# Patient Record
Sex: FEMALE | Race: BLACK OR AFRICAN AMERICAN | NOT HISPANIC OR LATINO | Employment: OTHER | RURAL
[De-identification: names, ages, dates, MRNs, and addresses within clinical notes are randomized per-mention and may not be internally consistent; named-entity substitution may affect disease eponyms.]

---

## 2017-12-07 ENCOUNTER — HISTORICAL (OUTPATIENT)
Dept: ADMINISTRATIVE | Facility: HOSPITAL | Age: 67
End: 2017-12-07

## 2017-12-12 LAB
LAB AP CLINICAL INFORMATION: NORMAL
LAB AP COMMENTS: NORMAL
LAB AP GENERAL CAT - HISTORICAL: NORMAL
LAB AP INTERPRETATION/RESULT - HISTORICAL: NEGATIVE
LAB AP SPECIMEN ADEQUACY - HISTORICAL: NORMAL
LAB AP SPECIMEN SUBMITTED - HISTORICAL: NORMAL

## 2018-03-14 ENCOUNTER — HISTORICAL (OUTPATIENT)
Dept: ADMINISTRATIVE | Facility: HOSPITAL | Age: 68
End: 2018-03-14

## 2018-03-15 LAB
LAB AP CLINICAL INFORMATION: NORMAL
LAB AP COMMENTS: NORMAL
LAB AP DIAGNOSIS - HISTORICAL: NORMAL
LAB AP GROSS PATHOLOGY - HISTORICAL: NORMAL
LAB AP SPECIMEN SUBMITTED - HISTORICAL: NORMAL

## 2020-04-18 ENCOUNTER — HISTORICAL (OUTPATIENT)
Dept: ADMINISTRATIVE | Facility: HOSPITAL | Age: 70
End: 2020-04-18

## 2020-04-18 LAB
ALBUMIN SERPL BCP-MCNC: 3.8 G/DL (ref 3.4–5)
ALBUMIN/GLOB SERPL: 1 {RATIO}
ALP SERPL-CCNC: 63 U/L (ref 50–136)
ALT SERPL W P-5'-P-CCNC: 18 U/L (ref 12–78)
AST SERPL W P-5'-P-CCNC: 14 U/L (ref 15–37)
BACTERIA #/AREA URNS HPF: ABNORMAL /HPF
BASOPHILS # BLD AUTO: 0.04 X10E3/UL (ref 0–0.2)
BASOPHILS NFR BLD AUTO: 0.3 % (ref 0–1)
BILIRUB SERPL-MCNC: 0.4 MG/DL (ref 0.2–1)
BILIRUB UR QL STRIP: NEGATIVE MG/DL
BUN SERPL-MCNC: 50 MG/DL (ref 7–18)
CALCIUM SERPL-MCNC: 9.3 MG/DL (ref 8.5–10.1)
CHLORIDE SERPL-SCNC: 102 MMOL/L (ref 101–111)
CLARITY UR: ABNORMAL
CO2 SERPL-SCNC: 27 MMOL/L (ref 21–32)
COLOR UR: YELLOW
CREAT SERPL-MCNC: 1.9 MG/DL (ref 0.6–1.3)
EOSINOPHIL # BLD AUTO: 0.13 X10E3/UL (ref 0–0.5)
EOSINOPHIL NFR BLD AUTO: 1.1 % (ref 1–4)
ERYTHROCYTE [DISTWIDTH] IN BLOOD BY AUTOMATED COUNT: 15.3 % (ref 11.5–14.5)
GLOBULIN SER-MCNC: 3.4 G/DL
GLUCOSE SERPL-MCNC: 154 MG/DL (ref 74–106)
GLUCOSE UR STRIP-MCNC: NORMAL MG/DL
HCT VFR BLD AUTO: 35.3 % (ref 38–47)
HGB BLD-MCNC: 11.6 G/DL (ref 12–16)
KETONES UR STRIP-SCNC: NEGATIVE MG/DL
LEUKOCYTE ESTERASE UR QL STRIP: ABNORMAL LEU/UL
LYMPHOCYTES # BLD AUTO: 1.31 X10E3/UL (ref 1–4.8)
LYMPHOCYTES NFR BLD AUTO: 11.4 % (ref 27–41)
MAGNESIUM SERPL-MCNC: 1.8 MG/DL (ref 1.8–2.4)
MCH RBC QN AUTO: 25.6 PG (ref 27–31)
MCHC RBC AUTO-ENTMCNC: 32.9 G/DL (ref 32–36)
MCV RBC AUTO: 78 FL (ref 80–96)
MONOCYTES # BLD AUTO: 1.21 X10E3/UL (ref 0–0.8)
MONOCYTES NFR BLD AUTO: 10.6 % (ref 2–6)
MPC BLD CALC-MCNC: 10.7 FL (ref 9.4–12.4)
MUCOUS THREADS #/AREA URNS HPF: ABNORMAL /HPF
NEUTROPHILS # BLD AUTO: 8.77 X10E3/UL (ref 1.8–7.7)
NEUTROPHILS NFR BLD AUTO: 76.6 % (ref 53–65)
NITRITE UR QL STRIP: NEGATIVE
PH UR STRIP: 6 PH UNITS (ref 5–8)
PHOSPHATE SERPL-MCNC: 3 MG/DL (ref 2.5–4.9)
PLATELET # BLD AUTO: 218 X10E3/UL (ref 150–400)
POTASSIUM SERPL-SCNC: 4.3 MMOL/L (ref 3.6–5)
PROT SERPL-MCNC: 7.2 G/DL (ref 6.4–8.2)
PROT UR QL STRIP: NEGATIVE MG/DL
RBC # BLD AUTO: 4.53 X10E6/UL (ref 4.2–5.4)
RBC # UR STRIP: ABNORMAL ERY/UL
RBC #/AREA URNS HPF: ABNORMAL /HPF (ref 0–3)
RENAL EPI CELLS #/AREA URNS LPF: ABNORMAL /LPF
SODIUM SERPL-SCNC: 140 MMOL/L (ref 135–145)
SP GR UR STRIP: 1.01 (ref 1–1.03)
SQUAMOUS #/AREA URNS LPF: ABNORMAL /LPF
UROBILINOGEN UR STRIP-ACNC: 0.2 MG/DL
WBC # BLD AUTO: 11.46 X10E3/UL (ref 4.5–11)
WBC #/AREA URNS HPF: ABNORMAL /HPF (ref 0–5)

## 2020-04-20 LAB
REPORT: 38
REPORT: NORMAL

## 2020-04-21 LAB — TACROLIMUS TROUGH BLD-MCNC: 2 NG/ML

## 2020-07-15 ENCOUNTER — HISTORICAL (OUTPATIENT)
Dept: ADMINISTRATIVE | Facility: HOSPITAL | Age: 70
End: 2020-07-15

## 2021-01-14 ENCOUNTER — HISTORICAL (OUTPATIENT)
Dept: ADMINISTRATIVE | Facility: HOSPITAL | Age: 71
End: 2021-01-14

## 2021-01-14 LAB
ALBUMIN SERPL BCP-MCNC: 4.3 G/DL (ref 3.5–5)
ALBUMIN/GLOB SERPL: 1.3 {RATIO}
ALP SERPL-CCNC: 70 U/L (ref 55–142)
ALT SERPL W P-5'-P-CCNC: 19 U/L (ref 13–56)
ANION GAP SERPL CALCULATED.3IONS-SCNC: 11 MMOL/L (ref 7–16)
AST SERPL W P-5'-P-CCNC: 16 U/L (ref 15–37)
BASOPHILS # BLD AUTO: 0.03 X10E3/UL (ref 0–0.2)
BASOPHILS NFR BLD AUTO: 0.3 % (ref 0–1)
BILIRUB SERPL-MCNC: 0.3 MG/DL (ref 0–1.2)
BUN SERPL-MCNC: 52 MG/DL (ref 7–18)
BUN/CREAT SERPL: 26
CALCIUM SERPL-MCNC: 9.9 MG/DL (ref 8.5–10.1)
CHLORIDE SERPL-SCNC: 102 MMOL/L (ref 98–107)
CO2 SERPL-SCNC: 30 MMOL/L (ref 21–32)
CREAT SERPL-MCNC: 2.03 MG/DL (ref 0.5–1.02)
EOSINOPHIL # BLD AUTO: 0.11 X10E3/UL (ref 0–0.5)
EOSINOPHIL NFR BLD AUTO: 1.2 % (ref 1–4)
ERYTHROCYTE [DISTWIDTH] IN BLOOD BY AUTOMATED COUNT: 17.4 % (ref 11.5–14.5)
GLOBULIN SER-MCNC: 3.4 G/DL (ref 2–4)
GLUCOSE SERPL-MCNC: 64 MG/DL (ref 74–106)
HCT VFR BLD AUTO: 37.9 % (ref 38–47)
HGB BLD-MCNC: 11.5 G/DL (ref 12–16)
IMM GRANULOCYTES # BLD AUTO: 0.02 X10E3/UL (ref 0–0.04)
IMM GRANULOCYTES NFR BLD: 0.2 % (ref 0–0.4)
LYMPHOCYTES # BLD AUTO: 1.15 X10E3/UL (ref 1–4.8)
LYMPHOCYTES NFR BLD AUTO: 12.7 % (ref 27–41)
MAGNESIUM SERPL-MCNC: 2.5 MG/DL (ref 1.7–2.3)
MCH RBC QN AUTO: 25.4 PG (ref 27–31)
MCHC RBC AUTO-ENTMCNC: 30.3 G/DL (ref 32–36)
MCV RBC AUTO: 83.8 FL (ref 80–96)
MONOCYTES # BLD AUTO: 0.87 X10E3/UL (ref 0–0.8)
MONOCYTES NFR BLD AUTO: 9.6 % (ref 2–6)
MPC BLD CALC-MCNC: 11 FL (ref 9.4–12.4)
NEUTROPHILS # BLD AUTO: 6.87 X10E3/UL (ref 1.8–7.7)
NEUTROPHILS NFR BLD AUTO: 76 % (ref 53–65)
NRBC # BLD AUTO: 0 X10E3/UL (ref 0–0)
NRBC, AUTO (.00): 0 /100 (ref 0–0)
PHOSPHATE SERPL-MCNC: 3.8 MG/DL (ref 2.5–4.5)
PLATELET # BLD AUTO: 209 X10E3/UL (ref 150–400)
POTASSIUM SERPL-SCNC: 4 MMOL/L (ref 3.5–5.1)
PROT SERPL-MCNC: 7.7 G/DL (ref 6.4–8.2)
RBC # BLD AUTO: 4.52 X10E6/UL (ref 4.2–5.4)
SODIUM SERPL-SCNC: 139 MMOL/L (ref 136–145)
WBC # BLD AUTO: 9.05 X10E3/UL (ref 4.5–11)

## 2021-01-16 LAB — TACROLIMUS TROUGH BLD-MCNC: 3.6 NG/ML

## 2021-02-22 ENCOUNTER — HISTORICAL (OUTPATIENT)
Dept: ADMINISTRATIVE | Facility: HOSPITAL | Age: 71
End: 2021-02-22

## 2021-06-22 RX ORDER — FUROSEMIDE 80 MG/1
80 TABLET ORAL 2 TIMES DAILY
COMMUNITY
End: 2021-06-22 | Stop reason: SDUPTHER

## 2021-06-22 RX ORDER — NIFEDIPINE 30 MG/1
30 TABLET, EXTENDED RELEASE ORAL DAILY
Qty: 90 TABLET | Refills: 1 | Status: SHIPPED | OUTPATIENT
Start: 2021-06-22 | End: 2021-08-18

## 2021-06-22 RX ORDER — ISOSORBIDE MONONITRATE 60 MG/1
60 TABLET, EXTENDED RELEASE ORAL NIGHTLY
COMMUNITY
End: 2021-06-22 | Stop reason: SDUPTHER

## 2021-06-22 RX ORDER — NIFEDIPINE 30 MG/1
30 TABLET, EXTENDED RELEASE ORAL DAILY
COMMUNITY
End: 2021-06-22 | Stop reason: SDUPTHER

## 2021-06-22 RX ORDER — ISOSORBIDE MONONITRATE 60 MG/1
60 TABLET, EXTENDED RELEASE ORAL NIGHTLY
Qty: 90 TABLET | Refills: 1 | Status: SHIPPED | OUTPATIENT
Start: 2021-06-22 | End: 2021-08-18

## 2021-06-22 RX ORDER — FUROSEMIDE 80 MG/1
80 TABLET ORAL 2 TIMES DAILY
Qty: 30 TABLET | Refills: 3 | Status: SHIPPED | OUTPATIENT
Start: 2021-06-22 | End: 2021-08-18

## 2021-07-22 ENCOUNTER — OFFICE VISIT (OUTPATIENT)
Dept: FAMILY MEDICINE | Facility: CLINIC | Age: 71
End: 2021-07-22
Payer: COMMERCIAL

## 2021-07-22 VITALS
HEIGHT: 64 IN | RESPIRATION RATE: 20 BRPM | BODY MASS INDEX: 30.08 KG/M2 | TEMPERATURE: 98 F | OXYGEN SATURATION: 99 % | DIASTOLIC BLOOD PRESSURE: 70 MMHG | SYSTOLIC BLOOD PRESSURE: 140 MMHG | WEIGHT: 176.19 LBS | HEART RATE: 69 BPM

## 2021-07-22 DIAGNOSIS — Z12.31 ENCOUNTER FOR SCREENING MAMMOGRAM FOR MALIGNANT NEOPLASM OF BREAST: ICD-10-CM

## 2021-07-22 DIAGNOSIS — Z78.0 ASYMPTOMATIC MENOPAUSAL STATE: ICD-10-CM

## 2021-07-22 DIAGNOSIS — Z00.00 ENCOUNTER FOR SUBSEQUENT ANNUAL WELLNESS VISIT (AWV) IN MEDICARE PATIENT: Primary | ICD-10-CM

## 2021-07-22 PROCEDURE — 1036F TOBACCO NON-USER: CPT | Mod: ,,, | Performed by: FAMILY MEDICINE

## 2021-07-22 PROCEDURE — G0439 PR MEDICARE ANNUAL WELLNESS SUBSEQUENT VISIT: ICD-10-PCS | Mod: ,,, | Performed by: FAMILY MEDICINE

## 2021-07-22 PROCEDURE — G0439 PPPS, SUBSEQ VISIT: HCPCS | Mod: ,,, | Performed by: FAMILY MEDICINE

## 2021-07-22 PROCEDURE — 3048F PR MOST RECENT LDL-C < 100 MG/DL: ICD-10-PCS | Mod: ,,, | Performed by: FAMILY MEDICINE

## 2021-07-22 PROCEDURE — 1036F PR CURRENT TOBACCO NON-USER: ICD-10-PCS | Mod: ,,, | Performed by: FAMILY MEDICINE

## 2021-07-22 PROCEDURE — 3048F LDL-C <100 MG/DL: CPT | Mod: ,,, | Performed by: FAMILY MEDICINE

## 2021-07-22 RX ORDER — MYCOPHENOLIC ACID 180 MG/1
180 TABLET, DELAYED RELEASE ORAL 2 TIMES DAILY
COMMUNITY
End: 2024-02-14

## 2021-07-22 RX ORDER — NAPROXEN SODIUM 220 MG/1
81 TABLET, FILM COATED ORAL
COMMUNITY
End: 2021-08-18 | Stop reason: SDUPTHER

## 2021-07-22 RX ORDER — TAMSULOSIN HYDROCHLORIDE 0.4 MG/1
0.4 CAPSULE ORAL DAILY
COMMUNITY
End: 2021-08-18 | Stop reason: SDUPTHER

## 2021-07-22 RX ORDER — TACROLIMUS 1 MG/1
1 CAPSULE ORAL DAILY
COMMUNITY
Start: 2021-07-16 | End: 2023-11-27

## 2021-07-22 RX ORDER — AMLODIPINE BESYLATE 10 MG/1
10 TABLET ORAL
COMMUNITY
End: 2021-08-18 | Stop reason: SDUPTHER

## 2021-07-22 RX ORDER — CARVEDILOL 25 MG/1
25 TABLET ORAL 2 TIMES DAILY
COMMUNITY
Start: 2021-04-08 | End: 2021-08-18 | Stop reason: SDUPTHER

## 2021-07-22 RX ORDER — INSULIN ASPART 100 [IU]/ML
9 INJECTION, SOLUTION INTRAVENOUS; SUBCUTANEOUS 3 TIMES DAILY
COMMUNITY
Start: 2021-01-22 | End: 2021-08-18 | Stop reason: SDUPTHER

## 2021-07-22 RX ORDER — PREDNISONE 5 MG/1
5 TABLET ORAL DAILY
COMMUNITY
End: 2023-11-27

## 2021-07-22 RX ORDER — INSULIN DEGLUDEC 100 U/ML
20 INJECTION, SOLUTION SUBCUTANEOUS DAILY
COMMUNITY
End: 2021-08-18 | Stop reason: SDUPTHER

## 2021-08-18 ENCOUNTER — OFFICE VISIT (OUTPATIENT)
Dept: FAMILY MEDICINE | Facility: CLINIC | Age: 71
End: 2021-08-18
Payer: COMMERCIAL

## 2021-08-18 VITALS
DIASTOLIC BLOOD PRESSURE: 90 MMHG | BODY MASS INDEX: 30.02 KG/M2 | HEIGHT: 64 IN | TEMPERATURE: 99 F | RESPIRATION RATE: 20 BRPM | SYSTOLIC BLOOD PRESSURE: 200 MMHG | WEIGHT: 175.81 LBS | OXYGEN SATURATION: 100 % | HEART RATE: 86 BPM

## 2021-08-18 DIAGNOSIS — E11.21 TYPE 2 DIABETES MELLITUS WITH DIABETIC NEPHROPATHY, WITH LONG-TERM CURRENT USE OF INSULIN: ICD-10-CM

## 2021-08-18 DIAGNOSIS — I10 ESSENTIAL HYPERTENSION: Primary | ICD-10-CM

## 2021-08-18 DIAGNOSIS — E78.2 MIXED HYPERLIPIDEMIA: ICD-10-CM

## 2021-08-18 DIAGNOSIS — H66.93 BILATERAL OTITIS MEDIA, UNSPECIFIED OTITIS MEDIA TYPE: ICD-10-CM

## 2021-08-18 DIAGNOSIS — Z20.822 EXPOSURE TO COVID-19 VIRUS: ICD-10-CM

## 2021-08-18 DIAGNOSIS — Z79.4 TYPE 2 DIABETES MELLITUS WITH DIABETIC NEPHROPATHY, WITH LONG-TERM CURRENT USE OF INSULIN: ICD-10-CM

## 2021-08-18 PROBLEM — M65.311 TRIGGER THUMB OF RIGHT HAND: Status: ACTIVE | Noted: 2018-11-15

## 2021-08-18 PROBLEM — G03.9 MENINGITIS: Status: ACTIVE | Noted: 2018-09-26

## 2021-08-18 PROBLEM — G56.01 CARPAL TUNNEL SYNDROME OF RIGHT WRIST: Status: ACTIVE | Noted: 2018-11-15

## 2021-08-18 PROBLEM — G93.2 ELEVATED INTRACRANIAL PRESSURE: Status: ACTIVE | Noted: 2020-12-11

## 2021-08-18 PROBLEM — R19.7 ACUTE DIARRHEA: Status: ACTIVE | Noted: 2018-09-12

## 2021-08-18 PROBLEM — S98.132A: Status: ACTIVE | Noted: 2021-02-26

## 2021-08-18 PROBLEM — H53.8 BLURRY VISION: Status: ACTIVE | Noted: 2018-09-24

## 2021-08-18 PROBLEM — I20.89 ANGINA OF EFFORT: Status: ACTIVE | Noted: 2020-09-30

## 2021-08-18 PROBLEM — G56.21 CUBITAL TUNNEL SYNDROME, RIGHT: Status: ACTIVE | Noted: 2018-11-15

## 2021-08-18 PROBLEM — I25.10 CORONARY ARTERY DISEASE INVOLVING NATIVE CORONARY ARTERY OF NATIVE HEART WITHOUT ANGINA PECTORIS: Status: ACTIVE | Noted: 2017-04-17

## 2021-08-18 PROBLEM — B45.9 CRYPTOCOCCOSIS: Status: ACTIVE | Noted: 2017-11-21

## 2021-08-18 PROBLEM — M54.16 LUMBAR RADICULOPATHY: Status: ACTIVE | Noted: 2018-11-29

## 2021-08-18 PROBLEM — E11.3553 STABLE PROLIFERATIVE DIABETIC RETINOPATHY OF BOTH EYES ASSOCIATED WITH TYPE 2 DIABETES MELLITUS: Status: ACTIVE | Noted: 2019-12-09

## 2021-08-18 PROBLEM — G62.9 NEUROPATHY: Status: ACTIVE | Noted: 2021-02-26

## 2021-08-18 PROBLEM — R04.2 MASSIVE HEMOPTYSIS: Status: ACTIVE | Noted: 2017-10-18

## 2021-08-18 PROBLEM — J80 ARDS (ADULT RESPIRATORY DISTRESS SYNDROME): Status: ACTIVE | Noted: 2017-10-18

## 2021-08-18 PROBLEM — R74.01 TRANSAMINITIS: Status: ACTIVE | Noted: 2017-10-03

## 2021-08-18 PROBLEM — E11.42 POLYNEUROPATHY DUE TO TYPE 2 DIABETES MELLITUS: Status: ACTIVE | Noted: 2018-11-29

## 2021-08-18 PROBLEM — Z96.1 PSEUDOPHAKIA, BOTH EYES: Status: ACTIVE | Noted: 2019-12-09

## 2021-08-18 PROBLEM — R91.8 PULMONARY NODULES: Status: ACTIVE | Noted: 2017-06-27

## 2021-08-18 LAB
CTP QC/QA: YES
SARS-COV-2 AG RESP QL IA.RAPID: NEGATIVE

## 2021-08-18 PROCEDURE — 99214 PR OFFICE/OUTPT VISIT, EST, LEVL IV, 30-39 MIN: ICD-10-PCS | Mod: ,,, | Performed by: FAMILY MEDICINE

## 2021-08-18 PROCEDURE — 87426 SARSCOV CORONAVIRUS AG IA: CPT | Mod: QW,,, | Performed by: FAMILY MEDICINE

## 2021-08-18 PROCEDURE — 99214 OFFICE O/P EST MOD 30 MIN: CPT | Mod: ,,, | Performed by: FAMILY MEDICINE

## 2021-08-18 PROCEDURE — 87426 SARS CORONAVIRUS 2 ANTIGEN POCT: ICD-10-PCS | Mod: QW,,, | Performed by: FAMILY MEDICINE

## 2021-08-18 RX ORDER — PRAVASTATIN SODIUM 20 MG/1
20 TABLET ORAL DAILY
Qty: 90 TABLET | Refills: 3 | Status: SHIPPED | OUTPATIENT
Start: 2021-08-18 | End: 2021-08-18

## 2021-08-18 RX ORDER — CARVEDILOL 25 MG/1
25 TABLET ORAL 2 TIMES DAILY
Qty: 180 TABLET | Refills: 1 | Status: SHIPPED | OUTPATIENT
Start: 2021-08-18 | End: 2021-08-18 | Stop reason: SDUPTHER

## 2021-08-18 RX ORDER — INSULIN ASPART 100 [IU]/ML
9 INJECTION, SOLUTION INTRAVENOUS; SUBCUTANEOUS
Qty: 24.3 ML | Refills: 1 | Status: SHIPPED | OUTPATIENT
Start: 2021-08-18 | End: 2021-09-21

## 2021-08-18 RX ORDER — NAPROXEN SODIUM 220 MG/1
81 TABLET, FILM COATED ORAL DAILY
Qty: 90 TABLET | Refills: 3 | Status: SHIPPED | OUTPATIENT
Start: 2021-08-18 | End: 2022-08-25

## 2021-08-18 RX ORDER — INSULIN DEGLUDEC 100 U/ML
20 INJECTION, SOLUTION SUBCUTANEOUS DAILY
Qty: 6 PEN | Refills: 1 | Status: SHIPPED | OUTPATIENT
Start: 2021-08-18 | End: 2021-08-18 | Stop reason: SDUPTHER

## 2021-08-18 RX ORDER — CIPROFLOXACIN AND DEXAMETHASONE 3; 1 MG/ML; MG/ML
4 SUSPENSION/ DROPS AURICULAR (OTIC) 2 TIMES DAILY
Qty: 7.5 ML | Refills: 0 | Status: SHIPPED | OUTPATIENT
Start: 2021-08-18 | End: 2021-09-10

## 2021-08-18 RX ORDER — NITROGLYCERIN 0.4 MG/1
0.4 TABLET SUBLINGUAL EVERY 5 MIN PRN
Qty: 30 TABLET | Refills: 3 | Status: SHIPPED | OUTPATIENT
Start: 2021-08-18 | End: 2021-08-18

## 2021-08-18 RX ORDER — INSULIN DEGLUDEC 100 U/ML
20 INJECTION, SOLUTION SUBCUTANEOUS DAILY
Qty: 6 PEN | Refills: 1 | Status: SHIPPED | OUTPATIENT
Start: 2021-08-18 | End: 2021-08-18

## 2021-08-18 RX ORDER — CARVEDILOL 25 MG/1
25 TABLET ORAL 2 TIMES DAILY
Qty: 180 TABLET | Refills: 1 | Status: SHIPPED | OUTPATIENT
Start: 2021-08-18 | End: 2021-08-18

## 2021-08-18 RX ORDER — CARVEDILOL 25 MG/1
25 TABLET ORAL 2 TIMES DAILY
Qty: 180 TABLET | Refills: 1 | Status: SHIPPED | OUTPATIENT
Start: 2021-08-18 | End: 2022-03-23

## 2021-08-18 RX ORDER — AMLODIPINE BESYLATE 10 MG/1
10 TABLET ORAL DAILY
Qty: 90 TABLET | Refills: 1 | Status: SHIPPED | OUTPATIENT
Start: 2021-08-18 | End: 2022-04-19

## 2021-08-18 RX ORDER — TAMSULOSIN HYDROCHLORIDE 0.4 MG/1
0.4 CAPSULE ORAL DAILY
Qty: 90 CAPSULE | Refills: 3 | Status: SHIPPED | OUTPATIENT
Start: 2021-08-18 | End: 2022-01-19

## 2021-08-18 RX ORDER — FUROSEMIDE 40 MG/1
40 TABLET ORAL 2 TIMES DAILY
Qty: 180 TABLET | Refills: 3 | Status: SHIPPED | OUTPATIENT
Start: 2021-08-18 | End: 2022-08-22

## 2021-08-18 RX ORDER — TAMSULOSIN HYDROCHLORIDE 0.4 MG/1
0.4 CAPSULE ORAL DAILY
Qty: 90 CAPSULE | Refills: 3 | Status: SHIPPED | OUTPATIENT
Start: 2021-08-18 | End: 2021-08-18

## 2021-08-18 RX ORDER — INSULIN ASPART 100 [IU]/ML
9 INJECTION, SOLUTION INTRAVENOUS; SUBCUTANEOUS
Qty: 24.3 ML | Refills: 1 | Status: SHIPPED | OUTPATIENT
Start: 2021-08-18 | End: 2021-08-18 | Stop reason: SDUPTHER

## 2021-08-18 RX ORDER — NAPROXEN SODIUM 220 MG/1
81 TABLET, FILM COATED ORAL DAILY
Qty: 90 TABLET | Refills: 3 | Status: SHIPPED | OUTPATIENT
Start: 2021-08-18 | End: 2021-08-18

## 2021-08-18 RX ORDER — AMLODIPINE BESYLATE 10 MG/1
10 TABLET ORAL DAILY
Qty: 90 TABLET | Refills: 1 | Status: SHIPPED | OUTPATIENT
Start: 2021-08-18 | End: 2021-08-18

## 2021-08-18 RX ORDER — NITROGLYCERIN 0.4 MG/1
0.4 TABLET SUBLINGUAL EVERY 5 MIN PRN
Qty: 30 TABLET | Refills: 3 | Status: SHIPPED | OUTPATIENT
Start: 2021-08-18 | End: 2023-09-13

## 2021-08-18 RX ORDER — FUROSEMIDE 40 MG/1
40 TABLET ORAL 2 TIMES DAILY
Qty: 180 TABLET | Refills: 3 | Status: SHIPPED | OUTPATIENT
Start: 2021-08-18 | End: 2021-08-18

## 2021-08-18 RX ORDER — INSULIN DEGLUDEC 100 U/ML
20 INJECTION, SOLUTION SUBCUTANEOUS DAILY
Qty: 6 PEN | Refills: 1 | Status: SHIPPED | OUTPATIENT
Start: 2021-08-18 | End: 2021-09-21

## 2021-08-18 RX ORDER — AMLODIPINE BESYLATE 10 MG/1
10 TABLET ORAL DAILY
Qty: 90 TABLET | Refills: 1 | Status: SHIPPED | OUTPATIENT
Start: 2021-08-18 | End: 2021-08-18 | Stop reason: SDUPTHER

## 2021-08-18 RX ORDER — PRAVASTATIN SODIUM 20 MG/1
20 TABLET ORAL DAILY
COMMUNITY
End: 2021-08-18 | Stop reason: SDUPTHER

## 2021-08-18 RX ORDER — IBUPROFEN 800 MG/1
800 TABLET ORAL EVERY 8 HOURS PRN
COMMUNITY
End: 2021-08-18

## 2021-08-18 RX ORDER — INSULIN ASPART 100 [IU]/ML
9 INJECTION, SOLUTION INTRAVENOUS; SUBCUTANEOUS
Qty: 24.3 ML | Refills: 1 | Status: SHIPPED | OUTPATIENT
Start: 2021-08-18 | End: 2021-08-18

## 2021-08-18 RX ORDER — NITROGLYCERIN 0.4 MG/1
0.4 TABLET SUBLINGUAL EVERY 5 MIN PRN
COMMUNITY
End: 2021-08-18 | Stop reason: SDUPTHER

## 2021-08-18 RX ORDER — FUROSEMIDE 40 MG/1
40 TABLET ORAL 2 TIMES DAILY
COMMUNITY
End: 2021-08-18 | Stop reason: SDUPTHER

## 2021-08-18 RX ORDER — PRAVASTATIN SODIUM 20 MG/1
20 TABLET ORAL DAILY
Qty: 90 TABLET | Refills: 3 | Status: SHIPPED | OUTPATIENT
Start: 2021-08-18 | End: 2022-03-28 | Stop reason: SDUPTHER

## 2021-08-18 RX ORDER — AMOXICILLIN AND CLAVULANATE POTASSIUM 500; 125 MG/1; MG/1
1 TABLET, FILM COATED ORAL 2 TIMES DAILY
Qty: 14 TABLET | Refills: 0 | Status: SHIPPED | OUTPATIENT
Start: 2021-08-18 | End: 2021-08-25

## 2021-08-18 RX ORDER — CLONIDINE HYDROCHLORIDE 0.2 MG/1
0.2 TABLET ORAL
Status: COMPLETED | OUTPATIENT
Start: 2021-08-18 | End: 2021-08-18

## 2021-08-18 RX ADMIN — CLONIDINE HYDROCHLORIDE 0.2 MG: 0.2 TABLET ORAL at 02:08

## 2021-09-09 ENCOUNTER — HOSPITAL ENCOUNTER (OUTPATIENT)
Dept: RADIOLOGY | Facility: HOSPITAL | Age: 71
Discharge: HOME | End: 2021-09-09
Attending: FAMILY MEDICINE
Payer: COMMERCIAL

## 2021-09-09 VITALS — WEIGHT: 175 LBS | HEIGHT: 64 IN | BODY MASS INDEX: 29.88 KG/M2

## 2021-09-09 DIAGNOSIS — Z12.31 ENCOUNTER FOR SCREENING MAMMOGRAM FOR MALIGNANT NEOPLASM OF BREAST: ICD-10-CM

## 2021-09-09 DIAGNOSIS — Z78.0 ASYMPTOMATIC MENOPAUSAL STATE: ICD-10-CM

## 2021-09-09 DIAGNOSIS — M81.0 AGE RELATED OSTEOPOROSIS, UNSPECIFIED PATHOLOGICAL FRACTURE PRESENCE: Primary | ICD-10-CM

## 2021-09-09 PROCEDURE — 77067 MAMMO DIGITAL SCREENING BILAT: ICD-10-PCS | Mod: 26,,, | Performed by: RADIOLOGY

## 2021-09-09 PROCEDURE — 77067 SCR MAMMO BI INCL CAD: CPT | Mod: TC

## 2021-09-09 PROCEDURE — 77080 DXA BONE DENSITY AXIAL: CPT | Mod: TC

## 2021-09-09 PROCEDURE — 77067 SCR MAMMO BI INCL CAD: CPT | Mod: 26,,, | Performed by: RADIOLOGY

## 2021-09-09 RX ORDER — ALENDRONATE SODIUM 70 MG/1
70 TABLET ORAL
Qty: 4 TABLET | Refills: 11 | Status: SHIPPED | OUTPATIENT
Start: 2021-09-09 | End: 2022-07-18

## 2021-09-10 ENCOUNTER — TELEPHONE (OUTPATIENT)
Dept: FAMILY MEDICINE | Facility: CLINIC | Age: 71
End: 2021-09-10

## 2021-09-10 ENCOUNTER — OFFICE VISIT (OUTPATIENT)
Dept: FAMILY MEDICINE | Facility: CLINIC | Age: 71
End: 2021-09-10
Payer: COMMERCIAL

## 2021-09-10 VITALS
HEART RATE: 62 BPM | WEIGHT: 177 LBS | OXYGEN SATURATION: 97 % | SYSTOLIC BLOOD PRESSURE: 136 MMHG | HEIGHT: 64 IN | RESPIRATION RATE: 18 BRPM | TEMPERATURE: 97 F | DIASTOLIC BLOOD PRESSURE: 62 MMHG | BODY MASS INDEX: 30.22 KG/M2

## 2021-09-10 DIAGNOSIS — H60.91 OTITIS EXTERNA OF RIGHT EAR, UNSPECIFIED CHRONICITY, UNSPECIFIED TYPE: Primary | ICD-10-CM

## 2021-09-10 DIAGNOSIS — H92.01 RIGHT EAR PAIN: ICD-10-CM

## 2021-09-10 PROCEDURE — 99214 PR OFFICE/OUTPT VISIT, EST, LEVL IV, 30-39 MIN: ICD-10-PCS | Mod: ,,, | Performed by: NURSE PRACTITIONER

## 2021-09-10 PROCEDURE — 99214 OFFICE O/P EST MOD 30 MIN: CPT | Mod: ,,, | Performed by: NURSE PRACTITIONER

## 2021-09-10 RX ORDER — ONDANSETRON 4 MG/1
4 TABLET, ORALLY DISINTEGRATING ORAL ONCE
COMMUNITY
End: 2022-06-13 | Stop reason: SDUPTHER

## 2021-09-10 RX ORDER — CIPROFLOXACIN AND DEXAMETHASONE 3; 1 MG/ML; MG/ML
4 SUSPENSION/ DROPS AURICULAR (OTIC) 2 TIMES DAILY
Qty: 7.5 ML | Refills: 1 | Status: SHIPPED | OUTPATIENT
Start: 2021-09-10 | End: 2022-01-19

## 2021-09-10 RX ORDER — AMOXICILLIN 500 MG/1
500 TABLET, FILM COATED ORAL EVERY 12 HOURS
Qty: 20 TABLET | Refills: 0 | Status: SHIPPED | OUTPATIENT
Start: 2021-09-10 | End: 2021-09-20

## 2021-09-17 ENCOUNTER — OFFICE VISIT (OUTPATIENT)
Dept: FAMILY MEDICINE | Facility: CLINIC | Age: 71
End: 2021-09-17
Payer: COMMERCIAL

## 2021-09-17 VITALS
WEIGHT: 176 LBS | SYSTOLIC BLOOD PRESSURE: 140 MMHG | OXYGEN SATURATION: 96 % | TEMPERATURE: 98 F | RESPIRATION RATE: 18 BRPM | DIASTOLIC BLOOD PRESSURE: 76 MMHG | HEART RATE: 80 BPM | BODY MASS INDEX: 30.05 KG/M2 | HEIGHT: 64 IN

## 2021-09-17 DIAGNOSIS — H60.91 OTITIS EXTERNA OF RIGHT EAR, UNSPECIFIED CHRONICITY, UNSPECIFIED TYPE: Primary | ICD-10-CM

## 2021-09-17 DIAGNOSIS — H92.09 OTALGIA, UNSPECIFIED LATERALITY: ICD-10-CM

## 2021-09-17 PROCEDURE — 99213 OFFICE O/P EST LOW 20 MIN: CPT | Mod: ,,, | Performed by: NURSE PRACTITIONER

## 2021-09-17 PROCEDURE — 99213 PR OFFICE/OUTPT VISIT, EST, LEVL III, 20-29 MIN: ICD-10-PCS | Mod: ,,, | Performed by: NURSE PRACTITIONER

## 2021-09-21 ENCOUNTER — OFFICE VISIT (OUTPATIENT)
Dept: FAMILY MEDICINE | Facility: CLINIC | Age: 71
End: 2021-09-21
Payer: COMMERCIAL

## 2021-09-21 VITALS
SYSTOLIC BLOOD PRESSURE: 176 MMHG | WEIGHT: 180 LBS | RESPIRATION RATE: 20 BRPM | OXYGEN SATURATION: 100 % | BODY MASS INDEX: 30.73 KG/M2 | HEART RATE: 74 BPM | TEMPERATURE: 98 F | DIASTOLIC BLOOD PRESSURE: 72 MMHG | HEIGHT: 64 IN

## 2021-09-21 DIAGNOSIS — F32.A DEPRESSION, UNSPECIFIED DEPRESSION TYPE: ICD-10-CM

## 2021-09-21 DIAGNOSIS — G89.29 CHRONIC BILATERAL LOW BACK PAIN WITH BILATERAL SCIATICA: ICD-10-CM

## 2021-09-21 DIAGNOSIS — E11.21 TYPE 2 DIABETES MELLITUS WITH DIABETIC NEPHROPATHY, WITH LONG-TERM CURRENT USE OF INSULIN: ICD-10-CM

## 2021-09-21 DIAGNOSIS — M54.41 CHRONIC BILATERAL LOW BACK PAIN WITH BILATERAL SCIATICA: ICD-10-CM

## 2021-09-21 DIAGNOSIS — Z79.4 TYPE 2 DIABETES MELLITUS WITH DIABETIC NEPHROPATHY, WITH LONG-TERM CURRENT USE OF INSULIN: ICD-10-CM

## 2021-09-21 DIAGNOSIS — I10 ESSENTIAL HYPERTENSION: Primary | ICD-10-CM

## 2021-09-21 DIAGNOSIS — M54.42 CHRONIC BILATERAL LOW BACK PAIN WITH BILATERAL SCIATICA: ICD-10-CM

## 2021-09-21 LAB
EST. AVERAGE GLUCOSE BLD GHB EST-MCNC: 127 MG/DL
GLUCOSE SERPL-MCNC: 97 MG/DL (ref 70–110)
HBA1C MFR BLD HPLC: 6.4 % (ref 4.5–6.6)

## 2021-09-21 PROCEDURE — 82962 POCT GLUCOSE, HAND-HELD DEVICE: ICD-10-PCS | Mod: QW,,, | Performed by: FAMILY MEDICINE

## 2021-09-21 PROCEDURE — 99213 OFFICE O/P EST LOW 20 MIN: CPT | Mod: ,,, | Performed by: FAMILY MEDICINE

## 2021-09-21 PROCEDURE — 99213 PR OFFICE/OUTPT VISIT, EST, LEVL III, 20-29 MIN: ICD-10-PCS | Mod: ,,, | Performed by: FAMILY MEDICINE

## 2021-09-21 PROCEDURE — 83036 HEMOGLOBIN A1C: ICD-10-PCS | Mod: QW,,, | Performed by: CLINICAL MEDICAL LABORATORY

## 2021-09-21 PROCEDURE — 83036 HEMOGLOBIN GLYCOSYLATED A1C: CPT | Mod: QW,,, | Performed by: CLINICAL MEDICAL LABORATORY

## 2021-09-21 PROCEDURE — 82962 GLUCOSE BLOOD TEST: CPT | Mod: QW,,, | Performed by: FAMILY MEDICINE

## 2021-09-21 RX ORDER — INSULIN DEGLUDEC 100 U/ML
22 INJECTION, SOLUTION SUBCUTANEOUS DAILY
Qty: 7 PEN | Refills: 1 | Status: SHIPPED | OUTPATIENT
Start: 2021-09-21 | End: 2022-03-20

## 2021-09-21 RX ORDER — DULOXETIN HYDROCHLORIDE 20 MG/1
20 CAPSULE, DELAYED RELEASE ORAL DAILY
Qty: 90 CAPSULE | Refills: 3 | Status: SHIPPED | OUTPATIENT
Start: 2021-09-21 | End: 2022-01-19

## 2021-09-21 RX ORDER — INSULIN ASPART 100 [IU]/ML
5 INJECTION, SOLUTION INTRAVENOUS; SUBCUTANEOUS
Qty: 13.5 ML | Refills: 1 | Status: SHIPPED | OUTPATIENT
Start: 2021-09-21 | End: 2022-01-25

## 2021-10-08 ENCOUNTER — OFFICE VISIT (OUTPATIENT)
Dept: FAMILY MEDICINE | Facility: CLINIC | Age: 71
End: 2021-10-08
Payer: COMMERCIAL

## 2021-10-08 VITALS
BODY MASS INDEX: 30.73 KG/M2 | HEART RATE: 63 BPM | OXYGEN SATURATION: 99 % | HEIGHT: 64 IN | WEIGHT: 180 LBS | RESPIRATION RATE: 20 BRPM | TEMPERATURE: 98 F | SYSTOLIC BLOOD PRESSURE: 128 MMHG | DIASTOLIC BLOOD PRESSURE: 72 MMHG

## 2021-10-08 DIAGNOSIS — H66.91 RIGHT OTITIS MEDIA, UNSPECIFIED OTITIS MEDIA TYPE: Primary | ICD-10-CM

## 2021-10-08 DIAGNOSIS — H93.8X1 EAR CONGESTION, RIGHT: ICD-10-CM

## 2021-10-08 PROCEDURE — 99214 PR OFFICE/OUTPT VISIT, EST, LEVL IV, 30-39 MIN: ICD-10-PCS | Mod: ,,, | Performed by: NURSE PRACTITIONER

## 2021-10-08 PROCEDURE — 99214 OFFICE O/P EST MOD 30 MIN: CPT | Mod: ,,, | Performed by: NURSE PRACTITIONER

## 2021-10-08 RX ORDER — METHYLPREDNISOLONE 4 MG/1
TABLET ORAL
Qty: 1 PACKAGE | Refills: 0 | Status: SHIPPED | OUTPATIENT
Start: 2021-10-08 | End: 2021-10-29

## 2021-10-08 RX ORDER — CEFDINIR 300 MG/1
300 CAPSULE ORAL 2 TIMES DAILY
Qty: 20 CAPSULE | Refills: 0 | Status: SHIPPED | OUTPATIENT
Start: 2021-10-08 | End: 2021-10-18

## 2021-10-08 RX ORDER — DEXAMETHASONE SODIUM PHOSPHATE 1 MG/ML
1 SOLUTION/ DROPS OPHTHALMIC EVERY 4 HOURS
Qty: 5 ML | Refills: 1 | Status: SHIPPED | OUTPATIENT
Start: 2021-10-08 | End: 2021-10-18

## 2021-10-11 PROBLEM — H93.8X1 EAR CONGESTION, RIGHT: Status: ACTIVE | Noted: 2021-10-11

## 2021-10-11 PROBLEM — H66.91 RIGHT OTITIS MEDIA: Status: ACTIVE | Noted: 2021-10-11

## 2022-01-19 ENCOUNTER — OFFICE VISIT (OUTPATIENT)
Dept: FAMILY MEDICINE | Facility: CLINIC | Age: 72
End: 2022-01-19
Payer: COMMERCIAL

## 2022-01-19 VITALS
OXYGEN SATURATION: 99 % | TEMPERATURE: 98 F | HEIGHT: 64 IN | SYSTOLIC BLOOD PRESSURE: 161 MMHG | DIASTOLIC BLOOD PRESSURE: 38 MMHG | RESPIRATION RATE: 18 BRPM | HEART RATE: 68 BPM | WEIGHT: 184.38 LBS | BODY MASS INDEX: 31.48 KG/M2

## 2022-01-19 DIAGNOSIS — H65.116 RECURRENT SUBACUTE ALLERGIC OTITIS MEDIA OF BOTH EARS: ICD-10-CM

## 2022-01-19 DIAGNOSIS — I10 PRIMARY HYPERTENSION: ICD-10-CM

## 2022-01-19 DIAGNOSIS — M50.90 CERVICAL DISC DISEASE: Primary | ICD-10-CM

## 2022-01-19 DIAGNOSIS — Z94.0 RENAL TRANSPLANT RECIPIENT: ICD-10-CM

## 2022-01-19 PROBLEM — G89.29 CHRONIC INTRACTABLE HEADACHE: Status: ACTIVE | Noted: 2018-06-27

## 2022-01-19 PROBLEM — R51.9 CHRONIC INTRACTABLE HEADACHE: Status: ACTIVE | Noted: 2018-06-27

## 2022-01-19 PROCEDURE — 1101F PR PT FALLS ASSESS DOC 0-1 FALLS W/OUT INJ PAST YR: ICD-10-PCS | Mod: ,,, | Performed by: FAMILY MEDICINE

## 2022-01-19 PROCEDURE — 3008F BODY MASS INDEX DOCD: CPT | Mod: ,,, | Performed by: FAMILY MEDICINE

## 2022-01-19 PROCEDURE — 99213 OFFICE O/P EST LOW 20 MIN: CPT | Mod: ,,, | Performed by: FAMILY MEDICINE

## 2022-01-19 PROCEDURE — 4010F ACE/ARB THERAPY RXD/TAKEN: CPT | Mod: ,,, | Performed by: FAMILY MEDICINE

## 2022-01-19 PROCEDURE — 1159F MED LIST DOCD IN RCRD: CPT | Mod: ,,, | Performed by: FAMILY MEDICINE

## 2022-01-19 PROCEDURE — 1159F PR MEDICATION LIST DOCUMENTED IN MEDICAL RECORD: ICD-10-PCS | Mod: ,,, | Performed by: FAMILY MEDICINE

## 2022-01-19 PROCEDURE — 3077F SYST BP >= 140 MM HG: CPT | Mod: ,,, | Performed by: FAMILY MEDICINE

## 2022-01-19 PROCEDURE — 3078F DIAST BP <80 MM HG: CPT | Mod: ,,, | Performed by: FAMILY MEDICINE

## 2022-01-19 PROCEDURE — 1160F RVW MEDS BY RX/DR IN RCRD: CPT | Mod: ,,, | Performed by: FAMILY MEDICINE

## 2022-01-19 PROCEDURE — 3078F PR MOST RECENT DIASTOLIC BLOOD PRESSURE < 80 MM HG: ICD-10-PCS | Mod: ,,, | Performed by: FAMILY MEDICINE

## 2022-01-19 PROCEDURE — 3288F PR FALLS RISK ASSESSMENT DOCUMENTED: ICD-10-PCS | Mod: ,,, | Performed by: FAMILY MEDICINE

## 2022-01-19 PROCEDURE — 3288F FALL RISK ASSESSMENT DOCD: CPT | Mod: ,,, | Performed by: FAMILY MEDICINE

## 2022-01-19 PROCEDURE — 1101F PT FALLS ASSESS-DOCD LE1/YR: CPT | Mod: ,,, | Performed by: FAMILY MEDICINE

## 2022-01-19 PROCEDURE — 3008F PR BODY MASS INDEX (BMI) DOCUMENTED: ICD-10-PCS | Mod: ,,, | Performed by: FAMILY MEDICINE

## 2022-01-19 PROCEDURE — 99213 PR OFFICE/OUTPT VISIT, EST, LEVL III, 20-29 MIN: ICD-10-PCS | Mod: ,,, | Performed by: FAMILY MEDICINE

## 2022-01-19 PROCEDURE — 1160F PR REVIEW ALL MEDS BY PRESCRIBER/CLIN PHARMACIST DOCUMENTED: ICD-10-PCS | Mod: ,,, | Performed by: FAMILY MEDICINE

## 2022-01-19 PROCEDURE — 4010F PR ACE/ARB THEARPY RXD/TAKEN: ICD-10-PCS | Mod: ,,, | Performed by: FAMILY MEDICINE

## 2022-01-19 PROCEDURE — 3077F PR MOST RECENT SYSTOLIC BLOOD PRESSURE >= 140 MM HG: ICD-10-PCS | Mod: ,,, | Performed by: FAMILY MEDICINE

## 2022-01-19 RX ORDER — LOSARTAN POTASSIUM 25 MG/1
1 TABLET ORAL DAILY
COMMUNITY
Start: 2022-01-11 | End: 2022-01-19 | Stop reason: SDUPTHER

## 2022-01-19 RX ORDER — LOSARTAN POTASSIUM 50 MG/1
50 TABLET ORAL DAILY
Qty: 90 TABLET | Refills: 3 | Status: SHIPPED | OUTPATIENT
Start: 2022-01-19 | End: 2022-03-28

## 2022-01-19 RX ORDER — CINACALCET 30 MG/1
30 TABLET, FILM COATED ORAL DAILY
COMMUNITY
Start: 2022-01-11 | End: 2022-06-28 | Stop reason: SDUPTHER

## 2022-01-19 RX ORDER — METHYLPREDNISOLONE 4 MG/1
TABLET ORAL
Qty: 21 EACH | Refills: 0 | Status: SHIPPED | OUTPATIENT
Start: 2022-01-19 | End: 2022-02-09

## 2022-01-19 NOTE — PROGRESS NOTES
Patricia Kendall MD        PATIENT NAME: Nasrin Grant  : 1950  DATE: 22  MRN: [id number]      Billing Provider: Patricia Kendall MD  Level of Service:   Patient PCP Information     Provider PCP Type    Patricia Kendall MD General          Reason for Visit / Chief Complaint: Referral and Arm Pain (Left arm tingling down to finger)       History of Present Illness      Nasrin Grant presents to the clinic with Referral and Arm Pain (Left arm tingling down to finger)     She has left arm pain where she has her fistula, radiating down the arm, pain is moderate to severe, electric shocks      Review of Systems     Review of Systems   Constitutional: Negative for activity change, appetite change, fatigue and fever.   Respiratory: Negative for shortness of breath.    Musculoskeletal: Positive for arthralgias, joint swelling and myalgias.   Allergic/Immunologic: Positive for environmental allergies.   Psychiatric/Behavioral: Negative for agitation, behavioral problems and suicidal ideas.       Medical / Social / Family History     Past Medical History:   Diagnosis Date    Atherosclerotic heart disease of native coronary artery with angina pectoris 2020    Depression     Diabetes mellitus, type 2     Disorder of kidney and ureter     Hyperlipidemia     Hypertension     Kidney transplant status 2020    Osteoporosis 2020    Stroke        Past Surgical History:   Procedure Laterality Date    EXTRACTION OF TOOTH Left 2021    HYSTERECTOMY      kidney transplant 2016         Social History  Ms.  reports that she has never smoked. She has never used smokeless tobacco. She reports that she does not drink alcohol and does not use drugs.    Family History  Ms.'s family history includes Diabetes in her father and mother; Hearing loss in her father and mother; Heart disease in her father and mother; Hyperlipidemia in her father and mother.    Medications and Allergies     Medications  Outpatient  "Medications Marked as Taking for the 1/19/22 encounter (Office Visit) with Patricia Kendall MD   Medication Sig Dispense Refill    alendronate (FOSAMAX) 70 MG tablet Take 1 tablet (70 mg total) by mouth every 7 days. 4 tablet 11    amLODIPine (NORVASC) 10 MG tablet Take 1 tablet (10 mg total) by mouth once daily. 90 tablet 1    aspirin 81 MG Chew Take 1 tablet (81 mg total) by mouth once daily. for 365 doses 90 tablet 3    carvediloL (COREG) 25 MG tablet Take 1 tablet (25 mg total) by mouth 2 (two) times daily. 180 tablet 1    cinacalcet (SENSIPAR) 30 MG Tab Take 30 mg by mouth once daily.      furosemide (LASIX) 40 MG tablet Take 1 tablet (40 mg total) by mouth 2 (two) times daily. 180 tablet 3    insulin aspart U-100 (NOVOLOG FLEXPEN U-100 INSULIN) 100 unit/mL (3 mL) InPn pen Inject 5 Units into the skin 3 (three) times daily with meals. 13.5 mL 1    insulin degludec (TRESIBA FLEXTOUCH U-100) 100 unit/mL (3 mL) insulin pen Inject 22 Units into the skin once daily. 7 pen 1    mycophenolate (MYFORTIC) 180 MG TbEC Take 180 mg by mouth 2 (two) times daily.      nitroGLYCERIN (NITROSTAT) 0.4 MG SL tablet Place 1 tablet (0.4 mg total) under the tongue every 5 (five) minutes as needed for Chest pain. 30 tablet 3    pravastatin (PRAVACHOL) 20 MG tablet Take 1 tablet (20 mg total) by mouth once daily. 90 tablet 3    predniSONE (DELTASONE) 5 MG tablet Take 5 mg by mouth once daily.      tacrolimus (PROGRAF) 1 MG Cap 1 capsule once daily.      [DISCONTINUED] losartan (COZAAR) 25 MG tablet Take 1 tablet by mouth once daily.         Allergies  Review of patient's allergies indicates:   Allergen Reactions    Gabapentin Other (See Comments)     Made her disoriented  "out of my head"      Morphine Itching    Opioids - morphine analogues        Physical Examination   BP (!) 161/38   Pulse 68   Temp 97.9 °F (36.6 °C) (Temporal)   Resp 18   Ht 5' 4" (1.626 m)   Wt 83.6 kg (184 lb 6.4 oz)   SpO2 99%   BMI 31.65 " kg/m²     Physical Exam  Constitutional:       Appearance: Normal appearance. She is normal weight.   Cardiovascular:      Rate and Rhythm: Normal rate and regular rhythm.      Pulses: Normal pulses.      Heart sounds: Normal heart sounds.      Comments: I did check the radial pulse and it was palpable BL   Frandy test was normal  fistulla sounds ok  Neurological:      General: No focal deficit present.      Mental Status: She is alert.   Psychiatric:         Mood and Affect: Mood normal.         Behavior: Behavior normal.         Judgment: Judgment normal.         Assessment and Plan (including Health Maintenance)     Plan:         Problem List Items Addressed This Visit        Cardiac/Vascular    Hypertension    Relevant Medications    losartan (COZAAR) 50 MG tablet      Other Visit Diagnoses     Cervical disc disease    -  Primary    Relevant Medications    methylPREDNISolone (MEDROL DOSEPACK) 4 mg tablet    Other Relevant Orders    X-Ray Cervical Spine 2 or 3 Views    Recurrent subacute allergic otitis media of both ears        Relevant Orders    Ambulatory referral/consult to ENT    Renal transplant recipient        Relevant Medications    methylPREDNISolone (MEDROL DOSEPACK) 4 mg tablet    Other Relevant Orders    Ambulatory referral/consult to Urology          I believe her pain is coming form the C spine, she is unable to tolerate gabapentin, will send a medrol dose pack     Follow up if symptoms worsen or fail to improve.        Signature:  Patricia Kendall MD      Date of encounter: 1/19/22

## 2022-01-26 RX ORDER — OLMESARTAN MEDOXOMIL 20 MG/1
20 TABLET ORAL DAILY
Qty: 30 TABLET | Refills: 0 | Status: SHIPPED | OUTPATIENT
Start: 2022-01-26 | End: 2022-02-21

## 2022-03-28 DIAGNOSIS — E78.2 MIXED HYPERLIPIDEMIA: ICD-10-CM

## 2022-03-28 RX ORDER — OLMESARTAN MEDOXOMIL 20 MG/1
TABLET ORAL
Qty: 90 TABLET | Refills: 0 | Status: SHIPPED | OUTPATIENT
Start: 2022-03-28 | End: 2022-05-31

## 2022-03-28 RX ORDER — PRAVASTATIN SODIUM 20 MG/1
20 TABLET ORAL DAILY
Qty: 90 TABLET | Refills: 3 | Status: SHIPPED | OUTPATIENT
Start: 2022-03-28 | End: 2022-11-29

## 2022-06-13 ENCOUNTER — OFFICE VISIT (OUTPATIENT)
Dept: FAMILY MEDICINE | Facility: CLINIC | Age: 72
End: 2022-06-13
Payer: MEDICARE

## 2022-06-13 VITALS
DIASTOLIC BLOOD PRESSURE: 65 MMHG | HEIGHT: 64 IN | OXYGEN SATURATION: 98 % | TEMPERATURE: 99 F | HEART RATE: 67 BPM | WEIGHT: 180 LBS | SYSTOLIC BLOOD PRESSURE: 138 MMHG | RESPIRATION RATE: 18 BRPM | BODY MASS INDEX: 30.73 KG/M2

## 2022-06-13 DIAGNOSIS — E78.2 MIXED HYPERLIPIDEMIA: ICD-10-CM

## 2022-06-13 DIAGNOSIS — Z79.4 TYPE 2 DIABETES MELLITUS WITH DIABETIC NEPHROPATHY, WITH LONG-TERM CURRENT USE OF INSULIN: Primary | ICD-10-CM

## 2022-06-13 DIAGNOSIS — E11.21 TYPE 2 DIABETES MELLITUS WITH DIABETIC NEPHROPATHY, WITH LONG-TERM CURRENT USE OF INSULIN: Primary | ICD-10-CM

## 2022-06-13 DIAGNOSIS — R19.7 DIARRHEA, UNSPECIFIED TYPE: ICD-10-CM

## 2022-06-13 LAB
ALBUMIN SERPL BCP-MCNC: 3.4 G/DL (ref 3.5–5)
ALBUMIN/GLOB SERPL: 1 {RATIO}
ALP SERPL-CCNC: 68 U/L (ref 55–142)
ALT SERPL W P-5'-P-CCNC: 11 U/L (ref 13–56)
ANION GAP SERPL CALCULATED.3IONS-SCNC: 13 MMOL/L (ref 7–16)
AST SERPL W P-5'-P-CCNC: 8 U/L (ref 15–37)
BASOPHILS # BLD AUTO: 0.06 K/UL (ref 0–0.2)
BASOPHILS NFR BLD AUTO: 0.6 % (ref 0–1)
BILIRUB SERPL-MCNC: 0.4 MG/DL (ref 0–1.2)
BUN SERPL-MCNC: 48 MG/DL (ref 7–18)
BUN/CREAT SERPL: 20 (ref 6–20)
CALCIUM SERPL-MCNC: 8.2 MG/DL (ref 8.5–10.1)
CHLORIDE SERPL-SCNC: 99 MMOL/L (ref 98–107)
CHOLEST SERPL-MCNC: 113 MG/DL (ref 0–200)
CHOLEST/HDLC SERPL: 2.9 {RATIO}
CO2 SERPL-SCNC: 26 MMOL/L (ref 21–32)
CREAT SERPL-MCNC: 2.44 MG/DL (ref 0.55–1.02)
DIFFERENTIAL METHOD BLD: ABNORMAL
EOSINOPHIL # BLD AUTO: 0.17 K/UL (ref 0–0.5)
EOSINOPHIL NFR BLD AUTO: 1.6 % (ref 1–4)
ERYTHROCYTE [DISTWIDTH] IN BLOOD BY AUTOMATED COUNT: 13.6 % (ref 11.5–14.5)
EST. AVERAGE GLUCOSE BLD GHB EST-MCNC: 184 MG/DL
GLOBULIN SER-MCNC: 3.4 G/DL (ref 2–4)
GLUCOSE SERPL-MCNC: 134 MG/DL (ref 74–106)
GLUCOSE SERPL-MCNC: 162 MG/DL (ref 70–110)
HBA1C MFR BLD HPLC: 8.1 % (ref 4.5–6.6)
HCT VFR BLD AUTO: 32.4 % (ref 38–47)
HDLC SERPL-MCNC: 39 MG/DL (ref 40–60)
HGB BLD-MCNC: 10.5 G/DL (ref 12–16)
IMM GRANULOCYTES # BLD AUTO: 0.13 K/UL (ref 0–0.04)
IMM GRANULOCYTES NFR BLD: 1.2 % (ref 0–0.4)
LDLC SERPL CALC-MCNC: 54 MG/DL
LDLC/HDLC SERPL: 1.4 {RATIO}
LYMPHOCYTES # BLD AUTO: 0.72 K/UL (ref 1–4.8)
LYMPHOCYTES NFR BLD AUTO: 6.6 % (ref 27–41)
MCH RBC QN AUTO: 25.9 PG (ref 27–31)
MCHC RBC AUTO-ENTMCNC: 32.4 G/DL (ref 32–36)
MCV RBC AUTO: 79.8 FL (ref 80–96)
MONOCYTES # BLD AUTO: 1.35 K/UL (ref 0–0.8)
MONOCYTES NFR BLD AUTO: 12.5 % (ref 2–6)
MPC BLD CALC-MCNC: 12 FL (ref 9.4–12.4)
NEUTROPHILS # BLD AUTO: 8.4 K/UL (ref 1.8–7.7)
NEUTROPHILS NFR BLD AUTO: 77.5 % (ref 53–65)
NONHDLC SERPL-MCNC: 74 MG/DL
NRBC # BLD AUTO: 0 X10E3/UL
NRBC, AUTO (.00): 0 %
PLATELET # BLD AUTO: 200 K/UL (ref 150–400)
POTASSIUM SERPL-SCNC: 4.3 MMOL/L (ref 3.5–5.1)
PROT SERPL-MCNC: 6.8 G/DL (ref 6.4–8.2)
RBC # BLD AUTO: 4.06 M/UL (ref 4.2–5.4)
SODIUM SERPL-SCNC: 134 MMOL/L (ref 136–145)
TRIGL SERPL-MCNC: 99 MG/DL (ref 35–150)
TSH SERPL DL<=0.005 MIU/L-ACNC: 1.36 UIU/ML (ref 0.36–3.74)
VLDLC SERPL-MCNC: 20 MG/DL
WBC # BLD AUTO: 10.83 K/UL (ref 4.5–11)

## 2022-06-13 PROCEDURE — 99215 OFFICE O/P EST HI 40 MIN: CPT | Mod: ,,, | Performed by: FAMILY MEDICINE

## 2022-06-13 PROCEDURE — 1160F RVW MEDS BY RX/DR IN RCRD: CPT | Mod: ,,, | Performed by: FAMILY MEDICINE

## 2022-06-13 PROCEDURE — 80053 COMPREHENSIVE METABOLIC PANEL: ICD-10-PCS | Mod: ,,, | Performed by: CLINICAL MEDICAL LABORATORY

## 2022-06-13 PROCEDURE — 84443 TSH: ICD-10-PCS | Mod: ,,, | Performed by: CLINICAL MEDICAL LABORATORY

## 2022-06-13 PROCEDURE — 80061 LIPID PANEL: ICD-10-PCS | Mod: ,,, | Performed by: CLINICAL MEDICAL LABORATORY

## 2022-06-13 PROCEDURE — 1159F PR MEDICATION LIST DOCUMENTED IN MEDICAL RECORD: ICD-10-PCS | Mod: ,,, | Performed by: FAMILY MEDICINE

## 2022-06-13 PROCEDURE — 1101F PT FALLS ASSESS-DOCD LE1/YR: CPT | Mod: ,,, | Performed by: FAMILY MEDICINE

## 2022-06-13 PROCEDURE — 1101F PR PT FALLS ASSESS DOC 0-1 FALLS W/OUT INJ PAST YR: ICD-10-PCS | Mod: ,,, | Performed by: FAMILY MEDICINE

## 2022-06-13 PROCEDURE — 1160F PR REVIEW ALL MEDS BY PRESCRIBER/CLIN PHARMACIST DOCUMENTED: ICD-10-PCS | Mod: ,,, | Performed by: FAMILY MEDICINE

## 2022-06-13 PROCEDURE — 3008F BODY MASS INDEX DOCD: CPT | Mod: ,,, | Performed by: FAMILY MEDICINE

## 2022-06-13 PROCEDURE — 4010F PR ACE/ARB THEARPY RXD/TAKEN: ICD-10-PCS | Mod: ,,, | Performed by: FAMILY MEDICINE

## 2022-06-13 PROCEDURE — 3078F DIAST BP <80 MM HG: CPT | Mod: ,,, | Performed by: FAMILY MEDICINE

## 2022-06-13 PROCEDURE — 85025 COMPLETE CBC W/AUTO DIFF WBC: CPT | Mod: ,,, | Performed by: CLINICAL MEDICAL LABORATORY

## 2022-06-13 PROCEDURE — 3288F FALL RISK ASSESSMENT DOCD: CPT | Mod: ,,, | Performed by: FAMILY MEDICINE

## 2022-06-13 PROCEDURE — 1159F MED LIST DOCD IN RCRD: CPT | Mod: ,,, | Performed by: FAMILY MEDICINE

## 2022-06-13 PROCEDURE — 83036 HEMOGLOBIN A1C: ICD-10-PCS | Mod: ,,, | Performed by: CLINICAL MEDICAL LABORATORY

## 2022-06-13 PROCEDURE — 84443 ASSAY THYROID STIM HORMONE: CPT | Mod: ,,, | Performed by: CLINICAL MEDICAL LABORATORY

## 2022-06-13 PROCEDURE — 83036 HEMOGLOBIN GLYCOSYLATED A1C: CPT | Mod: ,,, | Performed by: CLINICAL MEDICAL LABORATORY

## 2022-06-13 PROCEDURE — 3008F PR BODY MASS INDEX (BMI) DOCUMENTED: ICD-10-PCS | Mod: ,,, | Performed by: FAMILY MEDICINE

## 2022-06-13 PROCEDURE — 85025 CBC WITH DIFFERENTIAL: ICD-10-PCS | Mod: ,,, | Performed by: CLINICAL MEDICAL LABORATORY

## 2022-06-13 PROCEDURE — 3288F PR FALLS RISK ASSESSMENT DOCUMENTED: ICD-10-PCS | Mod: ,,, | Performed by: FAMILY MEDICINE

## 2022-06-13 PROCEDURE — 3078F PR MOST RECENT DIASTOLIC BLOOD PRESSURE < 80 MM HG: ICD-10-PCS | Mod: ,,, | Performed by: FAMILY MEDICINE

## 2022-06-13 PROCEDURE — 3075F SYST BP GE 130 - 139MM HG: CPT | Mod: ,,, | Performed by: FAMILY MEDICINE

## 2022-06-13 PROCEDURE — 4010F ACE/ARB THERAPY RXD/TAKEN: CPT | Mod: ,,, | Performed by: FAMILY MEDICINE

## 2022-06-13 PROCEDURE — 3075F PR MOST RECENT SYSTOLIC BLOOD PRESS GE 130-139MM HG: ICD-10-PCS | Mod: ,,, | Performed by: FAMILY MEDICINE

## 2022-06-13 PROCEDURE — 99215 PR OFFICE/OUTPT VISIT, EST, LEVL V, 40-54 MIN: ICD-10-PCS | Mod: ,,, | Performed by: FAMILY MEDICINE

## 2022-06-13 PROCEDURE — 80061 LIPID PANEL: CPT | Mod: ,,, | Performed by: CLINICAL MEDICAL LABORATORY

## 2022-06-13 PROCEDURE — 80053 COMPREHEN METABOLIC PANEL: CPT | Mod: ,,, | Performed by: CLINICAL MEDICAL LABORATORY

## 2022-06-13 RX ORDER — TAMSULOSIN HYDROCHLORIDE 0.4 MG/1
1 CAPSULE ORAL DAILY
COMMUNITY
Start: 2022-03-23 | End: 2022-06-13

## 2022-06-13 RX ORDER — ONDANSETRON 4 MG/1
4 TABLET, ORALLY DISINTEGRATING ORAL EVERY 6 HOURS PRN
Qty: 30 TABLET | Refills: 0 | Status: SHIPPED | OUTPATIENT
Start: 2022-06-13 | End: 2022-06-28 | Stop reason: SDUPTHER

## 2022-06-13 RX ORDER — DULOXETIN HYDROCHLORIDE 20 MG/1
20 CAPSULE, DELAYED RELEASE ORAL DAILY
COMMUNITY
Start: 2022-03-23 | End: 2022-09-12

## 2022-06-13 RX ORDER — MIRABEGRON 25 MG/1
25 TABLET, FILM COATED, EXTENDED RELEASE ORAL DAILY
COMMUNITY
End: 2024-02-14

## 2022-06-13 RX ORDER — SODIUM CHLORIDE 0.9 % (FLUSH) 0.9 %
10 SYRINGE (ML) INJECTION
OUTPATIENT
Start: 2022-06-13

## 2022-06-13 RX ORDER — METRONIDAZOLE 500 MG/1
500 TABLET ORAL EVERY 12 HOURS
Qty: 20 TABLET | Refills: 0 | Status: SHIPPED | OUTPATIENT
Start: 2022-06-13 | End: 2022-06-23

## 2022-06-13 RX ORDER — CIPROFLOXACIN 500 MG/1
500 TABLET ORAL 2 TIMES DAILY
Qty: 20 TABLET | Refills: 0 | Status: SHIPPED | OUTPATIENT
Start: 2022-06-13 | End: 2022-06-23

## 2022-06-13 NOTE — PROGRESS NOTES
Patricia Kendall MD        PATIENT NAME: Nasrin Grant  : 1950  DATE: 22  MRN: [id number]      Billing Provider: Patricia Kendall MD  Level of Service:   Patient PCP Information     Provider PCP Type    Patricia Kendall MD General          Reason for Visit / Chief Complaint: Headache, Diarrhea, and Fatigue       History of Present Illness      Nasrin Grant presents to the clinic with Headache, Diarrhea, and Fatigue     Diarrhea for the last week, bloated, some nausea with no vomiting, has been unable to eat much of anything for 2 days, dehydrated    Headache   This is a recurrent problem. Pertinent negatives include no fever.   Diarrhea   Associated symptoms include headaches. Pertinent negatives include no fever.   Fatigue  Associated symptoms include fatigue, headaches and joint swelling. Pertinent negatives include no fever.       Review of Systems     Review of Systems   Constitutional: Positive for fatigue. Negative for activity change, appetite change and fever.   HENT: Negative for postnasal drip.    Respiratory: Negative for shortness of breath.    Gastrointestinal: Positive for diarrhea.   Musculoskeletal: Positive for gait problem and joint swelling.   Allergic/Immunologic: Positive for environmental allergies.   Neurological: Positive for headaches.   Psychiatric/Behavioral: Negative for agitation, behavioral problems and suicidal ideas.       Medical / Social / Family History     Past Medical History:   Diagnosis Date    Atherosclerotic heart disease of native coronary artery with angina pectoris 2020    Depression     Diabetes mellitus, type 2     Disorder of kidney and ureter     Hyperlipidemia     Hypertension     Kidney transplant status 2020    Osteoporosis 2020    Stroke        Past Surgical History:   Procedure Laterality Date    EXTRACTION OF TOOTH Left 2021    HYSTERECTOMY      kidney transplant 2016         Social History  Ms.  reports that she has never  smoked. She has never used smokeless tobacco. She reports that she does not drink alcohol and does not use drugs.    Family History  Ms.'s family history includes Diabetes in her father and mother; Hearing loss in her father and mother; Heart disease in her father and mother; Hyperlipidemia in her father and mother.    Medications and Allergies     Medications  Outpatient Medications Marked as Taking for the 6/13/22 encounter (Office Visit) with Patricia Kendall MD   Medication Sig Dispense Refill    alendronate (FOSAMAX) 70 MG tablet Take 1 tablet (70 mg total) by mouth every 7 days. 4 tablet 11    amLODIPine (NORVASC) 10 MG tablet TAKE ONE TABLET BY MOUTH ONCE DAILY 90 tablet 1    aspirin 81 MG Chew Take 1 tablet (81 mg total) by mouth once daily. for 365 doses 90 tablet 3    carvediloL (COREG) 25 MG tablet TAKE ONE TABLET BY MOUTH TWICE DAILY 180 tablet 1    cinacalcet (SENSIPAR) 30 MG Tab Take 30 mg by mouth once daily.      DULoxetine (CYMBALTA) 20 MG capsule Take 20 mg by mouth once daily.      furosemide (LASIX) 40 MG tablet Take 1 tablet (40 mg total) by mouth 2 (two) times daily. 180 tablet 3    Lactobacillus acidophilus (PROBIOTIC ACIDOPHILUS ORAL) Take 4 Billion Cells by mouth.      mirabegron (MYRBETRIQ) 25 mg Tb24 ER tablet Take 25 mg by mouth once daily.      mycophenolate (MYFORTIC) 180 MG TbEC Take 180 mg by mouth 2 (two) times daily.      nitroGLYCERIN (NITROSTAT) 0.4 MG SL tablet Place 1 tablet (0.4 mg total) under the tongue every 5 (five) minutes as needed for Chest pain. 30 tablet 3    NOVOLOG FLEXPEN U-100 INSULIN 100 unit/mL (3 mL) InPn pen INJECT FIVE UNITS into THE SKIN THREE TIMES DAILY WITH MEALS 15 mL 1    olmesartan (BENICAR) 20 MG tablet TAKE ONE TABLET BY MOUTH EVERY DAY 90 tablet 0    pravastatin (PRAVACHOL) 20 MG tablet Take 1 tablet (20 mg total) by mouth once daily. 90 tablet 3    predniSONE (DELTASONE) 5 MG tablet Take 5 mg by mouth once daily.      tacrolimus  "(PROGRAF) 1 MG Cap 1 capsule once daily.      [DISCONTINUED] tamsulosin (FLOMAX) 0.4 mg Cap Take 1 capsule by mouth once daily.         Allergies  Review of patient's allergies indicates:   Allergen Reactions    Gabapentin Other (See Comments)     Made her disoriented  "out of my head"      Morphine Itching    Opioids - morphine analogues        Physical Examination   /65 (BP Location: Left arm, Patient Position: Sitting, BP Method: Medium (Automatic))   Pulse 67   Temp 98.8 °F (37.1 °C) (Oral)   Resp 18   Ht 5' 4" (1.626 m)   Wt 81.6 kg (180 lb)   SpO2 98%   BMI 30.90 kg/m²     Physical Exam  Constitutional:       Appearance: Normal appearance. She is normal weight.   Cardiovascular:      Rate and Rhythm: Normal rate and regular rhythm.      Pulses: Normal pulses.      Heart sounds: Normal heart sounds.   Pulmonary:      Effort: Pulmonary effort is normal.      Breath sounds: Normal breath sounds.   Abdominal:      General: There is distension.      Tenderness: There is no abdominal tenderness. There is no guarding or rebound.      Hernia: No hernia is present.   Musculoskeletal:         General: Normal range of motion.   Skin:     General: Skin is warm.   Neurological:      General: No focal deficit present.      Mental Status: She is alert.   Psychiatric:         Mood and Affect: Mood normal.         Behavior: Behavior normal.         Judgment: Judgment normal.         Recent Results (from the past 24 hour(s))   POCT glucose    Collection Time: 06/13/22  9:39 AM   Result Value Ref Range    POC Glucose 162 (A) 70 - 110 MG/DL        Assessment and Plan (including Health Maintenance)     Plan:         Problem List Items Addressed This Visit        Endocrine    Type 2 diabetes mellitus with diabetic nephropathy, with long-term current use of insulin - Primary    Relevant Orders    POCT glucose (Completed)    Comprehensive Metabolic Panel    Lipid Panel    CBC Auto Differential    Hemoglobin A1C       " GI    Diarrhea    Relevant Medications    ciprofloxacin HCl (CIPRO) 500 MG tablet    metroNIDAZOLE (FLAGYL) 500 MG tablet    ondansetron (ZOFRAN-ODT) 4 MG TbDL    Other Relevant Orders    TSH    POCT URINALYSIS W/O SCOPE      Other Visit Diagnoses     Mixed hyperlipidemia            - IVF give to the pt today, sending antibiotics for diverticulitis, follow up in one week    Follow up in about 1 month (around 7/13/2022).        Signature:  Patricia Kendall MD      Date of encounter: 6/13/22

## 2022-06-14 ENCOUNTER — TELEPHONE (OUTPATIENT)
Dept: FAMILY MEDICINE | Facility: CLINIC | Age: 72
End: 2022-06-14
Payer: MEDICARE

## 2022-06-14 DIAGNOSIS — R79.89 ELEVATED LFTS: Primary | ICD-10-CM

## 2022-06-14 NOTE — TELEPHONE ENCOUNTER
Pt stated she feels much better. She pushed fluids and she has been urinating. She will be in tomorrow for her labs to be done

## 2022-06-14 NOTE — TELEPHONE ENCOUNTER
----- Message from Patricia Kendall MD sent at 6/14/2022  8:57 AM CDT -----  How is she feeling today, her A1C is up to 8.1, her kidney function is down a little and she has some inflamation of her liver, can she come in to get a hepatitis panel drawn if she is not feeling any better

## 2022-06-28 ENCOUNTER — OFFICE VISIT (OUTPATIENT)
Dept: FAMILY MEDICINE | Facility: CLINIC | Age: 72
End: 2022-06-28
Payer: MEDICARE

## 2022-06-28 VITALS
DIASTOLIC BLOOD PRESSURE: 76 MMHG | HEART RATE: 78 BPM | SYSTOLIC BLOOD PRESSURE: 145 MMHG | TEMPERATURE: 98 F | HEIGHT: 64 IN | WEIGHT: 193.63 LBS | RESPIRATION RATE: 18 BRPM | BODY MASS INDEX: 33.06 KG/M2

## 2022-06-28 DIAGNOSIS — R19.7 DIARRHEA, UNSPECIFIED TYPE: ICD-10-CM

## 2022-06-28 DIAGNOSIS — R60.0 LOWER EXTREMITY EDEMA: Primary | ICD-10-CM

## 2022-06-28 PROCEDURE — 3052F PR MOST RECENT HEMOGLOBIN A1C LEVEL 8.0 - < 9.0%: ICD-10-PCS | Mod: ,,, | Performed by: FAMILY MEDICINE

## 2022-06-28 PROCEDURE — 3008F BODY MASS INDEX DOCD: CPT | Mod: ,,, | Performed by: FAMILY MEDICINE

## 2022-06-28 PROCEDURE — 3288F FALL RISK ASSESSMENT DOCD: CPT | Mod: ,,, | Performed by: FAMILY MEDICINE

## 2022-06-28 PROCEDURE — 3288F PR FALLS RISK ASSESSMENT DOCUMENTED: ICD-10-PCS | Mod: ,,, | Performed by: FAMILY MEDICINE

## 2022-06-28 PROCEDURE — 1159F PR MEDICATION LIST DOCUMENTED IN MEDICAL RECORD: ICD-10-PCS | Mod: ,,, | Performed by: FAMILY MEDICINE

## 2022-06-28 PROCEDURE — 1160F PR REVIEW ALL MEDS BY PRESCRIBER/CLIN PHARMACIST DOCUMENTED: ICD-10-PCS | Mod: ,,, | Performed by: FAMILY MEDICINE

## 2022-06-28 PROCEDURE — 3052F HG A1C>EQUAL 8.0%<EQUAL 9.0%: CPT | Mod: ,,, | Performed by: FAMILY MEDICINE

## 2022-06-28 PROCEDURE — 3078F DIAST BP <80 MM HG: CPT | Mod: ,,, | Performed by: FAMILY MEDICINE

## 2022-06-28 PROCEDURE — 4010F PR ACE/ARB THEARPY RXD/TAKEN: ICD-10-PCS | Mod: ,,, | Performed by: FAMILY MEDICINE

## 2022-06-28 PROCEDURE — 3077F SYST BP >= 140 MM HG: CPT | Mod: ,,, | Performed by: FAMILY MEDICINE

## 2022-06-28 PROCEDURE — 99213 OFFICE O/P EST LOW 20 MIN: CPT | Mod: ,,, | Performed by: FAMILY MEDICINE

## 2022-06-28 PROCEDURE — 1101F PT FALLS ASSESS-DOCD LE1/YR: CPT | Mod: ,,, | Performed by: FAMILY MEDICINE

## 2022-06-28 PROCEDURE — 3078F PR MOST RECENT DIASTOLIC BLOOD PRESSURE < 80 MM HG: ICD-10-PCS | Mod: ,,, | Performed by: FAMILY MEDICINE

## 2022-06-28 PROCEDURE — 3077F PR MOST RECENT SYSTOLIC BLOOD PRESSURE >= 140 MM HG: ICD-10-PCS | Mod: ,,, | Performed by: FAMILY MEDICINE

## 2022-06-28 PROCEDURE — 1159F MED LIST DOCD IN RCRD: CPT | Mod: ,,, | Performed by: FAMILY MEDICINE

## 2022-06-28 PROCEDURE — 1101F PR PT FALLS ASSESS DOC 0-1 FALLS W/OUT INJ PAST YR: ICD-10-PCS | Mod: ,,, | Performed by: FAMILY MEDICINE

## 2022-06-28 PROCEDURE — 99213 PR OFFICE/OUTPT VISIT, EST, LEVL III, 20-29 MIN: ICD-10-PCS | Mod: ,,, | Performed by: FAMILY MEDICINE

## 2022-06-28 PROCEDURE — 3008F PR BODY MASS INDEX (BMI) DOCUMENTED: ICD-10-PCS | Mod: ,,, | Performed by: FAMILY MEDICINE

## 2022-06-28 PROCEDURE — 4010F ACE/ARB THERAPY RXD/TAKEN: CPT | Mod: ,,, | Performed by: FAMILY MEDICINE

## 2022-06-28 PROCEDURE — 1160F RVW MEDS BY RX/DR IN RCRD: CPT | Mod: ,,, | Performed by: FAMILY MEDICINE

## 2022-06-28 RX ORDER — LOSARTAN POTASSIUM 25 MG/1
25 TABLET ORAL DAILY
COMMUNITY
Start: 2022-03-01 | End: 2024-02-14

## 2022-06-28 RX ORDER — CINACALCET 30 MG/1
30 TABLET, FILM COATED ORAL DAILY
Qty: 30 TABLET | Refills: 11 | Status: SHIPPED | OUTPATIENT
Start: 2022-06-28 | End: 2023-07-03

## 2022-06-28 RX ORDER — ONDANSETRON 4 MG/1
4 TABLET, ORALLY DISINTEGRATING ORAL EVERY 6 HOURS PRN
Qty: 30 TABLET | Refills: 0 | Status: SHIPPED | OUTPATIENT
Start: 2022-06-28 | End: 2022-11-29

## 2022-06-28 NOTE — PROGRESS NOTES
Patricia Kendall MD        PATIENT NAME: Nasrin Grant  : 1950  DATE: 22  MRN: [id number]      Billing Provider: Patricia Kendall MD  Level of Service:   Patient PCP Information     Provider PCP Type    Patricia Kendall MD General          Reason for Visit / Chief Complaint: Follow-up and Abdominal Pain (Pt stated her stomach pain is much better)       History of Present Illness      Nasrin Grant presents to the clinic with Follow-up and Abdominal Pain (Pt stated her stomach pain is much better)     Follow-up  Pertinent negatives include no abdominal pain, anorexia, arthralgias, change in bowel habit, congestion, headaches, joint swelling, myalgias, nausea, neck pain or numbness.   Abdominal Pain  Pertinent negatives include no anorexia, arthralgias, headaches, myalgias or nausea.       Review of Systems     Review of Systems   HENT: Negative for congestion.    Gastrointestinal: Negative for abdominal pain, anorexia, change in bowel habit and nausea.   Musculoskeletal: Negative for arthralgias, joint swelling, myalgias and neck pain.   Neurological: Negative for numbness and headaches.       Medical / Social / Family History     Past Medical History:   Diagnosis Date    Atherosclerotic heart disease of native coronary artery with angina pectoris 2020    Depression     Diabetes mellitus, type 2     Disorder of kidney and ureter     Hyperlipidemia     Hypertension     Kidney transplant status 2020    Osteoporosis 2020    Stroke        Past Surgical History:   Procedure Laterality Date    EXTRACTION OF TOOTH Left 2021    HYSTERECTOMY      kidney transplant          Social History  Ms.  reports that she has never smoked. She has never used smokeless tobacco. She reports that she does not drink alcohol and does not use drugs.    Family History  Ms.'s family history includes Diabetes in her father and mother; Hearing loss in her father and mother; Heart disease in her father  "and mother; Hyperlipidemia in her father and mother.    Medications and Allergies     Medications  No outpatient medications have been marked as taking for the 6/28/22 encounter (Office Visit) with Patricia Kendall MD.       Allergies  Review of patient's allergies indicates:   Allergen Reactions    Gabapentin Other (See Comments)     Made her disoriented  "out of my head"      Morphine Itching    Opioids - morphine analogues        Physical Examination   BP (!) 145/76   Pulse 78   Temp 97.7 °F (36.5 °C) (Oral)   Resp 18   Ht 5' 4" (1.626 m)   Wt 87.8 kg (193 lb 9.6 oz)   BMI 33.23 kg/m²     Physical Exam  Constitutional:       Appearance: Normal appearance. She is obese.   Cardiovascular:      Rate and Rhythm: Normal rate and regular rhythm.      Pulses: Normal pulses.      Heart sounds: Normal heart sounds.   Pulmonary:      Effort: Pulmonary effort is normal.      Breath sounds: Normal breath sounds.   Musculoskeletal:         General: Normal range of motion.   Skin:     General: Skin is warm.   Neurological:      General: No focal deficit present.      Mental Status: She is alert.   Psychiatric:         Mood and Affect: Mood normal.         Behavior: Behavior normal.         Judgment: Judgment normal.         Assessment and Plan (including Health Maintenance)     Plan:         Problem List Items Addressed This Visit        GI    Diarrhea    Relevant Medications    ondansetron (ZOFRAN-ODT) 4 MG TbDL      Other Visit Diagnoses     Lower extremity edema    -  Primary        - wear compression socks if this does not work come back and we can due a diuretic    Follow up if symptoms worsen or fail to improve.        Signature:  Patricia Kendall MD      Date of encounter: 6/28/22      "

## 2022-07-25 ENCOUNTER — OFFICE VISIT (OUTPATIENT)
Dept: FAMILY MEDICINE | Facility: CLINIC | Age: 72
End: 2022-07-25
Payer: MEDICARE

## 2022-07-25 VITALS
RESPIRATION RATE: 18 BRPM | WEIGHT: 187 LBS | TEMPERATURE: 98 F | SYSTOLIC BLOOD PRESSURE: 154 MMHG | OXYGEN SATURATION: 100 % | HEART RATE: 70 BPM | DIASTOLIC BLOOD PRESSURE: 60 MMHG | BODY MASS INDEX: 31.92 KG/M2 | HEIGHT: 64 IN

## 2022-07-25 DIAGNOSIS — M79.672 LEFT FOOT PAIN: ICD-10-CM

## 2022-07-25 DIAGNOSIS — S91.302A WOUND OF LEFT FOOT: Primary | ICD-10-CM

## 2022-07-25 PROCEDURE — 99213 PR OFFICE/OUTPT VISIT, EST, LEVL III, 20-29 MIN: ICD-10-PCS | Mod: ,,, | Performed by: FAMILY MEDICINE

## 2022-07-25 PROCEDURE — 3008F PR BODY MASS INDEX (BMI) DOCUMENTED: ICD-10-PCS | Mod: ,,, | Performed by: FAMILY MEDICINE

## 2022-07-25 PROCEDURE — 3077F PR MOST RECENT SYSTOLIC BLOOD PRESSURE >= 140 MM HG: ICD-10-PCS | Mod: ,,, | Performed by: FAMILY MEDICINE

## 2022-07-25 PROCEDURE — 87070 CULTURE, WOUND: ICD-10-PCS | Mod: ,,, | Performed by: CLINICAL MEDICAL LABORATORY

## 2022-07-25 PROCEDURE — 4010F ACE/ARB THERAPY RXD/TAKEN: CPT | Mod: ,,, | Performed by: FAMILY MEDICINE

## 2022-07-25 PROCEDURE — 1101F PR PT FALLS ASSESS DOC 0-1 FALLS W/OUT INJ PAST YR: ICD-10-PCS | Mod: ,,, | Performed by: FAMILY MEDICINE

## 2022-07-25 PROCEDURE — 3077F SYST BP >= 140 MM HG: CPT | Mod: ,,, | Performed by: FAMILY MEDICINE

## 2022-07-25 PROCEDURE — 3078F DIAST BP <80 MM HG: CPT | Mod: ,,, | Performed by: FAMILY MEDICINE

## 2022-07-25 PROCEDURE — 1159F MED LIST DOCD IN RCRD: CPT | Mod: ,,, | Performed by: FAMILY MEDICINE

## 2022-07-25 PROCEDURE — 3052F HG A1C>EQUAL 8.0%<EQUAL 9.0%: CPT | Mod: ,,, | Performed by: FAMILY MEDICINE

## 2022-07-25 PROCEDURE — 1101F PT FALLS ASSESS-DOCD LE1/YR: CPT | Mod: ,,, | Performed by: FAMILY MEDICINE

## 2022-07-25 PROCEDURE — 3078F PR MOST RECENT DIASTOLIC BLOOD PRESSURE < 80 MM HG: ICD-10-PCS | Mod: ,,, | Performed by: FAMILY MEDICINE

## 2022-07-25 PROCEDURE — 3288F PR FALLS RISK ASSESSMENT DOCUMENTED: ICD-10-PCS | Mod: ,,, | Performed by: FAMILY MEDICINE

## 2022-07-25 PROCEDURE — 87070 CULTURE OTHR SPECIMN AEROBIC: CPT | Mod: ,,, | Performed by: CLINICAL MEDICAL LABORATORY

## 2022-07-25 PROCEDURE — 1159F PR MEDICATION LIST DOCUMENTED IN MEDICAL RECORD: ICD-10-PCS | Mod: ,,, | Performed by: FAMILY MEDICINE

## 2022-07-25 PROCEDURE — 3052F PR MOST RECENT HEMOGLOBIN A1C LEVEL 8.0 - < 9.0%: ICD-10-PCS | Mod: ,,, | Performed by: FAMILY MEDICINE

## 2022-07-25 PROCEDURE — 3008F BODY MASS INDEX DOCD: CPT | Mod: ,,, | Performed by: FAMILY MEDICINE

## 2022-07-25 PROCEDURE — 1160F RVW MEDS BY RX/DR IN RCRD: CPT | Mod: ,,, | Performed by: FAMILY MEDICINE

## 2022-07-25 PROCEDURE — 4010F PR ACE/ARB THEARPY RXD/TAKEN: ICD-10-PCS | Mod: ,,, | Performed by: FAMILY MEDICINE

## 2022-07-25 PROCEDURE — 1160F PR REVIEW ALL MEDS BY PRESCRIBER/CLIN PHARMACIST DOCUMENTED: ICD-10-PCS | Mod: ,,, | Performed by: FAMILY MEDICINE

## 2022-07-25 PROCEDURE — 99213 OFFICE O/P EST LOW 20 MIN: CPT | Mod: ,,, | Performed by: FAMILY MEDICINE

## 2022-07-25 PROCEDURE — 3288F FALL RISK ASSESSMENT DOCD: CPT | Mod: ,,, | Performed by: FAMILY MEDICINE

## 2022-07-25 RX ORDER — SULFAMETHOXAZOLE AND TRIMETHOPRIM 800; 160 MG/1; MG/1
1 TABLET ORAL 2 TIMES DAILY
Qty: 10 TABLET | Refills: 0 | Status: SHIPPED | OUTPATIENT
Start: 2022-07-25 | End: 2022-07-30
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
## 2022-07-27 ENCOUNTER — HOSPITAL ENCOUNTER (OUTPATIENT)
Dept: RADIOLOGY | Facility: HOSPITAL | Age: 72
Discharge: HOME | End: 2022-07-27
Attending: FAMILY MEDICINE
Payer: MEDICARE

## 2022-07-27 DIAGNOSIS — M79.672 LEFT FOOT PAIN: ICD-10-CM

## 2022-07-27 LAB — MICROORGANISM SPEC CULT: NORMAL

## 2022-07-27 PROCEDURE — 93925 LOWER EXTREMITY STUDY: CPT | Mod: TC

## 2022-08-22 LAB — CRC RECOMMENDATION EXT: NORMAL

## 2022-08-25 ENCOUNTER — OFFICE VISIT (OUTPATIENT)
Dept: FAMILY MEDICINE | Facility: CLINIC | Age: 72
End: 2022-08-25
Payer: MEDICARE

## 2022-08-25 VITALS
WEIGHT: 194.63 LBS | RESPIRATION RATE: 18 BRPM | DIASTOLIC BLOOD PRESSURE: 80 MMHG | SYSTOLIC BLOOD PRESSURE: 120 MMHG | TEMPERATURE: 98 F | OXYGEN SATURATION: 97 % | HEART RATE: 68 BPM | HEIGHT: 64 IN | BODY MASS INDEX: 33.23 KG/M2

## 2022-08-25 DIAGNOSIS — M81.0 AGE RELATED OSTEOPOROSIS, UNSPECIFIED PATHOLOGICAL FRACTURE PRESENCE: ICD-10-CM

## 2022-08-25 DIAGNOSIS — K25.9 GASTRIC ULCER, UNSPECIFIED CHRONICITY, UNSPECIFIED WHETHER GASTRIC ULCER HEMORRHAGE OR PERFORATION PRESENT: ICD-10-CM

## 2022-08-25 DIAGNOSIS — I73.9 PVD (PERIPHERAL VASCULAR DISEASE): ICD-10-CM

## 2022-08-25 DIAGNOSIS — K57.30 DIVERTICULA OF COLON: ICD-10-CM

## 2022-08-25 DIAGNOSIS — S98.132A: Primary | ICD-10-CM

## 2022-08-25 PROBLEM — R11.2 INTRACTABLE NAUSEA AND VOMITING: Status: ACTIVE | Noted: 2022-08-20

## 2022-08-25 PROBLEM — I50.9 CONGESTIVE HEART FAILURE: Status: ACTIVE | Noted: 2017-10-19

## 2022-08-25 PROBLEM — L08.9 LEFT FOOT INFECTION: Status: ACTIVE | Noted: 2022-08-01

## 2022-08-25 PROCEDURE — 1125F AMNT PAIN NOTED PAIN PRSNT: CPT | Mod: ,,, | Performed by: FAMILY MEDICINE

## 2022-08-25 PROCEDURE — 4010F PR ACE/ARB THEARPY RXD/TAKEN: ICD-10-PCS | Mod: ,,, | Performed by: FAMILY MEDICINE

## 2022-08-25 PROCEDURE — 1101F PT FALLS ASSESS-DOCD LE1/YR: CPT | Mod: ,,, | Performed by: FAMILY MEDICINE

## 2022-08-25 PROCEDURE — 99214 OFFICE O/P EST MOD 30 MIN: CPT | Mod: ,,, | Performed by: FAMILY MEDICINE

## 2022-08-25 PROCEDURE — 3008F PR BODY MASS INDEX (BMI) DOCUMENTED: ICD-10-PCS | Mod: ,,, | Performed by: FAMILY MEDICINE

## 2022-08-25 PROCEDURE — 3074F PR MOST RECENT SYSTOLIC BLOOD PRESSURE < 130 MM HG: ICD-10-PCS | Mod: ,,, | Performed by: FAMILY MEDICINE

## 2022-08-25 PROCEDURE — 1159F PR MEDICATION LIST DOCUMENTED IN MEDICAL RECORD: ICD-10-PCS | Mod: ,,, | Performed by: FAMILY MEDICINE

## 2022-08-25 PROCEDURE — 99214 PR OFFICE/OUTPT VISIT, EST, LEVL IV, 30-39 MIN: ICD-10-PCS | Mod: ,,, | Performed by: FAMILY MEDICINE

## 2022-08-25 PROCEDURE — 1160F PR REVIEW ALL MEDS BY PRESCRIBER/CLIN PHARMACIST DOCUMENTED: ICD-10-PCS | Mod: ,,, | Performed by: FAMILY MEDICINE

## 2022-08-25 PROCEDURE — 1160F RVW MEDS BY RX/DR IN RCRD: CPT | Mod: ,,, | Performed by: FAMILY MEDICINE

## 2022-08-25 PROCEDURE — 3074F SYST BP LT 130 MM HG: CPT | Mod: ,,, | Performed by: FAMILY MEDICINE

## 2022-08-25 PROCEDURE — 3079F DIAST BP 80-89 MM HG: CPT | Mod: ,,, | Performed by: FAMILY MEDICINE

## 2022-08-25 PROCEDURE — 3079F PR MOST RECENT DIASTOLIC BLOOD PRESSURE 80-89 MM HG: ICD-10-PCS | Mod: ,,, | Performed by: FAMILY MEDICINE

## 2022-08-25 PROCEDURE — 3052F HG A1C>EQUAL 8.0%<EQUAL 9.0%: CPT | Mod: ,,, | Performed by: FAMILY MEDICINE

## 2022-08-25 PROCEDURE — 1159F MED LIST DOCD IN RCRD: CPT | Mod: ,,, | Performed by: FAMILY MEDICINE

## 2022-08-25 PROCEDURE — 3052F PR MOST RECENT HEMOGLOBIN A1C LEVEL 8.0 - < 9.0%: ICD-10-PCS | Mod: ,,, | Performed by: FAMILY MEDICINE

## 2022-08-25 PROCEDURE — 1101F PR PT FALLS ASSESS DOC 0-1 FALLS W/OUT INJ PAST YR: ICD-10-PCS | Mod: ,,, | Performed by: FAMILY MEDICINE

## 2022-08-25 PROCEDURE — 4010F ACE/ARB THERAPY RXD/TAKEN: CPT | Mod: ,,, | Performed by: FAMILY MEDICINE

## 2022-08-25 PROCEDURE — 3288F FALL RISK ASSESSMENT DOCD: CPT | Mod: ,,, | Performed by: FAMILY MEDICINE

## 2022-08-25 PROCEDURE — 3008F BODY MASS INDEX DOCD: CPT | Mod: ,,, | Performed by: FAMILY MEDICINE

## 2022-08-25 PROCEDURE — 3288F PR FALLS RISK ASSESSMENT DOCUMENTED: ICD-10-PCS | Mod: ,,, | Performed by: FAMILY MEDICINE

## 2022-08-25 PROCEDURE — 1125F PR PAIN SEVERITY QUANTIFIED, PAIN PRESENT: ICD-10-PCS | Mod: ,,, | Performed by: FAMILY MEDICINE

## 2022-08-25 RX ORDER — INSULIN DEGLUDEC 100 U/ML
INJECTION, SOLUTION SUBCUTANEOUS
COMMUNITY
Start: 2022-06-13 | End: 2023-04-17

## 2022-08-25 RX ORDER — LOPERAMIDE HYDROCHLORIDE 2 MG/1
2 CAPSULE ORAL 2 TIMES DAILY
COMMUNITY
Start: 2022-08-05 | End: 2023-04-13

## 2022-08-25 RX ORDER — TAMSULOSIN HYDROCHLORIDE 0.4 MG/1
CAPSULE ORAL
COMMUNITY
Start: 2022-08-18 | End: 2024-02-14

## 2022-08-25 RX ORDER — PANTOPRAZOLE SODIUM 40 MG/1
40 TABLET, DELAYED RELEASE ORAL 2 TIMES DAILY
Qty: 180 TABLET | Refills: 3 | Status: SHIPPED | OUTPATIENT
Start: 2022-08-25 | End: 2023-10-06 | Stop reason: SDUPTHER

## 2022-08-25 RX ORDER — PANTOPRAZOLE SODIUM 40 MG/1
40 TABLET, DELAYED RELEASE ORAL
COMMUNITY
Start: 2022-08-23 | End: 2022-08-25 | Stop reason: SDUPTHER

## 2022-08-25 NOTE — PROGRESS NOTES
Patricia Kendall MD        PATIENT NAME: Nasrin Grant  : 1950  DATE: 22  MRN: [id number]      Billing Provider: Patricia Kendall MD  Level of Service:   Patient PCP Information     Provider PCP Type    Patricia Kendall MD General          Reason for Visit / Chief Complaint: wound left foot (F/u on wound left foot. She has been in hospital at [facility] for a gi illness and diagnosed with small stomach ulcer. States she had colonoscopy and upper scope also. )       History of Present Illness      Nasrin Grant presents to the clinic with wound left foot (F/u on wound left foot. She has been in hospital at [facility] for a gi illness and diagnosed with small stomach ulcer. States she had colonoscopy and upper scope also. )     She has gone to the hospital a few times due to KYLER, which she was there according to the pt she had an endoscopy and a colonoscopy done, I reviewed the records and found that and  It appears both of these had negative biopsies, however there was a report of diverticula and gastric ulcers. She was also told she had Bl lower extremity blockage, I recenlty did an MIRANDA, with moderate disease reported, but there was no obstruction. Her wound on her left foot has healed well. She continues to have problems with her son who has an addiction problem.     She is concern over her health      Review of Systems     Review of Systems   Constitutional: Negative for activity change, appetite change, fatigue and fever.   Respiratory: Negative for shortness of breath.    Musculoskeletal: Positive for arthralgias, back pain, gait problem and joint swelling.   Allergic/Immunologic: Positive for environmental allergies.   Psychiatric/Behavioral: Negative for agitation, behavioral problems and suicidal ideas.       Medical / Social / Family History     Past Medical History:   Diagnosis Date    Atherosclerotic heart disease of native coronary artery with angina pectoris 2020    Depression     Diabetes mellitus, type  2     Disorder of kidney and ureter     Hyperlipidemia     Hypertension     Kidney transplant status 07/13/2020    Osteoporosis 07/22/2020    Stroke        Past Surgical History:   Procedure Laterality Date    EXTRACTION OF TOOTH Left 08/11/2021    HYSTERECTOMY      kidney transplant 2016         Social History  Ms.  reports that she has never smoked. She has never used smokeless tobacco. She reports that she does not drink alcohol and does not use drugs.    Family History  Ms.'s family history includes Diabetes in her father and mother; Hearing loss in her father and mother; Heart disease in her father and mother; Hyperlipidemia in her father and mother.    Medications and Allergies     Medications  Outpatient Medications Marked as Taking for the 8/25/22 encounter (Office Visit) with Patricia Kendall MD   Medication Sig Dispense Refill    amLODIPine (NORVASC) 10 MG tablet TAKE ONE TABLET BY MOUTH ONCE DAILY 90 tablet 1    carvediloL (COREG) 25 MG tablet TAKE ONE TABLET BY MOUTH TWICE DAILY 180 tablet 1    cinacalcet (SENSIPAR) 30 MG Tab Take 1 tablet (30 mg total) by mouth once daily. 30 tablet 11    DULoxetine (CYMBALTA) 20 MG capsule Take 20 mg by mouth once daily.      furosemide (LASIX) 40 MG tablet TAKE ONE TABLET BY MOUTH TWICE DAILY 180 tablet 3    Lactobacillus acidophilus (PROBIOTIC ACIDOPHILUS ORAL) Take 4 Billion Cells by mouth.      loperamide (IMODIUM) 2 mg capsule Take 2 mg by mouth 2 (two) times daily.      losartan (COZAAR) 25 MG tablet Take 25 mg by mouth once daily.      mirabegron (MYRBETRIQ) 25 mg Tb24 ER tablet Take 25 mg by mouth once daily.      mycophenolate (MYFORTIC) 180 MG TbEC Take 180 mg by mouth 2 (two) times daily.      nitroGLYCERIN (NITROSTAT) 0.4 MG SL tablet Place 1 tablet (0.4 mg total) under the tongue every 5 (five) minutes as needed for Chest pain. 30 tablet 3    NOVOLOG FLEXPEN U-100 INSULIN 100 unit/mL (3 mL) InPn pen INJECT FIVE UNITS into THE SKIN THREE  "TIMES DAILY WITH MEALS 15 mL 1    olmesartan (BENICAR) 20 MG tablet TAKE ONE TABLET BY MOUTH EVERY DAY 90 tablet 0    ondansetron (ZOFRAN-ODT) 4 MG TbDL Take 1 tablet (4 mg total) by mouth every 6 (six) hours as needed (nausea). 30 tablet 0    pravastatin (PRAVACHOL) 20 MG tablet Take 1 tablet (20 mg total) by mouth once daily. 90 tablet 3    predniSONE (DELTASONE) 5 MG tablet Take 5 mg by mouth once daily.      tacrolimus (PROGRAF) 1 MG Cap 1 capsule once daily.      tamsulosin (FLOMAX) 0.4 mg Cap       TRESIBA FLEXTOUCH U-100 100 unit/mL (3 mL) insulin pen INJECT 20 UNITS into THE SKIN ONCE DAILY      triprolidine-pseudoephedrine (APRODINE) 2.5-60 mg Tab Take 1 tablet by mouth every 6 (six) hours as needed.      [DISCONTINUED] alendronate (FOSAMAX) 70 MG tablet TAKE ONE TABLET BY MOUTH once weekly 4 tablet 10    [DISCONTINUED] pantoprazole (PROTONIX) 40 MG tablet Take 40 mg by mouth.         Allergies  Review of patient's allergies indicates:   Allergen Reactions    Gabapentin Other (See Comments)     Made her disoriented  "out of my head"      Morphine Itching    Opioids - morphine analogues        Physical Examination   /80 (BP Location: Left arm, Patient Position: Sitting)   Pulse 68   Temp 97.9 °F (36.6 °C) (Oral)   Resp 18   Ht 5' 4" (1.626 m)   Wt 88.3 kg (194 lb 9.6 oz)   SpO2 97%   BMI 33.40 kg/m²     Physical Exam  Constitutional:       Appearance: Normal appearance. She is normal weight.   Cardiovascular:      Rate and Rhythm: Normal rate and regular rhythm.      Pulses: Normal pulses.      Heart sounds: Normal heart sounds.   Pulmonary:      Effort: Pulmonary effort is normal.      Breath sounds: Normal breath sounds.   Musculoskeletal:         General: Normal range of motion.   Skin:     General: Skin is warm.   Neurological:      Mental Status: She is alert.      Motor: Weakness present.      Gait: Gait abnormal.   Psychiatric:         Mood and Affect: Mood normal.         " Behavior: Behavior normal.         Judgment: Judgment normal.         Assessment and Plan (including Health Maintenance)     Plan:         Problem List Items Addressed This Visit        Orthopedic    Amputation of third toe, left, traumatic - Primary    Age related osteoporosis      Other Visit Diagnoses     PVD (peripheral vascular disease)        Relevant Orders    US Lower Extrem Arteries Bilat with MIRANDA (xpd)    Ambulatory referral/consult to General Surgery    Diverticula of colon        Gastric ulcer, unspecified chronicity, unspecified whether gastric ulcer hemorrhage or perforation present        Relevant Medications    pantoprazole (PROTONIX) 40 MG tablet        - increase protonix to BID  - stop alendronate will try to order prolia for her  - repeat US and send for surgical intervention of PVD  - wound is doing well     Follow up in about 6 months (around 2/25/2023).        Signature:  Patricai Kendall MD      Date of encounter: 8/25/22

## 2022-09-12 ENCOUNTER — OFFICE VISIT (OUTPATIENT)
Dept: SURGERY | Facility: CLINIC | Age: 72
End: 2022-09-12
Payer: MEDICARE

## 2022-09-12 VITALS — WEIGHT: 182 LBS | HEIGHT: 64 IN | BODY MASS INDEX: 31.07 KG/M2

## 2022-09-12 DIAGNOSIS — Z94.0 RENAL TRANSPLANT RECIPIENT: Primary | ICD-10-CM

## 2022-09-12 DIAGNOSIS — I73.9 PVD (PERIPHERAL VASCULAR DISEASE): ICD-10-CM

## 2022-09-12 PROCEDURE — 99214 OFFICE O/P EST MOD 30 MIN: CPT | Mod: PBBFAC | Performed by: SURGERY

## 2022-09-12 PROCEDURE — 4010F PR ACE/ARB THEARPY RXD/TAKEN: ICD-10-PCS | Mod: CPTII,,, | Performed by: SURGERY

## 2022-09-12 PROCEDURE — 3052F PR MOST RECENT HEMOGLOBIN A1C LEVEL 8.0 - < 9.0%: ICD-10-PCS | Mod: CPTII,,, | Performed by: SURGERY

## 2022-09-12 PROCEDURE — 3052F HG A1C>EQUAL 8.0%<EQUAL 9.0%: CPT | Mod: CPTII,,, | Performed by: SURGERY

## 2022-09-12 PROCEDURE — 4010F ACE/ARB THERAPY RXD/TAKEN: CPT | Mod: CPTII,,, | Performed by: SURGERY

## 2022-09-12 PROCEDURE — 1159F PR MEDICATION LIST DOCUMENTED IN MEDICAL RECORD: ICD-10-PCS | Mod: CPTII,,, | Performed by: SURGERY

## 2022-09-12 PROCEDURE — 1159F MED LIST DOCD IN RCRD: CPT | Mod: CPTII,,, | Performed by: SURGERY

## 2022-09-12 PROCEDURE — 3008F PR BODY MASS INDEX (BMI) DOCUMENTED: ICD-10-PCS | Mod: CPTII,,, | Performed by: SURGERY

## 2022-09-12 PROCEDURE — 99202 PR OFFICE/OUTPT VISIT, NEW, LEVL II, 15-29 MIN: ICD-10-PCS | Mod: S$PBB,,, | Performed by: SURGERY

## 2022-09-12 PROCEDURE — 3008F BODY MASS INDEX DOCD: CPT | Mod: CPTII,,, | Performed by: SURGERY

## 2022-09-12 PROCEDURE — 99202 OFFICE O/P NEW SF 15 MIN: CPT | Mod: S$PBB,,, | Performed by: SURGERY

## 2022-09-12 NOTE — PROGRESS NOTES
"Subjective:       Patient ID: Nasrin Grant is a 72 y.o. female.    Chief Complaint: Consult (PVD)  Peripheral vascular disease.  Right MIRANDA is 0.71 left MIRANDA is 0.51     No wounds tissue loss currently is healed multiple amputations on both feet    As a right pelvic transplant kidney.  Creatinine is are elevated close to 2 will plan is sit tight this time there is no wounds or tissue loss or rest pain.  family history includes Diabetes in her father and mother; Hearing loss in her father and mother; Heart disease in her father and mother; Hyperlipidemia in her father and mother.  Past Medical History:   Diagnosis Date    Atherosclerotic heart disease of native coronary artery with angina pectoris 01/20/2020    Depression     Diabetes mellitus, type 2     Disorder of kidney and ureter     Hyperlipidemia     Hypertension     Kidney transplant status 07/13/2020    Osteoporosis 07/22/2020    Stroke       Past Surgical History:   Procedure Laterality Date    EXTRACTION OF TOOTH Left 08/11/2021    HYSTERECTOMY      kidney transplant 2016      TOE AMPUTATION         reports that she has never smoked. She has never used smokeless tobacco. She reports that she does not drink alcohol and does not use drugs.   HPI  Review of Systems      Objective:      Ht 5' 4" (1.626 m)   Wt 82.6 kg (182 lb)   BMI 31.24 kg/m²    Physical Exam  Constitutional:       Appearance: Normal appearance.   Cardiovascular:      Comments: Decreased pedal pulses bilaterally   Pulmonary:      Effort: Pulmonary effort is normal.      Breath sounds: Normal breath sounds.   Abdominal:      General: Abdomen is flat.   Musculoskeletal:      Right lower leg: Edema present.      Left lower leg: Edema present.      Comments: Toe amputations bilateral feet well-healed   Skin:     General: Skin is warm and dry.   Neurological:      Mental Status: She is alert.   Psychiatric:         Mood and Affect: Mood normal.         Behavior: Behavior normal.         Thought " Content: Thought content normal.         Judgment: Judgment normal.         Assessment:       1. Renal transplant recipient    2. PVD (peripheral vascular disease)          Plan:       Follow-up 6 months educated patient

## 2022-10-26 ENCOUNTER — OFFICE VISIT (OUTPATIENT)
Dept: FAMILY MEDICINE | Facility: CLINIC | Age: 72
End: 2022-10-26
Payer: MEDICARE

## 2022-10-26 VITALS
HEIGHT: 64 IN | BODY MASS INDEX: 30.22 KG/M2 | HEART RATE: 60 BPM | RESPIRATION RATE: 18 BRPM | TEMPERATURE: 98 F | WEIGHT: 177 LBS | DIASTOLIC BLOOD PRESSURE: 63 MMHG | OXYGEN SATURATION: 100 % | SYSTOLIC BLOOD PRESSURE: 131 MMHG

## 2022-10-26 DIAGNOSIS — S46.811A STRAIN OF RIGHT TRAPEZIUS MUSCLE, INITIAL ENCOUNTER: ICD-10-CM

## 2022-10-26 DIAGNOSIS — S91.302A WOUND OF LEFT FOOT: Primary | ICD-10-CM

## 2022-10-26 DIAGNOSIS — R30.0 DYSURIA: ICD-10-CM

## 2022-10-26 DIAGNOSIS — N39.0 UTI (URINARY TRACT INFECTION), UNCOMPLICATED: ICD-10-CM

## 2022-10-26 LAB
BILIRUB SERPL-MCNC: NEGATIVE MG/DL
BLOOD URINE, POC: ABNORMAL
COLOR, POC UA: YELLOW
GLUCOSE UR QL STRIP: NEGATIVE
KETONES UR QL STRIP: NEGATIVE
LEUKOCYTE ESTERASE URINE, POC: ABNORMAL
NITRITE, POC UA: NEGATIVE
PH, POC UA: 5.5
PROTEIN, POC: 30
SPECIFIC GRAVITY, POC UA: 1.01
UROBILINOGEN, POC UA: 0.2

## 2022-10-26 PROCEDURE — 3078F PR MOST RECENT DIASTOLIC BLOOD PRESSURE < 80 MM HG: ICD-10-PCS | Mod: ,,, | Performed by: FAMILY MEDICINE

## 2022-10-26 PROCEDURE — 4010F ACE/ARB THERAPY RXD/TAKEN: CPT | Mod: ,,, | Performed by: FAMILY MEDICINE

## 2022-10-26 PROCEDURE — 3288F PR FALLS RISK ASSESSMENT DOCUMENTED: ICD-10-PCS | Mod: ,,, | Performed by: FAMILY MEDICINE

## 2022-10-26 PROCEDURE — 87077 CULTURE AEROBIC IDENTIFY: CPT | Mod: ,,, | Performed by: CLINICAL MEDICAL LABORATORY

## 2022-10-26 PROCEDURE — 3075F SYST BP GE 130 - 139MM HG: CPT | Mod: ,,, | Performed by: FAMILY MEDICINE

## 2022-10-26 PROCEDURE — 3075F PR MOST RECENT SYSTOLIC BLOOD PRESS GE 130-139MM HG: ICD-10-PCS | Mod: ,,, | Performed by: FAMILY MEDICINE

## 2022-10-26 PROCEDURE — 81003 URINALYSIS AUTO W/O SCOPE: CPT | Mod: QW,,, | Performed by: FAMILY MEDICINE

## 2022-10-26 PROCEDURE — 3288F FALL RISK ASSESSMENT DOCD: CPT | Mod: ,,, | Performed by: FAMILY MEDICINE

## 2022-10-26 PROCEDURE — 1159F PR MEDICATION LIST DOCUMENTED IN MEDICAL RECORD: ICD-10-PCS | Mod: ,,, | Performed by: FAMILY MEDICINE

## 2022-10-26 PROCEDURE — 87186 SC STD MICRODIL/AGAR DIL: CPT | Mod: XU,91,, | Performed by: CLINICAL MEDICAL LABORATORY

## 2022-10-26 PROCEDURE — 99214 OFFICE O/P EST MOD 30 MIN: CPT | Mod: ,,, | Performed by: FAMILY MEDICINE

## 2022-10-26 PROCEDURE — 87070 CULTURE, WOUND: ICD-10-PCS | Mod: 59,,, | Performed by: CLINICAL MEDICAL LABORATORY

## 2022-10-26 PROCEDURE — 1160F RVW MEDS BY RX/DR IN RCRD: CPT | Mod: ,,, | Performed by: FAMILY MEDICINE

## 2022-10-26 PROCEDURE — 87077 CULTURE, URINE: ICD-10-PCS | Mod: ,,, | Performed by: CLINICAL MEDICAL LABORATORY

## 2022-10-26 PROCEDURE — 87186 SC STD MICRODIL/AGAR DIL: CPT | Mod: ,,, | Performed by: CLINICAL MEDICAL LABORATORY

## 2022-10-26 PROCEDURE — 1101F PR PT FALLS ASSESS DOC 0-1 FALLS W/OUT INJ PAST YR: ICD-10-PCS | Mod: ,,, | Performed by: FAMILY MEDICINE

## 2022-10-26 PROCEDURE — 1160F PR REVIEW ALL MEDS BY PRESCRIBER/CLIN PHARMACIST DOCUMENTED: ICD-10-PCS | Mod: ,,, | Performed by: FAMILY MEDICINE

## 2022-10-26 PROCEDURE — 1159F MED LIST DOCD IN RCRD: CPT | Mod: ,,, | Performed by: FAMILY MEDICINE

## 2022-10-26 PROCEDURE — 1125F PR PAIN SEVERITY QUANTIFIED, PAIN PRESENT: ICD-10-PCS | Mod: ,,, | Performed by: FAMILY MEDICINE

## 2022-10-26 PROCEDURE — 1125F AMNT PAIN NOTED PAIN PRSNT: CPT | Mod: ,,, | Performed by: FAMILY MEDICINE

## 2022-10-26 PROCEDURE — 1101F PT FALLS ASSESS-DOCD LE1/YR: CPT | Mod: ,,, | Performed by: FAMILY MEDICINE

## 2022-10-26 PROCEDURE — 99214 PR OFFICE/OUTPT VISIT, EST, LEVL IV, 30-39 MIN: ICD-10-PCS | Mod: ,,, | Performed by: FAMILY MEDICINE

## 2022-10-26 PROCEDURE — 3052F HG A1C>EQUAL 8.0%<EQUAL 9.0%: CPT | Mod: ,,, | Performed by: FAMILY MEDICINE

## 2022-10-26 PROCEDURE — 87086 URINE CULTURE/COLONY COUNT: CPT | Mod: ,,, | Performed by: CLINICAL MEDICAL LABORATORY

## 2022-10-26 PROCEDURE — 81003 POCT URINALYSIS W/O SCOPE: ICD-10-PCS | Mod: QW,,, | Performed by: FAMILY MEDICINE

## 2022-10-26 PROCEDURE — 3052F PR MOST RECENT HEMOGLOBIN A1C LEVEL 8.0 - < 9.0%: ICD-10-PCS | Mod: ,,, | Performed by: FAMILY MEDICINE

## 2022-10-26 PROCEDURE — 3078F DIAST BP <80 MM HG: CPT | Mod: ,,, | Performed by: FAMILY MEDICINE

## 2022-10-26 PROCEDURE — 87070 CULTURE OTHR SPECIMN AEROBIC: CPT | Mod: 59,,, | Performed by: CLINICAL MEDICAL LABORATORY

## 2022-10-26 PROCEDURE — 87186 CULTURE, URINE: ICD-10-PCS | Mod: ,,, | Performed by: CLINICAL MEDICAL LABORATORY

## 2022-10-26 PROCEDURE — 87086 CULTURE, URINE: ICD-10-PCS | Mod: ,,, | Performed by: CLINICAL MEDICAL LABORATORY

## 2022-10-26 PROCEDURE — 4010F PR ACE/ARB THEARPY RXD/TAKEN: ICD-10-PCS | Mod: ,,, | Performed by: FAMILY MEDICINE

## 2022-10-26 RX ORDER — SILVER SULFADIAZINE 10 G/1000G
CREAM TOPICAL DAILY
Qty: 400 G | Refills: 1 | Status: SHIPPED | OUTPATIENT
Start: 2022-10-26 | End: 2023-04-13

## 2022-10-26 RX ORDER — SULFAMETHOXAZOLE AND TRIMETHOPRIM 800; 160 MG/1; MG/1
1 TABLET ORAL 2 TIMES DAILY
Qty: 14 TABLET | Refills: 0 | Status: SHIPPED | OUTPATIENT
Start: 2022-10-26 | End: 2022-11-02

## 2022-10-26 RX ORDER — CYCLOBENZAPRINE HCL 10 MG
10 TABLET ORAL 3 TIMES DAILY PRN
Qty: 60 TABLET | Refills: 0 | Status: SHIPPED | OUTPATIENT
Start: 2022-10-26 | End: 2022-11-25

## 2022-10-26 NOTE — PROGRESS NOTES
Patricia Kendall MD        PATIENT NAME: Nasrin Grant  : 1950  DATE: 10/26/22  MRN: [id number]      Billing Provider: Patricia Kendall MD  Level of Service:   Patient PCP Information       Provider PCP Type    Patricia Kendall MD General            Reason for Visit / Chief Complaint: Callouses (Bottom of left foot), Flank Pain (When she woke up yesterday had a pain in right side that makes it difficult to move or stand; pain goes down back), and Shoulder Pain (Area towards neck pain when trying to stand and head feel heavy)       History of Present Illness      Nasrin Grant presents to the clinic with Callouses (Bottom of left foot), Flank Pain (When she woke up yesterday had a pain in right side that makes it difficult to move or stand; pain goes down back), and Shoulder Pain (Area towards neck pain when trying to stand and head feel heavy)     She has a wound on the bottom of her left foot, she has been trying to clean it on her own, she had home health, but they stopped coming    She has right side neck pain radiating to the shoulder, feels tense    She has right flank pain, radiating to the back     Flank Pain  This is a new problem. The current episode started yesterday. The problem occurs constantly. The problem is unchanged. The pain is at a severity of 6/10. Associated symptoms include headaches and weakness. Pertinent negatives include no chest pain, dysuria, leg pain or numbness.   Shoulder Pain   The pain is present in the neck and back. This is a new problem. The current episode started yesterday. The problem has been unchanged. The quality of the pain is described as aching. The pain is moderate. Associated symptoms include headaches. Pertinent negatives include no numbness.     Review of Systems     Review of Systems   Constitutional:  Positive for unexpected weight change. Negative for activity change.   HENT:  Negative for hearing loss, rhinorrhea and trouble swallowing.    Eyes:  Negative for  discharge and visual disturbance.   Respiratory:  Negative for chest tightness and wheezing.    Cardiovascular:  Negative for chest pain and palpitations.   Gastrointestinal:  Positive for vomiting. Negative for constipation and diarrhea.   Endocrine: Negative for polydipsia and polyuria.   Genitourinary:  Positive for flank pain. Negative for difficulty urinating, dysuria, hematuria and menstrual problem.   Musculoskeletal:  Positive for arthralgias and neck pain. Negative for joint swelling.   Neurological:  Positive for weakness and headaches. Negative for numbness.   Psychiatric/Behavioral:  Negative for confusion and dysphoric mood.      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  reports that she has never smoked. She has never used smokeless tobacco. She reports that she does not drink alcohol and does not use drugs.    Family History  Ms.'s family history includes Diabetes in her father and mother; Hearing loss in her father and mother; Heart disease in her father and mother; Hyperlipidemia in her father and mother.    Medications and Allergies     Medications  Outpatient Medications Marked as Taking for the 10/26/22 encounter (Office Visit) with Patricia Kendall MD   Medication Sig Dispense Refill    amLODIPine (NORVASC) 10 MG tablet TAKE ONE TABLET BY MOUTH ONCE DAILY 90 tablet 1    blood sugar diagnostic (TRUE METRIX GLUCOSE TEST STRIP) Strp Inject 1 strip into the skin 3 (three) times daily before meals. 400  strip 2    blood-glucose meter (TRUE METRIX GLUCOSE METER) kit 1 each by Other route 3 (three) times daily before meals. 1 each 0    carvediloL (COREG) 25 MG tablet TAKE ONE TABLET BY MOUTH TWICE DAILY 180 tablet 1    cinacalcet (SENSIPAR) 30 MG Tab Take 1 tablet (30 mg total) by mouth once daily. 30 tablet 11    DULoxetine (CYMBALTA) 20 MG capsule TAKE ONE CAPSULE BY MOUTH ONCE DAILY 90 capsule 3    furosemide (LASIX) 40 MG tablet TAKE ONE TABLET BY MOUTH TWICE DAILY 180 tablet 3    Lactobacillus acidophilus (PROBIOTIC ACIDOPHILUS ORAL) Take 4 Billion Cells by mouth.      lancets (TRUEPLUS LANCETS) 33 gauge Misc Inject 1 lancet into the skin 3 (three) times daily before meals. 400 each 2    loperamide (IMODIUM) 2 mg capsule Take 2 mg by mouth 2 (two) times daily.      losartan (COZAAR) 25 MG tablet Take 25 mg by mouth once daily.      mirabegron (MYRBETRIQ) 25 mg Tb24 ER tablet Take 25 mg by mouth once daily.      mycophenolate (MYFORTIC) 180 MG TbEC Take 180 mg by mouth 2 (two) times daily.      NOVOLOG FLEXPEN U-100 INSULIN 100 unit/mL (3 mL) InPn pen INJECT FIVE UNITS into THE SKIN THREE TIMES DAILY WITH MEALS 15 mL 1    olmesartan (BENICAR) 20 MG tablet TAKE ONE TABLET BY MOUTH EVERY DAY 90 tablet 0    ondansetron (ZOFRAN-ODT) 4 MG TbDL Take 1 tablet (4 mg total) by mouth every 6 (six) hours as needed (nausea). 30 tablet 0    pantoprazole (PROTONIX) 40 MG tablet Take 1 tablet (40 mg total) by mouth 2 (two) times daily. 180 tablet 3    pravastatin (PRAVACHOL) 20 MG tablet Take 1 tablet (20 mg total) by mouth once daily. 90 tablet 3    predniSONE (DELTASONE) 5 MG tablet Take 5 mg by mouth once daily.      tacrolimus (PROGRAF) 1 MG Cap 1 capsule once daily.      tamsulosin (FLOMAX) 0.4 mg Cap       TRESIBA FLEXTOUCH U-100 100 unit/mL (3 mL) insulin pen INJECT 20 UNITS into THE SKIN ONCE DAILY      triprolidine-pseudoephedrine (APRODINE) 2.5-60 mg Tab Take 1 tablet by mouth every 6 (six) hours as needed.    "      Allergies  Review of patient's allergies indicates:   Allergen Reactions    Gabapentin Other (See Comments)     Made her disoriented  "out of my head"      Morphine Itching    Opioids - morphine analogues        Physical Examination   /63 (BP Location: Right arm, Patient Position: Sitting, BP Method: Medium (Automatic))   Pulse 60   Temp 98.1 °F (36.7 °C) (Oral)   Resp 18   Ht 5' 4" (1.626 m)   Wt 80.3 kg (177 lb)   SpO2 100%   BMI 30.38 kg/m²     Physical Exam  Constitutional:       Appearance: Normal appearance. She is normal weight.   Cardiovascular:      Rate and Rhythm: Normal rate and regular rhythm.      Pulses: Normal pulses.      Heart sounds: Normal heart sounds.   Abdominal:      Tenderness: There is abdominal tenderness. There is right CVA tenderness.   Musculoskeletal:      Right shoulder: Tenderness present. Decreased range of motion.        Arms:         Feet:    Feet:      Left foot:      Skin integrity: Skin breakdown and erythema present.   Skin:     General: Skin is warm.   Neurological:      Mental Status: She is alert.      Motor: Weakness present.      Coordination: Coordination abnormal.      Gait: Gait abnormal.   Psychiatric:         Mood and Affect: Mood normal.         Behavior: Behavior normal.         Judgment: Judgment normal.       Recent Results (from the past 24 hour(s))   POCT URINALYSIS W/O SCOPE    Collection Time: 10/26/22  3:13 PM   Result Value Ref Range    Color, UA Yellow     Spec Grav UA 1.015     pH, UA 5.5     WBC, UA large     Nitrite, UA negative     Protein, POC 30     Glucose, UA negative     Ketones, UA negative     Bilirubin, POC negative     Urobilinogen, UA 0.2     Blood, UA small         Assessment and Plan (including Health Maintenance)     Plan:         Problem List Items Addressed This Visit    None  Visit Diagnoses       Wound of left foot    -  Primary    Relevant Orders    Culture, Wound    Ambulatory referral/consult to Wound Clinic    " Dysuria        Relevant Orders    POCT URINALYSIS W/O SCOPE (Completed)    Strain of right trapezius muscle, initial encounter        Relevant Medications    cyclobenzaprine (FLEXERIL) 10 MG tablet    UTI (urinary tract infection), uncomplicated        Relevant Medications    sulfamethoxazole-trimethoprim 800-160mg (BACTRIM DS) 800-160 mg Tab    Other Relevant Orders    Urine culture              Will send silvadene and wound care referral   Bactrium for UTI  Urine culture send  Trapezius strain noted on exam, will send flexeril to see if this helps  Wound culture taken today       Signature:  Patricia Kendall MD      Date of encounter: 10/26/22

## 2022-10-28 LAB
MICROORGANISM SPEC CULT: ABNORMAL
UA COMPLETE W REFLEX CULTURE PNL UR: ABNORMAL

## 2022-10-31 ENCOUNTER — OFFICE VISIT (OUTPATIENT)
Dept: FAMILY MEDICINE | Facility: CLINIC | Age: 72
End: 2022-10-31
Payer: MEDICARE

## 2022-10-31 VITALS
HEART RATE: 68 BPM | OXYGEN SATURATION: 97 % | SYSTOLIC BLOOD PRESSURE: 110 MMHG | TEMPERATURE: 98 F | WEIGHT: 177 LBS | HEIGHT: 64 IN | RESPIRATION RATE: 20 BRPM | DIASTOLIC BLOOD PRESSURE: 72 MMHG | BODY MASS INDEX: 30.22 KG/M2

## 2022-10-31 DIAGNOSIS — R44.3 HALLUCINATION: ICD-10-CM

## 2022-10-31 DIAGNOSIS — E87.1 HYPONATREMIA: Primary | ICD-10-CM

## 2022-10-31 DIAGNOSIS — R53.1 WEAKNESS: ICD-10-CM

## 2022-10-31 DIAGNOSIS — R51.9 ACUTE NONINTRACTABLE HEADACHE, UNSPECIFIED HEADACHE TYPE: ICD-10-CM

## 2022-10-31 LAB
ALBUMIN SERPL BCP-MCNC: 3.2 G/DL (ref 3.5–5)
ALBUMIN/GLOB SERPL: 0.8 {RATIO}
ALP SERPL-CCNC: 60 U/L (ref 55–142)
ALT SERPL W P-5'-P-CCNC: 8 U/L (ref 13–56)
ANION GAP SERPL CALCULATED.3IONS-SCNC: 16 MMOL/L (ref 7–16)
AST SERPL W P-5'-P-CCNC: 15 U/L (ref 15–37)
BASOPHILS # BLD AUTO: 0.02 K/UL (ref 0–0.2)
BASOPHILS NFR BLD AUTO: 0.2 % (ref 0–1)
BILIRUB SERPL-MCNC: 0.2 MG/DL (ref ?–1.2)
BUN SERPL-MCNC: 91 MG/DL (ref 7–18)
BUN/CREAT SERPL: 17 (ref 6–20)
CALCIUM SERPL-MCNC: 8.1 MG/DL (ref 8.5–10.1)
CHLORIDE SERPL-SCNC: 87 MMOL/L (ref 98–107)
CO2 SERPL-SCNC: 19 MMOL/L (ref 21–32)
CREAT SERPL-MCNC: 5.4 MG/DL (ref 0.55–1.02)
CTP QC/QA: YES
DIFFERENTIAL METHOD BLD: ABNORMAL
EGFR (NO RACE VARIABLE) (RUSH/TITUS): 8 ML/MIN/1.73M²
EOSINOPHIL # BLD AUTO: 0.13 K/UL (ref 0–0.5)
EOSINOPHIL NFR BLD AUTO: 1.2 % (ref 1–4)
ERYTHROCYTE [DISTWIDTH] IN BLOOD BY AUTOMATED COUNT: 14 % (ref 11.5–14.5)
FLUAV AG NPH QL: NEGATIVE
FLUBV AG NPH QL: NEGATIVE
GLOBULIN SER-MCNC: 4 G/DL (ref 2–4)
GLUCOSE SERPL-MCNC: 185 MG/DL (ref 74–106)
GLUCOSE SERPL-MCNC: 186 MG/DL (ref 70–110)
HCT VFR BLD AUTO: 27.8 % (ref 38–47)
HGB BLD-MCNC: 9.6 G/DL (ref 12–16)
HYPOCHROMIA BLD QL SMEAR: ABNORMAL
LYMPHOCYTES # BLD AUTO: 0.61 K/UL (ref 1–4.8)
LYMPHOCYTES NFR BLD AUTO: 5.6 % (ref 27–41)
LYMPHOCYTES NFR BLD MANUAL: 6 % (ref 27–41)
MCH RBC QN AUTO: 24.6 PG (ref 27–31)
MCHC RBC AUTO-ENTMCNC: 34.5 G/DL (ref 32–36)
MCV RBC AUTO: 71.3 FL (ref 80–96)
MICROCYTES BLD QL SMEAR: ABNORMAL
MONOCYTES # BLD AUTO: 0.58 K/UL (ref 0–0.8)
MONOCYTES NFR BLD AUTO: 5.3 % (ref 2–6)
MONOCYTES NFR BLD MANUAL: 5 % (ref 2–6)
MPC BLD CALC-MCNC: 9.8 FL (ref 9.4–12.4)
NEUTROPHILS # BLD AUTO: 9.58 K/UL (ref 1.8–7.7)
NEUTROPHILS NFR BLD AUTO: 87.7 % (ref 53–65)
NEUTS BAND NFR BLD MANUAL: 1 % (ref 1–5)
NEUTS SEG NFR BLD MANUAL: 88 % (ref 50–62)
NRBC BLD MANUAL-RTO: ABNORMAL %
PLATELET # BLD AUTO: 374 K/UL (ref 150–400)
POTASSIUM SERPL-SCNC: 4.8 MMOL/L (ref 3.5–5.1)
PROT SERPL-MCNC: 7.2 G/DL (ref 6.4–8.2)
RBC # BLD AUTO: 3.9 M/UL (ref 4.2–5.4)
SARS-COV-2 AG RESP QL IA.RAPID: NEGATIVE
SODIUM SERPL-SCNC: 117 MMOL/L (ref 136–145)
WBC # BLD AUTO: 10.92 K/UL (ref 4.5–11)

## 2022-10-31 PROCEDURE — 3074F PR MOST RECENT SYSTOLIC BLOOD PRESSURE < 130 MM HG: ICD-10-PCS | Mod: ,,, | Performed by: NURSE PRACTITIONER

## 2022-10-31 PROCEDURE — 85025 COMPLETE CBC W/AUTO DIFF WBC: CPT | Performed by: NURSE PRACTITIONER

## 2022-10-31 PROCEDURE — 87428 POCT SARS-COV2 (COVID) WITH FLU ANTIGEN: ICD-10-PCS | Mod: QW,,, | Performed by: NURSE PRACTITIONER

## 2022-10-31 PROCEDURE — 1101F PR PT FALLS ASSESS DOC 0-1 FALLS W/OUT INJ PAST YR: ICD-10-PCS | Mod: ,,, | Performed by: NURSE PRACTITIONER

## 2022-10-31 PROCEDURE — 3074F SYST BP LT 130 MM HG: CPT | Mod: ,,, | Performed by: NURSE PRACTITIONER

## 2022-10-31 PROCEDURE — 4010F PR ACE/ARB THEARPY RXD/TAKEN: ICD-10-PCS | Mod: ,,, | Performed by: NURSE PRACTITIONER

## 2022-10-31 PROCEDURE — 3078F DIAST BP <80 MM HG: CPT | Mod: ,,, | Performed by: NURSE PRACTITIONER

## 2022-10-31 PROCEDURE — 4010F ACE/ARB THERAPY RXD/TAKEN: CPT | Mod: ,,, | Performed by: NURSE PRACTITIONER

## 2022-10-31 PROCEDURE — 3288F PR FALLS RISK ASSESSMENT DOCUMENTED: ICD-10-PCS | Mod: ,,, | Performed by: NURSE PRACTITIONER

## 2022-10-31 PROCEDURE — 3288F FALL RISK ASSESSMENT DOCD: CPT | Mod: ,,, | Performed by: NURSE PRACTITIONER

## 2022-10-31 PROCEDURE — 3052F HG A1C>EQUAL 8.0%<EQUAL 9.0%: CPT | Mod: ,,, | Performed by: NURSE PRACTITIONER

## 2022-10-31 PROCEDURE — 3078F PR MOST RECENT DIASTOLIC BLOOD PRESSURE < 80 MM HG: ICD-10-PCS | Mod: ,,, | Performed by: NURSE PRACTITIONER

## 2022-10-31 PROCEDURE — 3052F PR MOST RECENT HEMOGLOBIN A1C LEVEL 8.0 - < 9.0%: ICD-10-PCS | Mod: ,,, | Performed by: NURSE PRACTITIONER

## 2022-10-31 PROCEDURE — 1159F PR MEDICATION LIST DOCUMENTED IN MEDICAL RECORD: ICD-10-PCS | Mod: ,,, | Performed by: NURSE PRACTITIONER

## 2022-10-31 PROCEDURE — 87428 SARSCOV & INF VIR A&B AG IA: CPT | Mod: QW,,, | Performed by: NURSE PRACTITIONER

## 2022-10-31 PROCEDURE — 80053 COMPREHEN METABOLIC PANEL: CPT | Performed by: NURSE PRACTITIONER

## 2022-10-31 PROCEDURE — 99214 PR OFFICE/OUTPT VISIT, EST, LEVL IV, 30-39 MIN: ICD-10-PCS | Mod: ,,, | Performed by: NURSE PRACTITIONER

## 2022-10-31 PROCEDURE — 1101F PT FALLS ASSESS-DOCD LE1/YR: CPT | Mod: ,,, | Performed by: NURSE PRACTITIONER

## 2022-10-31 PROCEDURE — 99214 OFFICE O/P EST MOD 30 MIN: CPT | Mod: ,,, | Performed by: NURSE PRACTITIONER

## 2022-10-31 PROCEDURE — 1159F MED LIST DOCD IN RCRD: CPT | Mod: ,,, | Performed by: NURSE PRACTITIONER

## 2022-10-31 NOTE — PROGRESS NOTES
Nery Brush NP   [facility]  [id number] Replaced by [facility]  [place]     PATIENT NAME: Nasrni Grant  : 1950  DATE: 10/31/22  MRN: [id number]      Billing Provider: Nery Brush NP  Level of Service:   Patient PCP Information       Provider PCP Type    Patricia Kendall MD General            Reason for Visit / Chief Complaint: Fatigue (C/o weakness, unable to walk without assistance.  States she fell on Friday while trying to get in her bed.  /Pt's family member states patient has been hallucinating and this behavior is not normal for her.), Foot Pain (F/u callous of left foot, pt states she was seen by Dr. Kendall last week and put on antibiotics. ), and Headache       Update PCP  Update Chief Complaint         History of Present Illness / Problem Focused Workflow     Nasrin Grant presents to the clinic pt is here today due to c/o weakness, unable to walk without assistance, fell Friday while trying to get in her bed, family member stated that she has been having auditory and visual hallucinations and this behavior is ot normal for her, c/o foot pain due to callow of left foot, saw dr kendall last week and put on antibiotics, also c/o headache. Had UTI Klebsiella Pneumoniae grew out and was sentive to bactrim which Dr Kendall prescribed    PT HAD KIDNEY TRANSPLANT IN 2016.       Review of Systems     Review of Systems   Constitutional:  Positive for fatigue.   Integumentary:         Large older bruise noted on right upper arm   Neurological:  Positive for headaches.   Psychiatric/Behavioral:          Auditory and visual hallucinations      Medical / Social / Family History     Past Medical History:   Diagnosis Date    Atherosclerotic heart disease of native coronary artery with angina pectoris 2020    Depression     Diabetes mellitus, type 2     Disorder of kidney and ureter     Hyperlipidemia     Hypertension     Kidney transplant status 2020    Osteoporosis 2020    Stroke        Past Surgical  History:   Procedure Laterality Date    EXTRACTION OF TOOTH Left 08/11/2021    HYSTERECTOMY      kidney transplant 2016      TOE AMPUTATION         Social History  Ms.  reports that she has never smoked. She has never used smokeless tobacco. She reports that she does not drink alcohol and does not use drugs.    Family History  Ms.'s family history includes Diabetes in her father and mother; Hearing loss in her father and mother; Heart disease in her father and mother; Hyperlipidemia in her father and mother.    Medications and Allergies     Medications  Outpatient Medications Marked as Taking for the 10/31/22 encounter (Office Visit) with Nery Brush NP   Medication Sig Dispense Refill    amLODIPine (NORVASC) 10 MG tablet TAKE ONE TABLET BY MOUTH ONCE DAILY 90 tablet 1    blood sugar diagnostic (TRUE METRIX GLUCOSE TEST STRIP) Strp Inject 1 strip into the skin 3 (three) times daily before meals. 400 strip 2    blood-glucose meter (TRUE METRIX GLUCOSE METER) kit 1 each by Other route 3 (three) times daily before meals. 1 each 0    carvediloL (COREG) 25 MG tablet TAKE ONE TABLET BY MOUTH TWICE DAILY 180 tablet 1    cinacalcet (SENSIPAR) 30 MG Tab Take 1 tablet (30 mg total) by mouth once daily. 30 tablet 11    cyclobenzaprine (FLEXERIL) 10 MG tablet Take 1 tablet (10 mg total) by mouth 3 (three) times daily as needed for Muscle spasms. 60 tablet 0    DULoxetine (CYMBALTA) 20 MG capsule TAKE ONE CAPSULE BY MOUTH ONCE DAILY 90 capsule 3    furosemide (LASIX) 40 MG tablet TAKE ONE TABLET BY MOUTH TWICE DAILY 180 tablet 3    Lactobacillus acidophilus (PROBIOTIC ACIDOPHILUS ORAL) Take 4 Billion Cells by mouth.      lancets (TRUEPLUS LANCETS) 33 gauge Misc Inject 1 lancet into the skin 3 (three) times daily before meals. 400 each 2    loperamide (IMODIUM) 2 mg capsule Take 2 mg by mouth 2 (two) times daily.      losartan (COZAAR) 25 MG tablet Take 25 mg by mouth once daily.      mirabegron (MYRBETRIQ) 25 mg Tb24 ER  "tablet Take 25 mg by mouth once daily.      mycophenolate (MYFORTIC) 180 MG TbEC Take 180 mg by mouth 2 (two) times daily.      NOVOLOG FLEXPEN U-100 INSULIN 100 unit/mL (3 mL) InPn pen INJECT FIVE UNITS into THE SKIN THREE TIMES DAILY WITH MEALS 15 mL 1    olmesartan (BENICAR) 20 MG tablet TAKE ONE TABLET BY MOUTH EVERY DAY 90 tablet 0    ondansetron (ZOFRAN-ODT) 4 MG TbDL Take 1 tablet (4 mg total) by mouth every 6 (six) hours as needed (nausea). 30 tablet 0    pantoprazole (PROTONIX) 40 MG tablet Take 1 tablet (40 mg total) by mouth 2 (two) times daily. 180 tablet 3    pravastatin (PRAVACHOL) 20 MG tablet Take 1 tablet (20 mg total) by mouth once daily. 90 tablet 3    predniSONE (DELTASONE) 5 MG tablet Take 5 mg by mouth once daily.      silver sulfADIAZINE 1% (SILVADENE) 1 % cream Apply topically once daily. 400 g 1    sulfamethoxazole-trimethoprim 800-160mg (BACTRIM DS) 800-160 mg Tab Take 1 tablet by mouth 2 (two) times daily. for 7 days 14 tablet 0    tacrolimus (PROGRAF) 1 MG Cap 1 capsule once daily.      tamsulosin (FLOMAX) 0.4 mg Cap       TRESIBA FLEXTOUCH U-100 100 unit/mL (3 mL) insulin pen INJECT 20 UNITS into THE SKIN ONCE DAILY      triprolidine-pseudoephedrine (APRODINE) 2.5-60 mg Tab Take 1 tablet by mouth every 6 (six) hours as needed.         Allergies  Review of patient's allergies indicates:   Allergen Reactions    Gabapentin Other (See Comments)     Made her disoriented  "out of my head"      Morphine Itching    Opioids - morphine analogues        Physical Examination     Vitals:    10/31/22 1333   BP: 110/72   BP Location: Left arm   Patient Position: Sitting   BP Method: Medium (Manual)   Pulse: 68   Resp: 20   Temp: 98.3 °F (36.8 °C)   TempSrc: Oral   SpO2: 97%   Weight: 80.3 kg (177 lb)   Height: 5' 4.02" (1.626 m)      Physical Exam  Constitutional:       Appearance: Normal appearance.   HENT:      Head: Normocephalic.      Right Ear: Tympanic membrane, ear canal and external ear normal. "      Left Ear: Tympanic membrane, ear canal and external ear normal.      Nose: Nose normal.      Mouth/Throat:      Mouth: Mucous membranes are moist.      Pharynx: Oropharynx is clear.   Eyes:      Extraocular Movements: Extraocular movements intact.      Conjunctiva/sclera: Conjunctivae normal.      Pupils: Pupils are equal, round, and reactive to light.   Cardiovascular:      Rate and Rhythm: Normal rate and regular rhythm.      Pulses: Normal pulses.      Heart sounds: Normal heart sounds.   Pulmonary:      Effort: Pulmonary effort is normal.      Breath sounds: Normal breath sounds.   Abdominal:      General: Bowel sounds are normal.      Palpations: Abdomen is soft.   Musculoskeletal:         General: Normal range of motion.      Cervical back: Normal range of motion and neck supple.   Skin:     General: Skin is warm and dry.      Capillary Refill: Capillary refill takes less than 2 seconds.      Coloration: Skin is pale.   Neurological:      General: No focal deficit present.      Mental Status: She is alert and oriented to person, place, and time.      Gait: Gait abnormal (using wheelchair for assistance).   Psychiatric:         Mood and Affect: Mood normal.         Behavior: Behavior normal.        Assessment and Plan (including Health Maintenance)      Problem List  Smart Sets  Document Outside HM   :    Plan: refer to [facility] er for prob admission, spoke with dr merchant who instructed me to send pt to go to er for admission    Weakness  -     CBC Auto Differential; Future; Expected date: 10/31/2022  -     Comprehensive Metabolic Panel; Future; Expected date: 10/31/2022  -     POCT glucose  -     POCT URINALYSIS W/O SCOPE  -     POCT SARS-COV2 (COVID) with Flu Antigen    Acute nonintractable headache, unspecified headache type  -     CBC Auto Differential; Future; Expected date: 10/31/2022  -     Comprehensive Metabolic Panel; Future; Expected date: 10/31/2022  -     POCT SARS-COV2 (COVID) with Flu  Antigen    Hallucination  -     CBC Auto Differential; Future; Expected date: 10/31/2022  -     Comprehensive Metabolic Panel; Future; Expected date: 10/31/2022  -     POCT glucose  -     POCT URINALYSIS W/O SCOPE  -     POCT SARS-COV2 (COVID) with Flu Antigen          Health Maintenance Due   Topic Date Due    Diabetes Urine Screening  Never done    Mammogram  09/09/2022    Foot Exam  10/08/2022    Hemoglobin A1c  11/02/2022       Problem List Items Addressed This Visit    None  Visit Diagnoses       Weakness    -  Primary    Relevant Orders    POCT URINALYSIS W/O SCOPE    Acute nonintractable headache, unspecified headache type        Hallucination        Relevant Orders    POCT URINALYSIS W/O SCOPE              Health Maintenance Topics with due status: Not Due       Topic Last Completion Date    DEXA Scan 09/09/2021    Lipid Panel 06/13/2022    Eye Exam 06/15/2022    Colorectal Cancer Screening 08/22/2022       Procedures     Future Appointments   Date Time Provider Department Center   11/10/2022 10:00 AM CHAPARRITA Paz [facility]   11/29/2022  1:45 PM Patricia Kendall MD [facility]   12/21/2022 10:45 AM Patricia Kendall MD [facility]   3/15/2023  1:00 PM Miryam Alaniz MD [facility]        Follow up for as scheduled with dr kendall.       Signature:  Nery Brush NP    Date of encounter: 10/31/22

## 2022-10-31 NOTE — PATIENT INSTRUCTIONS
I spoke with Dr Castellanos who instructed for pt to go to [facility] ER and be evaluated, as she does need hospital admission, will give copies of lab to pt and family member.

## 2022-11-01 PROBLEM — R44.3 HALLUCINATION: Status: ACTIVE | Noted: 2022-11-01

## 2022-11-01 PROBLEM — R53.1 WEAKNESS: Status: ACTIVE | Noted: 2022-11-01

## 2022-11-01 PROBLEM — E87.1 HYPONATREMIA: Status: ACTIVE | Noted: 2022-11-01

## 2022-11-01 PROBLEM — R51.9 ACUTE NONINTRACTABLE HEADACHE: Status: ACTIVE | Noted: 2022-11-01

## 2022-11-09 DIAGNOSIS — Z71.89 COMPLEX CARE COORDINATION: ICD-10-CM

## 2022-11-10 ENCOUNTER — HOSPITAL ENCOUNTER (EMERGENCY)
Facility: HOSPITAL | Age: 72
Discharge: ANOTHER HOSPITAL | End: 2022-11-10
Payer: MEDICARE

## 2022-11-10 VITALS
WEIGHT: 170 LBS | RESPIRATION RATE: 29 BRPM | DIASTOLIC BLOOD PRESSURE: 65 MMHG | HEIGHT: 64 IN | TEMPERATURE: 99 F | OXYGEN SATURATION: 100 % | HEART RATE: 92 BPM | SYSTOLIC BLOOD PRESSURE: 174 MMHG | BODY MASS INDEX: 29.02 KG/M2

## 2022-11-10 DIAGNOSIS — D72.829 LEUKOCYTOSIS, UNSPECIFIED TYPE: ICD-10-CM

## 2022-11-10 DIAGNOSIS — R09.02 HYPOXIA: ICD-10-CM

## 2022-11-10 DIAGNOSIS — R79.89 ELEVATED TROPONIN: ICD-10-CM

## 2022-11-10 DIAGNOSIS — I50.9 CONGESTIVE HEART FAILURE, UNSPECIFIED HF CHRONICITY, UNSPECIFIED HEART FAILURE TYPE: Primary | ICD-10-CM

## 2022-11-10 DIAGNOSIS — R06.02 SOB (SHORTNESS OF BREATH): ICD-10-CM

## 2022-11-10 LAB
ALBUMIN SERPL BCP-MCNC: 3.1 G/DL (ref 3.5–5)
ALBUMIN/GLOB SERPL: 0.8 {RATIO}
ALP SERPL-CCNC: 49 U/L (ref 55–142)
ALT SERPL W P-5'-P-CCNC: 15 U/L (ref 13–56)
ANION GAP SERPL CALCULATED.3IONS-SCNC: 16 MMOL/L (ref 7–16)
AST SERPL W P-5'-P-CCNC: 14 U/L (ref 15–37)
BACTERIA #/AREA URNS HPF: ABNORMAL /HPF
BASOPHILS # BLD AUTO: 0.07 K/UL (ref 0–0.2)
BASOPHILS NFR BLD AUTO: 0.4 % (ref 0–1)
BILIRUB SERPL-MCNC: 0.5 MG/DL (ref ?–1.2)
BILIRUB UR QL STRIP: NEGATIVE
BUN SERPL-MCNC: 68 MG/DL (ref 7–18)
BUN/CREAT SERPL: 28 (ref 6–20)
CALCIUM SERPL-MCNC: 9.1 MG/DL (ref 8.5–10.1)
CHLORIDE SERPL-SCNC: 102 MMOL/L (ref 98–107)
CLARITY UR: CLEAR
CO2 SERPL-SCNC: 25 MMOL/L (ref 21–32)
COLOR UR: YELLOW
CREAT SERPL-MCNC: 2.41 MG/DL (ref 0.55–1.02)
DIFFERENTIAL METHOD BLD: ABNORMAL
EGFR (NO RACE VARIABLE) (RUSH/TITUS): 21 ML/MIN/1.73M²
EOSINOPHIL # BLD AUTO: 0.11 K/UL (ref 0–0.5)
EOSINOPHIL NFR BLD AUTO: 0.6 % (ref 1–4)
ERYTHROCYTE [DISTWIDTH] IN BLOOD BY AUTOMATED COUNT: 15.8 % (ref 11.5–14.5)
FLUAV AG UPPER RESP QL IA.RAPID: NEGATIVE
FLUBV AG UPPER RESP QL IA.RAPID: NEGATIVE
GLOBULIN SER-MCNC: 3.7 G/DL (ref 2–4)
GLUCOSE SERPL-MCNC: 233 MG/DL (ref 74–106)
GLUCOSE UR STRIP-MCNC: NEGATIVE MG/DL
HCT VFR BLD AUTO: 23.4 % (ref 38–47)
HGB BLD-MCNC: 7.5 G/DL (ref 12–16)
KETONES UR STRIP-SCNC: NEGATIVE MG/DL
LACTATE SERPL-SCNC: 0.8 MMOL/L (ref 0.4–2)
LEUKOCYTE ESTERASE UR QL STRIP: ABNORMAL
LYMPHOCYTES # BLD AUTO: 0.84 K/UL (ref 1–4.8)
LYMPHOCYTES NFR BLD AUTO: 4.8 % (ref 27–41)
LYMPHOCYTES NFR BLD MANUAL: 7 % (ref 27–41)
MAGNESIUM SERPL-MCNC: 1.3 MG/DL (ref 1.7–2.3)
MCH RBC QN AUTO: 24.8 PG (ref 27–31)
MCHC RBC AUTO-ENTMCNC: 32.1 G/DL (ref 32–36)
MCV RBC AUTO: 77.5 FL (ref 80–96)
MONOCYTES # BLD AUTO: 1.37 K/UL (ref 0–0.8)
MONOCYTES NFR BLD AUTO: 7.8 % (ref 2–6)
MONOCYTES NFR BLD MANUAL: 3 % (ref 2–6)
MPC BLD CALC-MCNC: 10 FL (ref 9.4–12.4)
NEUTROPHILS # BLD AUTO: 15.12 K/UL (ref 1.8–7.7)
NEUTROPHILS NFR BLD AUTO: 86.4 % (ref 53–65)
NEUTS SEG NFR BLD MANUAL: 90 % (ref 50–62)
NITRITE UR QL STRIP: NEGATIVE
NRBC BLD MANUAL-RTO: ABNORMAL %
NT-PROBNP SERPL-MCNC: ABNORMAL PG/ML (ref 1–125)
OCCULT BLOOD: NEGATIVE
PH UR STRIP: 5.5 PH UNITS
PLATELET # BLD AUTO: 218 K/UL (ref 150–400)
POTASSIUM SERPL-SCNC: 3.7 MMOL/L (ref 3.5–5.1)
PROT SERPL-MCNC: 6.8 G/DL (ref 6.4–8.2)
PROT UR QL STRIP: NEGATIVE
RBC # BLD AUTO: 3.02 M/UL (ref 4.2–5.4)
RBC # UR STRIP: ABNORMAL /UL
RBC #/AREA URNS HPF: ABNORMAL /HPF
SARS-COV+SARS-COV-2 AG RESP QL IA.RAPID: NEGATIVE
SODIUM SERPL-SCNC: 139 MMOL/L (ref 136–145)
SP GR UR STRIP: 1.01
SQUAMOUS #/AREA URNS LPF: ABNORMAL /LPF
TROPONIN I SERPL HS-MCNC: 283 PG/ML
TROPONIN I SERPL HS-MCNC: 327.7 PG/ML
UROBILINOGEN UR STRIP-ACNC: 0.2 MG/DL
WBC # BLD AUTO: 17.51 K/UL (ref 4.5–11)
WBC #/AREA URNS HPF: ABNORMAL /HPF

## 2022-11-10 PROCEDURE — 36410 VNPNXR 3YR/> PHY/QHP DX/THER: CPT

## 2022-11-10 PROCEDURE — 93005 ELECTROCARDIOGRAM TRACING: CPT

## 2022-11-10 PROCEDURE — 82271 OCCULT BLOOD OTHER SOURCES: CPT | Performed by: NURSE PRACTITIONER

## 2022-11-10 PROCEDURE — 87428 SARSCOV & INF VIR A&B AG IA: CPT | Performed by: NURSE PRACTITIONER

## 2022-11-10 PROCEDURE — 84484 ASSAY OF TROPONIN QUANT: CPT | Performed by: NURSE PRACTITIONER

## 2022-11-10 PROCEDURE — 81001 URINALYSIS AUTO W/SCOPE: CPT | Performed by: NURSE PRACTITIONER

## 2022-11-10 PROCEDURE — 83880 ASSAY OF NATRIURETIC PEPTIDE: CPT | Performed by: NURSE PRACTITIONER

## 2022-11-10 PROCEDURE — 87040 BLOOD CULTURE FOR BACTERIA: CPT | Performed by: NURSE PRACTITIONER

## 2022-11-10 PROCEDURE — 93010 ELECTROCARDIOGRAM REPORT: CPT | Mod: ,,, | Performed by: STUDENT IN AN ORGANIZED HEALTH CARE EDUCATION/TRAINING PROGRAM

## 2022-11-10 PROCEDURE — 96374 THER/PROPH/DIAG INJ IV PUSH: CPT

## 2022-11-10 PROCEDURE — 63600175 PHARM REV CODE 636 W HCPCS: Performed by: NURSE PRACTITIONER

## 2022-11-10 PROCEDURE — 83735 ASSAY OF MAGNESIUM: CPT | Performed by: NURSE PRACTITIONER

## 2022-11-10 PROCEDURE — 99285 EMERGENCY DEPT VISIT HI MDM: CPT | Mod: 25

## 2022-11-10 PROCEDURE — 83605 ASSAY OF LACTIC ACID: CPT | Performed by: NURSE PRACTITIONER

## 2022-11-10 PROCEDURE — 81003 URINALYSIS AUTO W/O SCOPE: CPT | Performed by: NURSE PRACTITIONER

## 2022-11-10 PROCEDURE — 93010 EKG 12-LEAD: ICD-10-PCS | Mod: ,,, | Performed by: STUDENT IN AN ORGANIZED HEALTH CARE EDUCATION/TRAINING PROGRAM

## 2022-11-10 PROCEDURE — 99285 EMERGENCY DEPT VISIT HI MDM: CPT | Mod: 25,GF | Performed by: NURSE PRACTITIONER

## 2022-11-10 PROCEDURE — 85025 COMPLETE CBC W/AUTO DIFF WBC: CPT | Performed by: NURSE PRACTITIONER

## 2022-11-10 PROCEDURE — 96375 TX/PRO/DX INJ NEW DRUG ADDON: CPT

## 2022-11-10 PROCEDURE — 25000003 PHARM REV CODE 250: Performed by: NURSE PRACTITIONER

## 2022-11-10 PROCEDURE — 36556 INSERT NON-TUNNEL CV CATH: CPT | Mod: GF | Performed by: NURSE PRACTITIONER

## 2022-11-10 PROCEDURE — 36415 COLL VENOUS BLD VENIPUNCTURE: CPT | Performed by: NURSE PRACTITIONER

## 2022-11-10 PROCEDURE — 80053 COMPREHEN METABOLIC PANEL: CPT | Performed by: NURSE PRACTITIONER

## 2022-11-10 RX ORDER — SODIUM BICARBONATE 650 MG/1
1300 TABLET ORAL
COMMUNITY
Start: 2022-11-09 | End: 2023-11-09

## 2022-11-10 RX ORDER — ASPIRIN 325 MG
325 TABLET ORAL
Status: COMPLETED | OUTPATIENT
Start: 2022-11-10 | End: 2022-11-10

## 2022-11-10 RX ORDER — MAGNESIUM SULFATE HEPTAHYDRATE 40 MG/ML
2 INJECTION, SOLUTION INTRAVENOUS
Status: COMPLETED | OUTPATIENT
Start: 2022-11-10 | End: 2022-11-10

## 2022-11-10 RX ORDER — TORSEMIDE 20 MG/1
1 TABLET ORAL DAILY
COMMUNITY
Start: 2022-11-09 | End: 2023-11-09

## 2022-11-10 RX ORDER — FUROSEMIDE 10 MG/ML
80 INJECTION INTRAMUSCULAR; INTRAVENOUS
Status: COMPLETED | OUTPATIENT
Start: 2022-11-10 | End: 2022-11-10

## 2022-11-10 RX ADMIN — MAGNESIUM SULFATE HEPTAHYDRATE 2 G: 2 INJECTION, SOLUTION INTRAVENOUS at 03:11

## 2022-11-10 RX ADMIN — FUROSEMIDE 80 MG: 10 INJECTION, SOLUTION INTRAMUSCULAR; INTRAVENOUS at 03:11

## 2022-11-10 RX ADMIN — ASPIRIN 325 MG ORAL TABLET 325 MG: 325 PILL ORAL at 03:11

## 2022-11-10 NOTE — ED NOTES
Transferred to [facility] via [facility] ems.  Magnesium  2 gram in 50 ml normal saline infusing at 25 cc/hr to left ej without difficulty.

## 2022-11-10 NOTE — ED PROVIDER NOTES
"Encounter Date: 11/10/2022       History     Chief Complaint   Patient presents with    Shortness of Breath     Patient is a 73 y/o AAF who presents to the ED with complaint of SOB started last night and has progressively become worse. Reports SOB worse with lying down. Denies any chest pains.  Has productive cough with thick clear sputum. Denies any fever. Was admitted to [facility] on altered mental status, then discharged on 11/05. Became SOB and was re-admitted to [facility] for CHF on 11/06 and discharged 11/09. Patient has not picked up Lasix or Demadex prescriptions.   PMH: Atrial fib, CHF, CAD with stent (2013),DM, right renal transplant (2016), dialysis LUE.    Echo at [facility] 11/08 EF 60-65%    The history is provided by the patient.   Review of patient's allergies indicates:   Allergen Reactions    Gabapentin Other (See Comments)     Made her disoriented  "out of my head"      Morphine Itching    Opioids - morphine analogues      Past Medical History:   Diagnosis Date    Atherosclerotic heart disease of native coronary artery with angina pectoris 01/20/2020    Depression     Diabetes mellitus, type 2     Disorder of kidney and ureter     Hyperlipidemia     Hypertension     Kidney transplant status 07/13/2020    Osteoporosis 07/22/2020    Stroke      Past Surgical History:   Procedure Laterality Date    EXTRACTION OF TOOTH Left 08/11/2021    HYSTERECTOMY      kidney transplant 2016      TOE AMPUTATION       Family History   Problem Relation Age of Onset    Diabetes Mother     Hyperlipidemia Mother     Heart disease Mother     Hearing loss Mother     Diabetes Father     Hearing loss Father     Heart disease Father     Hyperlipidemia Father      Social History     Tobacco Use    Smoking status: Never    Smokeless tobacco: Never   Substance Use Topics    Alcohol use: Never    Drug use: Never     Review of Systems   Constitutional:  Positive for activity change and fatigue. Negative for appetite change and fever.   HENT: " Negative.  Negative for congestion, ear pain, sinus pressure, sinus pain and sore throat.    Eyes:  Negative for pain, discharge, redness, itching and visual disturbance.   Respiratory:  Positive for cough and shortness of breath. Negative for wheezing.    Cardiovascular:  Positive for leg swelling. Negative for chest pain and palpitations.   Gastrointestinal: Negative.  Negative for abdominal distention, abdominal pain, constipation, diarrhea, nausea and vomiting.   Genitourinary:  Negative for decreased urine volume, difficulty urinating and dysuria.   Musculoskeletal: Negative.  Negative for gait problem.   Skin: Negative.    Neurological:  Negative for dizziness, weakness, light-headedness and headaches.   Hematological: Negative.    Psychiatric/Behavioral: Negative.       Physical Exam     Initial Vitals [11/10/22 1305]   BP Pulse Resp Temp SpO2   (!) 176/54 93 17 99.1 °F (37.3 °C) (!) 88 %      MAP       --         Physical Exam    Vitals reviewed.  Constitutional: She appears well-developed and well-nourished. She is not diaphoretic. She is cooperative.  Non-toxic appearance. No distress.   HENT:   Head: Normocephalic and atraumatic.   Right Ear: Hearing, tympanic membrane, external ear and ear canal normal.   Left Ear: Tympanic membrane, external ear and ear canal normal.   Nose: Nose normal.   Mouth/Throat: Mucous membranes are normal.   Eyes: Conjunctivae are normal. Pupils are equal, round, and reactive to light.   Neck: Neck supple.   Normal range of motion.  Cardiovascular:  Normal rate, regular rhythm, normal heart sounds, intact distal pulses and normal pulses.           Trace edema to BLE   Pulmonary/Chest: Effort normal. Tachypnea noted. She has decreased breath sounds. She has no wheezes. She has rales in the right middle field, the right lower field and the left lower field.   Abdominal: Abdomen is soft. There is no abdominal tenderness.   Musculoskeletal:      Cervical back: Normal range of  motion and neck supple.     Lymphadenopathy:     She has no cervical adenopathy.   Neurological: She is alert and oriented to person, place, and time. GCS eye subscore is 4. GCS verbal subscore is 5. GCS motor subscore is 6.   Skin: Skin is warm, dry and intact. Capillary refill takes less than 2 seconds. No rash noted.   Psychiatric: She has a normal mood and affect. Her speech is normal and behavior is normal.       Medical Screening Exam   See Full Note    ED Course   External Jugular IV    Date/Time: 11/10/2022 1:20 PM  Performed by: CHAPARRITA Mckeon  Authorized by: CHAPARRITA Mckeon   Consent Done: Yes  Consent: Verbal consent obtained.  Risks and benefits: risks, benefits and alternatives were discussed  Consent given by: patient  Patient understanding: patient states understanding of the procedure being performed  Patient consent: the patient's understanding of the procedure matches consent given  Patient identity confirmed: , name, verbally with patient and MRN  Location (Ext Jugular): Left.  Area Prepped With: Betadine and Chlorohexidine.  Catheter Size: 20 ga.  ED EXT JUG - Cath Type: Insyte.  Number of attempts: 1  Fixation/Dressing: Taped in place and Tegaderm.  Patient tolerance: Patient tolerated the procedure well with no immediate complications  Comments: Good blood return, flushes well with infiltration. Patient tolerated well.      Labs Reviewed   NT-PRO NATRIURETIC PEPTIDE - Abnormal; Notable for the following components:       Result Value    ProBNP 12,917 (*)     All other components within normal limits   COMPREHENSIVE METABOLIC PANEL - Abnormal; Notable for the following components:    Glucose 233 (*)     BUN 68 (*)     Creatinine 2.41 (*)     BUN/Creatinine Ratio 28 (*)     Albumin 3.1 (*)     Alk Phos 49 (*)     AST 14 (*)     eGFR 21 (*)     All other components within normal limits   MAGNESIUM - Abnormal; Notable for the following components:    Magnesium 1.3 (*)     All  other components within normal limits   TROPONIN I - Abnormal; Notable for the following components:    Troponin I High Sensitivity 283.0 (*)     All other components within normal limits   CBC WITH DIFFERENTIAL - Abnormal; Notable for the following components:    WBC 17.51 (*)     RBC 3.02 (*)     Hemoglobin 7.5 (*)     Hematocrit 23.4 (*)     MCV 77.5 (*)     MCH 24.8 (*)     RDW 15.8 (*)     Neutrophils % 86.4 (*)     Lymphocytes % 4.8 (*)     Neutrophils, Abs 15.12 (*)     Lymphocytes, Absolute 0.84 (*)     Monocytes % 7.8 (*)     Eosinophils % 0.6 (*)     Monocytes, Absolute 1.37 (*)     All other components within normal limits   MANUAL DIFFERENTIAL - Abnormal; Notable for the following components:    Segmented Neutrophils, Man % 90 (*)     Lymphocytes, Man % 7 (*)     All other components within normal limits   URINALYSIS, REFLEX TO URINE CULTURE - Abnormal; Notable for the following components:    Leukocytes, UA Trace (*)     Blood, UA Trace-Intact (*)     All other components within normal limits   URINALYSIS, MICROSCOPIC - Abnormal; Notable for the following components:    Bacteria, UA Occasional (*)     Squamous Epithelial Cells, UA Few (*)     All other components within normal limits   SARS-COV2 (COVID) W/ FLU ANTIGEN - Normal    Narrative:     Negative SARS-CoV results should not be used as the sole basis for treatment or patient management decisions; negative results should be considered in the context of a patient's recent exposures, history and the presene of clinical signs and symptoms consistent with COVID-19.  Negative results should be treated as presumptive and confirmed by molecular assay, if necessary for patient management.   OCCULT BLOOD X 1, STOOL - Normal   LACTIC ACID, PLASMA - Normal   CULTURE, BLOOD   CULTURE, BLOOD   CBC W/ AUTO DIFFERENTIAL    Narrative:     The following orders were created for panel order CBC auto differential.  Procedure                               Abnormality          Status                     ---------                               -----------         ------                     CBC with Differential[[id number]]        Abnormal            Final result               Manual Differential[[id number]]          Abnormal            Final result                 Please view results for these tests on the individual orders.   TROPONIN I        ECG Results              EKG 12-lead (In process)  Result time 11/10/22 13:47:40      In process by Interface, Lab In [facility] (11/10/22 13:47:40)                   Narrative:    Test Reason : R06.02,    Vent. Rate : 087 BPM     Atrial Rate : 087 BPM     P-R Int : 176 ms          QRS Dur : 102 ms      QT Int : 372 ms       P-R-T Axes : 077 076 017 degrees     QTc Int : 447 ms    Normal sinus rhythm  Septal infarct ,age undetermined  Lateral infarct ,age undetermined  Abnormal ECG  No previous ECGs available    Referred By:             Confirmed By:                                   Imaging Results              X-Ray Chest AP Portable (Final result)  Result time 11/10/22 13:52:46      Final result by Jose Keller MD (11/10/22 13:52:46)                   Impression:      Bilateral opacities concerning for pneumonia or edema.      Electronically signed by: Jose Keller  Date:    11/10/2022  Time:    13:52               Narrative:    EXAMINATION:  XR CHEST AP PORTABLE    CLINICAL HISTORY:  SOB;    TECHNIQUE:  Single frontal view of the chest was performed.    COMPARISON:  03/20/2018    FINDINGS:  Cardiac silhouette is enlarged.  Bilateral opacities are now present more so in the right lung than the left.  No pneumothorax.                                       Medications   magnesium sulfate 2g in water 50mL IVPB (premix) (2 g Intravenous New Bag 11/10/22 1516)   furosemide injection 80 mg (80 mg Intravenous Given 11/10/22 1506)   aspirin tablet 325 mg (325 mg Oral Given 11/10/22 1506)     Medical Decision Making:   Clinical Tests:   Lab Tests: Ordered  and Reviewed  The following lab test(s) were unremarkable: CBC, CMP, Urinalysis, Troponin and Lactate       <> Summary of Lab: BC pending.  ED Management:  BNP 12,917. BUN/CR 68/2.4 (baseline)  CXR bilateral opacities concerning for pneumonia vs edema. O2 sat 88%  on arrival. Placed on O2 at 2L/NC and sats up to 92% . Lasix 80 mg IV x 1 given in ED. Mag 1.3. Will give Mag 2g IV. Troponin 283.0.  mg given. WBC 17.5. BC x 2 pending. H&H 7.5/23.4 which appears to be baseline. Previous H&H 7.5/24.4 on 11/08. Hemoccult negative in ED today.     15:10: Spoke with [email or web address] regarding patient transfer. Dr. Portillo accepted to [facility] ED.  16:18: Second troponin 327.7. Patient denies any chest pain.                 Clinical Impression:   Final diagnoses:  [R06.02] SOB (shortness of breath)  [I50.9] Congestive heart failure, unspecified HF chronicity, unspecified heart failure type (Primary)  [R77.8] Elevated troponin  [D72.829] Leukocytosis, unspecified type  [R09.02] Hypoxia        ED Disposition Condition    Transfer to Another Facility Stable                     CHAPARRITA Mckeon  11/10/22 4001

## 2022-11-16 LAB
BACTERIA BLD CULT: NORMAL
BACTERIA BLD CULT: NORMAL

## 2022-11-29 ENCOUNTER — OFFICE VISIT (OUTPATIENT)
Dept: FAMILY MEDICINE | Facility: CLINIC | Age: 72
End: 2022-11-29
Payer: MEDICARE

## 2022-11-29 VITALS
OXYGEN SATURATION: 99 % | SYSTOLIC BLOOD PRESSURE: 142 MMHG | HEART RATE: 71 BPM | BODY MASS INDEX: 32.1 KG/M2 | TEMPERATURE: 98 F | DIASTOLIC BLOOD PRESSURE: 50 MMHG | RESPIRATION RATE: 18 BRPM | HEIGHT: 64 IN | WEIGHT: 188 LBS

## 2022-11-29 DIAGNOSIS — S91.302A WOUND OF LEFT FOOT: Primary | ICD-10-CM

## 2022-11-29 DIAGNOSIS — I50.9 CONGESTIVE HEART FAILURE, UNSPECIFIED HF CHRONICITY, UNSPECIFIED HEART FAILURE TYPE: ICD-10-CM

## 2022-11-29 PROCEDURE — 4010F ACE/ARB THERAPY RXD/TAKEN: CPT | Mod: ,,, | Performed by: FAMILY MEDICINE

## 2022-11-29 PROCEDURE — 1159F PR MEDICATION LIST DOCUMENTED IN MEDICAL RECORD: ICD-10-PCS | Mod: ,,, | Performed by: FAMILY MEDICINE

## 2022-11-29 PROCEDURE — 1160F PR REVIEW ALL MEDS BY PRESCRIBER/CLIN PHARMACIST DOCUMENTED: ICD-10-PCS | Mod: ,,, | Performed by: FAMILY MEDICINE

## 2022-11-29 PROCEDURE — 87070 CULTURE OTHR SPECIMN AEROBIC: CPT | Mod: ,,, | Performed by: CLINICAL MEDICAL LABORATORY

## 2022-11-29 PROCEDURE — 3008F BODY MASS INDEX DOCD: CPT | Mod: ,,, | Performed by: FAMILY MEDICINE

## 2022-11-29 PROCEDURE — 1101F PR PT FALLS ASSESS DOC 0-1 FALLS W/OUT INJ PAST YR: ICD-10-PCS | Mod: ,,, | Performed by: FAMILY MEDICINE

## 2022-11-29 PROCEDURE — 3078F DIAST BP <80 MM HG: CPT | Mod: ,,, | Performed by: FAMILY MEDICINE

## 2022-11-29 PROCEDURE — 3288F PR FALLS RISK ASSESSMENT DOCUMENTED: ICD-10-PCS | Mod: ,,, | Performed by: FAMILY MEDICINE

## 2022-11-29 PROCEDURE — 3077F PR MOST RECENT SYSTOLIC BLOOD PRESSURE >= 140 MM HG: ICD-10-PCS | Mod: ,,, | Performed by: FAMILY MEDICINE

## 2022-11-29 PROCEDURE — 3078F PR MOST RECENT DIASTOLIC BLOOD PRESSURE < 80 MM HG: ICD-10-PCS | Mod: ,,, | Performed by: FAMILY MEDICINE

## 2022-11-29 PROCEDURE — 1159F MED LIST DOCD IN RCRD: CPT | Mod: ,,, | Performed by: FAMILY MEDICINE

## 2022-11-29 PROCEDURE — 3288F FALL RISK ASSESSMENT DOCD: CPT | Mod: ,,, | Performed by: FAMILY MEDICINE

## 2022-11-29 PROCEDURE — 4010F PR ACE/ARB THEARPY RXD/TAKEN: ICD-10-PCS | Mod: ,,, | Performed by: FAMILY MEDICINE

## 2022-11-29 PROCEDURE — 87070 CULTURE, WOUND: ICD-10-PCS | Mod: ,,, | Performed by: CLINICAL MEDICAL LABORATORY

## 2022-11-29 PROCEDURE — 3052F PR MOST RECENT HEMOGLOBIN A1C LEVEL 8.0 - < 9.0%: ICD-10-PCS | Mod: ,,, | Performed by: FAMILY MEDICINE

## 2022-11-29 PROCEDURE — 1160F RVW MEDS BY RX/DR IN RCRD: CPT | Mod: ,,, | Performed by: FAMILY MEDICINE

## 2022-11-29 PROCEDURE — 3052F HG A1C>EQUAL 8.0%<EQUAL 9.0%: CPT | Mod: ,,, | Performed by: FAMILY MEDICINE

## 2022-11-29 PROCEDURE — 99214 OFFICE O/P EST MOD 30 MIN: CPT | Mod: ,,, | Performed by: FAMILY MEDICINE

## 2022-11-29 PROCEDURE — 3008F PR BODY MASS INDEX (BMI) DOCUMENTED: ICD-10-PCS | Mod: ,,, | Performed by: FAMILY MEDICINE

## 2022-11-29 PROCEDURE — 99214 PR OFFICE/OUTPT VISIT, EST, LEVL IV, 30-39 MIN: ICD-10-PCS | Mod: ,,, | Performed by: FAMILY MEDICINE

## 2022-11-29 PROCEDURE — 1101F PT FALLS ASSESS-DOCD LE1/YR: CPT | Mod: ,,, | Performed by: FAMILY MEDICINE

## 2022-11-29 PROCEDURE — 3077F SYST BP >= 140 MM HG: CPT | Mod: ,,, | Performed by: FAMILY MEDICINE

## 2022-11-29 RX ORDER — ASPIRIN 81 MG/1
81 TABLET ORAL DAILY
COMMUNITY

## 2022-11-29 RX ORDER — ROSUVASTATIN CALCIUM 40 MG/1
40 TABLET, COATED ORAL DAILY
COMMUNITY
Start: 2022-11-07 | End: 2023-05-10

## 2022-11-29 NOTE — PROGRESS NOTES
Patricia Kendall MD        PATIENT NAME: Nasrin Grant  : 1950  DATE: 22  MRN: [id number]      Billing Provider: Patricia Kendall MD  Level of Service:   Patient PCP Information       Provider PCP Type    Patricia Kendall MD General            Reason for Visit / Chief Complaint: hopsital follow-up (States she got out of hospital last Wednesday, was in for four weeks) and Wound Check (Wound to left foot. She has appt on Dec 12th in [place].States she cleans it and puts silver on it and keeps it dry but in hospital they only changed it every other day.)       History of Present Illness      Nasrin Grant presents to the clinic with hopsital follow-up (States she got out of hospital last Wednesday, was in for four weeks) and Wound Check (Wound to left foot. She has appt on Dec 12th in [place].States she cleans it and puts silver on it and keeps it dry but in hospital they only changed it every other day.)     She was admitted for AHRF due to bacterial PNA. Transplant nephro consulted. She was started on IV diuretics. Transplant ID consulted for Leukocytosis & concern for infection. She was started on vanc, cefepime and doxycycline. ID Discontinue vancomycin (MrSA negative) and doxycycline and cefepime was continued. CT Chest w/ patchy infiltrate, possibly multifocal pneumonia. Pulmonary evaluated on  and recommended IV diruesis & repeat CXR that showed improvement and did not underwent bronchosocpy. Cefepime was also DcD on 11/15/22.    Echo 22:Normal LV and RV sizes and systolic function.  LV estimated EF 60-65%. Indeterminant diastolic fuunction (mitral E/A ratio not prerforrmed). No significant valvular dysfunction.Trace pericardial effusion    Patient condition was much improved with diruesis and she was sent home on 22. Nephro explained her that she needs to keep doing her daily weight. Resume lasix 40 bid on 22. Target weight is 178 to 181 lbs (81 to 82kg)       Today weight  noted    No shortness of breath    She has a wound on her left lower extremity on the bottom of her foot, during her stay they did due an MRI to rule out osteomyelitis, that is fine     Wound Check    Review of Systems     Review of Systems   Constitutional:  Negative for activity change, appetite change, fatigue and fever.   Respiratory:  Negative for shortness of breath.    Allergic/Immunologic: Positive for environmental allergies.   Psychiatric/Behavioral:  Negative for agitation, behavioral problems and suicidal ideas.      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  reports that she has never smoked. She has never used smokeless tobacco. She reports that she does not drink alcohol and does not use drugs.    Family History  Ms.'s family history includes Diabetes in her father and mother; Hearing loss in her father and mother; Heart disease in her father and mother; Hyperlipidemia in her father and mother.    Medications and Allergies     Medications  Outpatient Medications Marked as Taking for the 11/29/22 encounter (Office Visit) with Patricia Kendall MD   Medication Sig Dispense Refill    amLODIPine (NORVASC) 10 MG tablet TAKE ONE TABLET BY MOUTH ONCE DAILY 90 tablet 1    aspirin (ECOTRIN) 81 MG EC tablet Take 81 mg by mouth once daily.      blood sugar diagnostic (TRUE METRIX GLUCOSE TEST STRIP) Strp Inject 1 strip into the skin 3 (three) times daily before meals. 400 strip 2    blood-glucose meter (TRUE METRIX  "GLUCOSE METER) kit 1 each by Other route 3 (three) times daily before meals. 1 each 0    carvediloL (COREG) 25 MG tablet TAKE ONE TABLET BY MOUTH TWICE DAILY 180 tablet 1    cinacalcet (SENSIPAR) 30 MG Tab Take 1 tablet (30 mg total) by mouth once daily. 30 tablet 11    furosemide (LASIX) 40 MG tablet TAKE ONE TABLET BY MOUTH TWICE DAILY 180 tablet 3    Lactobacillus acidophilus (PROBIOTIC ACIDOPHILUS ORAL) Take 4 Billion Cells by mouth.      lancets (TRUEPLUS LANCETS) 33 gauge Misc Inject 1 lancet into the skin 3 (three) times daily before meals. 400 each 2    losartan (COZAAR) 25 MG tablet Take 25 mg by mouth once daily.      mycophenolate (MYFORTIC) 180 MG TbEC Take 180 mg by mouth 2 (two) times daily.      NOVOLOG FLEXPEN U-100 INSULIN 100 unit/mL (3 mL) InPn pen INJECT FIVE UNITS into THE SKIN THREE TIMES DAILY WITH MEALS 15 mL 1    olmesartan (BENICAR) 20 MG tablet TAKE ONE TABLET BY MOUTH EVERY DAY 90 tablet 0    pantoprazole (PROTONIX) 40 MG tablet Take 1 tablet (40 mg total) by mouth 2 (two) times daily. 180 tablet 3    predniSONE (DELTASONE) 5 MG tablet Take 5 mg by mouth once daily.      rosuvastatin (CRESTOR) 40 MG Tab Take 40 mg by mouth once daily.      silver sulfADIAZINE 1% (SILVADENE) 1 % cream Apply topically once daily. 400 g 1    sodium bicarbonate 650 MG tablet Take 1,300 mg by mouth.      tacrolimus (PROGRAF) 1 MG Cap 1 capsule once daily.      TRESIBA FLEXTOUCH U-100 100 unit/mL (3 mL) insulin pen INJECT 20 UNITS into THE SKIN ONCE DAILY         Allergies  Review of patient's allergies indicates:   Allergen Reactions    Gabapentin Other (See Comments)     Made her disoriented  "out of my head"      Morphine Itching    Opioids - morphine analogues        Physical Examination   BP (!) 142/50 (BP Location: Left arm, Patient Position: Sitting, BP Method: Medium (Automatic))   Pulse 71   Temp 98.3 °F (36.8 °C)   Resp 18   Ht 5' 4" (1.626 m)   Wt 85.3 kg (188 lb)   SpO2 99%   BMI 32.27 kg/m² "     Physical Exam  Constitutional:       Appearance: Normal appearance. She is normal weight.   Cardiovascular:      Rate and Rhythm: Normal rate and regular rhythm.      Pulses: Normal pulses.      Heart sounds: Normal heart sounds.   Pulmonary:      Effort: Pulmonary effort is normal.      Breath sounds: Normal breath sounds.   Musculoskeletal:         General: Normal range of motion.        Feet:       Left Lower Extremity: Left leg is amputated below ankle.   Feet:      Left foot:      Skin integrity: Ulcer, skin breakdown, erythema, callus and dry skin present.   Skin:     General: Skin is warm.   Neurological:      Mental Status: She is alert.      Motor: Weakness present.      Coordination: Coordination abnormal.   Psychiatric:         Mood and Affect: Mood normal.         Behavior: Behavior normal.         Judgment: Judgment normal.         Assessment and Plan (including Health Maintenance)     Plan:         Problem List Items Addressed This Visit          Cardiac/Vascular    Congestive heart failure     Other Visit Diagnoses       Wound of left foot    -  Primary    Relevant Orders    Ambulatory referral/consult to Home Health    Culture, Wound          - using a 15 blade today in clinic I went ahead and removed excess skin and callus, I then placed silvadene on it and a bandage  - there is a discrepancy with her weight today, she will go home and reweight herself, if needed can take an extra lasix, I reviewed how to titrate this with her today       Follow up in about 3 months (around 2/28/2023).        Signature:  Patricia Kendall MD      Date of encounter: 11/29/22

## 2022-12-01 LAB — MICROORGANISM SPEC CULT: NORMAL

## 2022-12-22 ENCOUNTER — EXTERNAL HOME HEALTH (OUTPATIENT)
Dept: HOME HEALTH SERVICES | Facility: HOSPITAL | Age: 72
End: 2022-12-22

## 2022-12-23 ENCOUNTER — DOCUMENT SCAN (OUTPATIENT)
Dept: HOME HEALTH SERVICES | Facility: HOSPITAL | Age: 72
End: 2022-12-23

## 2023-01-09 ENCOUNTER — DOCUMENT SCAN (OUTPATIENT)
Dept: HOME HEALTH SERVICES | Facility: HOSPITAL | Age: 73
End: 2023-01-09

## 2023-01-10 ENCOUNTER — OFFICE VISIT (OUTPATIENT)
Dept: FAMILY MEDICINE | Facility: CLINIC | Age: 73
End: 2023-01-10
Payer: MEDICARE

## 2023-01-10 VITALS
HEIGHT: 64 IN | TEMPERATURE: 99 F | OXYGEN SATURATION: 96 % | DIASTOLIC BLOOD PRESSURE: 64 MMHG | HEART RATE: 78 BPM | WEIGHT: 194 LBS | SYSTOLIC BLOOD PRESSURE: 130 MMHG | BODY MASS INDEX: 33.12 KG/M2 | RESPIRATION RATE: 20 BRPM

## 2023-01-10 DIAGNOSIS — I50.9 CONGESTIVE HEART FAILURE, UNSPECIFIED HF CHRONICITY, UNSPECIFIED HEART FAILURE TYPE: ICD-10-CM

## 2023-01-10 DIAGNOSIS — R06.02 SHORTNESS OF BREATH: ICD-10-CM

## 2023-01-10 DIAGNOSIS — Z79.4 TYPE 2 DIABETES MELLITUS WITH DIABETIC NEPHROPATHY, WITH LONG-TERM CURRENT USE OF INSULIN: Primary | ICD-10-CM

## 2023-01-10 DIAGNOSIS — E11.21 TYPE 2 DIABETES MELLITUS WITH DIABETIC NEPHROPATHY, WITH LONG-TERM CURRENT USE OF INSULIN: Primary | ICD-10-CM

## 2023-01-10 PROBLEM — Z79.60 LONG-TERM USE OF IMMUNOSUPPRESSANT MEDICATION: Status: ACTIVE | Noted: 2022-11-01

## 2023-01-10 PROBLEM — I50.33 ACUTE ON CHRONIC HEART FAILURE WITH PRESERVED EJECTION FRACTION: Status: ACTIVE | Noted: 2022-11-11

## 2023-01-10 PROBLEM — D50.9 MICROCYTIC ANEMIA: Status: ACTIVE | Noted: 2022-11-02

## 2023-01-10 LAB
ALBUMIN SERPL BCP-MCNC: 3.4 G/DL (ref 3.5–5)
ALBUMIN/GLOB SERPL: 0.9 {RATIO}
ALP SERPL-CCNC: 78 U/L (ref 55–142)
ALT SERPL W P-5'-P-CCNC: 33 U/L (ref 13–56)
ANION GAP SERPL CALCULATED.3IONS-SCNC: 11 MMOL/L (ref 7–16)
AST SERPL W P-5'-P-CCNC: 28 U/L (ref 15–37)
BASOPHILS # BLD AUTO: 0.05 K/UL (ref 0–0.2)
BASOPHILS NFR BLD AUTO: 0.5 % (ref 0–1)
BILIRUB SERPL-MCNC: 0.4 MG/DL (ref ?–1.2)
BUN SERPL-MCNC: 46 MG/DL (ref 7–18)
BUN/CREAT SERPL: 23 (ref 6–20)
CALCIUM SERPL-MCNC: 8.4 MG/DL (ref 8.5–10.1)
CHLORIDE SERPL-SCNC: 104 MMOL/L (ref 98–107)
CO2 SERPL-SCNC: 31 MMOL/L (ref 21–32)
CREAT SERPL-MCNC: 2.01 MG/DL (ref 0.55–1.02)
CREAT UR-MCNC: 76 MG/DL (ref 28–219)
DIFFERENTIAL METHOD BLD: ABNORMAL
EGFR (NO RACE VARIABLE) (RUSH/TITUS): 26 ML/MIN/1.73M²
EOSINOPHIL # BLD AUTO: 0.21 K/UL (ref 0–0.5)
EOSINOPHIL NFR BLD AUTO: 2.2 % (ref 1–4)
ERYTHROCYTE [DISTWIDTH] IN BLOOD BY AUTOMATED COUNT: 17.3 % (ref 11.5–14.5)
EST. AVERAGE GLUCOSE BLD GHB EST-MCNC: 140 MG/DL
GLOBULIN SER-MCNC: 4 G/DL (ref 2–4)
GLUCOSE SERPL-MCNC: 50 MG/DL (ref 74–106)
HBA1C MFR BLD HPLC: 6.8 % (ref 4.5–6.6)
HCT VFR BLD AUTO: 30.8 % (ref 38–47)
HGB BLD-MCNC: 9.2 G/DL (ref 12–16)
IMM GRANULOCYTES # BLD AUTO: 0.04 K/UL (ref 0–0.04)
IMM GRANULOCYTES NFR BLD: 0.4 % (ref 0–0.4)
LYMPHOCYTES # BLD AUTO: 0.88 K/UL (ref 1–4.8)
LYMPHOCYTES NFR BLD AUTO: 9.2 % (ref 27–41)
MCH RBC QN AUTO: 23.1 PG (ref 27–31)
MCHC RBC AUTO-ENTMCNC: 29.9 G/DL (ref 32–36)
MCV RBC AUTO: 77.4 FL (ref 80–96)
MICROALBUMIN UR-MCNC: 5.6 MG/DL (ref 0–2.8)
MICROALBUMIN/CREAT RATIO PNL UR: 73.7 MG/G (ref 0–30)
MONOCYTES # BLD AUTO: 0.8 K/UL (ref 0–0.8)
MONOCYTES NFR BLD AUTO: 8.4 % (ref 2–6)
MPC BLD CALC-MCNC: 11.1 FL (ref 9.4–12.4)
NEUTROPHILS # BLD AUTO: 7.57 K/UL (ref 1.8–7.7)
NEUTROPHILS NFR BLD AUTO: 79.3 % (ref 53–65)
NRBC # BLD AUTO: 0 X10E3/UL
NRBC, AUTO (.00): 0 %
PLATELET # BLD AUTO: 221 K/UL (ref 150–400)
PLATELET MORPHOLOGY: NORMAL
POTASSIUM SERPL-SCNC: 3.5 MMOL/L (ref 3.5–5.1)
PROT SERPL-MCNC: 7.4 G/DL (ref 6.4–8.2)
RBC # BLD AUTO: 3.98 M/UL (ref 4.2–5.4)
RBC MORPH BLD: NORMAL
SODIUM SERPL-SCNC: 142 MMOL/L (ref 136–145)
WBC # BLD AUTO: 9.55 K/UL (ref 4.5–11)

## 2023-01-10 PROCEDURE — 3078F PR MOST RECENT DIASTOLIC BLOOD PRESSURE < 80 MM HG: ICD-10-PCS | Mod: ,,, | Performed by: FAMILY MEDICINE

## 2023-01-10 PROCEDURE — 1101F PT FALLS ASSESS-DOCD LE1/YR: CPT | Mod: ,,, | Performed by: FAMILY MEDICINE

## 2023-01-10 PROCEDURE — 1126F PR PAIN SEVERITY QUANTIFIED, NO PAIN PRESENT: ICD-10-PCS | Mod: ,,, | Performed by: FAMILY MEDICINE

## 2023-01-10 PROCEDURE — 85025 CBC WITH DIFFERENTIAL: ICD-10-PCS | Mod: ,,, | Performed by: CLINICAL MEDICAL LABORATORY

## 2023-01-10 PROCEDURE — 3288F FALL RISK ASSESSMENT DOCD: CPT | Mod: ,,, | Performed by: FAMILY MEDICINE

## 2023-01-10 PROCEDURE — 82043 MICROALBUMIN / CREATININE RATIO URINE: ICD-10-PCS | Mod: ,,, | Performed by: CLINICAL MEDICAL LABORATORY

## 2023-01-10 PROCEDURE — 3008F BODY MASS INDEX DOCD: CPT | Mod: ,,, | Performed by: FAMILY MEDICINE

## 2023-01-10 PROCEDURE — 1126F AMNT PAIN NOTED NONE PRSNT: CPT | Mod: ,,, | Performed by: FAMILY MEDICINE

## 2023-01-10 PROCEDURE — 85025 COMPLETE CBC W/AUTO DIFF WBC: CPT | Mod: ,,, | Performed by: CLINICAL MEDICAL LABORATORY

## 2023-01-10 PROCEDURE — 3008F PR BODY MASS INDEX (BMI) DOCUMENTED: ICD-10-PCS | Mod: ,,, | Performed by: FAMILY MEDICINE

## 2023-01-10 PROCEDURE — 3075F SYST BP GE 130 - 139MM HG: CPT | Mod: ,,, | Performed by: FAMILY MEDICINE

## 2023-01-10 PROCEDURE — 80053 COMPREHEN METABOLIC PANEL: CPT | Mod: ,,, | Performed by: CLINICAL MEDICAL LABORATORY

## 2023-01-10 PROCEDURE — 1159F MED LIST DOCD IN RCRD: CPT | Mod: ,,, | Performed by: FAMILY MEDICINE

## 2023-01-10 PROCEDURE — 3288F PR FALLS RISK ASSESSMENT DOCUMENTED: ICD-10-PCS | Mod: ,,, | Performed by: FAMILY MEDICINE

## 2023-01-10 PROCEDURE — 99214 PR OFFICE/OUTPT VISIT, EST, LEVL IV, 30-39 MIN: ICD-10-PCS | Mod: ,,, | Performed by: FAMILY MEDICINE

## 2023-01-10 PROCEDURE — 99214 OFFICE O/P EST MOD 30 MIN: CPT | Mod: ,,, | Performed by: FAMILY MEDICINE

## 2023-01-10 PROCEDURE — 1159F PR MEDICATION LIST DOCUMENTED IN MEDICAL RECORD: ICD-10-PCS | Mod: ,,, | Performed by: FAMILY MEDICINE

## 2023-01-10 PROCEDURE — 82043 UR ALBUMIN QUANTITATIVE: CPT | Mod: ,,, | Performed by: CLINICAL MEDICAL LABORATORY

## 2023-01-10 PROCEDURE — 1160F RVW MEDS BY RX/DR IN RCRD: CPT | Mod: ,,, | Performed by: FAMILY MEDICINE

## 2023-01-10 PROCEDURE — 82570 MICROALBUMIN / CREATININE RATIO URINE: ICD-10-PCS | Mod: ,,, | Performed by: CLINICAL MEDICAL LABORATORY

## 2023-01-10 PROCEDURE — 83036 HEMOGLOBIN A1C: ICD-10-PCS | Mod: ,,, | Performed by: CLINICAL MEDICAL LABORATORY

## 2023-01-10 PROCEDURE — 1101F PR PT FALLS ASSESS DOC 0-1 FALLS W/OUT INJ PAST YR: ICD-10-PCS | Mod: ,,, | Performed by: FAMILY MEDICINE

## 2023-01-10 PROCEDURE — 1160F PR REVIEW ALL MEDS BY PRESCRIBER/CLIN PHARMACIST DOCUMENTED: ICD-10-PCS | Mod: ,,, | Performed by: FAMILY MEDICINE

## 2023-01-10 PROCEDURE — 83036 HEMOGLOBIN GLYCOSYLATED A1C: CPT | Mod: ,,, | Performed by: CLINICAL MEDICAL LABORATORY

## 2023-01-10 PROCEDURE — 3075F PR MOST RECENT SYSTOLIC BLOOD PRESS GE 130-139MM HG: ICD-10-PCS | Mod: ,,, | Performed by: FAMILY MEDICINE

## 2023-01-10 PROCEDURE — 3078F DIAST BP <80 MM HG: CPT | Mod: ,,, | Performed by: FAMILY MEDICINE

## 2023-01-10 PROCEDURE — 82570 ASSAY OF URINE CREATININE: CPT | Mod: ,,, | Performed by: CLINICAL MEDICAL LABORATORY

## 2023-01-10 PROCEDURE — 80053 COMPREHENSIVE METABOLIC PANEL: ICD-10-PCS | Mod: ,,, | Performed by: CLINICAL MEDICAL LABORATORY

## 2023-01-10 RX ORDER — DOXYCYCLINE 100 MG/1
100 CAPSULE ORAL EVERY 12 HOURS
Qty: 14 CAPSULE | Refills: 0 | Status: SHIPPED | OUTPATIENT
Start: 2023-01-10 | End: 2023-01-17

## 2023-01-10 RX ORDER — POTASSIUM CHLORIDE 750 MG/1
10 CAPSULE, EXTENDED RELEASE ORAL DAILY
Qty: 90 CAPSULE | Refills: 1 | Status: SHIPPED | OUTPATIENT
Start: 2023-01-10 | End: 2023-07-09

## 2023-01-10 RX ORDER — BUMETANIDE 2 MG/1
2 TABLET ORAL DAILY
Qty: 30 TABLET | Refills: 11 | Status: SHIPPED | OUTPATIENT
Start: 2023-01-10 | End: 2024-02-14

## 2023-01-10 RX ORDER — VIT C/E/ZN/COPPR/LUTEIN/ZEAXAN 250MG-90MG
1000 CAPSULE ORAL
COMMUNITY

## 2023-01-10 NOTE — PROGRESS NOTES
Patricia Kendall MD        PATIENT NAME: Nasrin Grant  : 1950  DATE: 1/10/23  MRN: [id number]      Billing Provider: Patricia Kendall MD  Level of Service:   Patient PCP Information       Provider PCP Type    Patricia Kendall MD General            Reason for Visit / Chief Complaint: Follow-up (Here for 6 month f/u  -  need labs, pt states she does not need refills), Cough, Nasal Congestion, and Shortness of Breath (Pt c/o coughing, some nasal congestion and wheezing x several days.)       History of Present Illness      Nasrin Grant presents to the clinic with Follow-up (Here for 6 month f/u  -  need labs, pt states she does not need refills), Cough, Nasal Congestion, and Shortness of Breath (Pt c/o coughing, some nasal congestion and wheezing x several days.)     She is 10 lbs overweight, edema lower extremity  Shortness of breath, worse when laying down   No fever     Follow-up  This is a chronic problem. Associated symptoms include arthralgias, congestion, coughing, fatigue and joint swelling. Pertinent negatives include no change in bowel habit, chills, headaches, myalgias, nausea, numbness or vomiting.   Cough  This is a new problem. Associated symptoms include shortness of breath. Pertinent negatives include no chills, headaches or myalgias.   Shortness of Breath  This is a new problem. Associated symptoms include leg swelling. Pertinent negatives include no claudication, coryza, headaches or vomiting.     Review of Systems     Review of Systems   Constitutional:  Positive for fatigue. Negative for chills.   HENT:  Positive for congestion.    Respiratory:  Positive for cough and shortness of breath.    Cardiovascular:  Positive for leg swelling. Negative for claudication.   Gastrointestinal:  Negative for change in bowel habit, nausea and vomiting.   Musculoskeletal:  Positive for arthralgias and joint swelling. Negative for myalgias.   Neurological:  Negative for numbness and headaches.     Medical /  Social / Family History     Past Medical History:   Diagnosis Date    Atherosclerotic heart disease of native coronary artery with angina pectoris 01/20/2020    Depression     Diabetes mellitus, type 2     Disorder of kidney and ureter     Hyperlipidemia     Hypertension     Kidney transplant status 07/13/2020    Osteoporosis 07/22/2020    Stroke        Past Surgical History:   Procedure Laterality Date    EXTRACTION OF TOOTH Left 08/11/2021    HYSTERECTOMY      kidney transplant 2016      TOE AMPUTATION         Social History  Ms.  reports that she has never smoked. She has never used smokeless tobacco. She reports that she does not drink alcohol and does not use drugs.    Family History  Ms.'s family history includes Diabetes in her father and mother; Hearing loss in her father and mother; Heart disease in her father and mother; Hyperlipidemia in her father and mother.    Medications and Allergies     Medications  Outpatient Medications Marked as Taking for the 1/10/23 encounter (Office Visit) with Patricia Kendall MD   Medication Sig Dispense Refill    amLODIPine (NORVASC) 10 MG tablet TAKE ONE TABLET BY MOUTH ONCE DAILY 90 tablet 1    aspirin (ECOTRIN) 81 MG EC tablet Take 81 mg by mouth once daily.      blood sugar diagnostic (TRUE METRIX GLUCOSE TEST STRIP) Strp Inject 1 strip into the skin 3 (three) times daily before meals. 400 strip 2    blood-glucose meter (TRUE METRIX GLUCOSE METER) kit 1 each by Other route 3 (three) times daily before meals. 1 each 0    carvediloL (COREG) 25 MG tablet TAKE ONE TABLET BY MOUTH TWICE DAILY 180 tablet 1    cholecalciferol, vitamin D3, (VITAMIN D3) 25 mcg (1,000 unit) capsule Take 1,000 Units by mouth.      cinacalcet (SENSIPAR) 30 MG Tab Take 1 tablet (30 mg total) by mouth once daily. 30 tablet 11    Lactobacillus acidophilus (PROBIOTIC ACIDOPHILUS ORAL) Take 4 Billion Cells by mouth.      lancets (TRUEPLUS LANCETS) 33 gauge Misc Inject 1 lancet into the skin 3 (three)  "times daily before meals. 400 each 2    loperamide (IMODIUM) 2 mg capsule Take 2 mg by mouth 2 (two) times daily.      losartan (COZAAR) 25 MG tablet Take 25 mg by mouth once daily.      mirabegron (MYRBETRIQ) 25 mg Tb24 ER tablet Take 25 mg by mouth once daily.      mycophenolate (MYFORTIC) 180 MG TbEC Take 180 mg by mouth 2 (two) times daily.      NOVOLOG FLEXPEN U-100 INSULIN 100 unit/mL (3 mL) InPn pen INJECT FIVE UNITS into THE SKIN THREE TIMES DAILY WITH MEALS 15 mL 1    olmesartan (BENICAR) 20 MG tablet TAKE ONE TABLET BY MOUTH EVERY DAY 90 tablet 0    pantoprazole (PROTONIX) 40 MG tablet Take 1 tablet (40 mg total) by mouth 2 (two) times daily. 180 tablet 3    predniSONE (DELTASONE) 5 MG tablet Take 5 mg by mouth once daily.      rosuvastatin (CRESTOR) 40 MG Tab Take 40 mg by mouth once daily.      silver sulfADIAZINE 1% (SILVADENE) 1 % cream Apply topically once daily. 400 g 1    sodium bicarbonate 650 MG tablet Take 1,300 mg by mouth.      tacrolimus (PROGRAF) 1 MG Cap 1 capsule once daily.      tamsulosin (FLOMAX) 0.4 mg Cap       torsemide (DEMADEX) 20 MG Tab Take 1 tablet by mouth once daily.      TRESIBA FLEXTOUCH U-100 100 unit/mL (3 mL) insulin pen INJECT 20 UNITS into THE SKIN ONCE DAILY      triprolidine-pseudoephedrine (APRODINE) 2.5-60 mg Tab Take 1 tablet by mouth every 6 (six) hours as needed.      [DISCONTINUED] furosemide (LASIX) 40 MG tablet TAKE ONE TABLET BY MOUTH TWICE DAILY 180 tablet 3       Allergies  Review of patient's allergies indicates:   Allergen Reactions    Gabapentin Other (See Comments)     Made her disoriented  "out of my head"      Morphine Itching    Opioids - morphine analogues        Physical Examination   /64 (BP Location: Right arm, Patient Position: Sitting, BP Method: Medium (Manual))   Pulse 78   Temp 98.7 °F (37.1 °C) (Oral)   Resp 20   Ht 5' 4.02" (1.626 m)   Wt 88 kg (194 lb)   SpO2 96%   BMI 33.28 kg/m²     Physical Exam  Constitutional:       " Appearance: Normal appearance. She is normal weight.   Cardiovascular:      Rate and Rhythm: Normal rate and regular rhythm.      Pulses: Normal pulses.      Heart sounds: Normal heart sounds.   Pulmonary:      Breath sounds: Rales present.   Musculoskeletal:         General: Normal range of motion.      Right lower leg: Edema present.      Left lower leg: Edema present.   Skin:     General: Skin is warm.   Neurological:      Mental Status: She is alert.      Coordination: Coordination abnormal.      Gait: Gait abnormal.   Psychiatric:         Mood and Affect: Mood normal.         Behavior: Behavior normal.         Judgment: Judgment normal.         Assessment and Plan (including Health Maintenance)     Plan:         Problem List Items Addressed This Visit          Cardiac/Vascular    Congestive heart failure    Relevant Medications    bumetanide (BUMEX) 2 MG tablet    potassium chloride (MICRO-K) 10 MEQ CpSR       Endocrine    DM2 (diabetes mellitus, type 2) - Primary    Relevant Orders    Hemoglobin A1C    Microalbumin/Creatinine Ratio, Urine     Other Visit Diagnoses       Shortness of breath        Relevant Medications    doxycycline (VIBRAMYCIN) 100 MG Cap    Other Relevant Orders    X-Ray Chest PA And Lateral (Completed)    Comprehensive Metabolic Panel    CBC Auto Differential          - she did not want to get a lasix shot today, changed lasix to bumex, labs send, will come back early next week, will have  keep an eye on her     Follow up in about 1 week (around 1/17/2023).        Signature:  Patricia Kendall MD      Date of encounter: 1/10/23

## 2023-04-13 ENCOUNTER — OFFICE VISIT (OUTPATIENT)
Dept: FAMILY MEDICINE | Facility: CLINIC | Age: 73
End: 2023-04-13
Payer: MEDICARE

## 2023-04-13 VITALS
BODY MASS INDEX: 32.33 KG/M2 | SYSTOLIC BLOOD PRESSURE: 138 MMHG | HEART RATE: 70 BPM | TEMPERATURE: 97 F | HEIGHT: 64 IN | RESPIRATION RATE: 18 BRPM | DIASTOLIC BLOOD PRESSURE: 86 MMHG | WEIGHT: 189.38 LBS | OXYGEN SATURATION: 100 %

## 2023-04-13 DIAGNOSIS — E78.2 MIXED HYPERLIPIDEMIA: ICD-10-CM

## 2023-04-13 DIAGNOSIS — K59.00 CONSTIPATION, UNSPECIFIED CONSTIPATION TYPE: ICD-10-CM

## 2023-04-13 DIAGNOSIS — S91.302A WOUND OF LEFT FOOT: ICD-10-CM

## 2023-04-13 DIAGNOSIS — I10 ESSENTIAL HYPERTENSION: Primary | ICD-10-CM

## 2023-04-13 DIAGNOSIS — Z79.4 TYPE 2 DIABETES MELLITUS WITH DIABETIC NEPHROPATHY, WITH LONG-TERM CURRENT USE OF INSULIN: ICD-10-CM

## 2023-04-13 DIAGNOSIS — E11.21 TYPE 2 DIABETES MELLITUS WITH DIABETIC NEPHROPATHY, WITH LONG-TERM CURRENT USE OF INSULIN: ICD-10-CM

## 2023-04-13 LAB
ALBUMIN SERPL BCP-MCNC: 3.4 G/DL (ref 3.5–5)
ALBUMIN/GLOB SERPL: 1 {RATIO}
ALP SERPL-CCNC: 65 U/L (ref 55–142)
ALT SERPL W P-5'-P-CCNC: 30 U/L (ref 13–56)
ANION GAP SERPL CALCULATED.3IONS-SCNC: 14 MMOL/L (ref 7–16)
AST SERPL W P-5'-P-CCNC: 27 U/L (ref 15–37)
BILIRUB SERPL-MCNC: 0.3 MG/DL (ref ?–1.2)
BUN SERPL-MCNC: 69 MG/DL (ref 7–18)
BUN/CREAT SERPL: 26 (ref 6–20)
CALCIUM SERPL-MCNC: 8.8 MG/DL (ref 8.5–10.1)
CHLORIDE SERPL-SCNC: 105 MMOL/L (ref 98–107)
CHOLEST SERPL-MCNC: 101 MG/DL (ref 0–200)
CHOLEST/HDLC SERPL: 2.1 {RATIO}
CO2 SERPL-SCNC: 24 MMOL/L (ref 21–32)
CREAT SERPL-MCNC: 2.69 MG/DL (ref 0.55–1.02)
EGFR (NO RACE VARIABLE) (RUSH/TITUS): 18 ML/MIN/1.73M²
GLOBULIN SER-MCNC: 3.4 G/DL (ref 2–4)
GLUCOSE SERPL-MCNC: 238 MG/DL (ref 74–106)
HDLC SERPL-MCNC: 47 MG/DL (ref 40–60)
LDLC SERPL CALC-MCNC: 46 MG/DL
LDLC/HDLC SERPL: 1 {RATIO}
NONHDLC SERPL-MCNC: 54 MG/DL
POTASSIUM SERPL-SCNC: 4.4 MMOL/L (ref 3.5–5.1)
PROT SERPL-MCNC: 6.8 G/DL (ref 6.4–8.2)
SODIUM SERPL-SCNC: 139 MMOL/L (ref 136–145)
TRIGL SERPL-MCNC: 39 MG/DL (ref 35–150)
VLDLC SERPL-MCNC: 8 MG/DL

## 2023-04-13 PROCEDURE — 3075F PR MOST RECENT SYSTOLIC BLOOD PRESS GE 130-139MM HG: ICD-10-PCS | Mod: ,,, | Performed by: NURSE PRACTITIONER

## 2023-04-13 PROCEDURE — 1126F AMNT PAIN NOTED NONE PRSNT: CPT | Mod: ,,, | Performed by: NURSE PRACTITIONER

## 2023-04-13 PROCEDURE — 1101F PT FALLS ASSESS-DOCD LE1/YR: CPT | Mod: ,,, | Performed by: NURSE PRACTITIONER

## 2023-04-13 PROCEDURE — 99214 OFFICE O/P EST MOD 30 MIN: CPT | Mod: ,,, | Performed by: NURSE PRACTITIONER

## 2023-04-13 PROCEDURE — 3060F PR POS MICROALBUMINURIA RESULT DOCUMENTED/REVIEW: ICD-10-PCS | Mod: ,,, | Performed by: NURSE PRACTITIONER

## 2023-04-13 PROCEDURE — 1160F RVW MEDS BY RX/DR IN RCRD: CPT | Mod: ,,, | Performed by: NURSE PRACTITIONER

## 2023-04-13 PROCEDURE — 3288F FALL RISK ASSESSMENT DOCD: CPT | Mod: ,,, | Performed by: NURSE PRACTITIONER

## 2023-04-13 PROCEDURE — 3079F DIAST BP 80-89 MM HG: CPT | Mod: ,,, | Performed by: NURSE PRACTITIONER

## 2023-04-13 PROCEDURE — 83036 HEMOGLOBIN GLYCOSYLATED A1C: CPT | Mod: ,,, | Performed by: CLINICAL MEDICAL LABORATORY

## 2023-04-13 PROCEDURE — 1159F PR MEDICATION LIST DOCUMENTED IN MEDICAL RECORD: ICD-10-PCS | Mod: ,,, | Performed by: NURSE PRACTITIONER

## 2023-04-13 PROCEDURE — 83036 HEMOGLOBIN A1C: ICD-10-PCS | Mod: ,,, | Performed by: CLINICAL MEDICAL LABORATORY

## 2023-04-13 PROCEDURE — 3079F PR MOST RECENT DIASTOLIC BLOOD PRESSURE 80-89 MM HG: ICD-10-PCS | Mod: ,,, | Performed by: NURSE PRACTITIONER

## 2023-04-13 PROCEDURE — 80053 COMPREHEN METABOLIC PANEL: CPT | Mod: ,,, | Performed by: CLINICAL MEDICAL LABORATORY

## 2023-04-13 PROCEDURE — 3066F NEPHROPATHY DOC TX: CPT | Mod: ,,, | Performed by: NURSE PRACTITIONER

## 2023-04-13 PROCEDURE — 3060F POS MICROALBUMINURIA REV: CPT | Mod: ,,, | Performed by: NURSE PRACTITIONER

## 2023-04-13 PROCEDURE — 80053 COMPREHENSIVE METABOLIC PANEL: ICD-10-PCS | Mod: ,,, | Performed by: CLINICAL MEDICAL LABORATORY

## 2023-04-13 PROCEDURE — 1160F PR REVIEW ALL MEDS BY PRESCRIBER/CLIN PHARMACIST DOCUMENTED: ICD-10-PCS | Mod: ,,, | Performed by: NURSE PRACTITIONER

## 2023-04-13 PROCEDURE — 3051F HG A1C>EQUAL 7.0%<8.0%: CPT | Mod: ,,, | Performed by: NURSE PRACTITIONER

## 2023-04-13 PROCEDURE — 3288F PR FALLS RISK ASSESSMENT DOCUMENTED: ICD-10-PCS | Mod: ,,, | Performed by: NURSE PRACTITIONER

## 2023-04-13 PROCEDURE — 1159F MED LIST DOCD IN RCRD: CPT | Mod: ,,, | Performed by: NURSE PRACTITIONER

## 2023-04-13 PROCEDURE — 3051F PR MOST RECENT HEMOGLOBIN A1C LEVEL 7.0 - < 8.0%: ICD-10-PCS | Mod: ,,, | Performed by: NURSE PRACTITIONER

## 2023-04-13 PROCEDURE — 1101F PR PT FALLS ASSESS DOC 0-1 FALLS W/OUT INJ PAST YR: ICD-10-PCS | Mod: ,,, | Performed by: NURSE PRACTITIONER

## 2023-04-13 PROCEDURE — 80061 LIPID PANEL: CPT | Mod: ,,, | Performed by: CLINICAL MEDICAL LABORATORY

## 2023-04-13 PROCEDURE — 80061 LIPID PANEL: ICD-10-PCS | Mod: ,,, | Performed by: CLINICAL MEDICAL LABORATORY

## 2023-04-13 PROCEDURE — 3008F BODY MASS INDEX DOCD: CPT | Mod: ,,, | Performed by: NURSE PRACTITIONER

## 2023-04-13 PROCEDURE — 3066F PR DOCUMENTATION OF TREATMENT FOR NEPHROPATHY: ICD-10-PCS | Mod: ,,, | Performed by: NURSE PRACTITIONER

## 2023-04-13 PROCEDURE — 3008F PR BODY MASS INDEX (BMI) DOCUMENTED: ICD-10-PCS | Mod: ,,, | Performed by: NURSE PRACTITIONER

## 2023-04-13 PROCEDURE — 1126F PR PAIN SEVERITY QUANTIFIED, NO PAIN PRESENT: ICD-10-PCS | Mod: ,,, | Performed by: NURSE PRACTITIONER

## 2023-04-13 PROCEDURE — 3075F SYST BP GE 130 - 139MM HG: CPT | Mod: ,,, | Performed by: NURSE PRACTITIONER

## 2023-04-13 PROCEDURE — 99214 PR OFFICE/OUTPT VISIT, EST, LEVL IV, 30-39 MIN: ICD-10-PCS | Mod: ,,, | Performed by: NURSE PRACTITIONER

## 2023-04-13 NOTE — PROGRESS NOTES
Reviewed care gaps with pt.   @  Health Maintenance Due   Topic Date Due    Mammogram  09/09/2022    Hemoglobin A1c  04/10/2023     Pt states she plans to make an apt. For mammogram.

## 2023-04-13 NOTE — PROGRESS NOTES
CHAPARRITA Bella   [facility]  [address] [address] [address]  [phone]      PATIENT NAME: Nasrin Grant  : 1950  DATE: 23  MRN: [id number]      Billing Provider: CHAPARRITA Bella  Level of Service:   Patient PCP Information       Provider PCP Type    CHAPARRITA Bella General            Reason for Visit / Chief Complaint: Follow-up (Routine follow up. /Pt is due for an A1C/Pt is fasting for labs. )       Update PCP  Update Chief Complaint         History of Present Illness / Problem Focused Workflow     72 year old female presents to establish care and labs  She has ulcer to left foot that she is followed by wound care in [place]  Reports recent hospitalization for CHF and foot ulcer  States she has been doing better since discharge  Last appt with cardiology in     Hx of DM, renal transplant, hypertension, hyperlipidemia, CAD      Review of Systems     Review of Systems   Constitutional:  Negative for fatigue and fever.   HENT:  Negative for congestion.    Respiratory:  Negative for cough and shortness of breath.    Cardiovascular:  Positive for leg swelling. Negative for chest pain.   Gastrointestinal:  Positive for constipation. Negative for abdominal pain, diarrhea and vomiting.   Endocrine: Negative for polydipsia and polyuria.   Musculoskeletal:  Positive for arthralgias and gait problem (uses walker).   Skin:  Positive for wound (let foot ulcer).   Neurological:  Negative for dizziness, weakness and headaches.   Psychiatric/Behavioral:  Negative for agitation and dysphoric mood.      Medical / Social / Family History     Past Medical History:   Diagnosis Date    Atherosclerotic heart disease of native coronary artery with angina pectoris 2020    Depression     Diabetes mellitus, type 2     Disorder of kidney and ureter     Hyperlipidemia     Hypertension     Kidney transplant status 2020    Osteoporosis  07/22/2020    Stroke        Past Surgical History:   Procedure Laterality Date    EXTRACTION OF TOOTH Left 08/11/2021    HYSTERECTOMY      kidney transplant 2016      TOE AMPUTATION         Social History  Ms.  reports that she has never smoked. She has never used smokeless tobacco. She reports that she does not drink alcohol and does not use drugs.    Family History  Ms.'s family history includes Diabetes in her father and mother; Hearing loss in her father and mother; Heart disease in her father and mother; Hyperlipidemia in her father and mother.    Medications and Allergies     Medications  Outpatient Medications Marked as Taking for the 4/13/23 encounter (Office Visit) with CHAPARRITA Bella   Medication Sig Dispense Refill    amLODIPine (NORVASC) 10 MG tablet TAKE ONE TABLET BY MOUTH ONCE DAILY 90 tablet 1    aspirin (ECOTRIN) 81 MG EC tablet Take 81 mg by mouth once daily.      blood sugar diagnostic (TRUE METRIX GLUCOSE TEST STRIP) Strp Inject 1 strip into the skin 3 (three) times daily before meals. 400 strip 2    blood-glucose meter (TRUE METRIX GLUCOSE METER) kit 1 each by Other route 3 (three) times daily before meals. 1 each 0    bumetanide (BUMEX) 2 MG tablet Take 1 tablet (2 mg total) by mouth once daily. 30 tablet 11    carvediloL (COREG) 25 MG tablet TAKE ONE TABLET BY MOUTH TWICE DAILY 180 tablet 1    cholecalciferol, vitamin D3, (VITAMIN D3) 25 mcg (1,000 unit) capsule Take 1,000 Units by mouth.      cinacalcet (SENSIPAR) 30 MG Tab Take 1 tablet (30 mg total) by mouth once daily. 30 tablet 11    Lactobacillus acidophilus (PROBIOTIC ACIDOPHILUS ORAL) Take 4 Billion Cells by mouth.      lancets (TRUEPLUS LANCETS) 33 gauge Misc Inject 1 lancet into the skin 3 (three) times daily before meals. 400 each 2    mycophenolate (MYFORTIC) 180 MG TbEC Take 180 mg by mouth 2 (two) times daily.      nitroGLYCERIN (NITROSTAT) 0.4 MG SL tablet Place 1 tablet (0.4 mg total) under the tongue every 5 (five) minutes  "as needed for Chest pain. 30 tablet 3    NOVOLOG FLEXPEN U-100 INSULIN 100 unit/mL (3 mL) InPn pen INJECT FIVE UNITS into THE SKIN THREE TIMES DAILY WITH MEALS 15 mL 1    olmesartan (BENICAR) 20 MG tablet TAKE ONE TABLET BY MOUTH EVERY DAY 90 tablet 0    pantoprazole (PROTONIX) 40 MG tablet Take 1 tablet (40 mg total) by mouth 2 (two) times daily. 180 tablet 3    potassium chloride (MICRO-K) 10 MEQ CpSR Take 1 capsule (10 mEq total) by mouth once daily. for 180 doses 90 capsule 1    predniSONE (DELTASONE) 5 MG tablet Take 5 mg by mouth once daily.      rosuvastatin (CRESTOR) 40 MG Tab Take 40 mg by mouth once daily.      sodium bicarbonate 650 MG tablet Take 1,300 mg by mouth.      tacrolimus (PROGRAF) 1 MG Cap 1 capsule once daily.      TRESIBA FLEXTOUCH U-100 100 unit/mL (3 mL) insulin pen INJECT 20 UNITS into THE SKIN ONCE DAILY         Allergies  Review of patient's allergies indicates:   Allergen Reactions    Hydrocodone-acetaminophen Rash    Gabapentin Other (See Comments)     Made her disoriented  "out of my head"      Morphine Itching    Opioids - morphine analogues        Physical Examination     Vitals:    04/13/23 0840   BP: 138/86   Pulse: 70   Resp: 18   Temp: 97.1 °F (36.2 °C)     Physical Exam  Constitutional:       General: She is not in acute distress.  HENT:      Head: Normocephalic.      Nose: Nose normal. No congestion.      Mouth/Throat:      Mouth: Mucous membranes are moist.   Eyes:      Extraocular Movements: Extraocular movements intact.   Cardiovascular:      Rate and Rhythm: Normal rate.   Pulmonary:      Effort: Pulmonary effort is normal. No respiratory distress.   Abdominal:      General: Bowel sounds are normal.      Palpations: Abdomen is soft.      Tenderness: There is no abdominal tenderness.   Musculoskeletal:         General: Normal range of motion.      Cervical back: Neck supple.   Skin:     General: Skin is warm.   Neurological:      Mental Status: She is alert and oriented to " person, place, and time.   Psychiatric:         Behavior: Behavior normal.         Imaging / Labs     Office Visit on 04/13/2023   Component Date Value Ref Range Status    Triglycerides 04/13/2023 39  35 - 150 mg/dL Final    Cholesterol 04/13/2023 101  0 - 200 mg/dL Final    HDL Cholesterol 04/13/2023 47  40 - 60 mg/dL Final    Cholesterol/HDL Ratio (Risk Factor) 04/13/2023 2.1   Final    Non-HDL 04/13/2023 54  mg/dL Final    LDL Calculated 04/13/2023 46  mg/dL Final    LDL/HDL 04/13/2023 1.0   Final    VLDL 04/13/2023 8  mg/dL Final    Sodium 04/13/2023 139  136 - 145 mmol/L Final    Potassium 04/13/2023 4.4  3.5 - 5.1 mmol/L Final    Chloride 04/13/2023 105  98 - 107 mmol/L Final    CO2 04/13/2023 24  21 - 32 mmol/L Final    Anion Gap 04/13/2023 14  7 - 16 mmol/L Final    Glucose 04/13/2023 238 (H)  74 - 106 mg/dL Final    BUN 04/13/2023 69 (H)  7 - 18 mg/dL Final    Creatinine 04/13/2023 2.69 (H)  0.55 - 1.02 mg/dL Final    BUN/Creatinine Ratio 04/13/2023 26 (H)  6 - 20 Final    Calcium 04/13/2023 8.8  8.5 - 10.1 mg/dL Final    Total Protein 04/13/2023 6.8  6.4 - 8.2 g/dL Final    Albumin 04/13/2023 3.4 (L)  3.5 - 5.0 g/dL Final    Globulin 04/13/2023 3.4  2.0 - 4.0 g/dL Final    A/G Ratio 04/13/2023 1.0   Final    Bilirubin, Total 04/13/2023 0.3  >0.0 - 1.2 mg/dL Final    Alk Phos 04/13/2023 65  55 - 142 U/L Final    ALT 04/13/2023 30  13 - 56 U/L Final    AST 04/13/2023 27  15 - 37 U/L Final    eGFR 04/13/2023 18 (L)  >=60 mL/min/1.73m² Final    Hemoglobin A1C 04/13/2023 7.6 (H)  4.5 - 6.6 % Final    Estimated Average Glucose 04/13/2023 167  mg/dL Final     X-Ray Chest PA And Lateral  Narrative: EXAMINATION:  XR CHEST PA AND LATERAL    CLINICAL HISTORY:  Shortness of breath    COMPARISON:  03/08/2017, 03/20/2018, and 11/10/2022    FINDINGS:  PA and lateral chest:    There is cardiomegaly.  Bilateral mixed interstitial and alveolar infiltrates are seen in the lower lobes and to some extent in the right upper  lobe.  There is thickening of the fissural lines with an overall appearance most suggestive of pulmonary edema.  The appearance has improved when compared to the 11/10/2022 examination, however.  No pleural effusions are seen at this time.    There are degenerative changes in the thoracic spine.  Impression: There is cardiomegaly with bibasilar pulmonary edema likely representing CHF.  The findings have significantly improved when compared to the most recent exam.    Place of service: [facility]    Electronically signed by: Hansa Key  Date:    01/10/2023  Time:    09:21      Assessment and Plan (including Health Maintenance)      Problem List  Smart Sets  Document Outside HM   :    Health Maintenance Due   Topic Date Due    Mammogram  09/09/2022    Hemoglobin A1c  04/10/2023       Problem List Items Addressed This Visit          Cardiac/Vascular    Essential hypertension - Primary    Current Assessment & Plan     Labs today  appt with Dr Robledo cardiology with [facility]           Relevant Orders    Ambulatory referral/consult to Cardiology    Mixed hyperlipidemia    Current Assessment & Plan     Labs today  Will treat as indicated  appt with cardiology              Relevant Orders    Lipid Panel       Endocrine    DM2 (diabetes mellitus, type 2)    Relevant Orders    Lipid Panel    Comprehensive Metabolic Panel    Hemoglobin A1C    Ambulatory referral/consult to Cardiology       GI    Constipation    Current Assessment & Plan     linzess 72 mg daily           Relevant Medications    linaCLOtide (LINZESS) 72 mcg Cap capsule       Orthopedic    Wound of left foot    Current Assessment & Plan     She is being followed by wound care in [facility] Topics with due status: Not Due       Topic Last Completion Date    TETANUS VACCINE 10/16/2017    DEXA Scan 09/09/2021    Eye Exam 06/15/2022    Colorectal Cancer Screening 08/22/2022    Lipid Panel 10/31/2022    Foot Exam 11/29/2022     Diabetes Urine Screening 01/10/2023    High Dose Statin 04/13/2023       Future Appointments   Date Time Provider Department Center   10/12/2023  8:30 AM CHAPARRITA Bella [facility]          Signature:  CHAPARRITA Bella [facility] [address] [place] [address]  [phone]    Date of encounter: 4/13/23

## 2023-04-14 LAB
EST. AVERAGE GLUCOSE BLD GHB EST-MCNC: 167 MG/DL
HBA1C MFR BLD HPLC: 7.6 % (ref 4.5–6.6)

## 2023-04-17 DIAGNOSIS — E11.21 TYPE 2 DIABETES MELLITUS WITH DIABETIC NEPHROPATHY, WITH LONG-TERM CURRENT USE OF INSULIN: Primary | ICD-10-CM

## 2023-04-17 DIAGNOSIS — Z79.4 TYPE 2 DIABETES MELLITUS WITH DIABETIC NEPHROPATHY, WITH LONG-TERM CURRENT USE OF INSULIN: Primary | ICD-10-CM

## 2023-04-17 RX ORDER — INSULIN DEGLUDEC 100 U/ML
24 INJECTION, SOLUTION SUBCUTANEOUS DAILY
Qty: 3 PEN | Refills: 1 | Status: SHIPPED | OUTPATIENT
Start: 2023-04-17 | End: 2023-11-27

## 2023-05-01 ENCOUNTER — DOCUMENT SCAN (OUTPATIENT)
Dept: HOME HEALTH SERVICES | Facility: HOSPITAL | Age: 73
End: 2023-05-01
Payer: MEDICARE

## 2023-05-02 ENCOUNTER — EXTERNAL HOME HEALTH (OUTPATIENT)
Dept: HOME HEALTH SERVICES | Facility: HOSPITAL | Age: 73
End: 2023-05-02
Payer: MEDICARE

## 2023-05-08 DIAGNOSIS — I10 ESSENTIAL HYPERTENSION: ICD-10-CM

## 2023-05-08 RX ORDER — CARVEDILOL 25 MG/1
TABLET ORAL
Qty: 180 TABLET | Refills: 1 | Status: SHIPPED | OUTPATIENT
Start: 2023-05-08

## 2023-05-09 ENCOUNTER — TELEPHONE (OUTPATIENT)
Dept: FAMILY MEDICINE | Facility: CLINIC | Age: 73
End: 2023-05-09
Payer: MEDICARE

## 2023-05-10 DIAGNOSIS — E78.2 MIXED HYPERLIPIDEMIA: Primary | ICD-10-CM

## 2023-05-10 RX ORDER — ATORVASTATIN CALCIUM 40 MG/1
40 TABLET, FILM COATED ORAL DAILY
Qty: 90 TABLET | Refills: 1 | Status: SHIPPED | OUTPATIENT
Start: 2023-05-10 | End: 2023-06-22

## 2023-05-22 ENCOUNTER — PATIENT OUTREACH (OUTPATIENT)
Dept: ADMINISTRATIVE | Facility: HOSPITAL | Age: 73
End: 2023-05-22

## 2023-06-05 ENCOUNTER — OFFICE VISIT (OUTPATIENT)
Dept: FAMILY MEDICINE | Facility: CLINIC | Age: 73
End: 2023-06-05
Payer: MEDICARE

## 2023-06-05 VITALS
TEMPERATURE: 97 F | SYSTOLIC BLOOD PRESSURE: 120 MMHG | DIASTOLIC BLOOD PRESSURE: 78 MMHG | HEIGHT: 64 IN | HEART RATE: 73 BPM | RESPIRATION RATE: 20 BRPM | BODY MASS INDEX: 28.75 KG/M2 | OXYGEN SATURATION: 99 % | WEIGHT: 168.38 LBS

## 2023-06-05 DIAGNOSIS — Z12.31 ENCOUNTER FOR SCREENING MAMMOGRAM FOR MALIGNANT NEOPLASM OF BREAST: ICD-10-CM

## 2023-06-05 DIAGNOSIS — M54.2 NECK PAIN ON RIGHT SIDE: Primary | ICD-10-CM

## 2023-06-05 PROBLEM — Z12.39 ENCOUNTER FOR SCREENING FOR MALIGNANT NEOPLASM OF BREAST: Status: ACTIVE | Noted: 2023-06-05

## 2023-06-05 PROCEDURE — 1125F AMNT PAIN NOTED PAIN PRSNT: CPT | Mod: ,,, | Performed by: NURSE PRACTITIONER

## 2023-06-05 PROCEDURE — 1125F PR PAIN SEVERITY QUANTIFIED, PAIN PRESENT: ICD-10-PCS | Mod: ,,, | Performed by: NURSE PRACTITIONER

## 2023-06-05 PROCEDURE — 3008F PR BODY MASS INDEX (BMI) DOCUMENTED: ICD-10-PCS | Mod: ,,, | Performed by: NURSE PRACTITIONER

## 2023-06-05 PROCEDURE — 3074F SYST BP LT 130 MM HG: CPT | Mod: ,,, | Performed by: NURSE PRACTITIONER

## 2023-06-05 PROCEDURE — 3008F BODY MASS INDEX DOCD: CPT | Mod: ,,, | Performed by: NURSE PRACTITIONER

## 2023-06-05 PROCEDURE — 3051F PR MOST RECENT HEMOGLOBIN A1C LEVEL 7.0 - < 8.0%: ICD-10-PCS | Mod: ,,, | Performed by: NURSE PRACTITIONER

## 2023-06-05 PROCEDURE — 3074F PR MOST RECENT SYSTOLIC BLOOD PRESSURE < 130 MM HG: ICD-10-PCS | Mod: ,,, | Performed by: NURSE PRACTITIONER

## 2023-06-05 PROCEDURE — 1101F PT FALLS ASSESS-DOCD LE1/YR: CPT | Mod: ,,, | Performed by: NURSE PRACTITIONER

## 2023-06-05 PROCEDURE — 3288F FALL RISK ASSESSMENT DOCD: CPT | Mod: ,,, | Performed by: NURSE PRACTITIONER

## 2023-06-05 PROCEDURE — 99213 OFFICE O/P EST LOW 20 MIN: CPT | Mod: ,,, | Performed by: NURSE PRACTITIONER

## 2023-06-05 PROCEDURE — 3051F HG A1C>EQUAL 7.0%<8.0%: CPT | Mod: ,,, | Performed by: NURSE PRACTITIONER

## 2023-06-05 PROCEDURE — 99213 PR OFFICE/OUTPT VISIT, EST, LEVL III, 20-29 MIN: ICD-10-PCS | Mod: ,,, | Performed by: NURSE PRACTITIONER

## 2023-06-05 PROCEDURE — 3066F PR DOCUMENTATION OF TREATMENT FOR NEPHROPATHY: ICD-10-PCS | Mod: ,,, | Performed by: NURSE PRACTITIONER

## 2023-06-05 PROCEDURE — 1101F PR PT FALLS ASSESS DOC 0-1 FALLS W/OUT INJ PAST YR: ICD-10-PCS | Mod: ,,, | Performed by: NURSE PRACTITIONER

## 2023-06-05 PROCEDURE — 3066F NEPHROPATHY DOC TX: CPT | Mod: ,,, | Performed by: NURSE PRACTITIONER

## 2023-06-05 PROCEDURE — 3060F PR POS MICROALBUMINURIA RESULT DOCUMENTED/REVIEW: ICD-10-PCS | Mod: ,,, | Performed by: NURSE PRACTITIONER

## 2023-06-05 PROCEDURE — 3078F DIAST BP <80 MM HG: CPT | Mod: ,,, | Performed by: NURSE PRACTITIONER

## 2023-06-05 PROCEDURE — 3078F PR MOST RECENT DIASTOLIC BLOOD PRESSURE < 80 MM HG: ICD-10-PCS | Mod: ,,, | Performed by: NURSE PRACTITIONER

## 2023-06-05 PROCEDURE — 3060F POS MICROALBUMINURIA REV: CPT | Mod: ,,, | Performed by: NURSE PRACTITIONER

## 2023-06-05 PROCEDURE — 3288F PR FALLS RISK ASSESSMENT DOCUMENTED: ICD-10-PCS | Mod: ,,, | Performed by: NURSE PRACTITIONER

## 2023-06-05 RX ORDER — PEN NEEDLE, DIABETIC 30 GX3/16"
1 NEEDLE, DISPOSABLE MISCELLANEOUS
COMMUNITY
Start: 2023-05-31

## 2023-06-05 RX ORDER — TIZANIDINE 4 MG/1
4 TABLET ORAL EVERY 6 HOURS PRN
Qty: 24 TABLET | Refills: 0 | Status: SHIPPED | OUTPATIENT
Start: 2023-06-05 | End: 2023-06-08

## 2023-06-05 RX ORDER — PEN NEEDLE, DIABETIC 30 GX3/16"
1 NEEDLE, DISPOSABLE MISCELLANEOUS
COMMUNITY
Start: 2023-05-31 | End: 2023-12-18

## 2023-06-05 NOTE — ASSESSMENT & PLAN NOTE
Reports right sided neck pain x 3-4 days   Denies any injury  Area is tender to touch  Heating pad on low to area  zanaflex prn    Follow up if symptoms fail to improve

## 2023-06-05 NOTE — PROGRESS NOTES
CHAPARRITA Bella   [facility]  [address] [address]  [phone]      PATIENT NAME: Nasrin Grant  : 1950  DATE: 23  MRN: [id number]      Billing Provider: CHAPARRITA Bella  Level of Service:   Patient PCP Information       Provider PCP Type    CHAPARRITA Bella General            Reason for Visit / Chief Complaint: Shoulder Pain (Pt states she has pain in her shoulder that radiates up her neck. /States this has been going on a couple weeks but pain has gotten worse. /Rates pain 8/10)       Update PCP  Update Chief Complaint         History of Present Illness / Problem Focused Workflow     72 year old female presents with complaints of right sided neck pain that extends to the shoulder  Reports pain has been ongoing for several weeks and has gotten worse  Denies any known injury  Reports she has appointment with cardiology for echo and carotid artery scan on the     Hx of DM, renal transplant, hypertension, hyperlipidemia, CAD      Review of Systems     Review of Systems   Constitutional:  Negative for fatigue and fever.   HENT:  Negative for congestion.    Respiratory:  Negative for cough and shortness of breath.    Cardiovascular:  Positive for leg swelling. Negative for chest pain.   Gastrointestinal:  Positive for constipation. Negative for abdominal pain, diarrhea and vomiting.   Endocrine: Negative for polydipsia and polyuria.   Musculoskeletal:  Positive for arthralgias, gait problem (uses walker) and neck pain (extends to right shoulder).   Skin:  Positive for wound (let foot ulcer).   Neurological:  Negative for dizziness, weakness and headaches.   Psychiatric/Behavioral:  Negative for agitation and dysphoric mood.      Medical / Social / Family History     Past Medical History:   Diagnosis Date    Atherosclerotic heart disease of native coronary artery with angina pectoris 2020    Depression     Diabetes  mellitus, type 2     Disorder of kidney and ureter     Hyperlipidemia     Hypertension     Kidney transplant status 07/13/2020    Osteoporosis 07/22/2020    Stroke        Past Surgical History:   Procedure Laterality Date    EXTRACTION OF TOOTH Left 08/11/2021    HYSTERECTOMY      kidney transplant 2016      TOE AMPUTATION         Social History  Ms.  reports that she has never smoked. She has never used smokeless tobacco. She reports that she does not drink alcohol and does not use drugs.    Family History  Ms.'s family history includes Diabetes in her father and mother; Hearing loss in her father and mother; Heart disease in her father and mother; Hyperlipidemia in her father and mother.    Medications and Allergies     Medications  Outpatient Medications Marked as Taking for the 6/5/23 encounter (Office Visit) with CHAPARRTIA Bella   Medication Sig Dispense Refill    amLODIPine (NORVASC) 10 MG tablet TAKE ONE TABLET BY MOUTH ONCE DAILY 90 tablet 1    aspirin (ECOTRIN) 81 MG EC tablet Take 81 mg by mouth once daily.      atorvastatin (LIPITOR) 40 MG tablet Take 1 tablet (40 mg total) by mouth once daily. 90 tablet 1    blood sugar diagnostic (TRUE METRIX GLUCOSE TEST STRIP) Strp Inject 1 strip into the skin 3 (three) times daily before meals. 400 strip 2    blood-glucose meter (TRUE METRIX GLUCOSE METER) kit 1 each by Other route 3 (three) times daily before meals. 1 each 0    bumetanide (BUMEX) 2 MG tablet Take 1 tablet (2 mg total) by mouth once daily. 30 tablet 11    carvediloL (COREG) 25 MG tablet TAKE ONE TABLET BY MOUTH TWICE DAILY 180 tablet 1    cholecalciferol, vitamin D3, (VITAMIN D3) 25 mcg (1,000 unit) capsule Take 1,000 Units by mouth.      cinacalcet (SENSIPAR) 30 MG Tab Take 1 tablet (30 mg total) by mouth once daily. 30 tablet 11    Lactobacillus acidophilus (PROBIOTIC ACIDOPHILUS ORAL) Take 4 Billion Cells by mouth.      lancets (TRUEPLUS LANCETS) 33 gauge Misc Inject 1 lancet into the skin 3  "(three) times daily before meals. 400 each 2    linaCLOtide (LINZESS) 72 mcg Cap capsule Take 1 capsule (72 mcg total) by mouth before breakfast. 90 capsule 1    losartan (COZAAR) 25 MG tablet Take 25 mg by mouth once daily.      mirabegron (MYRBETRIQ) 25 mg Tb24 ER tablet Take 25 mg by mouth once daily.      mycophenolate (MYFORTIC) 180 MG TbEC Take 180 mg by mouth 2 (two) times daily.      NOVOLOG FLEXPEN U-100 INSULIN 100 unit/mL (3 mL) InPn pen INJECT FIVE UNITS into THE SKIN THREE TIMES DAILY WITH MEALS 15 mL 1    olmesartan (BENICAR) 20 MG tablet TAKE ONE TABLET BY MOUTH EVERY DAY 90 tablet 0    pantoprazole (PROTONIX) 40 MG tablet Take 1 tablet (40 mg total) by mouth 2 (two) times daily. 180 tablet 3    pen needle, diabetic 31 gauge x 3/16" Ndle 1 each by NOT APPLICABLE route.      pen needle, diabetic 31 gauge x 3/16" Ndle 1 each by NOT APPLICABLE route.      potassium chloride (MICRO-K) 10 MEQ CpSR Take 1 capsule (10 mEq total) by mouth once daily. for 180 doses 90 capsule 1    predniSONE (DELTASONE) 5 MG tablet Take 5 mg by mouth once daily.      sodium bicarbonate 650 MG tablet Take 1,300 mg by mouth.      tacrolimus (PROGRAF) 1 MG Cap 1 capsule once daily.      tamsulosin (FLOMAX) 0.4 mg Cap       torsemide (DEMADEX) 20 MG Tab Take 1 tablet by mouth once daily.      TRESIBA FLEXTOUCH U-100 100 unit/mL (3 mL) insulin pen Inject 24 Units into the skin once daily. 3 pen 1    triprolidine-pseudoephedrine (APRODINE) 2.5-60 mg Tab Take 1 tablet by mouth every 6 (six) hours as needed.         Allergies  Review of patient's allergies indicates:   Allergen Reactions    Hydrocodone-acetaminophen Rash    Gabapentin Other (See Comments)     Made her disoriented  "out of my head"      Morphine Itching    Opioids - morphine analogues        Physical Examination     Vitals:    06/05/23 0806   BP: 120/78   Pulse: 73   Resp: 20   Temp: 97.3 °F (36.3 °C)     Physical Exam  Constitutional:       General: She is not in " acute distress.  HENT:      Head: Normocephalic.      Nose: Nose normal. No congestion.      Mouth/Throat:      Mouth: Mucous membranes are moist.   Eyes:      Extraocular Movements: Extraocular movements intact.   Cardiovascular:      Rate and Rhythm: Normal rate.   Pulmonary:      Effort: Pulmonary effort is normal. No respiratory distress.   Abdominal:      General: Bowel sounds are normal.      Palpations: Abdomen is soft.      Tenderness: There is no abdominal tenderness.   Musculoskeletal:         General: Tenderness (right shoulder) present. No swelling or signs of injury. Normal range of motion.      Cervical back: Neck supple.   Skin:     General: Skin is warm.   Neurological:      Mental Status: She is alert and oriented to person, place, and time.   Psychiatric:         Behavior: Behavior normal.         Imaging / Labs     No visits with results within 1 Day(s) from this visit.   Latest known visit with results is:   Office Visit on 04/13/2023   Component Date Value Ref Range Status    Triglycerides 04/13/2023 39  35 - 150 mg/dL Final    Cholesterol 04/13/2023 101  0 - 200 mg/dL Final    HDL Cholesterol 04/13/2023 47  40 - 60 mg/dL Final    Cholesterol/HDL Ratio (Risk Factor) 04/13/2023 2.1   Final    Non-HDL 04/13/2023 54  mg/dL Final    LDL Calculated 04/13/2023 46  mg/dL Final    LDL/HDL 04/13/2023 1.0   Final    VLDL 04/13/2023 8  mg/dL Final    Sodium 04/13/2023 139  136 - 145 mmol/L Final    Potassium 04/13/2023 4.4  3.5 - 5.1 mmol/L Final    Chloride 04/13/2023 105  98 - 107 mmol/L Final    CO2 04/13/2023 24  21 - 32 mmol/L Final    Anion Gap 04/13/2023 14  7 - 16 mmol/L Final    Glucose 04/13/2023 238 (H)  74 - 106 mg/dL Final    BUN 04/13/2023 69 (H)  7 - 18 mg/dL Final    Creatinine 04/13/2023 2.69 (H)  0.55 - 1.02 mg/dL Final    BUN/Creatinine Ratio 04/13/2023 26 (H)  6 - 20 Final    Calcium 04/13/2023 8.8  8.5 - 10.1 mg/dL Final    Total Protein 04/13/2023 6.8  6.4 - 8.2 g/dL Final    Albumin  04/13/2023 3.4 (L)  3.5 - 5.0 g/dL Final    Globulin 04/13/2023 3.4  2.0 - 4.0 g/dL Final    A/G Ratio 04/13/2023 1.0   Final    Bilirubin, Total 04/13/2023 0.3  >0.0 - 1.2 mg/dL Final    Alk Phos 04/13/2023 65  55 - 142 U/L Final    ALT 04/13/2023 30  13 - 56 U/L Final    AST 04/13/2023 27  15 - 37 U/L Final    eGFR 04/13/2023 18 (L)  >=60 mL/min/1.73m² Final    Hemoglobin A1C 04/13/2023 7.6 (H)  4.5 - 6.6 % Final    Estimated Average Glucose 04/13/2023 167  mg/dL Final     X-Ray Chest PA And Lateral  Narrative: EXAMINATION:  XR CHEST PA AND LATERAL    CLINICAL HISTORY:  Shortness of breath    COMPARISON:  03/08/2017, 03/20/2018, and 11/10/2022    FINDINGS:  PA and lateral chest:    There is cardiomegaly.  Bilateral mixed interstitial and alveolar infiltrates are seen in the lower lobes and to some extent in the right upper lobe.  There is thickening of the fissural lines with an overall appearance most suggestive of pulmonary edema.  The appearance has improved when compared to the 11/10/2022 examination, however.  No pleural effusions are seen at this time.    There are degenerative changes in the thoracic spine.  Impression: There is cardiomegaly with bibasilar pulmonary edema likely representing CHF.  The findings have significantly improved when compared to the most recent exam.    Place of service: [facility]    Electronically signed by: Hansa Key  Date:    01/10/2023  Time:    09:21      Assessment and Plan (including Health Maintenance)      Problem List  Smart Sets  Document Outside HM   :    Health Maintenance Due   Topic Date Due    Mammogram  09/09/2022    Eye Exam  06/15/2023       Problem List Items Addressed This Visit          Renal/    Encounter for screening mammogram for malignant neoplasm of breast    Current Assessment & Plan     Referral for mammogram         Relevant Orders    Mammo Digital Screening Bilat       Orthopedic    Neck pain on right side - Primary    Current  Assessment & Plan     Reports right sided neck pain x 3-4 days   Denies any injury  Area is tender to touch  Heating pad on low to area  zanaflex prn    Follow up if symptoms fail to improve             Health Maintenance Topics with due status: Not Due       Topic Last Completion Date    TETANUS VACCINE 10/16/2017    DEXA Scan 09/09/2021    Colorectal Cancer Screening 08/22/2022    Foot Exam 11/29/2022    Diabetes Urine Screening 01/10/2023    Lipid Panel 04/13/2023    Hemoglobin A1c 04/13/2023    High Dose Statin 06/08/2023       Future Appointments   Date Time Provider Department Center   10/12/2023  8:40 AM CHAPARRITA Bella   10/13/2023 10:00 AM [id number]          Signature:  CHAPARRITA Bella [facility] [address] [place] [address]  [phone]    Date of encounter: 6/5/23

## 2023-06-08 ENCOUNTER — OFFICE VISIT (OUTPATIENT)
Dept: FAMILY MEDICINE | Facility: CLINIC | Age: 73
End: 2023-06-08
Payer: MEDICARE

## 2023-06-08 VITALS
BODY MASS INDEX: 28.79 KG/M2 | WEIGHT: 168.63 LBS | TEMPERATURE: 98 F | OXYGEN SATURATION: 99 % | SYSTOLIC BLOOD PRESSURE: 120 MMHG | RESPIRATION RATE: 20 BRPM | DIASTOLIC BLOOD PRESSURE: 82 MMHG | HEIGHT: 64 IN | HEART RATE: 64 BPM

## 2023-06-08 DIAGNOSIS — H66.91 RIGHT OTITIS MEDIA, UNSPECIFIED OTITIS MEDIA TYPE: ICD-10-CM

## 2023-06-08 DIAGNOSIS — M54.2 NECK PAIN ON RIGHT SIDE: Primary | ICD-10-CM

## 2023-06-08 PROCEDURE — 3074F PR MOST RECENT SYSTOLIC BLOOD PRESSURE < 130 MM HG: ICD-10-PCS | Mod: ,,, | Performed by: NURSE PRACTITIONER

## 2023-06-08 PROCEDURE — 1159F MED LIST DOCD IN RCRD: CPT | Mod: ,,, | Performed by: NURSE PRACTITIONER

## 2023-06-08 PROCEDURE — 3066F NEPHROPATHY DOC TX: CPT | Mod: ,,, | Performed by: NURSE PRACTITIONER

## 2023-06-08 PROCEDURE — 1160F RVW MEDS BY RX/DR IN RCRD: CPT | Mod: ,,, | Performed by: NURSE PRACTITIONER

## 2023-06-08 PROCEDURE — 3060F PR POS MICROALBUMINURIA RESULT DOCUMENTED/REVIEW: ICD-10-PCS | Mod: ,,, | Performed by: NURSE PRACTITIONER

## 2023-06-08 PROCEDURE — 3008F BODY MASS INDEX DOCD: CPT | Mod: ,,, | Performed by: NURSE PRACTITIONER

## 2023-06-08 PROCEDURE — 1125F PR PAIN SEVERITY QUANTIFIED, PAIN PRESENT: ICD-10-PCS | Mod: ,,, | Performed by: NURSE PRACTITIONER

## 2023-06-08 PROCEDURE — 3079F PR MOST RECENT DIASTOLIC BLOOD PRESSURE 80-89 MM HG: ICD-10-PCS | Mod: ,,, | Performed by: NURSE PRACTITIONER

## 2023-06-08 PROCEDURE — 3060F POS MICROALBUMINURIA REV: CPT | Mod: ,,, | Performed by: NURSE PRACTITIONER

## 2023-06-08 PROCEDURE — 1125F AMNT PAIN NOTED PAIN PRSNT: CPT | Mod: ,,, | Performed by: NURSE PRACTITIONER

## 2023-06-08 PROCEDURE — 99213 OFFICE O/P EST LOW 20 MIN: CPT | Mod: ,,, | Performed by: NURSE PRACTITIONER

## 2023-06-08 PROCEDURE — 3051F PR MOST RECENT HEMOGLOBIN A1C LEVEL 7.0 - < 8.0%: ICD-10-PCS | Mod: ,,, | Performed by: NURSE PRACTITIONER

## 2023-06-08 PROCEDURE — 3288F FALL RISK ASSESSMENT DOCD: CPT | Mod: ,,, | Performed by: NURSE PRACTITIONER

## 2023-06-08 PROCEDURE — 1101F PT FALLS ASSESS-DOCD LE1/YR: CPT | Mod: ,,, | Performed by: NURSE PRACTITIONER

## 2023-06-08 PROCEDURE — 3051F HG A1C>EQUAL 7.0%<8.0%: CPT | Mod: ,,, | Performed by: NURSE PRACTITIONER

## 2023-06-08 PROCEDURE — 3066F PR DOCUMENTATION OF TREATMENT FOR NEPHROPATHY: ICD-10-PCS | Mod: ,,, | Performed by: NURSE PRACTITIONER

## 2023-06-08 PROCEDURE — 3288F PR FALLS RISK ASSESSMENT DOCUMENTED: ICD-10-PCS | Mod: ,,, | Performed by: NURSE PRACTITIONER

## 2023-06-08 PROCEDURE — 99213 PR OFFICE/OUTPT VISIT, EST, LEVL III, 20-29 MIN: ICD-10-PCS | Mod: ,,, | Performed by: NURSE PRACTITIONER

## 2023-06-08 PROCEDURE — 3074F SYST BP LT 130 MM HG: CPT | Mod: ,,, | Performed by: NURSE PRACTITIONER

## 2023-06-08 PROCEDURE — 3079F DIAST BP 80-89 MM HG: CPT | Mod: ,,, | Performed by: NURSE PRACTITIONER

## 2023-06-08 PROCEDURE — 1160F PR REVIEW ALL MEDS BY PRESCRIBER/CLIN PHARMACIST DOCUMENTED: ICD-10-PCS | Mod: ,,, | Performed by: NURSE PRACTITIONER

## 2023-06-08 PROCEDURE — 3008F PR BODY MASS INDEX (BMI) DOCUMENTED: ICD-10-PCS | Mod: ,,, | Performed by: NURSE PRACTITIONER

## 2023-06-08 PROCEDURE — 1101F PR PT FALLS ASSESS DOC 0-1 FALLS W/OUT INJ PAST YR: ICD-10-PCS | Mod: ,,, | Performed by: NURSE PRACTITIONER

## 2023-06-08 PROCEDURE — 1159F PR MEDICATION LIST DOCUMENTED IN MEDICAL RECORD: ICD-10-PCS | Mod: ,,, | Performed by: NURSE PRACTITIONER

## 2023-06-08 RX ORDER — CYCLOBENZAPRINE HCL 5 MG
5 TABLET ORAL 3 TIMES DAILY
Qty: 30 TABLET | Refills: 0 | Status: CANCELLED | OUTPATIENT
Start: 2023-06-08 | End: 2023-06-18

## 2023-06-08 RX ORDER — LIDOCAINE 50 MG/G
2 PATCH TOPICAL DAILY
Qty: 30 PATCH | Refills: 1 | Status: SHIPPED | OUTPATIENT
Start: 2023-06-08 | End: 2023-10-12

## 2023-06-08 RX ORDER — NEOMYCIN SULFATE, POLYMYXIN B SULFATE AND HYDROCORTISONE 10; 3.5; 1 MG/ML; MG/ML; [USP'U]/ML
3 SUSPENSION/ DROPS AURICULAR (OTIC) 3 TIMES DAILY
Qty: 10 ML | Refills: 0 | Status: SHIPPED | OUTPATIENT
Start: 2023-06-08 | End: 2023-10-12

## 2023-06-08 RX ORDER — METHOCARBAMOL 500 MG/1
500 TABLET, FILM COATED ORAL 3 TIMES DAILY
Qty: 30 TABLET | Refills: 0 | Status: SHIPPED | OUTPATIENT
Start: 2023-06-08 | End: 2023-06-18

## 2023-06-08 RX ORDER — AZITHROMYCIN 250 MG/1
TABLET, FILM COATED ORAL
Qty: 6 TABLET | Refills: 0 | Status: SHIPPED | OUTPATIENT
Start: 2023-06-08 | End: 2023-06-13

## 2023-06-08 NOTE — ASSESSMENT & PLAN NOTE
Continues to complain of neck pain  Reports she was seen in [place] [facility] last and had x-ray done  states x-ray was okay  She is wearing lidocaine patch to neck and states it does help some  Was told to follow up here today  Will refill lidocaine patches for her  Change zanaflex to robaxin TID  Discussed side effects; may cause drowsiness. Voices understanding  Rest.   Will get HH to refer for PT @ home

## 2023-06-08 NOTE — PROGRESS NOTES
CHAPARRITA Bella   [facility]  [address] [address]  [phone]      PATIENT NAME: Nasrin Grant  : 1950  DATE: 23  MRN: [id number]      Billing Provider: CHAPARRITA Bella  Level of Service:   Patient PCP Information       Provider PCP Type    CHAPARRITA Bella General            Reason for Visit / Chief Complaint: Neck Pain and Shoulder Pain (Pt was here 23 with similar aches states pain has continuously gotten worse. 8/10 /States pain starts in R shoulder and radiates up her neck and ear. /Pt states she went to [place] [facility] last night. /Procedure included Xray of chest that showed no significant findings. /Pt was given IV mag. /ER  recommended f/u with pcp.)       Update PCP  Update Chief Complaint         History of Present Illness / Problem Focused Workflow     73 year old female continuing to complain of neck pain  Reports she was seen in [place] [facility] last and had x-ray done  states x-ray was okay  She is wearing lidocaine patch to neck and states it does help some  Was told to follow up here today  Also complains of otalgia right ear today    Hx of DM, renal transplant, hypertension, hyperlipidemia, CAD      Review of Systems     Review of Systems   Constitutional:  Negative for fatigue and fever.   HENT:  Positive for congestion, ear pain and sore throat. Negative for ear discharge.    Respiratory:  Negative for cough and shortness of breath.    Cardiovascular:  Positive for leg swelling. Negative for chest pain.   Gastrointestinal:  Positive for constipation. Negative for abdominal pain, diarrhea and vomiting.   Endocrine: Negative for polydipsia and polyuria.   Musculoskeletal:  Positive for arthralgias, gait problem (uses walker) and neck pain (extends to right shoulder).   Skin:  Positive for wound (let foot ulcer).   Neurological:  Negative for dizziness, weakness and headaches.   Psychiatric/Behavioral:  Negative for  agitation and dysphoric mood.      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  reports that she has never smoked. She has never used smokeless tobacco. She reports that she does not drink alcohol and does not use drugs.    Family History  Ms.'s family history includes Diabetes in her father and mother; Hearing loss in her father and mother; Heart disease in her father and mother; Hyperlipidemia in her father and mother.    Medications and Allergies     Medications  Outpatient Medications Marked as Taking for the 6/8/23 encounter (Office Visit) with CHAPARRITA Bella   Medication Sig Dispense Refill    amLODIPine (NORVASC) 10 MG tablet TAKE ONE TABLET BY MOUTH ONCE DAILY 90 tablet 1    aspirin (ECOTRIN) 81 MG EC tablet Take 81 mg by mouth once daily.      atorvastatin (LIPITOR) 40 MG tablet Take 1 tablet (40 mg total) by mouth once daily. 90 tablet 1    blood sugar diagnostic (TRUE METRIX GLUCOSE TEST STRIP) Strp Inject 1 strip into the skin 3 (three) times daily before meals. 400 strip 2    blood-glucose meter (TRUE METRIX GLUCOSE METER) kit 1 each by Other route 3 (three) times daily before meals. 1 each 0    bumetanide (BUMEX) 2 MG tablet Take 1 tablet (2 mg total) by mouth once daily. 30 tablet 11    carvediloL (COREG) 25 MG tablet TAKE ONE TABLET BY MOUTH TWICE DAILY 180 tablet 1    cholecalciferol, vitamin D3, (VITAMIN D3) 25 mcg (1,000 unit) capsule Take 1,000 Units by mouth.      cinacalcet (SENSIPAR) 30 MG Tab  "Take 1 tablet (30 mg total) by mouth once daily. 30 tablet 11    Lactobacillus acidophilus (PROBIOTIC ACIDOPHILUS ORAL) Take 4 Billion Cells by mouth.      lancets (TRUEPLUS LANCETS) 33 gauge Misc Inject 1 lancet into the skin 3 (three) times daily before meals. 400 each 2    linaCLOtide (LINZESS) 72 mcg Cap capsule Take 1 capsule (72 mcg total) by mouth before breakfast. 90 capsule 1    losartan (COZAAR) 25 MG tablet Take 25 mg by mouth once daily.      mirabegron (MYRBETRIQ) 25 mg Tb24 ER tablet Take 25 mg by mouth once daily.      mycophenolate (MYFORTIC) 180 MG TbEC Take 180 mg by mouth 2 (two) times daily.      NOVOLOG FLEXPEN U-100 INSULIN 100 unit/mL (3 mL) InPn pen INJECT FIVE UNITS into THE SKIN THREE TIMES DAILY WITH MEALS 15 mL 1    olmesartan (BENICAR) 20 MG tablet TAKE ONE TABLET BY MOUTH EVERY DAY 90 tablet 0    pantoprazole (PROTONIX) 40 MG tablet Take 1 tablet (40 mg total) by mouth 2 (two) times daily. 180 tablet 3    pen needle, diabetic 31 gauge x 3/16" Ndle 1 each by NOT APPLICABLE route.      pen needle, diabetic 31 gauge x 3/16" Ndle 1 each by NOT APPLICABLE route.      potassium chloride (MICRO-K) 10 MEQ CpSR Take 1 capsule (10 mEq total) by mouth once daily. for 180 doses 90 capsule 1    predniSONE (DELTASONE) 5 MG tablet Take 5 mg by mouth once daily.      sodium bicarbonate 650 MG tablet Take 1,300 mg by mouth.      tacrolimus (PROGRAF) 1 MG Cap 1 capsule once daily.      tamsulosin (FLOMAX) 0.4 mg Cap       torsemide (DEMADEX) 20 MG Tab Take 1 tablet by mouth once daily.      TRESIBA FLEXTOUCH U-100 100 unit/mL (3 mL) insulin pen Inject 24 Units into the skin once daily. 3 pen 1    triprolidine-pseudoephedrine (APRODINE) 2.5-60 mg Tab Take 1 tablet by mouth every 6 (six) hours as needed.      [DISCONTINUED] tiZANidine (ZANAFLEX) 4 MG tablet Take 1 tablet (4 mg total) by mouth every 6 (six) hours as needed (neck pain). 24 tablet 0       Allergies  Review of patient's allergies indicates: " "  Allergen Reactions    Hydrocodone-acetaminophen Rash    Gabapentin Other (See Comments)     Made her disoriented  "out of my head"      Morphine Itching    Opioids - morphine analogues        Physical Examination     Vitals:    06/08/23 1458   BP: 120/82   Pulse: 64   Resp: 20   Temp: 97.7 °F (36.5 °C)     Physical Exam  Constitutional:       General: She is not in acute distress.  HENT:      Head: Normocephalic.      Ears:      Comments: Redness to bilat TM; right greater than left     Nose: Congestion present.      Mouth/Throat:      Mouth: Mucous membranes are moist.      Pharynx: Posterior oropharyngeal erythema present.   Eyes:      Extraocular Movements: Extraocular movements intact.   Cardiovascular:      Rate and Rhythm: Normal rate.   Pulmonary:      Effort: Pulmonary effort is normal. No respiratory distress.   Abdominal:      General: Bowel sounds are normal.      Palpations: Abdomen is soft.      Tenderness: There is no abdominal tenderness.   Musculoskeletal:         General: Tenderness (right shoulder) present. No swelling or signs of injury. Normal range of motion.      Cervical back: Neck supple.   Skin:     General: Skin is warm.   Neurological:      Mental Status: She is alert and oriented to person, place, and time.   Psychiatric:         Behavior: Behavior normal.         Imaging / Labs     No visits with results within 1 Day(s) from this visit.   Latest known visit with results is:   Office Visit on 04/13/2023   Component Date Value Ref Range Status    Triglycerides 04/13/2023 39  35 - 150 mg/dL Final    Cholesterol 04/13/2023 101  0 - 200 mg/dL Final    HDL Cholesterol 04/13/2023 47  40 - 60 mg/dL Final    Cholesterol/HDL Ratio (Risk Factor) 04/13/2023 2.1   Final    Non-HDL 04/13/2023 54  mg/dL Final    LDL Calculated 04/13/2023 46  mg/dL Final    LDL/HDL 04/13/2023 1.0   Final    VLDL 04/13/2023 8  mg/dL Final    Sodium 04/13/2023 139  136 - 145 mmol/L Final    Potassium 04/13/2023 4.4  3.5 " - 5.1 mmol/L Final    Chloride 04/13/2023 105  98 - 107 mmol/L Final    CO2 04/13/2023 24  21 - 32 mmol/L Final    Anion Gap 04/13/2023 14  7 - 16 mmol/L Final    Glucose 04/13/2023 238 (H)  74 - 106 mg/dL Final    BUN 04/13/2023 69 (H)  7 - 18 mg/dL Final    Creatinine 04/13/2023 2.69 (H)  0.55 - 1.02 mg/dL Final    BUN/Creatinine Ratio 04/13/2023 26 (H)  6 - 20 Final    Calcium 04/13/2023 8.8  8.5 - 10.1 mg/dL Final    Total Protein 04/13/2023 6.8  6.4 - 8.2 g/dL Final    Albumin 04/13/2023 3.4 (L)  3.5 - 5.0 g/dL Final    Globulin 04/13/2023 3.4  2.0 - 4.0 g/dL Final    A/G Ratio 04/13/2023 1.0   Final    Bilirubin, Total 04/13/2023 0.3  >0.0 - 1.2 mg/dL Final    Alk Phos 04/13/2023 65  55 - 142 U/L Final    ALT 04/13/2023 30  13 - 56 U/L Final    AST 04/13/2023 27  15 - 37 U/L Final    eGFR 04/13/2023 18 (L)  >=60 mL/min/1.73m² Final    Hemoglobin A1C 04/13/2023 7.6 (H)  4.5 - 6.6 % Final    Estimated Average Glucose 04/13/2023 167  mg/dL Final     X-Ray Chest PA And Lateral  Narrative: EXAMINATION:  XR CHEST PA AND LATERAL    CLINICAL HISTORY:  Shortness of breath    COMPARISON:  03/08/2017, 03/20/2018, and 11/10/2022    FINDINGS:  PA and lateral chest:    There is cardiomegaly.  Bilateral mixed interstitial and alveolar infiltrates are seen in the lower lobes and to some extent in the right upper lobe.  There is thickening of the fissural lines with an overall appearance most suggestive of pulmonary edema.  The appearance has improved when compared to the 11/10/2022 examination, however.  No pleural effusions are seen at this time.    There are degenerative changes in the thoracic spine.  Impression: There is cardiomegaly with bibasilar pulmonary edema likely representing CHF.  The findings have significantly improved when compared to the most recent exam.    [facility]    Electronically signed by: Hansa Key  Date:    01/10/2023  Time:    09:21      Assessment and Plan  (including Health Maintenance)      Problem List  Smart Sets  Document Outside HM   :    Health Maintenance Due   Topic Date Due    Mammogram  09/09/2022    Eye Exam  06/15/2023       Problem List Items Addressed This Visit          ENT    Right otitis media    Current Assessment & Plan     Cortisporin gtts  zithromax po          Relevant Medications    neomycin-polymyxin-hydrocortisone (CORTISPORIN) 3.5-10,000-1 mg/mL-unit/mL-% otic suspension    azithromycin (Z-BRENDAN) 250 MG tablet       Orthopedic    Neck pain on right side - Primary    Current Assessment & Plan     Continues to complain of neck pain  Reports she was seen in [place] ER last and had x-ray done  states x-ray was okay  She is wearing lidocaine patch to neck and states it does help some  Was told to follow up here today  Will refill lidocaine patches for her  Change zanaflex to robaxin TID  Discussed side effects; may cause drowsiness. Voices understanding  Rest.   Will get HH to refer for PT @ home          Relevant Medications    LIDOcaine (LIDODERM) 5 %    methocarbamoL (ROBAXIN) 500 MG Tab       Health Maintenance Topics with due status: Not Due       Topic Last Completion Date    TETANUS VACCINE 10/16/2017    DEXA Scan 09/09/2021    Colorectal Cancer Screening 08/22/2022    Foot Exam 11/29/2022    Diabetes Urine Screening 01/10/2023    Lipid Panel 04/13/2023    Hemoglobin A1c 04/13/2023    High Dose Statin 06/08/2023       Future Appointments   Date Time Provider Department Center   10/12/2023  8:40 AM [facility]APARR[facility] [address]   10/13/2023 10:00 AM [facility]          Signature:  CHAPARRITA Bella [facility] [address] [place] [address]  [phone]    Date of encounter: 6/8/23

## 2023-06-09 DIAGNOSIS — Z71.89 COMPLEX CARE COORDINATION: ICD-10-CM

## 2023-06-12 ENCOUNTER — OFFICE VISIT (OUTPATIENT)
Dept: FAMILY MEDICINE | Facility: CLINIC | Age: 73
End: 2023-06-12
Payer: MEDICARE

## 2023-06-12 VITALS
RESPIRATION RATE: 20 BRPM | DIASTOLIC BLOOD PRESSURE: 82 MMHG | WEIGHT: 168.88 LBS | HEART RATE: 64 BPM | SYSTOLIC BLOOD PRESSURE: 130 MMHG | TEMPERATURE: 97 F | BODY MASS INDEX: 28.83 KG/M2 | HEIGHT: 64 IN | OXYGEN SATURATION: 99 %

## 2023-06-12 DIAGNOSIS — R93.89 ABNORMAL CT OF THE CHEST: ICD-10-CM

## 2023-06-12 DIAGNOSIS — M54.2 NECK PAIN ON RIGHT SIDE: ICD-10-CM

## 2023-06-12 DIAGNOSIS — R91.8 PULMONARY NODULES: ICD-10-CM

## 2023-06-12 DIAGNOSIS — B02.8 HERPES ZOSTER WITH OTHER COMPLICATION: Primary | ICD-10-CM

## 2023-06-12 PROBLEM — B02.9 SHINGLES: Status: ACTIVE | Noted: 2023-06-12

## 2023-06-12 PROCEDURE — 96372 THER/PROPH/DIAG INJ SC/IM: CPT | Mod: ,,, | Performed by: NURSE PRACTITIONER

## 2023-06-12 PROCEDURE — 3079F DIAST BP 80-89 MM HG: CPT | Mod: ,,, | Performed by: NURSE PRACTITIONER

## 2023-06-12 PROCEDURE — 3051F PR MOST RECENT HEMOGLOBIN A1C LEVEL 7.0 - < 8.0%: ICD-10-PCS | Mod: ,,, | Performed by: NURSE PRACTITIONER

## 2023-06-12 PROCEDURE — 3051F HG A1C>EQUAL 7.0%<8.0%: CPT | Mod: ,,, | Performed by: NURSE PRACTITIONER

## 2023-06-12 PROCEDURE — 3060F POS MICROALBUMINURIA REV: CPT | Mod: ,,, | Performed by: NURSE PRACTITIONER

## 2023-06-12 PROCEDURE — 3066F NEPHROPATHY DOC TX: CPT | Mod: ,,, | Performed by: NURSE PRACTITIONER

## 2023-06-12 PROCEDURE — 1125F AMNT PAIN NOTED PAIN PRSNT: CPT | Mod: ,,, | Performed by: NURSE PRACTITIONER

## 2023-06-12 PROCEDURE — 1101F PR PT FALLS ASSESS DOC 0-1 FALLS W/OUT INJ PAST YR: ICD-10-PCS | Mod: ,,, | Performed by: NURSE PRACTITIONER

## 2023-06-12 PROCEDURE — 99214 OFFICE O/P EST MOD 30 MIN: CPT | Mod: 25,,, | Performed by: NURSE PRACTITIONER

## 2023-06-12 PROCEDURE — 1159F PR MEDICATION LIST DOCUMENTED IN MEDICAL RECORD: ICD-10-PCS | Mod: ,,, | Performed by: NURSE PRACTITIONER

## 2023-06-12 PROCEDURE — 3075F SYST BP GE 130 - 139MM HG: CPT | Mod: ,,, | Performed by: NURSE PRACTITIONER

## 2023-06-12 PROCEDURE — 3008F PR BODY MASS INDEX (BMI) DOCUMENTED: ICD-10-PCS | Mod: ,,, | Performed by: NURSE PRACTITIONER

## 2023-06-12 PROCEDURE — 96372 PR INJECTION,THERAP/PROPH/DIAG2ST, IM OR SUBCUT: ICD-10-PCS | Mod: ,,, | Performed by: NURSE PRACTITIONER

## 2023-06-12 PROCEDURE — 3075F PR MOST RECENT SYSTOLIC BLOOD PRESS GE 130-139MM HG: ICD-10-PCS | Mod: ,,, | Performed by: NURSE PRACTITIONER

## 2023-06-12 PROCEDURE — 3066F PR DOCUMENTATION OF TREATMENT FOR NEPHROPATHY: ICD-10-PCS | Mod: ,,, | Performed by: NURSE PRACTITIONER

## 2023-06-12 PROCEDURE — 3288F PR FALLS RISK ASSESSMENT DOCUMENTED: ICD-10-PCS | Mod: ,,, | Performed by: NURSE PRACTITIONER

## 2023-06-12 PROCEDURE — 1160F RVW MEDS BY RX/DR IN RCRD: CPT | Mod: ,,, | Performed by: NURSE PRACTITIONER

## 2023-06-12 PROCEDURE — 1101F PT FALLS ASSESS-DOCD LE1/YR: CPT | Mod: ,,, | Performed by: NURSE PRACTITIONER

## 2023-06-12 PROCEDURE — 3008F BODY MASS INDEX DOCD: CPT | Mod: ,,, | Performed by: NURSE PRACTITIONER

## 2023-06-12 PROCEDURE — 3288F FALL RISK ASSESSMENT DOCD: CPT | Mod: ,,, | Performed by: NURSE PRACTITIONER

## 2023-06-12 PROCEDURE — 3079F PR MOST RECENT DIASTOLIC BLOOD PRESSURE 80-89 MM HG: ICD-10-PCS | Mod: ,,, | Performed by: NURSE PRACTITIONER

## 2023-06-12 PROCEDURE — 1125F PR PAIN SEVERITY QUANTIFIED, PAIN PRESENT: ICD-10-PCS | Mod: ,,, | Performed by: NURSE PRACTITIONER

## 2023-06-12 PROCEDURE — 99214 PR OFFICE/OUTPT VISIT, EST, LEVL IV, 30-39 MIN: ICD-10-PCS | Mod: 25,,, | Performed by: NURSE PRACTITIONER

## 2023-06-12 PROCEDURE — 3060F PR POS MICROALBUMINURIA RESULT DOCUMENTED/REVIEW: ICD-10-PCS | Mod: ,,, | Performed by: NURSE PRACTITIONER

## 2023-06-12 PROCEDURE — 1160F PR REVIEW ALL MEDS BY PRESCRIBER/CLIN PHARMACIST DOCUMENTED: ICD-10-PCS | Mod: ,,, | Performed by: NURSE PRACTITIONER

## 2023-06-12 PROCEDURE — 1159F MED LIST DOCD IN RCRD: CPT | Mod: ,,, | Performed by: NURSE PRACTITIONER

## 2023-06-12 RX ORDER — ACYCLOVIR 800 MG/1
800 TABLET ORAL 2 TIMES DAILY
Qty: 60 TABLET | Refills: 1 | Status: SHIPPED | OUTPATIENT
Start: 2023-06-12 | End: 2023-08-07

## 2023-06-12 RX ORDER — DULOXETIN HYDROCHLORIDE 30 MG/1
30 CAPSULE, DELAYED RELEASE ORAL 2 TIMES DAILY
Qty: 30 CAPSULE | Refills: 0 | Status: CANCELLED | OUTPATIENT
Start: 2023-06-12 | End: 2024-06-11

## 2023-06-12 RX ORDER — METHYLPREDNISOLONE ACETATE 40 MG/ML
40 INJECTION, SUSPENSION INTRA-ARTICULAR; INTRALESIONAL; INTRAMUSCULAR; SOFT TISSUE
Status: COMPLETED | OUTPATIENT
Start: 2023-06-12 | End: 2023-06-12

## 2023-06-12 RX ORDER — ROSUVASTATIN CALCIUM 40 MG/1
40 TABLET, COATED ORAL
COMMUNITY
Start: 2023-06-09 | End: 2023-10-17

## 2023-06-12 RX ADMIN — METHYLPREDNISOLONE ACETATE 40 MG: 40 INJECTION, SUSPENSION INTRA-ARTICULAR; INTRALESIONAL; INTRAMUSCULAR; SOFT TISSUE at 04:06

## 2023-06-12 NOTE — ASSESSMENT & PLAN NOTE
Zovirax po BID  She is limited on what she can take due to allergies and decreased renal function  Has allergy to gabapentin

## 2023-06-12 NOTE — ASSESSMENT & PLAN NOTE
Requests injection for pain today  She is limited on meds she can be given related to renal function and allerties  Last A1C around 7.8  depomedrol 40 mg given IM

## 2023-06-12 NOTE — ASSESSMENT & PLAN NOTE
X-ray and chest CT done @ [facility] on 06/08/23 indicates pulmonary nodules with concerns of malignancy    Discussed results with her if office today  Will refer to pulmonology

## 2023-06-12 NOTE — PROGRESS NOTES
CHAPARRITA Bella   [facility] [address] [place] [address]  [phone]      PATIENT NAME: Nasrin Grant  : 1950  DATE: 23  MRN: [id number]      Billing Provider: CHAPARRITA Bella  Level of Service: [place] OFFICE/OUTPT VISIT, EST, LEVL IV, 30-39 MIN  Patient PCP Information       Provider PCP Type    CHAPARRITA Bella General            Reason for Visit / Chief Complaint: Shoulder Pain (Pt was seen  and  for R shoulder pain that radiates up neck. / pt was changed from zanaflex to robaxin and HH referral for PT was placed. /Pt states HH has not come out yet. /States nothing has helped with her shoulder pain. ), Rash (Pt has rash on R neck and shoulder. /States it is painful and itches. /States she has had shingles in the past and it has felt like this. /X 4 days), and Headache (States she is having a stabbing H/A on R side. )       Update PCP  Update Chief Complaint         History of Present Illness / Problem Focused Workflow     73 year old female continuing to complain of neck pain and now has rash to right shoulder and chest area  States she has had little relief with muscle relaxer's and requests injection  Also received chest x-ray and CT from [facility] today; indicates pulmonary nodules  Herpetic rash to right shoulder and right chest area    Hx of DM, renal transplant, hypertension, hyperlipidemia, CAD      Review of Systems     Review of Systems   Constitutional:  Negative for fatigue and fever.   HENT:  Negative for congestion.    Respiratory:  Negative for cough and shortness of breath.    Cardiovascular:  Positive for leg swelling. Negative for chest pain.   Gastrointestinal:  Positive for constipation. Negative for abdominal pain, diarrhea and vomiting.   Endocrine: Negative for polydipsia and polyuria.   Musculoskeletal:  Positive for arthralgias, gait problem (uses walker) and neck pain (extends to right shoulder).    Skin:  Positive for rash (rash to right shoulder and chest area).   Neurological:  Negative for dizziness, weakness and headaches.   Psychiatric/Behavioral:  Negative for agitation and dysphoric mood.      Medical / Social / Family History     Past Medical History:   Diagnosis Date    Atherosclerotic heart disease of native coronary artery with angina pectoris 2020    Depression     Diabetes mellitus, type 2     Disorder of kidney and ureter     Hyperlipidemia     Hypertension     Kidney transplant status 2020    Osteoporosis 2020    Stroke        Past Surgical History:   Procedure Laterality Date    EXTRACTION OF TOOTH Left 2021    HYSTERECTOMY      kidney transplant 2016      TOE AMPUTATION         Social History  Ms.  reports that she has never smoked. She has never used smokeless tobacco. She reports that she does not drink alcohol and does not use drugs.    Family History  Ms.'s family history includes Diabetes in her father and mother; Hearing loss in her father and mother; Heart disease in her father and mother; Hyperlipidemia in her father and mother.    Medications and Allergies     Medications  Outpatient Medications Marked as Taking for the 23 encounter (Office Visit) with CHAPARRITA Bella   Medication Sig Dispense Refill    amLODIPine (NORVASC) 10 MG tablet TAKE ONE TABLET BY MOUTH ONCE DAILY 90 tablet 1    aspirin (ECOTRIN) 81 MG EC tablet Take 81 mg by mouth once daily.      [] azithromycin (Z-BRENDAN) 250 MG tablet Take 2 tablets by mouth on day 1; Take 1 tablet by mouth on days 2-5 6 tablet 0    blood sugar diagnostic (TRUE METRIX GLUCOSE TEST STRIP) Strp Inject 1 strip into the skin 3 (three) times daily before meals. 400 strip 2    blood-glucose meter (TRUE METRIX GLUCOSE METER) kit 1 each by Other route 3 (three) times daily before meals. 1 each 0    bumetanide (BUMEX) 2 MG tablet Take 1 tablet (2 mg total) by mouth once daily. 30 tablet 11    carvediloL (COREG)  "25 MG tablet TAKE ONE TABLET BY MOUTH TWICE DAILY 180 tablet 1    cholecalciferol, vitamin D3, (VITAMIN D3) 25 mcg (1,000 unit) capsule Take 1,000 Units by mouth.      cinacalcet (SENSIPAR) 30 MG Tab Take 1 tablet (30 mg total) by mouth once daily. 30 tablet 11    Lactobacillus acidophilus (PROBIOTIC ACIDOPHILUS ORAL) Take 4 Billion Cells by mouth.      lancets (TRUEPLUS LANCETS) 33 gauge Misc Inject 1 lancet into the skin 3 (three) times daily before meals. 400 each 2    LIDOcaine (LIDODERM) 5 % Place 2 patches onto the skin once daily. Remove & Discard patch within 12 hours or as directed by MD 30 patch 1    linaCLOtide (LINZESS) 72 mcg Cap capsule Take 1 capsule (72 mcg total) by mouth before breakfast. 90 capsule 1    losartan (COZAAR) 25 MG tablet Take 25 mg by mouth once daily.      methocarbamoL (ROBAXIN) 500 MG Tab Take 1 tablet (500 mg total) by mouth 3 (three) times daily. for 10 days 30 tablet 0    mirabegron (MYRBETRIQ) 25 mg Tb24 ER tablet Take 25 mg by mouth once daily.      mycophenolate (MYFORTIC) 180 MG TbEC Take 180 mg by mouth 2 (two) times daily.      neomycin-polymyxin-hydrocortisone (CORTISPORIN) 3.5-10,000-1 mg/mL-unit/mL-% otic suspension Place 3 drops into both ears 3 (three) times daily. 10 mL 0    NOVOLOG FLEXPEN U-100 INSULIN 100 unit/mL (3 mL) InPn pen INJECT FIVE UNITS into THE SKIN THREE TIMES DAILY WITH MEALS 15 mL 1    olmesartan (BENICAR) 20 MG tablet TAKE ONE TABLET BY MOUTH EVERY DAY 90 tablet 0    pantoprazole (PROTONIX) 40 MG tablet Take 1 tablet (40 mg total) by mouth 2 (two) times daily. 180 tablet 3    pen needle, diabetic 31 gauge x 3/16" Ndle 1 each by NOT APPLICABLE route.      pen needle, diabetic 31 gauge x 3/16" Ndle 1 each by NOT APPLICABLE route.      potassium chloride (MICRO-K) 10 MEQ CpSR Take 1 capsule (10 mEq total) by mouth once daily. for 180 doses 90 capsule 1    predniSONE (DELTASONE) 5 MG tablet Take 5 mg by mouth once daily.      rosuvastatin (CRESTOR) 40 " "MG Tab Take 40 mg by mouth.      sodium bicarbonate 650 MG tablet Take 1,300 mg by mouth.      tacrolimus (PROGRAF) 1 MG Cap 1 capsule once daily.      tamsulosin (FLOMAX) 0.4 mg Cap       torsemide (DEMADEX) 20 MG Tab Take 1 tablet by mouth once daily.      TRESIBA FLEXTOUCH U-100 100 unit/mL (3 mL) insulin pen Inject 24 Units into the skin once daily. 3 pen 1    triprolidine-pseudoephedrine (APRODINE) 2.5-60 mg Tab Take 1 tablet by mouth every 6 (six) hours as needed.         Allergies  Review of patient's allergies indicates:   Allergen Reactions    Hydrocodone-acetaminophen Rash    Gabapentin Other (See Comments)     Made her disoriented  "out of my head"      Morphine Itching    Opioids - morphine analogues        Physical Examination     Vitals:    06/12/23 1500   BP: 130/82   Pulse: 64   Resp: 20   Temp: 97.1 °F (36.2 °C)     Physical Exam  Constitutional:       General: She is not in acute distress.  HENT:      Head: Normocephalic.      Nose: Nose normal.      Mouth/Throat:      Mouth: Mucous membranes are moist.   Eyes:      Extraocular Movements: Extraocular movements intact.   Cardiovascular:      Rate and Rhythm: Normal rate.   Pulmonary:      Effort: Pulmonary effort is normal. No respiratory distress.   Abdominal:      General: Bowel sounds are normal.      Palpations: Abdomen is soft.      Tenderness: There is no abdominal tenderness.   Musculoskeletal:         General: Tenderness (right shoulder) present. No swelling or signs of injury. Normal range of motion.      Cervical back: Neck supple.   Skin:     General: Skin is warm.      Findings: Rash (shingles rash right shoulder and chest) present.   Neurological:      Mental Status: She is alert and oriented to person, place, and time.   Psychiatric:         Behavior: Behavior normal.         Imaging / Labs     No visits with results within 1 Day(s) from this visit.   Latest known visit with results is:   Office Visit on 04/13/2023   Component Date " Value Ref Range Status    Triglycerides 04/13/2023 39  35 - 150 mg/dL Final    Cholesterol 04/13/2023 101  0 - 200 mg/dL Final    HDL Cholesterol 04/13/2023 47  40 - 60 mg/dL Final    Cholesterol/HDL Ratio (Risk Factor) 04/13/2023 2.1   Final    Non-HDL 04/13/2023 54  mg/dL Final    LDL Calculated 04/13/2023 46  mg/dL Final    LDL/HDL 04/13/2023 1.0   Final    VLDL 04/13/2023 8  mg/dL Final    Sodium 04/13/2023 139  136 - 145 mmol/L Final    Potassium 04/13/2023 4.4  3.5 - 5.1 mmol/L Final    Chloride 04/13/2023 105  98 - 107 mmol/L Final    CO2 04/13/2023 24  21 - 32 mmol/L Final    Anion Gap 04/13/2023 14  7 - 16 mmol/L Final    Glucose 04/13/2023 238 (H)  74 - 106 mg/dL Final    BUN 04/13/2023 69 (H)  7 - 18 mg/dL Final    Creatinine 04/13/2023 2.69 (H)  0.55 - 1.02 mg/dL Final    BUN/Creatinine Ratio 04/13/2023 26 (H)  6 - 20 Final    Calcium 04/13/2023 8.8  8.5 - 10.1 mg/dL Final    Total Protein 04/13/2023 6.8  6.4 - 8.2 g/dL Final    Albumin 04/13/2023 3.4 (L)  3.5 - 5.0 g/dL Final    Globulin 04/13/2023 3.4  2.0 - 4.0 g/dL Final    A/G Ratio 04/13/2023 1.0   Final    Bilirubin, Total 04/13/2023 0.3  >0.0 - 1.2 mg/dL Final    Alk Phos 04/13/2023 65  55 - 142 U/L Final    ALT 04/13/2023 30  13 - 56 U/L Final    AST 04/13/2023 27  15 - 37 U/L Final    eGFR 04/13/2023 18 (L)  >=60 mL/min/1.73m² Final    Hemoglobin A1C 04/13/2023 7.6 (H)  4.5 - 6.6 % Final    Estimated Average Glucose 04/13/2023 167  mg/dL Final     X-Ray Chest PA And Lateral  Narrative: EXAMINATION:  XR CHEST PA AND LATERAL    CLINICAL HISTORY:  Shortness of breath    COMPARISON:  03/08/2017, 03/20/2018, and 11/10/2022    FINDINGS:  PA and lateral chest:    There is cardiomegaly.  Bilateral mixed interstitial and alveolar infiltrates are seen in the lower lobes and to some extent in the right upper lobe.  There is thickening of the fissural lines with an overall appearance most suggestive of pulmonary edema.  The appearance has improved when  compared to the 11/10/2022 examination, however.  No pleural effusions are seen at this time.    There are degenerative changes in the thoracic spine.  Impression: There is cardiomegaly with bibasilar pulmonary edema likely representing CHF.  The findings have significantly improved when compared to the most recent exam.    Place of service: [facility]    Electronically signed by: Hansa Key  Date:    01/10/2023  Time:    09:21      Assessment and Plan (including Health Maintenance)      Problem List  Smart Sets  Document Outside HM   :    Health Maintenance Due   Topic Date Due    Mammogram  09/09/2022    Eye Exam  06/15/2023       Problem List Items Addressed This Visit          Pulmonary    Pulmonary nodules    Current Assessment & Plan     X-ray and chest CT done @ [facility] on 06/08/23 indicates pulmonary nodules with concerns of malignancy    Discussed results with her if office today  Will refer to pulmonology          Relevant Orders    Ambulatory referral/consult to Pulmonology       ID    Shingles - Primary    Current Assessment & Plan     Zovirax po BID  She is limited on what she can take due to allergies and decreased renal function  Has allergy to gabapentin           Relevant Medications    acyclovir (ZOVIRAX) 800 MG Tab       Orthopedic    Neck pain on right side    Current Assessment & Plan     Requests injection for pain today  She is limited on meds she can be given related to renal function and allerties  Last A1C around 7.8  depomedrol 40 mg given IM            Other    Abnormal CT of the chest    Current Assessment & Plan     Referral for pulmonology          Relevant Orders    Ambulatory referral/consult to Pulmonology       Health Maintenance Topics with due status: Not Due       Topic Last Completion Date    TETANUS VACCINE 10/16/2017    DEXA Scan 09/09/2021    Colorectal Cancer Screening 08/22/2022    Foot Exam 11/29/2022    Diabetes Urine Screening 01/10/2023    Lipid  Panel 04/13/2023    Hemoglobin A1c 04/13/2023    High Dose Statin 06/12/2023       Future Appointments   Date Time Provider Department Center   6/22/2023  1:40 PM CHAPARRITA Bella   10/12/2023  8:40 AM CHAPARRITA Bella   10/13/2023 10:00 AM [facility]          Signature:  CHAPARRITA Bella [facility] [address] [place] [address]  [phone]    Date of encounter: 6/12/23

## 2023-06-15 ENCOUNTER — DOCUMENT SCAN (OUTPATIENT)
Dept: HOME HEALTH SERVICES | Facility: HOSPITAL | Age: 73
End: 2023-06-15
Payer: MEDICARE

## 2023-06-15 PROBLEM — R93.89 ABNORMAL CT OF THE CHEST: Status: ACTIVE | Noted: 2023-06-15

## 2023-06-22 ENCOUNTER — OFFICE VISIT (OUTPATIENT)
Dept: FAMILY MEDICINE | Facility: CLINIC | Age: 73
End: 2023-06-22
Payer: MEDICARE

## 2023-06-22 ENCOUNTER — EXTERNAL HOME HEALTH (OUTPATIENT)
Dept: HOME HEALTH SERVICES | Facility: HOSPITAL | Age: 73
End: 2023-06-22
Payer: MEDICARE

## 2023-06-22 ENCOUNTER — DOCUMENT SCAN (OUTPATIENT)
Dept: HOME HEALTH SERVICES | Facility: HOSPITAL | Age: 73
End: 2023-06-22
Payer: MEDICARE

## 2023-06-22 VITALS
RESPIRATION RATE: 18 BRPM | OXYGEN SATURATION: 95 % | WEIGHT: 167.38 LBS | DIASTOLIC BLOOD PRESSURE: 82 MMHG | HEIGHT: 64 IN | HEART RATE: 82 BPM | SYSTOLIC BLOOD PRESSURE: 126 MMHG | BODY MASS INDEX: 28.57 KG/M2 | TEMPERATURE: 97 F

## 2023-06-22 DIAGNOSIS — B02.8 HERPES ZOSTER WITH OTHER COMPLICATION: Primary | ICD-10-CM

## 2023-06-22 DIAGNOSIS — M54.2 NECK PAIN ON RIGHT SIDE: ICD-10-CM

## 2023-06-22 PROCEDURE — 1126F AMNT PAIN NOTED NONE PRSNT: CPT | Mod: ,,, | Performed by: NURSE PRACTITIONER

## 2023-06-22 PROCEDURE — 3288F PR FALLS RISK ASSESSMENT DOCUMENTED: ICD-10-PCS | Mod: ,,, | Performed by: NURSE PRACTITIONER

## 2023-06-22 PROCEDURE — 3066F PR DOCUMENTATION OF TREATMENT FOR NEPHROPATHY: ICD-10-PCS | Mod: ,,, | Performed by: NURSE PRACTITIONER

## 2023-06-22 PROCEDURE — 3051F HG A1C>EQUAL 7.0%<8.0%: CPT | Mod: ,,, | Performed by: NURSE PRACTITIONER

## 2023-06-22 PROCEDURE — 3074F SYST BP LT 130 MM HG: CPT | Mod: ,,, | Performed by: NURSE PRACTITIONER

## 2023-06-22 PROCEDURE — 3060F POS MICROALBUMINURIA REV: CPT | Mod: ,,, | Performed by: NURSE PRACTITIONER

## 2023-06-22 PROCEDURE — 1160F RVW MEDS BY RX/DR IN RCRD: CPT | Mod: ,,, | Performed by: NURSE PRACTITIONER

## 2023-06-22 PROCEDURE — 1159F PR MEDICATION LIST DOCUMENTED IN MEDICAL RECORD: ICD-10-PCS | Mod: ,,, | Performed by: NURSE PRACTITIONER

## 2023-06-22 PROCEDURE — 3079F PR MOST RECENT DIASTOLIC BLOOD PRESSURE 80-89 MM HG: ICD-10-PCS | Mod: ,,, | Performed by: NURSE PRACTITIONER

## 2023-06-22 PROCEDURE — 1126F PR PAIN SEVERITY QUANTIFIED, NO PAIN PRESENT: ICD-10-PCS | Mod: ,,, | Performed by: NURSE PRACTITIONER

## 2023-06-22 PROCEDURE — 3008F PR BODY MASS INDEX (BMI) DOCUMENTED: ICD-10-PCS | Mod: ,,, | Performed by: NURSE PRACTITIONER

## 2023-06-22 PROCEDURE — 1160F PR REVIEW ALL MEDS BY PRESCRIBER/CLIN PHARMACIST DOCUMENTED: ICD-10-PCS | Mod: ,,, | Performed by: NURSE PRACTITIONER

## 2023-06-22 PROCEDURE — 3008F BODY MASS INDEX DOCD: CPT | Mod: ,,, | Performed by: NURSE PRACTITIONER

## 2023-06-22 PROCEDURE — 1159F MED LIST DOCD IN RCRD: CPT | Mod: ,,, | Performed by: NURSE PRACTITIONER

## 2023-06-22 PROCEDURE — 3060F PR POS MICROALBUMINURIA RESULT DOCUMENTED/REVIEW: ICD-10-PCS | Mod: ,,, | Performed by: NURSE PRACTITIONER

## 2023-06-22 PROCEDURE — 99213 OFFICE O/P EST LOW 20 MIN: CPT | Mod: ,,, | Performed by: NURSE PRACTITIONER

## 2023-06-22 PROCEDURE — 3074F PR MOST RECENT SYSTOLIC BLOOD PRESSURE < 130 MM HG: ICD-10-PCS | Mod: ,,, | Performed by: NURSE PRACTITIONER

## 2023-06-22 PROCEDURE — 99213 PR OFFICE/OUTPT VISIT, EST, LEVL III, 20-29 MIN: ICD-10-PCS | Mod: ,,, | Performed by: NURSE PRACTITIONER

## 2023-06-22 PROCEDURE — 3051F PR MOST RECENT HEMOGLOBIN A1C LEVEL 7.0 - < 8.0%: ICD-10-PCS | Mod: ,,, | Performed by: NURSE PRACTITIONER

## 2023-06-22 PROCEDURE — 3066F NEPHROPATHY DOC TX: CPT | Mod: ,,, | Performed by: NURSE PRACTITIONER

## 2023-06-22 PROCEDURE — 1101F PR PT FALLS ASSESS DOC 0-1 FALLS W/OUT INJ PAST YR: ICD-10-PCS | Mod: ,,, | Performed by: NURSE PRACTITIONER

## 2023-06-22 PROCEDURE — 3288F FALL RISK ASSESSMENT DOCD: CPT | Mod: ,,, | Performed by: NURSE PRACTITIONER

## 2023-06-22 PROCEDURE — 3079F DIAST BP 80-89 MM HG: CPT | Mod: ,,, | Performed by: NURSE PRACTITIONER

## 2023-06-22 PROCEDURE — 1101F PT FALLS ASSESS-DOCD LE1/YR: CPT | Mod: ,,, | Performed by: NURSE PRACTITIONER

## 2023-06-22 NOTE — ASSESSMENT & PLAN NOTE
Rash is almost completely resolved  She denies any pain  Reports having some itching to rash  She has follow up appt with Dr Grewal next week

## 2023-06-22 NOTE — PROGRESS NOTES
CHAPARRITA Bella   [facility] [address] [place] [address]  [phone]      PATIENT NAME: Nasrin Grant  : 1950  DATE: 23  MRN: [id number]      Billing Provider: CHAPARRITA Bella  Level of Service:   Patient PCP Information       Provider PCP Type    CHAPARRITA Bella General            Reason for Visit / Chief Complaint: Follow-up (10 day f/u for shingles outbreak) and Rash (Pt is following up on shingles flare up. /Rx'd 800mg acyclovir on /Pt states she is no longer experiencing any pain. /States rash is clearing up but still itches. /Pt is still taking acyclovir. )       Update PCP  Update Chief Complaint         History of Present Illness / Problem Focused Workflow     73 year old female presents for follow up on shingles  Reports pain has resolved  Rash is clearing, no pain to rash  States she does have some itching to area       Hx of DM, renal transplant, hypertension, hyperlipidemia, CAD      Review of Systems     Review of Systems   Constitutional:  Negative for fatigue and fever.   HENT:  Negative for congestion.    Respiratory:  Negative for cough and shortness of breath.    Cardiovascular:  Positive for leg swelling. Negative for chest pain.   Gastrointestinal:  Positive for constipation. Negative for abdominal pain, diarrhea and vomiting.   Endocrine: Negative for polydipsia and polyuria.   Musculoskeletal:  Positive for arthralgias and gait problem (uses walker). Negative for neck pain.   Skin:  Positive for rash.   Neurological:  Negative for dizziness, weakness and headaches.   Psychiatric/Behavioral:  Negative for agitation and dysphoric mood.      Medical / Social / Family History     Past Medical History:   Diagnosis Date    Atherosclerotic heart disease of native coronary artery with angina pectoris 2020    Depression     Diabetes mellitus, type 2     Disorder of kidney and ureter     Hyperlipidemia      Hypertension     Kidney transplant status 07/13/2020    Osteoporosis 07/22/2020    Stroke        Past Surgical History:   Procedure Laterality Date    EXTRACTION OF TOOTH Left 08/11/2021    HYSTERECTOMY      kidney transplant 2016      TOE AMPUTATION         Social History  Ms.  reports that she has never smoked. She has never used smokeless tobacco. She reports that she does not drink alcohol and does not use drugs.    Family History  Ms.'s family history includes Diabetes in her father and mother; Hearing loss in her father and mother; Heart disease in her father and mother; Hyperlipidemia in her father and mother.    Medications and Allergies     Medications  Outpatient Medications Marked as Taking for the 6/22/23 encounter (Office Visit) with CHAPARRITA Bella   Medication Sig Dispense Refill    acyclovir (ZOVIRAX) 800 MG Tab Take 1 tablet (800 mg total) by mouth 2 (two) times daily. 60 tablet 1    aspirin (ECOTRIN) 81 MG EC tablet Take 81 mg by mouth once daily.      blood sugar diagnostic (TRUE METRIX GLUCOSE TEST STRIP) Strp Inject 1 strip into the skin 3 (three) times daily before meals. 400 strip 2    blood-glucose meter (TRUE METRIX GLUCOSE METER) kit 1 each by Other route 3 (three) times daily before meals. 1 each 0    bumetanide (BUMEX) 2 MG tablet Take 1 tablet (2 mg total) by mouth once daily. 30 tablet 11    carvediloL (COREG) 25 MG tablet TAKE ONE TABLET BY MOUTH TWICE DAILY 180 tablet 1    cholecalciferol, vitamin D3, (VITAMIN D3) 25 mcg (1,000 unit) capsule Take 1,000 Units by mouth.      Lactobacillus acidophilus (PROBIOTIC ACIDOPHILUS ORAL) Take 4 Billion Cells by mouth.      lancets (TRUEPLUS LANCETS) 33 gauge Misc Inject 1 lancet into the skin 3 (three) times daily before meals. 400 each 2    LIDOcaine (LIDODERM) 5 % Place 2 patches onto the skin once daily. Remove & Discard patch within 12 hours or as directed by MD 30 patch 1    linaCLOtide (LINZESS) 72 mcg Cap capsule Take 1 capsule (72 mcg  "total) by mouth before breakfast. 90 capsule 1    losartan (COZAAR) 25 MG tablet Take 25 mg by mouth once daily.      mirabegron (MYRBETRIQ) 25 mg Tb24 ER tablet Take 25 mg by mouth once daily.      mycophenolate (MYFORTIC) 180 MG TbEC Take 180 mg by mouth 2 (two) times daily.      neomycin-polymyxin-hydrocortisone (CORTISPORIN) 3.5-10,000-1 mg/mL-unit/mL-% otic suspension Place 3 drops into both ears 3 (three) times daily. 10 mL 0    NOVOLOG FLEXPEN U-100 INSULIN 100 unit/mL (3 mL) InPn pen INJECT FIVE UNITS into THE SKIN THREE TIMES DAILY WITH MEALS 15 mL 1    olmesartan (BENICAR) 20 MG tablet TAKE ONE TABLET BY MOUTH EVERY DAY 90 tablet 0    pantoprazole (PROTONIX) 40 MG tablet Take 1 tablet (40 mg total) by mouth 2 (two) times daily. 180 tablet 3    pen needle, diabetic 31 gauge x 3/16" Ndle 1 each by NOT APPLICABLE route.      pen needle, diabetic 31 gauge x 3/16" Ndle 1 each by NOT APPLICABLE route.      potassium chloride (MICRO-K) 10 MEQ CpSR Take 1 capsule (10 mEq total) by mouth once daily. for 180 doses 90 capsule 1    predniSONE (DELTASONE) 5 MG tablet Take 5 mg by mouth once daily.      rosuvastatin (CRESTOR) 40 MG Tab Take 40 mg by mouth.      sodium bicarbonate 650 MG tablet Take 1,300 mg by mouth.      tacrolimus (PROGRAF) 1 MG Cap 1 capsule once daily.      tamsulosin (FLOMAX) 0.4 mg Cap       torsemide (DEMADEX) 20 MG Tab Take 1 tablet by mouth once daily.      TRESIBA FLEXTOUCH U-100 100 unit/mL (3 mL) insulin pen Inject 24 Units into the skin once daily. 3 pen 1    triprolidine-pseudoephedrine (APRODINE) 2.5-60 mg Tab Take 1 tablet by mouth every 6 (six) hours as needed.      [DISCONTINUED] amLODIPine (NORVASC) 10 MG tablet TAKE ONE TABLET BY MOUTH ONCE DAILY 90 tablet 1    [DISCONTINUED] cinacalcet (SENSIPAR) 30 MG Tab Take 1 tablet (30 mg total) by mouth once daily. 30 tablet 11       Allergies  Review of patient's allergies indicates:   Allergen Reactions    Hydrocodone-acetaminophen Rash    " "Gabapentin Other (See Comments)     Made her disoriented  "out of my head"      Morphine Itching    Opioids - morphine analogues        Physical Examination     Vitals:    06/22/23 1342   BP: 126/82   Pulse: 82   Resp: 18   Temp: 97.1 °F (36.2 °C)     Physical Exam  Constitutional:       General: She is not in acute distress.  HENT:      Head: Normocephalic.      Nose: Nose normal.      Mouth/Throat:      Mouth: Mucous membranes are moist.   Eyes:      Extraocular Movements: Extraocular movements intact.   Cardiovascular:      Rate and Rhythm: Normal rate.   Pulmonary:      Effort: Pulmonary effort is normal. No respiratory distress.   Abdominal:      General: Bowel sounds are normal.      Palpations: Abdomen is soft.      Tenderness: There is no abdominal tenderness.   Musculoskeletal:         General: No tenderness. Normal range of motion.      Cervical back: Neck supple.   Skin:     General: Skin is warm.      Findings: Rash (resolving) present.   Neurological:      Mental Status: She is alert and oriented to person, place, and time.   Psychiatric:         Behavior: Behavior normal.         Imaging / Labs     No visits with results within 1 Day(s) from this visit.   Latest known visit with results is:   Office Visit on 04/13/2023   Component Date Value Ref Range Status    Triglycerides 04/13/2023 39  35 - 150 mg/dL Final    Cholesterol 04/13/2023 101  0 - 200 mg/dL Final    HDL Cholesterol 04/13/2023 47  40 - 60 mg/dL Final    Cholesterol/HDL Ratio (Risk Factor) 04/13/2023 2.1   Final    Non-HDL 04/13/2023 54  mg/dL Final    LDL Calculated 04/13/2023 46  mg/dL Final    LDL/HDL 04/13/2023 1.0   Final    VLDL 04/13/2023 8  mg/dL Final    Sodium 04/13/2023 139  136 - 145 mmol/L Final    Potassium 04/13/2023 4.4  3.5 - 5.1 mmol/L Final    Chloride 04/13/2023 105  98 - 107 mmol/L Final    CO2 04/13/2023 24  21 - 32 mmol/L Final    Anion Gap 04/13/2023 14  7 - 16 mmol/L Final    Glucose 04/13/2023 238 (H)  74 - 106 " mg/dL Final    BUN 04/13/2023 69 (H)  7 - 18 mg/dL Final    Creatinine 04/13/2023 2.69 (H)  0.55 - 1.02 mg/dL Final    BUN/Creatinine Ratio 04/13/2023 26 (H)  6 - 20 Final    Calcium 04/13/2023 8.8  8.5 - 10.1 mg/dL Final    Total Protein 04/13/2023 6.8  6.4 - 8.2 g/dL Final    Albumin 04/13/2023 3.4 (L)  3.5 - 5.0 g/dL Final    Globulin 04/13/2023 3.4  2.0 - 4.0 g/dL Final    A/G Ratio 04/13/2023 1.0   Final    Bilirubin, Total 04/13/2023 0.3  >0.0 - 1.2 mg/dL Final    Alk Phos 04/13/2023 65  55 - 142 U/L Final    ALT 04/13/2023 30  13 - 56 U/L Final    AST 04/13/2023 27  15 - 37 U/L Final    eGFR 04/13/2023 18 (L)  >=60 mL/min/1.73m² Final    Hemoglobin A1C 04/13/2023 7.6 (H)  4.5 - 6.6 % Final    Estimated Average Glucose 04/13/2023 167  mg/dL Final     X-Ray Chest PA And Lateral  Narrative: EXAMINATION:  XR CHEST PA AND LATERAL    CLINICAL HISTORY:  Shortness of breath    COMPARISON:  03/08/2017, 03/20/2018, and 11/10/2022    FINDINGS:  PA and lateral chest:    There is cardiomegaly.  Bilateral mixed interstitial and alveolar infiltrates are seen in the lower lobes and to some extent in the right upper lobe.  There is thickening of the fissural lines with an overall appearance most suggestive of pulmonary edema.  The appearance has improved when compared to the 11/10/2022 examination, however.  No pleural effusions are seen at this time.    There are degenerative changes in the thoracic spine.  Impression: There is cardiomegaly with bibasilar pulmonary edema likely representing CHF.  The findings have significantly improved when compared to the most recent exam.    Place of service: [facility]    Electronically signed by: Hansa Key  Date:    01/10/2023  Time:    09:21      Assessment and Plan (including Health Maintenance)      Problem List  Smart Sets  Document Outside HM   :    Health Maintenance Due   Topic Date Due    Mammogram  09/09/2022    Eye Exam  06/15/2023       Problem List Items  Addressed This Visit          ID    Shingles - Primary    Current Assessment & Plan     Rash is almost completely resolved  She denies any pain  Reports having some itching to rash  She has follow up appt with Dr Grewal next week              Orthopedic    Neck pain on right side    Current Assessment & Plan     Neck pain has resolved            Health Maintenance Topics with due status: Not Due       Topic Last Completion Date    TETANUS VACCINE 10/16/2017    DEXA Scan 09/09/2021    Colorectal Cancer Screening 08/22/2022    Influenza Vaccine 10/05/2022    Foot Exam 11/29/2022    Diabetes Urine Screening 01/10/2023    Lipid Panel 04/13/2023    Hemoglobin A1c 04/13/2023    High Dose Statin 06/22/2023       Future Appointments   Date Time Provider Department Center   10/12/2023  8:40 AM CHAPRARITA Bella [facility]   10/13/2023 10:00 AM [facility]          Signature:  CHAPARRITA Bella [facility] [address] [place] [address]  [phone]    Date of encounter: 6/22/23

## 2023-06-29 DIAGNOSIS — I10 ESSENTIAL HYPERTENSION: ICD-10-CM

## 2023-07-03 RX ORDER — AMLODIPINE BESYLATE 10 MG/1
TABLET ORAL
Qty: 90 TABLET | Refills: 1 | Status: SHIPPED | OUTPATIENT
Start: 2023-07-03 | End: 2023-10-12

## 2023-07-03 RX ORDER — CINACALCET 30 MG/1
TABLET, FILM COATED ORAL
Qty: 30 TABLET | Refills: 11 | Status: SHIPPED | OUTPATIENT
Start: 2023-07-03 | End: 2023-12-18

## 2023-08-07 DIAGNOSIS — B02.8 HERPES ZOSTER WITH OTHER COMPLICATION: ICD-10-CM

## 2023-08-07 RX ORDER — ACYCLOVIR 400 MG/1
800 TABLET ORAL 2 TIMES DAILY
Qty: 120 TABLET | Refills: 1 | Status: SHIPPED | OUTPATIENT
Start: 2023-08-07 | End: 2023-11-27

## 2023-08-18 ENCOUNTER — EXTERNAL HOME HEALTH (OUTPATIENT)
Dept: HOME HEALTH SERVICES | Facility: HOSPITAL | Age: 73
End: 2023-08-18
Payer: MEDICARE

## 2023-08-21 DIAGNOSIS — I10 ESSENTIAL HYPERTENSION: ICD-10-CM

## 2023-08-21 DIAGNOSIS — E11.9 TYPE 2 DIABETES MELLITUS WITHOUT COMPLICATION, UNSPECIFIED WHETHER LONG TERM INSULIN USE: Primary | ICD-10-CM

## 2023-08-21 DIAGNOSIS — D50.9 MICROCYTIC ANEMIA: ICD-10-CM

## 2023-09-01 ENCOUNTER — PATIENT OUTREACH (OUTPATIENT)
Dept: ADMINISTRATIVE | Facility: HOSPITAL | Age: 73
End: 2023-09-01

## 2023-09-01 NOTE — PROGRESS NOTES
[facility] Chart Audit for the following: Medication Reconciliation Post Hospital Discharge.    Unable to locate documentation for Medication Reconciliation within 30 days of 02/17/2023 Hospital Discharge.

## 2023-09-05 DIAGNOSIS — E11.9 TYPE 2 DIABETES MELLITUS WITHOUT COMPLICATION, UNSPECIFIED WHETHER LONG TERM INSULIN USE: Primary | ICD-10-CM

## 2023-09-06 ENCOUNTER — OFFICE VISIT (OUTPATIENT)
Dept: PULMONOLOGY | Facility: CLINIC | Age: 73
End: 2023-09-06
Payer: MEDICARE

## 2023-09-06 VITALS
WEIGHT: 167 LBS | DIASTOLIC BLOOD PRESSURE: 78 MMHG | HEIGHT: 64 IN | HEART RATE: 73 BPM | OXYGEN SATURATION: 98 % | BODY MASS INDEX: 28.51 KG/M2 | RESPIRATION RATE: 20 BRPM | SYSTOLIC BLOOD PRESSURE: 166 MMHG

## 2023-09-06 DIAGNOSIS — R22.2 LOCALIZED SWELLING, MASS AND LUMP, TRUNK: Primary | ICD-10-CM

## 2023-09-06 DIAGNOSIS — R93.89 ABNORMAL CT OF THE CHEST: ICD-10-CM

## 2023-09-06 DIAGNOSIS — R91.8 PULMONARY NODULES: ICD-10-CM

## 2023-09-06 DIAGNOSIS — R06.02 SOB (SHORTNESS OF BREATH): ICD-10-CM

## 2023-09-06 PROCEDURE — 3288F PR FALLS RISK ASSESSMENT DOCUMENTED: ICD-10-PCS | Mod: CPTII,,, | Performed by: INTERNAL MEDICINE

## 2023-09-06 PROCEDURE — 3078F DIAST BP <80 MM HG: CPT | Mod: CPTII,,, | Performed by: INTERNAL MEDICINE

## 2023-09-06 PROCEDURE — 3051F PR MOST RECENT HEMOGLOBIN A1C LEVEL 7.0 - < 8.0%: ICD-10-PCS | Mod: CPTII,,, | Performed by: INTERNAL MEDICINE

## 2023-09-06 PROCEDURE — 3008F BODY MASS INDEX DOCD: CPT | Mod: CPTII,,, | Performed by: INTERNAL MEDICINE

## 2023-09-06 PROCEDURE — 99214 OFFICE O/P EST MOD 30 MIN: CPT | Mod: PBBFAC | Performed by: INTERNAL MEDICINE

## 2023-09-06 PROCEDURE — 3077F SYST BP >= 140 MM HG: CPT | Mod: CPTII,,, | Performed by: INTERNAL MEDICINE

## 2023-09-06 PROCEDURE — 1126F AMNT PAIN NOTED NONE PRSNT: CPT | Mod: CPTII,,, | Performed by: INTERNAL MEDICINE

## 2023-09-06 PROCEDURE — 3288F FALL RISK ASSESSMENT DOCD: CPT | Mod: CPTII,,, | Performed by: INTERNAL MEDICINE

## 2023-09-06 PROCEDURE — 3078F PR MOST RECENT DIASTOLIC BLOOD PRESSURE < 80 MM HG: ICD-10-PCS | Mod: CPTII,,, | Performed by: INTERNAL MEDICINE

## 2023-09-06 PROCEDURE — 99204 OFFICE O/P NEW MOD 45 MIN: CPT | Mod: S$PBB,,, | Performed by: INTERNAL MEDICINE

## 2023-09-06 PROCEDURE — 1101F PT FALLS ASSESS-DOCD LE1/YR: CPT | Mod: CPTII,,, | Performed by: INTERNAL MEDICINE

## 2023-09-06 PROCEDURE — 1101F PR PT FALLS ASSESS DOC 0-1 FALLS W/OUT INJ PAST YR: ICD-10-PCS | Mod: CPTII,,, | Performed by: INTERNAL MEDICINE

## 2023-09-06 PROCEDURE — 99204 PR OFFICE/OUTPT VISIT, NEW, LEVL IV, 45-59 MIN: ICD-10-PCS | Mod: S$PBB,,, | Performed by: INTERNAL MEDICINE

## 2023-09-06 PROCEDURE — 3066F PR DOCUMENTATION OF TREATMENT FOR NEPHROPATHY: ICD-10-PCS | Mod: CPTII,,, | Performed by: INTERNAL MEDICINE

## 2023-09-06 PROCEDURE — 3051F HG A1C>EQUAL 7.0%<8.0%: CPT | Mod: CPTII,,, | Performed by: INTERNAL MEDICINE

## 2023-09-06 PROCEDURE — 3060F POS MICROALBUMINURIA REV: CPT | Mod: CPTII,,, | Performed by: INTERNAL MEDICINE

## 2023-09-06 PROCEDURE — 3077F PR MOST RECENT SYSTOLIC BLOOD PRESSURE >= 140 MM HG: ICD-10-PCS | Mod: CPTII,,, | Performed by: INTERNAL MEDICINE

## 2023-09-06 PROCEDURE — 3066F NEPHROPATHY DOC TX: CPT | Mod: CPTII,,, | Performed by: INTERNAL MEDICINE

## 2023-09-06 PROCEDURE — 3008F PR BODY MASS INDEX (BMI) DOCUMENTED: ICD-10-PCS | Mod: CPTII,,, | Performed by: INTERNAL MEDICINE

## 2023-09-06 PROCEDURE — 3060F PR POS MICROALBUMINURIA RESULT DOCUMENTED/REVIEW: ICD-10-PCS | Mod: CPTII,,, | Performed by: INTERNAL MEDICINE

## 2023-09-06 PROCEDURE — 1126F PR PAIN SEVERITY QUANTIFIED, NO PAIN PRESENT: ICD-10-PCS | Mod: CPTII,,, | Performed by: INTERNAL MEDICINE

## 2023-09-06 RX ORDER — ATORVASTATIN CALCIUM 40 MG/1
1 TABLET, FILM COATED ORAL DAILY
COMMUNITY
Start: 2023-06-09 | End: 2023-10-17 | Stop reason: SDUPTHER

## 2023-09-06 RX ORDER — NIFEDIPINE 60 MG/1
1 TABLET, EXTENDED RELEASE ORAL DAILY
COMMUNITY
Start: 2023-06-28 | End: 2023-11-27

## 2023-09-06 RX ORDER — SODIUM CHLORIDE 9 MG/ML
INJECTION, SOLUTION INTRAMUSCULAR; INTRAVENOUS; SUBCUTANEOUS
COMMUNITY
Start: 2023-03-02 | End: 2024-01-09

## 2023-09-06 RX ORDER — POTASSIUM CHLORIDE 750 MG/1
10 CAPSULE, EXTENDED RELEASE ORAL
COMMUNITY
Start: 2023-07-13 | End: 2023-10-06 | Stop reason: SDUPTHER

## 2023-09-06 RX ORDER — ALBUTEROL SULFATE 90 UG/1
2 AEROSOL, METERED RESPIRATORY (INHALATION) EVERY 6 HOURS PRN
Qty: 18 G | Refills: 5 | Status: SHIPPED | OUTPATIENT
Start: 2023-09-06

## 2023-09-06 RX ORDER — PREDNISONE 5 MG/1
1 TABLET ORAL DAILY
COMMUNITY
Start: 2023-08-17

## 2023-09-06 RX ORDER — FUROSEMIDE 40 MG/1
40 TABLET ORAL
COMMUNITY
Start: 2023-02-07 | End: 2023-11-27

## 2023-09-06 NOTE — PROGRESS NOTES
"Subjective:       Patient ID: Nasrin Grant is a 73 y.o. female.    Chief Complaint: Abnormal Ct Scan (Has problems when she lays down, feels like mucus is on her. Can not walk far.)    Abnormal Ct Scan  This is a chronic problem. The current episode started more than 1 month ago. The problem occurs daily. The problem has been unchanged. Pertinent negatives include no abdominal pain, arthralgias, chest pain, chills, congestion, headaches or rash. The symptoms are aggravated by exertion.     Past Medical History:   Diagnosis Date    Atherosclerotic heart disease of native coronary artery with angina pectoris 01/20/2020    Depression     Diabetes mellitus, type 2     Disorder of kidney and ureter     Hyperlipidemia     Hypertension     Kidney transplant status 07/13/2020    Osteoporosis 07/22/2020    Stroke      Past Surgical History:   Procedure Laterality Date    Appendix      EXTRACTION OF TOOTH Left 08/11/2021    HYSTERECTOMY      kidney transplant 2016      TOE AMPUTATION       Family History   Problem Relation Age of Onset    Diabetes Mother     Hyperlipidemia Mother     Heart disease Mother     Hearing loss Mother     Diabetes Father     Hearing loss Father     Heart disease Father     Hyperlipidemia Father      Review of patient's allergies indicates:   Allergen Reactions    Hydrocodone-acetaminophen Rash    Gabapentin Other (See Comments)     Made her disoriented  "out of my head"      Morphine Itching    Opioids - morphine analogues       Social History     Tobacco Use    Smoking status: Never    Smokeless tobacco: Never   Substance Use Topics    Alcohol use: Never    Drug use: Never      Review of Systems   Constitutional:  Negative for chills, activity change and night sweats.   HENT:  Negative for congestion and ear pain.    Eyes:  Negative for redness and itching.   Cardiovascular:  Negative for chest pain and palpitations.   Musculoskeletal:  Negative for arthralgias and back pain.   Skin:  Negative for " "rash.   Gastrointestinal:  Negative for abdominal pain and abdominal distention.   Neurological:  Negative for dizziness and headaches.   Hematological:  Negative for adenopathy. Does not bruise/bleed easily.   Psychiatric/Behavioral:  Negative for confusion. The patient is not nervous/anxious.      Objective:      Physical Exam   Constitutional: She is oriented to person, place, and time. She appears well-developed and well-nourished.   HENT:   Head: Normocephalic.   Nose: Nose normal.   Mouth/Throat: Oropharynx is clear and moist.   Neck: No JVD present. No thyromegaly present.   Cardiovascular: Normal rate, regular rhythm, normal heart sounds and intact distal pulses.   Pulmonary/Chest: Normal expansion, hyperinflation, symmetric chest wall expansion, effort normal and breath sounds normal.   Abdominal: Soft. Bowel sounds are normal.   Musculoskeletal:         General: Normal range of motion.      Cervical back: Normal range of motion and neck supple.   Lymphadenopathy: No supraclavicular adenopathy is present.     She has no cervical adenopathy.   Neurological: She is alert and oriented to person, place, and time. She has normal reflexes.   Skin: Skin is warm and dry.   Psychiatric: She has a normal mood and affect. Her behavior is normal.   Personal Diagnostic Review  CT chest multiple nodules        9/6/2023     2:17 PM 6/22/2023     1:42 PM 6/12/2023     3:00 PM 6/8/2023     2:58 PM 6/5/2023     8:06 AM 4/13/2023     8:40 AM 1/10/2023     8:35 AM   Pulmonary Function Tests   SpO2 98 % 95 % 99 % 99 % 99 % 100 % 96 %   Height 5' 4" (1.626 m) 5' 4" (1.626 m) 5' 4" (1.626 m) 5' 4" (1.626 m) 5' 4" (1.626 m) 5' 4" (1.626 m) 5' 4.02" (1.626 m)   Weight 75.8 kg (167 lb) 75.9 kg (167 lb 6.4 oz) 76.6 kg (168 lb 14.4 oz) 76.5 kg (168 lb 9.6 oz) 76.4 kg (168 lb 6.4 oz) 85.9 kg (189 lb 6.4 oz) 88 kg (194 lb)   BMI (Calculated) 28.7 28.7 29 28.9 28.9 32.5 33.3         Assessment:       1. Localized swelling, mass and lump, " trunk    2. Abnormal CT of the chest    3. Pulmonary nodules    4. SOB (shortness of breath)        Outpatient Encounter Medications as of 9/6/2023   Medication Sig Dispense Refill    0.9 % sodium chloride (SODIUM CHLORIDE 0.9%) Soln       aspirin (ECOTRIN) 81 MG EC tablet Take 81 mg by mouth once daily.      atorvastatin (LIPITOR) 40 MG tablet Take 1 tablet by mouth once daily.      blood sugar diagnostic (TRUE METRIX GLUCOSE TEST STRIP) Strp Inject 1 strip into the skin 3 (three) times daily before meals. 400 strip 2    blood-glucose meter (TRUE METRIX GLUCOSE METER) kit 1 each by Other route 3 (three) times daily before meals. 1 each 0    bumetanide (BUMEX) 2 MG tablet Take 1 tablet (2 mg total) by mouth once daily. 30 tablet 11    carvediloL (COREG) 25 MG tablet TAKE ONE TABLET BY MOUTH TWICE DAILY 180 tablet 1    cholecalciferol, vitamin D3, (VITAMIN D3) 25 mcg (1,000 unit) capsule Take 1,000 Units by mouth.      cinacalcet (SENSIPAR) 30 MG Tab TAKE ONE TABLET BY MOUTH ONCE DAILY 30 tablet 11    empagliflozin (JARDIANCE) 10 mg tablet Take 1 tablet by mouth every morning.      flash glucose sensor (FREESTYLE MARY 10 DAY SENSOR MISC) 1 Device by Percutaneous route.      furosemide (LASIX) 40 MG tablet Take 40 mg by mouth.      Lactobacillus acidophilus (PROBIOTIC ACIDOPHILUS ORAL) Take 4 Billion Cells by mouth.      lancets (TRUEPLUS LANCETS) 33 gauge Misc Inject 1 lancet into the skin 3 (three) times daily before meals. 400 each 2    linaCLOtide (LINZESS) 72 mcg Cap capsule Take 1 capsule (72 mcg total) by mouth before breakfast. 90 capsule 1    linaCLOtide (LINZESS) 72 mcg Cap capsule Take 72 mcg by mouth.      mirabegron (MYRBETRIQ) 25 mg Tb24 ER tablet Take 25 mg by mouth once daily.      mycophenolate (MYFORTIC) 180 MG TbEC Take 180 mg by mouth 2 (two) times daily.      NIFEdipine (PROCARDIA-XL) 60 MG (OSM) 24 hr tablet Take 1 tablet by mouth once daily.      nitroGLYCERIN (NITROSTAT) 0.4 MG SL tablet Place  "1 tablet (0.4 mg total) under the tongue every 5 (five) minutes as needed for Chest pain. 30 tablet 3    pen needle, diabetic 31 gauge x 3/16" Ndle 1 each by NOT APPLICABLE route.      pen needle, diabetic 31 gauge x 3/16" Ndle 1 each by NOT APPLICABLE route.      predniSONE (DELTASONE) 5 MG tablet Take 5 mg by mouth once daily.      predniSONE (DELTASONE) 5 MG tablet Take 1 tablet by mouth once daily.      rosuvastatin (CRESTOR) 40 MG Tab Take 40 mg by mouth.      sodium bicarbonate 650 MG tablet Take 1,300 mg by mouth.      tacrolimus (PROGRAF) 1 MG Cap 1 capsule once daily.      TRESIBA FLEXTOUCH U-100 100 unit/mL (3 mL) insulin pen Inject 24 Units into the skin once daily. 3 pen 1    acyclovir (ZOVIRAX) 400 MG tablet TAKE TWO TABLETS BY MOUTH TWICE DAILY (Patient not taking: Reported on 9/6/2023) 120 tablet 1    albuterol (VENTOLIN HFA) 90 mcg/actuation inhaler Inhale 2 puffs into the lungs every 6 (six) hours as needed for Wheezing. Rescue 18 g 5    amLODIPine (NORVASC) 10 MG tablet TAKE ONE TABLET BY MOUTH ONCE DAILY (Patient not taking: Reported on 9/6/2023) 90 tablet 1    LIDOcaine (LIDODERM) 5 % Place 2 patches onto the skin once daily. Remove & Discard patch within 12 hours or as directed by MD (Patient not taking: Reported on 9/6/2023) 30 patch 1    losartan (COZAAR) 25 MG tablet Take 25 mg by mouth once daily.      neomycin-polymyxin-hydrocortisone (CORTISPORIN) 3.5-10,000-1 mg/mL-unit/mL-% otic suspension Place 3 drops into both ears 3 (three) times daily. 10 mL 0    NOVOLOG FLEXPEN U-100 INSULIN 100 unit/mL (3 mL) InPn pen INJECT FIVE UNITS into THE SKIN THREE TIMES DAILY WITH MEALS 15 mL 1    olmesartan (BENICAR) 20 MG tablet TAKE ONE TABLET BY MOUTH EVERY DAY 90 tablet 0    pantoprazole (PROTONIX) 40 MG tablet Take 1 tablet (40 mg total) by mouth 2 (two) times daily. 180 tablet 3    potassium chloride (MICRO-K) 10 MEQ CpSR Take 10 mEq by mouth.      tamsulosin (FLOMAX) 0.4 mg Cap       torsemide " (DEMADEX) 20 MG Tab Take 1 tablet by mouth once daily.      triprolidine-pseudoephedrine (APRODINE) 2.5-60 mg Tab Take 1 tablet by mouth every 6 (six) hours as needed.      [DISCONTINUED] blood sugar diagnostic (TRUE METRIX GLUCOSE TEST STRIP) Strp Inject 1 strip into the skin 3 (three) times daily before meals. 400 strip 2     No facility-administered encounter medications on file as of 9/6/2023.     Orders Placed This Encounter   Procedures    CT Chest Without Contrast     Standing Status:   Future     Standing Expiration Date:   9/6/2024     Order Specific Question:   May the Radiologist modify the order per protocol to meet the clinical needs of the patient?     Answer:   Yes     Order Specific Question:   Access port per protocol?     Answer:   No    Complete PFT with bronchodilator     Standing Status:   Future     Standing Expiration Date:   9/6/2024     Order Specific Question:   Release to patient     Answer:   Immediate       Plan:       Problem List Items Addressed This Visit          Pulmonary    Multiple pulmonary nodules     We will repeat CT last 1 I have is from June they were multiple pulmonary nodules noted in a renal transplant patient         SOB (shortness of breath)     Will start her on p.r.n. Ventolin get PFTs when she comes back next week         Relevant Medications    albuterol (VENTOLIN HFA) 90 mcg/actuation inhaler    Other Relevant Orders    Complete PFT with bronchodilator       Other    Abnormal CT of the chest     Other Visit Diagnoses       Localized swelling, mass and lump, trunk    -  Primary    Relevant Orders    CT Chest Without Contrast

## 2023-09-06 NOTE — ASSESSMENT & PLAN NOTE
We will repeat CT last 1 I have is from June they were multiple pulmonary nodules noted in a renal transplant patient

## 2023-09-07 LAB
LEFT EYE DM RETINOPATHY: POSITIVE
RIGHT EYE DM RETINOPATHY: POSITIVE

## 2023-09-13 ENCOUNTER — CLINICAL SUPPORT (OUTPATIENT)
Dept: PULMONOLOGY | Facility: HOSPITAL | Age: 73
End: 2023-09-13
Attending: INTERNAL MEDICINE
Payer: MEDICARE

## 2023-09-13 ENCOUNTER — HOSPITAL ENCOUNTER (OUTPATIENT)
Dept: RADIOLOGY | Facility: HOSPITAL | Age: 73
Discharge: HOME | End: 2023-09-13
Attending: INTERNAL MEDICINE
Payer: MEDICARE

## 2023-09-13 ENCOUNTER — OFFICE VISIT (OUTPATIENT)
Dept: PULMONOLOGY | Facility: CLINIC | Age: 73
End: 2023-09-13
Payer: MEDICARE

## 2023-09-13 VITALS — OXYGEN SATURATION: 98 %

## 2023-09-13 VITALS
HEIGHT: 64 IN | OXYGEN SATURATION: 100 % | DIASTOLIC BLOOD PRESSURE: 64 MMHG | SYSTOLIC BLOOD PRESSURE: 124 MMHG | BODY MASS INDEX: 28.51 KG/M2 | RESPIRATION RATE: 20 BRPM | HEART RATE: 75 BPM | WEIGHT: 167 LBS

## 2023-09-13 DIAGNOSIS — R91.8 PULMONARY NODULES: ICD-10-CM

## 2023-09-13 DIAGNOSIS — R91.8 MULTIPLE PULMONARY NODULES: ICD-10-CM

## 2023-09-13 DIAGNOSIS — R22.2 LOCALIZED SWELLING, MASS AND LUMP, TRUNK: Primary | ICD-10-CM

## 2023-09-13 DIAGNOSIS — R06.02 SOB (SHORTNESS OF BREATH): ICD-10-CM

## 2023-09-13 DIAGNOSIS — R22.2 LOCALIZED SWELLING, MASS AND LUMP, TRUNK: ICD-10-CM

## 2023-09-13 PROCEDURE — 3078F PR MOST RECENT DIASTOLIC BLOOD PRESSURE < 80 MM HG: ICD-10-PCS | Mod: CPTII,,, | Performed by: INTERNAL MEDICINE

## 2023-09-13 PROCEDURE — 1126F PR PAIN SEVERITY QUANTIFIED, NO PAIN PRESENT: ICD-10-PCS | Mod: CPTII,,, | Performed by: INTERNAL MEDICINE

## 2023-09-13 PROCEDURE — 94060 EVALUATION OF WHEEZING: CPT | Mod: 26,,, | Performed by: INTERNAL MEDICINE

## 2023-09-13 PROCEDURE — 3066F NEPHROPATHY DOC TX: CPT | Mod: CPTII,,, | Performed by: INTERNAL MEDICINE

## 2023-09-13 PROCEDURE — 94726 PLETHYSMOGRAPHY LUNG VOLUMES: CPT

## 2023-09-13 PROCEDURE — 3060F POS MICROALBUMINURIA REV: CPT | Mod: CPTII,,, | Performed by: INTERNAL MEDICINE

## 2023-09-13 PROCEDURE — 3008F PR BODY MASS INDEX (BMI) DOCUMENTED: ICD-10-PCS | Mod: CPTII,,, | Performed by: INTERNAL MEDICINE

## 2023-09-13 PROCEDURE — 3074F PR MOST RECENT SYSTOLIC BLOOD PRESSURE < 130 MM HG: ICD-10-PCS | Mod: CPTII,,, | Performed by: INTERNAL MEDICINE

## 2023-09-13 PROCEDURE — 99214 OFFICE O/P EST MOD 30 MIN: CPT | Mod: S$PBB,25,, | Performed by: INTERNAL MEDICINE

## 2023-09-13 PROCEDURE — 3288F FALL RISK ASSESSMENT DOCD: CPT | Mod: CPTII,,, | Performed by: INTERNAL MEDICINE

## 2023-09-13 PROCEDURE — 1101F PT FALLS ASSESS-DOCD LE1/YR: CPT | Mod: CPTII,,, | Performed by: INTERNAL MEDICINE

## 2023-09-13 PROCEDURE — 94060 PR EVAL OF BRONCHOSPASM: ICD-10-PCS | Mod: 26,,, | Performed by: INTERNAL MEDICINE

## 2023-09-13 PROCEDURE — 3060F PR POS MICROALBUMINURIA RESULT DOCUMENTED/REVIEW: ICD-10-PCS | Mod: CPTII,,, | Performed by: INTERNAL MEDICINE

## 2023-09-13 PROCEDURE — 71250 CT THORAX DX C-: CPT | Mod: 26,,, | Performed by: RADIOLOGY

## 2023-09-13 PROCEDURE — 71250 CT THORAX DX C-: CPT | Mod: TC

## 2023-09-13 PROCEDURE — 94726 PULM FUNCT TST PLETHYSMOGRAP: ICD-10-PCS | Mod: 26,,, | Performed by: INTERNAL MEDICINE

## 2023-09-13 PROCEDURE — 94729 DIFFUSING CAPACITY: CPT | Mod: 26,,, | Performed by: INTERNAL MEDICINE

## 2023-09-13 PROCEDURE — 3288F PR FALLS RISK ASSESSMENT DOCUMENTED: ICD-10-PCS | Mod: CPTII,,, | Performed by: INTERNAL MEDICINE

## 2023-09-13 PROCEDURE — 27100098 HC SPACER

## 2023-09-13 PROCEDURE — 1126F AMNT PAIN NOTED NONE PRSNT: CPT | Mod: CPTII,,, | Performed by: INTERNAL MEDICINE

## 2023-09-13 PROCEDURE — 94729 DIFFUSING CAPACITY: CPT

## 2023-09-13 PROCEDURE — 3008F BODY MASS INDEX DOCD: CPT | Mod: CPTII,,, | Performed by: INTERNAL MEDICINE

## 2023-09-13 PROCEDURE — 99214 PR OFFICE/OUTPT VISIT, EST, LEVL IV, 30-39 MIN: ICD-10-PCS | Mod: S$PBB,25,, | Performed by: INTERNAL MEDICINE

## 2023-09-13 PROCEDURE — 3078F DIAST BP <80 MM HG: CPT | Mod: CPTII,,, | Performed by: INTERNAL MEDICINE

## 2023-09-13 PROCEDURE — 3066F PR DOCUMENTATION OF TREATMENT FOR NEPHROPATHY: ICD-10-PCS | Mod: CPTII,,, | Performed by: INTERNAL MEDICINE

## 2023-09-13 PROCEDURE — 94729 PR C02/MEMBANE DIFFUSE CAPACITY: ICD-10-PCS | Mod: 26,,, | Performed by: INTERNAL MEDICINE

## 2023-09-13 PROCEDURE — 71250 CT CHEST WITHOUT CONTRAST: ICD-10-PCS | Mod: 26,,, | Performed by: RADIOLOGY

## 2023-09-13 PROCEDURE — 94726 PLETHYSMOGRAPHY LUNG VOLUMES: CPT | Mod: 26,,, | Performed by: INTERNAL MEDICINE

## 2023-09-13 PROCEDURE — 1101F PR PT FALLS ASSESS DOC 0-1 FALLS W/OUT INJ PAST YR: ICD-10-PCS | Mod: CPTII,,, | Performed by: INTERNAL MEDICINE

## 2023-09-13 PROCEDURE — 3051F PR MOST RECENT HEMOGLOBIN A1C LEVEL 7.0 - < 8.0%: ICD-10-PCS | Mod: CPTII,,, | Performed by: INTERNAL MEDICINE

## 2023-09-13 PROCEDURE — 99214 OFFICE O/P EST MOD 30 MIN: CPT | Mod: PBBFAC,25 | Performed by: INTERNAL MEDICINE

## 2023-09-13 PROCEDURE — 3074F SYST BP LT 130 MM HG: CPT | Mod: CPTII,,, | Performed by: INTERNAL MEDICINE

## 2023-09-13 PROCEDURE — 94060 EVALUATION OF WHEEZING: CPT

## 2023-09-13 PROCEDURE — 3051F HG A1C>EQUAL 7.0%<8.0%: CPT | Mod: CPTII,,, | Performed by: INTERNAL MEDICINE

## 2023-09-13 RX ORDER — NITROGLYCERIN 0.4 MG/1
TABLET SUBLINGUAL
COMMUNITY
Start: 2023-08-17

## 2023-09-13 NOTE — ASSESSMENT & PLAN NOTE
Unchanged to little bit smaller compared to 2019 report will repeat in 1 year the risk calculator shows a possible risk of cancer of around 3-4% much lower with the timeframe of no change

## 2023-09-13 NOTE — PROGRESS NOTES
"Subjective:       Patient ID: Nasrin Grant is a 73 y.o. female.    Chief Complaint: Localized swelling mass and lumb and Results PFT    Patient follow-up of abnormal CT and shortness of breath shortness of breath is really not much will bother right now      Past Medical History:   Diagnosis Date    Amputation of one or more toes     2 TOES RT FOOT, 3 TOES LFT FOOT    Atherosclerotic heart disease of native coronary artery with angina pectoris 01/20/2020    Depression     Diabetes mellitus, type 2     Disorder of kidney and ureter     History of carpal tunnel surgery of left wrist     History of carpal tunnel surgery of right wrist     History of repair of left rotator cuff     History of repair of right rotator cuff     Hyperlipidemia     Hypertension     Kidney transplant status 07/13/2020    Osteoporosis 07/22/2020    Stroke      Past Surgical History:   Procedure Laterality Date    Appendix      EXTRACTION OF TOOTH Left 08/11/2021    HYSTERECTOMY      kidney transplant 2016      Have had issues since transplant, kidney had a virus per patient    TOE AMPUTATION       Family History   Problem Relation Age of Onset    Diabetes Mother     Hyperlipidemia Mother     Heart disease Mother     Hearing loss Mother     Diabetes Father     Hearing loss Father     Heart disease Father     Hyperlipidemia Father      Review of patient's allergies indicates:   Allergen Reactions    Hydrocodone-acetaminophen Rash    Gabapentin Other (See Comments)     Made her disoriented  "out of my head"      Morphine Itching    Opioids - morphine analogues       Social History     Tobacco Use    Smoking status: Never    Smokeless tobacco: Never   Substance Use Topics    Alcohol use: Never    Drug use: Never      Review of Systems   Constitutional:  Negative for chills, activity change and night sweats.   HENT:  Negative for congestion and ear pain.    Eyes:  Negative for redness and itching.   Cardiovascular:  Negative for chest pain and " palpitations.   Musculoskeletal:  Negative for arthralgias and back pain.   Skin:  Negative for rash.   Gastrointestinal:  Negative for abdominal pain and abdominal distention.   Neurological:  Negative for dizziness and headaches.   Hematological:  Negative for adenopathy. Does not bruise/bleed easily.   Psychiatric/Behavioral:  Negative for confusion. The patient is not nervous/anxious.        Objective:      Physical Exam   Constitutional: She is oriented to person, place, and time. She appears well-developed and well-nourished.   HENT:   Head: Normocephalic.   Nose: Nose normal.   Mouth/Throat: Oropharynx is clear and moist.   Neck: No JVD present. No thyromegaly present.   Cardiovascular: Normal rate, regular rhythm, normal heart sounds and intact distal pulses.   Pulmonary/Chest: Normal expansion, hyperinflation, symmetric chest wall expansion, effort normal and breath sounds normal.   Abdominal: Soft. Bowel sounds are normal.   Musculoskeletal:         General: Normal range of motion.      Cervical back: Normal range of motion and neck supple.   Lymphadenopathy: No supraclavicular adenopathy is present.     She has no cervical adenopathy.   Neurological: She is alert and oriented to person, place, and time. She has normal reflexes.   Skin: Skin is warm and dry.   Psychiatric: She has a normal mood and affect. Her behavior is normal.     Personal Diagnostic Review  CT reviewed mostly unchanged to little improved        9/13/2023     1:00 PM 9/13/2023    11:45 AM 9/6/2023     2:17 PM 6/22/2023     1:42 PM 6/12/2023     3:00 PM 6/8/2023     2:58 PM 6/5/2023     8:06 AM   Pulmonary Function Tests   FVC  2.68 liters        FVC%  76        FEV1  2.07 liters        FEV1%  79        FEF 25-75  1.75        FEF 25-75%  100        TLC (liters)  4.9 liters        TLC%  74        RV  2.19        RV%  73        DLCO (ml/mmHg sec)  18.66 ml/mmHg sec        DLCO%  78        Peak Flow  306 L/min        FiO2 (%)  21 %       "  SpO2 100 % 98 % 98 % 95 % 99 % 99 % 99 %   Height 5' 4" (1.626 m)  5' 4" (1.626 m) 5' 4" (1.626 m) 5' 4" (1.626 m) 5' 4" (1.626 m) 5' 4" (1.626 m)   Weight 75.8 kg (167 lb)  75.8 kg (167 lb) 75.9 kg (167 lb 6.4 oz) 76.6 kg (168 lb 14.4 oz) 76.5 kg (168 lb 9.6 oz) 76.4 kg (168 lb 6.4 oz)   BMI (Calculated) 28.7  28.7 28.7 29 28.9 28.9         Assessment:       1. Localized swelling, mass and lump, trunk    2. Multiple pulmonary nodules    3. SOB (shortness of breath)        Outpatient Encounter Medications as of 9/13/2023   Medication Sig Dispense Refill    albuterol (VENTOLIN HFA) 90 mcg/actuation inhaler Inhale 2 puffs into the lungs every 6 (six) hours as needed for Wheezing. Rescue 18 g 5    aspirin (ECOTRIN) 81 MG EC tablet Take 81 mg by mouth once daily.      atorvastatin (LIPITOR) 40 MG tablet Take 1 tablet by mouth once daily.      blood sugar diagnostic (TRUE METRIX GLUCOSE TEST STRIP) Strp Inject 1 strip into the skin 3 (three) times daily before meals. 400 strip 2    blood-glucose meter (TRUE METRIX GLUCOSE METER) kit 1 each by Other route 3 (three) times daily before meals. 1 each 0    bumetanide (BUMEX) 2 MG tablet Take 1 tablet (2 mg total) by mouth once daily. 30 tablet 11    carvediloL (COREG) 25 MG tablet TAKE ONE TABLET BY MOUTH TWICE DAILY 180 tablet 1    cholecalciferol, vitamin D3, (VITAMIN D3) 25 mcg (1,000 unit) capsule Take 1,000 Units by mouth.      cinacalcet (SENSIPAR) 30 MG Tab TAKE ONE TABLET BY MOUTH ONCE DAILY 30 tablet 11    empagliflozin (JARDIANCE) 10 mg tablet Take 1 tablet by mouth every morning.      flash glucose sensor (FREESTYLE MARY 10 DAY SENSOR MIS) 1 Device by Percutaneous route.      furosemide (LASIX) 40 MG tablet Take 40 mg by mouth.      Lactobacillus acidophilus (PROBIOTIC ACIDOPHILUS ORAL) Take 4 Billion Cells by mouth.      lancets (TRUEPLUS LANCETS) 33 gauge Misc Inject 1 lancet into the skin 3 (three) times daily before meals. 400 each 2    linaCLOtide " "(LINZESS) 72 mcg Cap capsule Take 72 mcg by mouth.      mycophenolate (MYFORTIC) 180 MG TbEC Take 180 mg by mouth 2 (two) times daily.      nitroGLYCERIN (NITROSTAT) 0.4 MG SL tablet Place 1 tablet (0.4 mg total) under the tongue every 5 (five) minutes as needed for Chest pain. 30 tablet 3    nitroGLYCERIN (NITROSTAT) 0.4 MG SL tablet DISSOLVE 1 TABLET UNDER THE TONGUE EVERY 5 MINUTES AS NEEDED FOR CHEST PAIN. DO NOT EXCEED A TOTAL OF 3 DOSES IN 15 MINUTES.      NOVOLOG FLEXPEN U-100 INSULIN 100 unit/mL (3 mL) InPn pen INJECT FIVE UNITS into THE SKIN THREE TIMES DAILY WITH MEALS 15 mL 1    olmesartan (BENICAR) 20 MG tablet TAKE ONE TABLET BY MOUTH EVERY DAY 90 tablet 0    pantoprazole (PROTONIX) 40 MG tablet Take 1 tablet (40 mg total) by mouth 2 (two) times daily. 180 tablet 3    pen needle, diabetic 31 gauge x 3/16" Ndle 1 each by NOT APPLICABLE route.      pen needle, diabetic 31 gauge x 3/16" Ndle 1 each by NOT APPLICABLE route.      potassium chloride (MICRO-K) 10 MEQ CpSR Take 10 mEq by mouth.      predniSONE (DELTASONE) 5 MG tablet Take 1 tablet by mouth once daily.      rosuvastatin (CRESTOR) 40 MG Tab Take 40 mg by mouth.      sodium bicarbonate 650 MG tablet Take 1,300 mg by mouth.      tacrolimus (PROGRAF) 1 MG Cap 1 capsule once daily.      tamsulosin (FLOMAX) 0.4 mg Cap       torsemide (DEMADEX) 20 MG Tab Take 1 tablet by mouth once daily.      TRESIBA FLEXTOUCH U-100 100 unit/mL (3 mL) insulin pen Inject 24 Units into the skin once daily. 3 pen 1    0.9 % sodium chloride (SODIUM CHLORIDE 0.9%) Soln       acyclovir (ZOVIRAX) 400 MG tablet TAKE TWO TABLETS BY MOUTH TWICE DAILY (Patient not taking: Reported on 9/6/2023) 120 tablet 1    amLODIPine (NORVASC) 10 MG tablet TAKE ONE TABLET BY MOUTH ONCE DAILY (Patient not taking: Reported on 9/6/2023) 90 tablet 1    LIDOcaine (LIDODERM) 5 % Place 2 patches onto the skin once daily. Remove & Discard patch within 12 hours or as directed by MD (Patient not " taking: Reported on 9/6/2023) 30 patch 1    linaCLOtide (LINZESS) 72 mcg Cap capsule Take 1 capsule (72 mcg total) by mouth before breakfast. (Patient not taking: Reported on 9/13/2023) 90 capsule 1    losartan (COZAAR) 25 MG tablet Take 25 mg by mouth once daily.      mirabegron (MYRBETRIQ) 25 mg Tb24 ER tablet Take 25 mg by mouth once daily.      neomycin-polymyxin-hydrocortisone (CORTISPORIN) 3.5-10,000-1 mg/mL-unit/mL-% otic suspension Place 3 drops into both ears 3 (three) times daily. (Patient not taking: Reported on 9/13/2023) 10 mL 0    NIFEdipine (PROCARDIA-XL) 60 MG (OSM) 24 hr tablet Take 1 tablet by mouth once daily.      predniSONE (DELTASONE) 5 MG tablet Take 5 mg by mouth once daily.      triprolidine-pseudoephedrine (APRODINE) 2.5-60 mg Tab Take 1 tablet by mouth every 6 (six) hours as needed.       No facility-administered encounter medications on file as of 9/13/2023.     Orders Placed This Encounter   Procedures    CT Chest Without Contrast     Standing Status:   Future     Standing Expiration Date:   9/13/2024     Order Specific Question:   May the Radiologist modify the order per protocol to meet the clinical needs of the patient?     Answer:   Yes     Order Specific Question:   Access port per protocol?     Answer:   No       Plan:       Problem List Items Addressed This Visit          Pulmonary    Multiple pulmonary nodules     Unchanged to little bit smaller compared to 2019 report will repeat in 1 year the risk calculator shows a possible risk of cancer of around 3-4% much lower with the timeframe of no change         SOB (shortness of breath)     Patient has normal pulmonary function test today uses p.r.n. Ventolin inhaler I would make no changes in regimen see her back in 1 year will repeat CT at that time also          Other Visit Diagnoses       Localized swelling, mass and lump, trunk    -  Primary    Relevant Orders    CT Chest Without Contrast

## 2023-09-13 NOTE — ASSESSMENT & PLAN NOTE
Patient has normal pulmonary function test today uses p.r.n. Ventolin inhaler I would make no changes in regimen see her back in 1 year will repeat CT at that time also

## 2023-09-13 NOTE — PROCEDURES
Pulmonary function test  Forced vital capacity 2.10 L 78% predicted  FEV1 1.69 L 82% predicted  FEV1 ratio 80%   Normal lung volumes  Normal DLCO  Mild broncho reactivity small airways  Normal pulmonary function tests except for small airways disease

## 2023-10-06 DIAGNOSIS — E87.6 HYPOKALEMIA: Primary | ICD-10-CM

## 2023-10-06 DIAGNOSIS — K25.9 GASTRIC ULCER, UNSPECIFIED CHRONICITY, UNSPECIFIED WHETHER GASTRIC ULCER HEMORRHAGE OR PERFORATION PRESENT: ICD-10-CM

## 2023-10-09 RX ORDER — POTASSIUM CHLORIDE 750 MG/1
10 CAPSULE, EXTENDED RELEASE ORAL DAILY
Qty: 90 CAPSULE | Refills: 1 | Status: SHIPPED | OUTPATIENT
Start: 2023-10-09 | End: 2023-12-18

## 2023-10-09 RX ORDER — PANTOPRAZOLE SODIUM 40 MG/1
40 TABLET, DELAYED RELEASE ORAL 2 TIMES DAILY
Qty: 180 TABLET | Refills: 1 | Status: SHIPPED | OUTPATIENT
Start: 2023-10-09 | End: 2023-11-27

## 2023-10-12 ENCOUNTER — OFFICE VISIT (OUTPATIENT)
Dept: FAMILY MEDICINE | Facility: CLINIC | Age: 73
End: 2023-10-12
Payer: MEDICARE

## 2023-10-12 VITALS
RESPIRATION RATE: 18 BRPM | HEART RATE: 70 BPM | DIASTOLIC BLOOD PRESSURE: 76 MMHG | WEIGHT: 158.38 LBS | SYSTOLIC BLOOD PRESSURE: 122 MMHG | OXYGEN SATURATION: 99 % | HEIGHT: 64 IN | TEMPERATURE: 98 F | BODY MASS INDEX: 27.04 KG/M2

## 2023-10-12 DIAGNOSIS — R63.4 WEIGHT LOSS: ICD-10-CM

## 2023-10-12 DIAGNOSIS — F41.9 ANXIETY: Primary | ICD-10-CM

## 2023-10-12 DIAGNOSIS — K52.9 CHRONIC DIARRHEA: ICD-10-CM

## 2023-10-12 PROBLEM — H93.8X1 EAR CONGESTION, RIGHT: Status: RESOLVED | Noted: 2021-10-11 | Resolved: 2023-10-12

## 2023-10-12 PROBLEM — H66.91 RIGHT OTITIS MEDIA: Status: RESOLVED | Noted: 2021-10-11 | Resolved: 2023-10-12

## 2023-10-12 PROCEDURE — 3008F BODY MASS INDEX DOCD: CPT | Mod: ,,, | Performed by: NURSE PRACTITIONER

## 2023-10-12 PROCEDURE — 3066F NEPHROPATHY DOC TX: CPT | Mod: ,,, | Performed by: NURSE PRACTITIONER

## 2023-10-12 PROCEDURE — 3288F PR FALLS RISK ASSESSMENT DOCUMENTED: ICD-10-PCS | Mod: ,,, | Performed by: NURSE PRACTITIONER

## 2023-10-12 PROCEDURE — 3060F PR POS MICROALBUMINURIA RESULT DOCUMENTED/REVIEW: ICD-10-PCS | Mod: ,,, | Performed by: NURSE PRACTITIONER

## 2023-10-12 PROCEDURE — 1126F AMNT PAIN NOTED NONE PRSNT: CPT | Mod: ,,, | Performed by: NURSE PRACTITIONER

## 2023-10-12 PROCEDURE — 3008F PR BODY MASS INDEX (BMI) DOCUMENTED: ICD-10-PCS | Mod: ,,, | Performed by: NURSE PRACTITIONER

## 2023-10-12 PROCEDURE — 3066F PR DOCUMENTATION OF TREATMENT FOR NEPHROPATHY: ICD-10-PCS | Mod: ,,, | Performed by: NURSE PRACTITIONER

## 2023-10-12 PROCEDURE — 99214 PR OFFICE/OUTPT VISIT, EST, LEVL IV, 30-39 MIN: ICD-10-PCS | Mod: ,,, | Performed by: NURSE PRACTITIONER

## 2023-10-12 PROCEDURE — 1159F MED LIST DOCD IN RCRD: CPT | Mod: ,,, | Performed by: NURSE PRACTITIONER

## 2023-10-12 PROCEDURE — 3288F FALL RISK ASSESSMENT DOCD: CPT | Mod: ,,, | Performed by: NURSE PRACTITIONER

## 2023-10-12 PROCEDURE — 1101F PR PT FALLS ASSESS DOC 0-1 FALLS W/OUT INJ PAST YR: ICD-10-PCS | Mod: ,,, | Performed by: NURSE PRACTITIONER

## 2023-10-12 PROCEDURE — 1160F RVW MEDS BY RX/DR IN RCRD: CPT | Mod: ,,, | Performed by: NURSE PRACTITIONER

## 2023-10-12 PROCEDURE — 99214 OFFICE O/P EST MOD 30 MIN: CPT | Mod: ,,, | Performed by: NURSE PRACTITIONER

## 2023-10-12 PROCEDURE — 3074F PR MOST RECENT SYSTOLIC BLOOD PRESSURE < 130 MM HG: ICD-10-PCS | Mod: ,,, | Performed by: NURSE PRACTITIONER

## 2023-10-12 PROCEDURE — 3051F PR MOST RECENT HEMOGLOBIN A1C LEVEL 7.0 - < 8.0%: ICD-10-PCS | Mod: ,,, | Performed by: NURSE PRACTITIONER

## 2023-10-12 PROCEDURE — 1101F PT FALLS ASSESS-DOCD LE1/YR: CPT | Mod: ,,, | Performed by: NURSE PRACTITIONER

## 2023-10-12 PROCEDURE — 1159F PR MEDICATION LIST DOCUMENTED IN MEDICAL RECORD: ICD-10-PCS | Mod: ,,, | Performed by: NURSE PRACTITIONER

## 2023-10-12 PROCEDURE — 3051F HG A1C>EQUAL 7.0%<8.0%: CPT | Mod: ,,, | Performed by: NURSE PRACTITIONER

## 2023-10-12 PROCEDURE — 3078F DIAST BP <80 MM HG: CPT | Mod: ,,, | Performed by: NURSE PRACTITIONER

## 2023-10-12 PROCEDURE — 3060F POS MICROALBUMINURIA REV: CPT | Mod: ,,, | Performed by: NURSE PRACTITIONER

## 2023-10-12 PROCEDURE — 3074F SYST BP LT 130 MM HG: CPT | Mod: ,,, | Performed by: NURSE PRACTITIONER

## 2023-10-12 PROCEDURE — 1126F PR PAIN SEVERITY QUANTIFIED, NO PAIN PRESENT: ICD-10-PCS | Mod: ,,, | Performed by: NURSE PRACTITIONER

## 2023-10-12 PROCEDURE — 3078F PR MOST RECENT DIASTOLIC BLOOD PRESSURE < 80 MM HG: ICD-10-PCS | Mod: ,,, | Performed by: NURSE PRACTITIONER

## 2023-10-12 PROCEDURE — 1160F PR REVIEW ALL MEDS BY PRESCRIBER/CLIN PHARMACIST DOCUMENTED: ICD-10-PCS | Mod: ,,, | Performed by: NURSE PRACTITIONER

## 2023-10-12 RX ORDER — HYDRALAZINE HYDROCHLORIDE 25 MG/1
25 TABLET, FILM COATED ORAL 3 TIMES DAILY
COMMUNITY
Start: 2023-09-28 | End: 2023-11-27

## 2023-10-12 RX ORDER — ISOSORBIDE MONONITRATE 30 MG/1
30 TABLET, EXTENDED RELEASE ORAL DAILY
COMMUNITY
Start: 2023-09-19

## 2023-10-12 RX ORDER — ESCITALOPRAM OXALATE 10 MG/1
10 TABLET ORAL DAILY
Qty: 30 TABLET | Refills: 2 | Status: SHIPPED | OUTPATIENT
Start: 2023-10-12 | End: 2023-11-27

## 2023-10-12 NOTE — ASSESSMENT & PLAN NOTE
Reports having increase weight loss and some fatigue  She has recently been seen by cardiology and pulmonology  9# weight loss over the past month  Denies any changes in diet

## 2023-10-12 NOTE — ASSESSMENT & PLAN NOTE
Reports having diarrhea for about 2 months daily  She had colonoscopy done 2018   Will refer to GI

## 2023-10-12 NOTE — PROGRESS NOTES
"   CHAPARRITA Bella   [facility] [address] [place] [address]  [phone]      PATIENT NAME: Nasrin Grant  : 1950  DATE: 10/12/23  MRN: [id number]      Billing Provider: CHAPARRITA Bella  Level of Service: HI OFFICE/OUTPT VISIT, EST, LEVL IV, 30-39 MIN  Patient PCP Information       Provider PCP Type    CHAPARRITA Bella General            Reason for Visit / Chief Complaint: Weight Loss (Pt states she is concerned because she is losing weight with ou trying. /States "every time I eat I have to run to the bathroom right after" ) and Fatigue (Symptoms X a few months. )       Update PCP  Update Chief Complaint         History of Present Illness / Problem Focused Workflow     73 year old female presents with complaints of weight loss  Reports having diarrhea over the last 2 months daily  Also having weight loss of about 9# since last visit  Complaints of having increased anxiety as well       Hx of DM, renal transplant, hypertension, hyperlipidemia, CAD      Review of Systems     Review of Systems   Constitutional:  Positive for fatigue and unexpected weight change. Negative for fever.   HENT:  Negative for congestion.    Respiratory:  Negative for cough and shortness of breath.    Cardiovascular:  Positive for leg swelling. Negative for chest pain.   Gastrointestinal:  Positive for diarrhea. Negative for abdominal pain, constipation and vomiting.   Endocrine: Negative for polydipsia and polyuria.   Musculoskeletal:  Positive for arthralgias and gait problem (uses walker). Negative for neck pain.   Neurological:  Negative for dizziness, weakness and headaches.   Psychiatric/Behavioral:  Negative for agitation, dysphoric mood and self-injury.        Medical / Social / Family History     Past Medical History:   Diagnosis Date    Amputation of one or more toes     2 TOES RT FOOT, 3 TOES LFT FOOT    Atherosclerotic heart disease of native coronary " artery with angina pectoris 01/20/2020    Depression     Diabetes mellitus, type 2     Disorder of kidney and ureter     History of carpal tunnel surgery of left wrist     History of carpal tunnel surgery of right wrist     History of repair of left rotator cuff     History of repair of right rotator cuff     Hyperlipidemia     Hypertension     Kidney transplant status 07/13/2020    Osteoporosis 07/22/2020    Stroke        Past Surgical History:   Procedure Laterality Date    Appendix      EXTRACTION OF TOOTH Left 08/11/2021    HYSTERECTOMY      kidney transplant 2016      Have had issues since transplant, 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  reports that she has never smoked. She has never used smokeless tobacco. She reports that she does not drink alcohol and does not use drugs.    Family History  Ms.'s family history includes Diabetes in her father and mother; Hearing loss in her father and mother; Heart disease in her father and mother; Hyperlipidemia in her father and mother.    Medications and Allergies     Medications  Outpatient Medications Marked as Taking for the 10/12/23 encounter (Office Visit) with Laury Enrique FNP   Medication Sig Dispense Refill    acyclovir (ZOVIRAX) 400 MG tablet TAKE TWO TABLETS BY MOUTH TWICE DAILY 120 tablet 1    bumetanide (BUMEX) 2 MG tablet Take 1 tablet (2 mg total) by mouth once daily. 30 tablet 11    carvediloL (COREG) 25 MG tablet TAKE ONE TABLET BY MOUTH TWICE DAILY 180 tablet 1    cinacalcet (SENSIPAR) 30 MG Tab TAKE ONE TABLET BY MOUTH ONCE DAILY 30 tablet 11    empagliflozin (JARDIANCE) 10 mg tablet Take 1 tablet by mouth every morning.      flash glucose sensor (FREESTYLE MARY 10 DAY SENSOR MISC) 1 Device by Percutaneous route.      furosemide (LASIX) 40 MG tablet Take 40 mg by mouth.      hydrALAZINE (APRESOLINE) 25 MG tablet Take 25 mg by mouth 3 (three) times daily.      isosorbide mononitrate (IMDUR)  "30 MG 24 hr tablet Take 30 mg by mouth 2 (two) times daily.      Lactobacillus acidophilus (PROBIOTIC ACIDOPHILUS ORAL) Take 4 Billion Cells by mouth.      mycophenolate (MYFORTIC) 180 MG TbEC Take 180 mg by mouth 2 (two) times daily.      NIFEdipine (PROCARDIA-XL) 60 MG (OSM) 24 hr tablet Take 1 tablet by mouth once daily.      nitroGLYCERIN (NITROSTAT) 0.4 MG SL tablet DISSOLVE 1 TABLET UNDER THE TONGUE EVERY 5 MINUTES AS NEEDED FOR CHEST PAIN. DO NOT EXCEED A TOTAL OF 3 DOSES IN 15 MINUTES.      NOVOLOG FLEXPEN U-100 INSULIN 100 unit/mL (3 mL) InPn pen INJECT FIVE UNITS into THE SKIN THREE TIMES DAILY WITH MEALS 15 mL 1    pantoprazole (PROTONIX) 40 MG tablet Take 1 tablet (40 mg total) by mouth 2 (two) times daily. 180 tablet 1    pen needle, diabetic 31 gauge x 3/16" Ndle 1 each by NOT APPLICABLE route.      pen needle, diabetic 31 gauge x 3/16" Ndle 1 each by NOT APPLICABLE route.      potassium chloride (MICRO-K) 10 MEQ CpSR Take 1 capsule (10 mEq total) by mouth once daily. 90 capsule 1    predniSONE (DELTASONE) 5 MG tablet Take 1 tablet by mouth once daily.      sodium bicarbonate 650 MG tablet Take 1,300 mg by mouth.      tacrolimus (PROGRAF) 1 MG Cap 1 capsule once daily.      TRESIBA FLEXTOUCH U-100 100 unit/mL (3 mL) insulin pen Inject 24 Units into the skin once daily. 3 pen 1    [DISCONTINUED] atorvastatin (LIPITOR) 40 MG tablet Take 1 tablet by mouth once daily.      [DISCONTINUED] rosuvastatin (CRESTOR) 40 MG Tab Take 40 mg by mouth.         Allergies  Review of patient's allergies indicates:   Allergen Reactions    Hydrocodone-acetaminophen Rash    Gabapentin Other (See Comments)     Made her disoriented  "out of my head"      Morphine Itching    Opioids - morphine analogues        Physical Examination     Vitals:    10/12/23 0856   BP: 122/76   Pulse: 70   Resp: 18   Temp: 97.6 °F (36.4 °C)     Physical Exam  Constitutional:       General: She is not in acute distress.  HENT:      Head: " Normocephalic.      Nose: Nose normal.      Mouth/Throat:      Mouth: Mucous membranes are moist.   Eyes:      Extraocular Movements: Extraocular movements intact.   Cardiovascular:      Rate and Rhythm: Normal rate.   Pulmonary:      Effort: Pulmonary effort is normal. No respiratory distress.   Abdominal:      General: Bowel sounds are normal.      Palpations: Abdomen is soft.      Tenderness: There is no abdominal tenderness. There is no guarding.   Musculoskeletal:         General: Normal range of motion.      Cervical back: Neck supple.   Skin:     General: Skin is warm.   Neurological:      Mental Status: She is alert and oriented to person, place, and time.   Psychiatric:         Behavior: Behavior normal.           Imaging / Labs     No visits with results within 1 Day(s) from this visit.   Latest known visit with results is:   Lab Visit on 08/21/2023   Component Date Value Ref Range Status    Hemoglobin A1C 08/21/2023 7.2 (H)  4.5 - 6.6 % Final    Estimated Average Glucose 08/21/2023 154  mg/dL Final    Sodium 08/21/2023 138  136 - 145 mmol/L Final    Potassium 08/21/2023 3.8  3.5 - 5.1 mmol/L Final    Chloride 08/21/2023 107  98 - 107 mmol/L Final    CO2 08/21/2023 23  21 - 32 mmol/L Final    Anion Gap 08/21/2023 12  7 - 16 mmol/L Final    Glucose 08/21/2023 109 (H)  74 - 106 mg/dL Final    BUN 08/21/2023 35 (H)  7 - 18 mg/dL Final    Creatinine 08/21/2023 2.69 (H)  0.55 - 1.02 mg/dL Final    BUN/Creatinine Ratio 08/21/2023 13  6 - 20 Final    Calcium 08/21/2023 8.1 (L)  8.5 - 10.1 mg/dL Final    Total Protein 08/21/2023 7.4  6.4 - 8.2 g/dL Final    Albumin 08/21/2023 3.6  3.5 - 5.0 g/dL Final    Globulin 08/21/2023 3.8  2.0 - 4.0 g/dL Final    A/G Ratio 08/21/2023 0.9   Final    Bilirubin, Total 08/21/2023 0.5  >0.0 - 1.2 mg/dL Final    Alk Phos 08/21/2023 75  55 - 142 U/L Final    ALT 08/21/2023 30  13 - 56 U/L Final    AST 08/21/2023 20  15 - 37 U/L Final    eGFR 08/21/2023 18 (L)  >=60 mL/min/1.73m2  Final    Triglycerides 08/21/2023 29 (L)  35 - 150 mg/dL Final    Cholesterol 08/21/2023 95  0 - 200 mg/dL Final    HDL Cholesterol 08/21/2023 54  40 - 60 mg/dL Final    Cholesterol/HDL Ratio (Risk Factor) 08/21/2023 1.8   Final    Non-HDL 08/21/2023 41  mg/dL Final    LDL Calculated 08/21/2023 35  mg/dL Final    VLDL 08/21/2023 6  mg/dL Final    WBC 08/21/2023 8.13  4.50 - 11.00 K/uL Final    RBC 08/21/2023 4.07 (L)  4.20 - 5.40 M/uL Final    Hemoglobin 08/21/2023 10.5 (L)  12.0 - 16.0 g/dL Final    Hematocrit 08/21/2023 33.9 (L)  38.0 - 47.0 % Final    MCV 08/21/2023 83.3  80.0 - 96.0 fL Final    MCH 08/21/2023 25.8 (L)  27.0 - 31.0 pg Final    MCHC 08/21/2023 31.0 (L)  32.0 - 36.0 g/dL Final    RDW 08/21/2023 20.2 (H)  11.5 - 14.5 % Final    Platelet Count 08/21/2023 172  150 - 400 K/uL Final    MPV 08/21/2023 11.2  9.4 - 12.4 fL Final    Neutrophils % 08/21/2023 71.9 (H)  53.0 - 65.0 % Final    Lymphocytes % 08/21/2023 12.9 (L)  27.0 - 41.0 % Final    Monocytes % 08/21/2023 12.3 (H)  2.0 - 6.0 % Final    Eosinophils % 08/21/2023 2.2  1.0 - 4.0 % Final    Basophils % 08/21/2023 0.5  0.0 - 1.0 % Final    Immature Granulocytes % 08/21/2023 0.2  0.0 - 0.4 % Final    nRBC, Auto 08/21/2023 0.0  <=0.0 % Final    Neutrophils, Abs 08/21/2023 5.84  1.80 - 7.70 K/uL Final    Lymphocytes, Absolute 08/21/2023 1.05  1.00 - 4.80 K/uL Final    Monocytes, Absolute 08/21/2023 1.00 (H)  0.00 - 0.80 K/uL Final    Eosinophils, Absolute 08/21/2023 0.18  0.00 - 0.50 K/uL Final    Basophils, Absolute 08/21/2023 0.04  0.00 - 0.20 K/uL Final    Immature Granulocytes, Absolute 08/21/2023 0.02  0.00 - 0.04 K/uL Final    nRBC, Absolute 08/21/2023 0.00  <=0.00 x10e3/uL Final    Diff Type 08/21/2023 Auto   Final     Mammo Digital Screening Bilat w/ Trent  Narrative: Result:   Mammo Digital Screening Bilat w/ Trent     History:  Patient is 73 y.o. and is seen for a screening mammogram.    Films Compared:  Prior images (if available) were  compared.     Findings:  This procedure was performed using tomosynthesis. Computer-aided detection   was utilized in the interpretation of this examination.  The breasts have scattered areas of fibroglandular density. There is no   evidence of suspicious masses, calcifications, or other abnormal findings.  Impression: Bilateral  There is no mammographic evidence of malignancy.    BI-RADS Category:   Overall: 1 - Negative       Recommendation:  Routine screening mammogram in 1 year is recommended.    Your estimated lifetime risk of breast cancer (to age 85) based on   Tyrer-Cuzick risk assessment model is Tyrer-Cuzick: 2.68 %. According to   the American Cancer Society, patients with a lifetime breast cancer risk   of 20% or higher might benefit from supplemental screening tests.      Assessment and Plan (including Health Maintenance)      Problem List  Smart Sets  Document Outside HM   :    Health Maintenance Due   Topic Date Due    Shingles Vaccine (1 of 2) Never done    Eye Exam  06/15/2023    Influenza Vaccine (1) 09/01/2023    COVID-19 Vaccine (5 - 2023-24 season) 09/01/2023    Foot Exam  11/29/2023       Problem List Items Addressed This Visit          Psychiatric    Anxiety - Primary    Current Assessment & Plan     Reports having increased anxiety and would like meds  Start lexapro 10 mg po daily          Relevant Medications    EScitalopram oxalate (LEXAPRO) 10 MG tablet       Endocrine    Weight loss    Current Assessment & Plan     Reports having increase weight loss and some fatigue  She has recently been seen by cardiology and pulmonology  9# weight loss over the past month  Denies any changes in diet          Relevant Orders    Ambulatory referral/consult to Gastroenterology       GI    Chronic diarrhea    Current Assessment & Plan     Reports having diarrhea for about 2 months daily  She had colonoscopy done 2018   Will refer to GI         Relevant Orders    Ambulatory referral/consult to  Gastroenterology       Health Maintenance Topics with due status: Not Due       Topic Last Completion Date    TETANUS VACCINE 10/16/2017    DEXA Scan 09/09/2021    Colorectal Cancer Screening 08/22/2022    Diabetes Urine Screening 01/10/2023    Lipid Panel 08/21/2023    Hemoglobin A1c 08/21/2023    Mammogram 10/13/2023    High Dose Statin 10/17/2023       Future Appointments   Date Time Provider Department Center   9/13/2024 10:00 AM [facility]1 [facility]   9/13/2024 10:50 AM Amari Girard MD [facility]  PUL[facility]   10/18/2024 10:15 AM [facility] MAMMO[facility] MM[facility]          Signature:  CHAPARRITA Bella  [facility] [address] [place] [address]  [phone]    Date of encounter: 10/12/23

## 2023-10-13 ENCOUNTER — PATIENT OUTREACH (OUTPATIENT)
Dept: ADMINISTRATIVE | Facility: HOSPITAL | Age: 73
End: 2023-10-13

## 2023-10-13 ENCOUNTER — HOSPITAL ENCOUNTER (OUTPATIENT)
Dept: RADIOLOGY | Facility: HOSPITAL | Age: 73
Discharge: HOME | End: 2023-10-13
Attending: NURSE PRACTITIONER
Payer: MEDICARE

## 2023-10-13 DIAGNOSIS — Z12.31 ENCOUNTER FOR SCREENING MAMMOGRAM FOR MALIGNANT NEOPLASM OF BREAST: ICD-10-CM

## 2023-10-13 PROCEDURE — 77067 SCR MAMMO BI INCL CAD: CPT | Mod: TC

## 2023-10-13 NOTE — LETTER
AUTHORIZATION FOR RELEASE OF   CONFIDENTIAL INFORMATION    Dear [facility],    We are seeing Nasrin Grant, date of birth 1950, in the clinic at [facility]. Laury Enrique FNP is the patient's PCP. Nasrin Grant has an outstanding lab/procedure at the time we reviewed her chart. In order to help keep her health information updated, she has authorized us to request the following medical record(s):        (  )  MAMMOGRAM                                      (  )  COLONOSCOPY      (  )  PAP SMEAR                                          (  )  OUTSIDE LAB RESULTS     (  )  DEXA SCAN                                          (X)  EYE EXAM            (  )  FOOT EXAM                                          (  )  ENTIRE RECORD     (  )  OUTSIDE IMMUNIZATIONS                 (  )  _______________         Please fax records to [facility], Sera Holden, [phone].     If you have any questions, please contact [phone].          Patient Name: Nasrin Grant  : 1950  Patient Phone #: [phone]      (4) no limitation

## 2023-10-13 NOTE — PROGRESS NOTES
MIIX was reviewed  Requesting eye exam from [facility] in [place], [place]

## 2023-10-17 DIAGNOSIS — B02.8 HERPES ZOSTER WITH OTHER COMPLICATION: ICD-10-CM

## 2023-10-17 DIAGNOSIS — I10 ESSENTIAL HYPERTENSION: Primary | ICD-10-CM

## 2023-10-17 RX ORDER — ACYCLOVIR 400 MG/1
800 TABLET ORAL 2 TIMES DAILY
Qty: 120 TABLET | Refills: 1 | OUTPATIENT
Start: 2023-10-17

## 2023-10-17 RX ORDER — ATORVASTATIN CALCIUM 40 MG/1
40 TABLET, FILM COATED ORAL DAILY
Qty: 90 TABLET | Refills: 1 | Status: SHIPPED | OUTPATIENT
Start: 2023-10-17

## 2023-10-17 RX ORDER — ATORVASTATIN CALCIUM 40 MG/1
40 TABLET, FILM COATED ORAL
Qty: 90 TABLET | Refills: 1 | OUTPATIENT
Start: 2023-10-17

## 2023-10-19 ENCOUNTER — PATIENT OUTREACH (OUTPATIENT)
Dept: ADMINISTRATIVE | Facility: HOSPITAL | Age: 73
End: 2023-10-19

## 2023-10-19 NOTE — PROGRESS NOTES
Uploaded pt eye exam from [facility] [place]. Hx has been updated

## 2023-11-08 ENCOUNTER — EXTERNAL HOME HEALTH (OUTPATIENT)
Dept: HOME HEALTH SERVICES | Facility: HOSPITAL | Age: 73
End: 2023-11-08
Payer: MEDICARE

## 2023-11-09 LAB
LEFT EYE DM RETINOPATHY: POSITIVE
RIGHT EYE DM RETINOPATHY: POSITIVE

## 2023-11-27 ENCOUNTER — OFFICE VISIT (OUTPATIENT)
Dept: FAMILY MEDICINE | Facility: CLINIC | Age: 73
End: 2023-11-27
Payer: MEDICARE

## 2023-11-27 VITALS
RESPIRATION RATE: 20 BRPM | HEART RATE: 73 BPM | OXYGEN SATURATION: 96 % | TEMPERATURE: 98 F | SYSTOLIC BLOOD PRESSURE: 122 MMHG | BODY MASS INDEX: 29.02 KG/M2 | HEIGHT: 64 IN | WEIGHT: 170 LBS | DIASTOLIC BLOOD PRESSURE: 76 MMHG

## 2023-11-27 DIAGNOSIS — R60.1 GENERALIZED EDEMA: ICD-10-CM

## 2023-11-27 DIAGNOSIS — R06.09 DYSPNEA ON EXERTION: ICD-10-CM

## 2023-11-27 DIAGNOSIS — R05.9 COUGH, UNSPECIFIED TYPE: ICD-10-CM

## 2023-11-27 DIAGNOSIS — J06.9 UPPER RESPIRATORY TRACT INFECTION, UNSPECIFIED TYPE: Primary | ICD-10-CM

## 2023-11-27 DIAGNOSIS — I10 ESSENTIAL HYPERTENSION: ICD-10-CM

## 2023-11-27 PROCEDURE — 3078F DIAST BP <80 MM HG: CPT | Mod: ,,, | Performed by: NURSE PRACTITIONER

## 2023-11-27 PROCEDURE — 99213 PR OFFICE/OUTPT VISIT, EST, LEVL III, 20-29 MIN: ICD-10-PCS | Mod: 25,,, | Performed by: NURSE PRACTITIONER

## 2023-11-27 PROCEDURE — 3008F PR BODY MASS INDEX (BMI) DOCUMENTED: ICD-10-PCS | Mod: ,,, | Performed by: NURSE PRACTITIONER

## 2023-11-27 PROCEDURE — 99213 OFFICE O/P EST LOW 20 MIN: CPT | Mod: 25,,, | Performed by: NURSE PRACTITIONER

## 2023-11-27 PROCEDURE — 3288F PR FALLS RISK ASSESSMENT DOCUMENTED: ICD-10-PCS | Mod: ,,, | Performed by: NURSE PRACTITIONER

## 2023-11-27 PROCEDURE — 3078F PR MOST RECENT DIASTOLIC BLOOD PRESSURE < 80 MM HG: ICD-10-PCS | Mod: ,,, | Performed by: NURSE PRACTITIONER

## 2023-11-27 PROCEDURE — 3051F PR MOST RECENT HEMOGLOBIN A1C LEVEL 7.0 - < 8.0%: ICD-10-PCS | Mod: ,,, | Performed by: NURSE PRACTITIONER

## 2023-11-27 PROCEDURE — 3074F PR MOST RECENT SYSTOLIC BLOOD PRESSURE < 130 MM HG: ICD-10-PCS | Mod: ,,, | Performed by: NURSE PRACTITIONER

## 2023-11-27 PROCEDURE — 3074F SYST BP LT 130 MM HG: CPT | Mod: ,,, | Performed by: NURSE PRACTITIONER

## 2023-11-27 PROCEDURE — 3051F HG A1C>EQUAL 7.0%<8.0%: CPT | Mod: ,,, | Performed by: NURSE PRACTITIONER

## 2023-11-27 PROCEDURE — 96372 THER/PROPH/DIAG INJ SC/IM: CPT | Mod: ,,, | Performed by: NURSE PRACTITIONER

## 2023-11-27 PROCEDURE — 1101F PT FALLS ASSESS-DOCD LE1/YR: CPT | Mod: ,,, | Performed by: NURSE PRACTITIONER

## 2023-11-27 PROCEDURE — 3288F FALL RISK ASSESSMENT DOCD: CPT | Mod: ,,, | Performed by: NURSE PRACTITIONER

## 2023-11-27 PROCEDURE — 3060F PR POS MICROALBUMINURIA RESULT DOCUMENTED/REVIEW: ICD-10-PCS | Mod: ,,, | Performed by: NURSE PRACTITIONER

## 2023-11-27 PROCEDURE — 3066F NEPHROPATHY DOC TX: CPT | Mod: ,,, | Performed by: NURSE PRACTITIONER

## 2023-11-27 PROCEDURE — 96372 PR INJECTION,THERAP/PROPH/DIAG2ST, IM OR SUBCUT: ICD-10-PCS | Mod: ,,, | Performed by: NURSE PRACTITIONER

## 2023-11-27 PROCEDURE — 3060F POS MICROALBUMINURIA REV: CPT | Mod: ,,, | Performed by: NURSE PRACTITIONER

## 2023-11-27 PROCEDURE — 3008F BODY MASS INDEX DOCD: CPT | Mod: ,,, | Performed by: NURSE PRACTITIONER

## 2023-11-27 PROCEDURE — 1101F PR PT FALLS ASSESS DOC 0-1 FALLS W/OUT INJ PAST YR: ICD-10-PCS | Mod: ,,, | Performed by: NURSE PRACTITIONER

## 2023-11-27 PROCEDURE — 3066F PR DOCUMENTATION OF TREATMENT FOR NEPHROPATHY: ICD-10-PCS | Mod: ,,, | Performed by: NURSE PRACTITIONER

## 2023-11-27 RX ORDER — AMLODIPINE BESYLATE 10 MG/1
10 TABLET ORAL
COMMUNITY
Start: 2023-11-16 | End: 2024-01-25

## 2023-11-27 RX ORDER — ROSUVASTATIN CALCIUM 40 MG/1
TABLET, COATED ORAL
COMMUNITY
Start: 2023-10-19 | End: 2023-11-27

## 2023-11-27 RX ORDER — DEXAMETHASONE SODIUM PHOSPHATE 4 MG/ML
4 INJECTION, SOLUTION INTRA-ARTICULAR; INTRALESIONAL; INTRAMUSCULAR; INTRAVENOUS; SOFT TISSUE
Status: COMPLETED | OUTPATIENT
Start: 2023-11-27 | End: 2023-11-27

## 2023-11-27 RX ORDER — HYDRALAZINE HYDROCHLORIDE 100 MG/1
50 TABLET, FILM COATED ORAL 3 TIMES DAILY
COMMUNITY

## 2023-11-27 RX ORDER — FUROSEMIDE 40 MG/1
40 TABLET ORAL 2 TIMES DAILY
Qty: 60 TABLET | Refills: 11 | Status: SHIPPED | OUTPATIENT
Start: 2023-11-27 | End: 2023-12-18

## 2023-11-27 RX ORDER — NIFEDIPINE 90 MG/1
60 TABLET, EXTENDED RELEASE ORAL DAILY
COMMUNITY

## 2023-11-27 RX ORDER — CEFTRIAXONE 1 G/1
1 INJECTION, POWDER, FOR SOLUTION INTRAMUSCULAR; INTRAVENOUS
Status: COMPLETED | OUTPATIENT
Start: 2023-11-27 | End: 2023-11-27

## 2023-11-27 RX ORDER — PROMETHAZINE HYDROCHLORIDE AND DEXTROMETHORPHAN HYDROBROMIDE 6.25; 15 MG/5ML; MG/5ML
5 SYRUP ORAL NIGHTLY PRN
Qty: 90 ML | Refills: 0 | Status: SHIPPED | OUTPATIENT
Start: 2023-11-27 | End: 2023-12-18

## 2023-11-27 RX ORDER — LEVOFLOXACIN 750 MG/1
750 TABLET ORAL DAILY
Qty: 7 TABLET | Refills: 0 | Status: SHIPPED | OUTPATIENT
Start: 2023-11-27 | End: 2023-12-18

## 2023-11-27 RX ORDER — TACROLIMUS 0.5 MG/1
1.5 CAPSULE ORAL
COMMUNITY
Start: 2023-10-19 | End: 2024-02-14

## 2023-11-27 RX ORDER — MINOXIDIL 2.5 MG/1
2.5 TABLET ORAL
COMMUNITY
End: 2024-02-14

## 2023-11-27 RX ADMIN — CEFTRIAXONE 1 G: 1 INJECTION, POWDER, FOR SOLUTION INTRAMUSCULAR; INTRAVENOUS at 03:11

## 2023-11-27 RX ADMIN — DEXAMETHASONE SODIUM PHOSPHATE 4 MG: 4 INJECTION, SOLUTION INTRA-ARTICULAR; INTRALESIONAL; INTRAMUSCULAR; INTRAVENOUS; SOFT TISSUE at 03:11

## 2023-11-27 NOTE — PROGRESS NOTES
Health Maintenance Due   Topic Date Due    Shingles Vaccine (1 of 2) Never done    RSV Vaccine (Age 60+ and Pregnant patients) (1 - 1-dose 60+ series) Never done    Influenza Vaccine (1) 09/01/2023    COVID-19 Vaccine (5 - 2023-24 season) 09/01/2023    Foot Exam  11/29/2023     Discussed care gaps.  Not interested in vaccs/foot exam.

## 2023-12-02 ENCOUNTER — HOSPITAL ENCOUNTER (EMERGENCY)
Facility: HOSPITAL | Age: 73
Discharge: ANOTHER HOSPITAL | End: 2023-12-02
Payer: MEDICARE

## 2023-12-02 VITALS
HEART RATE: 80 BPM | RESPIRATION RATE: 18 BRPM | TEMPERATURE: 98 F | DIASTOLIC BLOOD PRESSURE: 56 MMHG | BODY MASS INDEX: 26.63 KG/M2 | HEIGHT: 64 IN | SYSTOLIC BLOOD PRESSURE: 160 MMHG | OXYGEN SATURATION: 95 % | WEIGHT: 156 LBS

## 2023-12-02 DIAGNOSIS — I10 HYPERTENSION: ICD-10-CM

## 2023-12-02 DIAGNOSIS — N17.9 ACUTE RENAL FAILURE SUPERIMPOSED ON CHRONIC KIDNEY DISEASE, UNSPECIFIED ACUTE RENAL FAILURE TYPE, UNSPECIFIED CKD STAGE: Primary | ICD-10-CM

## 2023-12-02 DIAGNOSIS — N18.9 ACUTE RENAL FAILURE SUPERIMPOSED ON CHRONIC KIDNEY DISEASE, UNSPECIFIED ACUTE RENAL FAILURE TYPE, UNSPECIFIED CKD STAGE: Primary | ICD-10-CM

## 2023-12-02 DIAGNOSIS — R79.89 ELEVATED BRAIN NATRIURETIC PEPTIDE (BNP) LEVEL: ICD-10-CM

## 2023-12-02 DIAGNOSIS — E83.42 HYPOMAGNESEMIA: ICD-10-CM

## 2023-12-02 LAB
ALBUMIN SERPL BCP-MCNC: 3.7 G/DL (ref 3.5–5)
ALBUMIN/GLOB SERPL: 1.2 {RATIO}
ALP SERPL-CCNC: 53 U/L (ref 55–142)
ALT SERPL W P-5'-P-CCNC: 20 U/L (ref 13–56)
ANION GAP SERPL CALCULATED.3IONS-SCNC: 19 MMOL/L (ref 7–16)
AST SERPL W P-5'-P-CCNC: 20 U/L (ref 15–37)
BASOPHILS # BLD AUTO: 0.02 K/UL (ref 0–0.2)
BASOPHILS NFR BLD AUTO: 0.3 % (ref 0–1)
BILIRUB SERPL-MCNC: 0.5 MG/DL (ref ?–1.2)
BILIRUB UR QL STRIP: ABNORMAL
BUN SERPL-MCNC: 67 MG/DL (ref 7–18)
BUN/CREAT SERPL: 16 (ref 6–20)
CALCIUM SERPL-MCNC: 8.2 MG/DL (ref 8.5–10.1)
CHLORIDE SERPL-SCNC: 100 MMOL/L (ref 98–107)
CLARITY UR: CLEAR
CO2 SERPL-SCNC: 21 MMOL/L (ref 21–32)
COLOR UR: YELLOW
CREAT SERPL-MCNC: 4.18 MG/DL (ref 0.55–1.02)
DIFFERENTIAL METHOD BLD: ABNORMAL
EGFR (NO RACE VARIABLE) (RUSH/TITUS): 11 ML/MIN/1.73M2
EOSINOPHIL # BLD AUTO: 0.12 K/UL (ref 0–0.5)
EOSINOPHIL NFR BLD AUTO: 1.7 % (ref 1–4)
ERYTHROCYTE [DISTWIDTH] IN BLOOD BY AUTOMATED COUNT: 15.1 % (ref 11.5–14.5)
GLOBULIN SER-MCNC: 3.1 G/DL (ref 2–4)
GLUCOSE SERPL-MCNC: 129 MG/DL (ref 74–106)
GLUCOSE UR STRIP-MCNC: NEGATIVE MG/DL
HCT VFR BLD AUTO: 29.9 % (ref 38–47)
HGB BLD-MCNC: 9.7 G/DL (ref 12–16)
KETONES UR STRIP-SCNC: NEGATIVE MG/DL
LACTATE SERPL-SCNC: 0.8 MMOL/L (ref 0.4–2)
LEUKOCYTE ESTERASE UR QL STRIP: NEGATIVE
LIPASE SERPL-CCNC: 31 U/L (ref 16–77)
LYMPHOCYTES # BLD AUTO: 0.85 K/UL (ref 1–4.8)
LYMPHOCYTES NFR BLD AUTO: 11.8 % (ref 27–41)
MAGNESIUM SERPL-MCNC: 1.4 MG/DL (ref 1.7–2.3)
MCH RBC QN AUTO: 27.2 PG (ref 27–31)
MCHC RBC AUTO-ENTMCNC: 32.4 G/DL (ref 32–36)
MCV RBC AUTO: 84 FL (ref 80–96)
MONOCYTES # BLD AUTO: 1.08 K/UL (ref 0–0.8)
MONOCYTES NFR BLD AUTO: 15 % (ref 2–6)
MPC BLD CALC-MCNC: 11 FL (ref 9.4–12.4)
NEUTROPHILS # BLD AUTO: 5.14 K/UL (ref 1.8–7.7)
NEUTROPHILS NFR BLD AUTO: 71.2 % (ref 53–65)
NITRITE UR QL STRIP: NEGATIVE
NT-PROBNP SERPL-MCNC: 6200 PG/ML (ref 1–125)
PH UR STRIP: 5.5 PH UNITS
PLATELET # BLD AUTO: 147 K/UL (ref 150–400)
POTASSIUM SERPL-SCNC: 3.8 MMOL/L (ref 3.5–5.1)
PROT SERPL-MCNC: 6.8 G/DL (ref 6.4–8.2)
PROT UR QL STRIP: NEGATIVE
RBC # BLD AUTO: 3.56 M/UL (ref 4.2–5.4)
RBC # UR STRIP: NEGATIVE /UL
SODIUM SERPL-SCNC: 136 MMOL/L (ref 136–145)
SP GR UR STRIP: >=1.03
TROPONIN I SERPL DL<=0.01 NG/ML-MCNC: 24.3 PG/ML
TSH SERPL DL<=0.005 MIU/L-ACNC: 1.33 UIU/ML (ref 0.36–3.74)
UROBILINOGEN UR STRIP-ACNC: 0.2 MG/DL
WBC # BLD AUTO: 7.21 K/UL (ref 4.5–11)

## 2023-12-02 PROCEDURE — 93010 EKG 12-LEAD: ICD-10-PCS | Mod: ,,, | Performed by: STUDENT IN AN ORGANIZED HEALTH CARE EDUCATION/TRAINING PROGRAM

## 2023-12-02 PROCEDURE — 84443 ASSAY THYROID STIM HORMONE: CPT | Performed by: NURSE PRACTITIONER

## 2023-12-02 PROCEDURE — 81003 URINALYSIS AUTO W/O SCOPE: CPT | Performed by: NURSE PRACTITIONER

## 2023-12-02 PROCEDURE — 99285 EMERGENCY DEPT VISIT HI MDM: CPT | Mod: 25

## 2023-12-02 PROCEDURE — 83605 ASSAY OF LACTIC ACID: CPT | Performed by: NURSE PRACTITIONER

## 2023-12-02 PROCEDURE — 84484 ASSAY OF TROPONIN QUANT: CPT | Performed by: NURSE PRACTITIONER

## 2023-12-02 PROCEDURE — 99285 EMERGENCY DEPT VISIT HI MDM: CPT

## 2023-12-02 PROCEDURE — 83880 ASSAY OF NATRIURETIC PEPTIDE: CPT | Performed by: NURSE PRACTITIONER

## 2023-12-02 PROCEDURE — 83735 ASSAY OF MAGNESIUM: CPT | Performed by: NURSE PRACTITIONER

## 2023-12-02 PROCEDURE — 93010 ELECTROCARDIOGRAM REPORT: CPT | Mod: ,,, | Performed by: STUDENT IN AN ORGANIZED HEALTH CARE EDUCATION/TRAINING PROGRAM

## 2023-12-02 PROCEDURE — 85025 COMPLETE CBC W/AUTO DIFF WBC: CPT | Performed by: NURSE PRACTITIONER

## 2023-12-02 PROCEDURE — 80053 COMPREHEN METABOLIC PANEL: CPT | Performed by: NURSE PRACTITIONER

## 2023-12-02 PROCEDURE — 83690 ASSAY OF LIPASE: CPT | Performed by: NURSE PRACTITIONER

## 2023-12-02 PROCEDURE — 93005 ELECTROCARDIOGRAM TRACING: CPT

## 2023-12-02 NOTE — ED PROVIDER NOTES
"Encounter Date: 12/2/2023       History     Chief Complaint   Patient presents with    Facial Swelling    Leg Swelling     74 y/o AAF with PMHx of Renal transplant (7 years ago at [facility]), CHF, Stroke, HTN, DM2, HLD, Atherosclerotic heart disease arrived to the ED with complaint of increase swelling of face and BLE over the past week. Reports increase in SOB and occasional productive cough with thick while sputum. SOB worse with lying down. Also having decrease in urination, but increase in frequency. Denies fever, wheezing, dysuria or hematuria. She was seen by NP on Tuesday for sinus drainage and congestion. Was given Levaquin for 7 days.       The history is provided by the patient.     Review of patient's allergies indicates:   Allergen Reactions    Hydrocodone-acetaminophen Rash    Gabapentin Other (See Comments)     Made her disoriented  "out of my head"      Morphine Itching    Opioids - morphine analogues      Past Medical History:   Diagnosis Date    Amputation of one or more toes     2 TOES RT FOOT, 3 TOES LFT FOOT    Atherosclerotic heart disease of native coronary artery with angina pectoris 01/20/2020    Depression     Diabetes mellitus, type 2     Disorder of kidney and ureter     History of carpal tunnel surgery of left wrist     History of carpal tunnel surgery of right wrist     History of repair of left rotator cuff     History of repair of right rotator cuff     Hyperlipidemia     Hypertension     Kidney transplant status 07/13/2020    Osteoporosis 07/22/2020    Proliferative diabetic retinopathy of both eyes 09/07/2023    Stroke      Past Surgical History:   Procedure Laterality Date    Appendix      EXTRACTION OF TOOTH Left 08/11/2021    HYSTERECTOMY      kidney transplant 2016      Have had issues since transplant, kidney had a virus per patient    OOPHORECTOMY      TOE AMPUTATION       Family History   Problem Relation Age of Onset    Diabetes Mother     Hyperlipidemia Mother     Heart disease " Mother     Hearing loss Mother     Diabetes Father     Hearing loss Father     Heart disease Father     Hyperlipidemia Father      Social History     Tobacco Use    Smoking status: Never    Smokeless tobacco: Never   Substance Use Topics    Alcohol use: Never    Drug use: Never     Review of Systems   Constitutional:  Positive for fatigue. Negative for activity change, appetite change, chills, diaphoresis and fever.   HENT:  Positive for congestion and facial swelling (eye lids). Negative for ear pain, sinus pressure, sinus pain and sore throat.    Eyes:  Negative for photophobia, pain, discharge, itching and visual disturbance.   Respiratory:  Positive for cough and shortness of breath. Negative for chest tightness and wheezing.    Cardiovascular:  Positive for leg swelling. Negative for chest pain and palpitations.   Gastrointestinal:  Positive for abdominal distention. Negative for diarrhea, nausea and vomiting.   Genitourinary:  Positive for decreased urine volume and frequency. Negative for dysuria, flank pain and hematuria.   Musculoskeletal: Negative.    Skin: Negative.    Neurological: Negative.  Negative for dizziness, weakness, light-headedness and headaches.   Hematological: Negative.    Psychiatric/Behavioral: Negative.         Physical Exam     Initial Vitals [12/02/23 0950]   BP Pulse Resp Temp SpO2   138/63 78 18 98.3 °F (36.8 °C) 96 %      MAP       --         Physical Exam    Nursing note and vitals reviewed.  Constitutional: Vital signs are normal. She appears well-developed and well-nourished. She is cooperative. She appears ill. No distress.   HENT:   Head: Normocephalic and atraumatic.   Right Ear: External ear normal.   Left Ear: External ear normal.   Nose: Nose normal. Right sinus exhibits no maxillary sinus tenderness and no frontal sinus tenderness. Left sinus exhibits no maxillary sinus tenderness and no frontal sinus tenderness.   Mouth/Throat: Uvula is midline, oropharynx is clear and  moist and mucous membranes are normal.   Eyes: Conjunctivae are normal. Pupils are equal, round, and reactive to light.   Bilateral eye lids edematous   Neck: Neck supple.   Normal range of motion.   Full passive range of motion without pain.     Cardiovascular:  Normal rate, regular rhythm, normal heart sounds and normal pulses.           2+ pitting edema to BLE   Pulmonary/Chest: Effort normal. She has decreased breath sounds. She has no wheezes. She has no rhonchi. She has no rales.   Abdominal: Abdomen is soft. Bowel sounds are normal. There is no abdominal tenderness.   Musculoskeletal:         General: Normal range of motion.      Cervical back: Full passive range of motion without pain, normal range of motion and neck supple.     Neurological: She is alert and oriented to person, place, and time. She has normal strength.   Skin: Skin is warm, dry and intact.   Psychiatric: She has a normal mood and affect. Her speech is normal and behavior is normal. Thought content normal. Cognition and memory are normal.         Medical Screening Exam   See Full Note    ED Course   Procedures  Labs Reviewed   COMPREHENSIVE METABOLIC PANEL - Abnormal; Notable for the following components:       Result Value    Anion Gap 19 (*)     Glucose 129 (*)     BUN 67 (*)     Creatinine 4.18 (*)     Calcium 8.2 (*)     Alk Phos 53 (*)     eGFR 11 (*)     All other components within normal limits   NT-PRO NATRIURETIC PEPTIDE - Abnormal; Notable for the following components:    ProBNP 6,200 (*)     All other components within normal limits   MAGNESIUM - Abnormal; Notable for the following components:    Magnesium 1.4 (*)     All other components within normal limits   URINALYSIS, REFLEX TO URINE CULTURE - Abnormal; Notable for the following components:    Bilirubin, UA Small (*)     Specific Gravity, UA >=1.030 (*)     All other components within normal limits   CBC WITH DIFFERENTIAL - Abnormal; Notable for the following components:     RBC 3.56 (*)     Hemoglobin 9.7 (*)     Hematocrit 29.9 (*)     RDW 15.1 (*)     Platelet Count 147 (*)     Neutrophils % 71.2 (*)     Lymphocytes % 11.8 (*)     Lymphocytes, Absolute 0.85 (*)     Monocytes % 15.0 (*)     Monocytes, Absolute 1.08 (*)     All other components within normal limits   LACTIC ACID, PLASMA - Normal   LIPASE - Normal   TSH - Normal   TROPONIN I - Normal   CBC W/ AUTO DIFFERENTIAL    Narrative:     The following orders were created for panel order CBC auto differential.  Procedure                               Abnormality         Status                     ---------                               -----------         ------                     CBC with Differential[[id number]]       Abnormal            Final result                 Please view results for these tests on the individual orders.        ECG Results              EKG 12-lead (In process)  Result time 12/02/23 11:46:37      In process by Interface, Lab In [facility] (12/02/23 11:46:37)                   Narrative:    Test Reason : I10,    Vent. Rate : 080 BPM     Atrial Rate : 080 BPM     P-R Int : 186 ms          QRS Dur : 110 ms      QT Int : 400 ms       P-R-T Axes : 047 037 050 degrees     QTc Int : 461 ms    Normal sinus rhythm  Septal infarct (cited on or before 10-NOV-2022)  Possible Lateral infarct (cited on or before 10-NOV-2022)  Abnormal ECG  When compared with ECG of 10-NOV-2022 13:31,  T wave inversion no longer evident in Lateral leads    Referred By: [email or web address]   SELF           Confirmed By:                                   Imaging Results              X-Ray Chest AP Portable (Final result)  Result time 12/02/23 12:23:19      Final result by Lorne Rodriguez DO (12/02/23 12:23:19)                   Impression:      Multiple pulmonary nodules scattered throughout the lungs are better visualized on the CT performed 09/13/2023.    Bibasilar airspace opacities within the lower lobes.    Small bilateral pleural  effusions.      Electronically signed by: Lorne Rodriguez  Date:    12/02/2023  Time:    12:23               Narrative:    EXAMINATION:  XR CHEST AP PORTABLE    CLINICAL HISTORY:  cough;    TECHNIQUE:  XR CHEST AP PORTABLE    COMPARISON:  9/13/23    FINDINGS:  No lines or tubes.    Multiple pulmonary nodules scattered throughout the lungs are better visualized on the CT performed 09/13/2023.    Bibasilar airspace opacities within the lower lobes.    Small bilateral pleural effusions.    Normal pleura.    Cardiac silhouette is similar to comparison exam.    No obvious acute bone findings.                                       Medications - No data to display  Medical Decision Making  VS wnl. O2 sat 96% on room air. Eye lids noted with swelling. HRRR. Lungs diminished in BLL. BS + x 4 quads, soft and non-tender. 2+ pitting edema to BLE. Appears in NAD.    Amount and/or Complexity of Data Reviewed  External Data Reviewed: notes.     Details: Echo:  Date: 6/19/2023  aNsrin Grant   [facility]# [id number]      Indication(s): Palpitations, murmur   Summary:   The left atrium is moderately enlarged with left atrial volume index of 36   mL/meter ²   Anterior and posterior mitral leaflets are mildly thickened.  There is   mild-to-moderate calcification of the annulus there is no significant   mitral regurgitation.  There is probably no mitral stenosis in the heart   is hyperdynamic.   The left ventricular mass is moderately increased at g per meter ².  This   is concentric hypertrophy.   Left ventricular systolic function is hyperdynamic in the left ventricular   ejection fraction is 0.70.  The global longitudinal strain is -14%.   Grade II diastolic relaxation.  Note: the average E/e' ratio is 27.   Mild tricuspid regurgitation.   Hypertension.  The right ventricular systolic pressure is 57 mm-Hg.   The right ventricle is normal in size and function the Tricuspid Annular   Plane Systolic Excursion is 33.   The aortic arch is  normal.   Compared to prior echocardiogram on 11/08/2022 the pulmonary artery   pressure has increased.   Paolo Robledo M.D., MPH   Attending Cardiologist   Paolo Robledo M.D., MPH     Labs: ordered.  Radiology: ordered.     Details: CXR:Multiple pulmonary nodules scattered throughout the lungs are better visualized on the CT performed 09/13/2023.  Bibasilar airspace opacities within the lower lobes.  Small bilateral pleural effusions.    ECG/medicine tests: ordered.     Details: EKG: NSR. Septal infarct , age undetermined.  Possible Lateral infarct, age undetermined. GHR 80 bpm. Unchanged from previous EKG 11/10/2022.   Discussion of management or test interpretation with external provider(s): Admission MDM  I discussed lab, EKG and CXR results with patient.   Discussed treatment plan with patient to transfer to [facility] for further evaluation by Nephrologist/Transplant team due to worsening of BUN/CR from 41/2.74 on 10/19/23 which is up to 67/4.18.  13:00 Call to [facility]. Dr. Salas accepted to transfer to [facility].                                           Clinical Impression:   Final diagnoses:  [I10] Hypertension  [E83.42] Hypomagnesemia  [R79.89] Elevated brain natriuretic peptide (BNP) level  [N17.9, N18.9] Acute renal failure superimposed on chronic kidney disease, unspecified acute renal failure type, unspecified CKD stage - BUN/CR 67/4.18 (Primary)        ED Disposition Condition    Transfer to Another Facility Mine Rodrigues, [facility]  12/02/23 2126

## 2023-12-02 NOTE — ED TRIAGE NOTES
Presents with c/o swelling all over this past week.  Sinus drainage and congestion.  Saw NP on Tuesday and was started on an antibiotic.  Noticed a decrease in urination yesterday and has continued to swell.  Had a kidney transplant about 7 years ago at [facility].  Reports increased SOB with exertion.

## 2023-12-08 ENCOUNTER — PATIENT OUTREACH (OUTPATIENT)
Dept: ADMINISTRATIVE | Facility: CLINIC | Age: 73
End: 2023-12-08

## 2023-12-08 ENCOUNTER — EXTERNAL HOSPITAL ADMISSION (OUTPATIENT)
Dept: ADMINISTRATIVE | Facility: CLINIC | Age: 73
End: 2023-12-08

## 2023-12-08 NOTE — PROGRESS NOTES
C3 nurse spoke with Nasrin Grant  for a TCC post hospital discharge follow up call. The patient has a scheduled [facility] appointment with Laury Enrique FNP  on 12/18/23 @ 1020.  Discharge med changes include; bumetanide 2mg increased to bid, isosorbide 30mg changed to daily, Novolog dose 6 u breakfast, 6u lunch and 9u dinner, tacrolimus 0.5mg changed to tid.   Pt reports she also takes Tresiba 10u qhs and lidocaine 5% 2 patches daily-meds not listed in Epic

## 2023-12-11 DIAGNOSIS — Z94.0 HISTORY OF KIDNEY TRANSPLANT: Primary | ICD-10-CM

## 2023-12-12 ENCOUNTER — PATIENT OUTREACH (OUTPATIENT)
Dept: ADMINISTRATIVE | Facility: HOSPITAL | Age: 73
End: 2023-12-12

## 2023-12-12 NOTE — PROGRESS NOTES
Uploaded eye exam from [facility]. Pt does have retinopathy E11.3593, repeat in 1 year

## 2023-12-18 ENCOUNTER — OFFICE VISIT (OUTPATIENT)
Dept: FAMILY MEDICINE | Facility: CLINIC | Age: 73
End: 2023-12-18
Payer: MEDICARE

## 2023-12-18 VITALS
SYSTOLIC BLOOD PRESSURE: 122 MMHG | DIASTOLIC BLOOD PRESSURE: 78 MMHG | RESPIRATION RATE: 18 BRPM | OXYGEN SATURATION: 99 % | HEIGHT: 64 IN | BODY MASS INDEX: 25.1 KG/M2 | HEART RATE: 62 BPM | WEIGHT: 147 LBS | TEMPERATURE: 98 F

## 2023-12-18 DIAGNOSIS — I10 ESSENTIAL HYPERTENSION: ICD-10-CM

## 2023-12-18 DIAGNOSIS — D64.9 ANEMIA, UNSPECIFIED TYPE: ICD-10-CM

## 2023-12-18 DIAGNOSIS — E11.9 TYPE 2 DIABETES MELLITUS WITHOUT COMPLICATION, UNSPECIFIED WHETHER LONG TERM INSULIN USE: ICD-10-CM

## 2023-12-18 DIAGNOSIS — R05.9 COUGH, UNSPECIFIED TYPE: ICD-10-CM

## 2023-12-18 DIAGNOSIS — Z09 HOSPITAL DISCHARGE FOLLOW-UP: Primary | ICD-10-CM

## 2023-12-18 DIAGNOSIS — R53.1 WEAKNESS: ICD-10-CM

## 2023-12-18 LAB
CTP QC/QA: YES
FLUAV AG NPH QL: NEGATIVE
FLUBV AG NPH QL: NEGATIVE
SARS-COV-2 AG RESP QL IA.RAPID: NEGATIVE

## 2023-12-18 PROCEDURE — 3066F NEPHROPATHY DOC TX: CPT | Mod: ,,, | Performed by: NURSE PRACTITIONER

## 2023-12-18 PROCEDURE — 3288F PR FALLS RISK ASSESSMENT DOCUMENTED: ICD-10-PCS | Mod: ,,, | Performed by: NURSE PRACTITIONER

## 2023-12-18 PROCEDURE — 3078F DIAST BP <80 MM HG: CPT | Mod: ,,, | Performed by: NURSE PRACTITIONER

## 2023-12-18 PROCEDURE — 99495 TRANSJ CARE MGMT MOD F2F 14D: CPT | Mod: ,,, | Performed by: NURSE PRACTITIONER

## 2023-12-18 PROCEDURE — 3008F PR BODY MASS INDEX (BMI) DOCUMENTED: ICD-10-PCS | Mod: ,,, | Performed by: NURSE PRACTITIONER

## 2023-12-18 PROCEDURE — 1160F RVW MEDS BY RX/DR IN RCRD: CPT | Mod: ,,, | Performed by: NURSE PRACTITIONER

## 2023-12-18 PROCEDURE — 99495 TCM SERVICES (MODERATE COMPLEXITY): ICD-10-PCS | Mod: ,,, | Performed by: NURSE PRACTITIONER

## 2023-12-18 PROCEDURE — 3060F POS MICROALBUMINURIA REV: CPT | Mod: ,,, | Performed by: NURSE PRACTITIONER

## 2023-12-18 PROCEDURE — 3074F SYST BP LT 130 MM HG: CPT | Mod: ,,, | Performed by: NURSE PRACTITIONER

## 2023-12-18 PROCEDURE — 3008F BODY MASS INDEX DOCD: CPT | Mod: ,,, | Performed by: NURSE PRACTITIONER

## 2023-12-18 PROCEDURE — 1101F PR PT FALLS ASSESS DOC 0-1 FALLS W/OUT INJ PAST YR: ICD-10-PCS | Mod: ,,, | Performed by: NURSE PRACTITIONER

## 2023-12-18 PROCEDURE — 3078F PR MOST RECENT DIASTOLIC BLOOD PRESSURE < 80 MM HG: ICD-10-PCS | Mod: ,,, | Performed by: NURSE PRACTITIONER

## 2023-12-18 PROCEDURE — 3288F FALL RISK ASSESSMENT DOCD: CPT | Mod: ,,, | Performed by: NURSE PRACTITIONER

## 2023-12-18 PROCEDURE — 3051F PR MOST RECENT HEMOGLOBIN A1C LEVEL 7.0 - < 8.0%: ICD-10-PCS | Mod: ,,, | Performed by: NURSE PRACTITIONER

## 2023-12-18 PROCEDURE — 87428 SARSCOV & INF VIR A&B AG IA: CPT | Mod: QW,,, | Performed by: NURSE PRACTITIONER

## 2023-12-18 PROCEDURE — 1159F PR MEDICATION LIST DOCUMENTED IN MEDICAL RECORD: ICD-10-PCS | Mod: ,,, | Performed by: NURSE PRACTITIONER

## 2023-12-18 PROCEDURE — 3074F PR MOST RECENT SYSTOLIC BLOOD PRESSURE < 130 MM HG: ICD-10-PCS | Mod: ,,, | Performed by: NURSE PRACTITIONER

## 2023-12-18 PROCEDURE — 1101F PT FALLS ASSESS-DOCD LE1/YR: CPT | Mod: ,,, | Performed by: NURSE PRACTITIONER

## 2023-12-18 PROCEDURE — 3051F HG A1C>EQUAL 7.0%<8.0%: CPT | Mod: ,,, | Performed by: NURSE PRACTITIONER

## 2023-12-18 PROCEDURE — 1160F PR REVIEW ALL MEDS BY PRESCRIBER/CLIN PHARMACIST DOCUMENTED: ICD-10-PCS | Mod: ,,, | Performed by: NURSE PRACTITIONER

## 2023-12-18 PROCEDURE — 1159F MED LIST DOCD IN RCRD: CPT | Mod: ,,, | Performed by: NURSE PRACTITIONER

## 2023-12-18 PROCEDURE — 87428 POCT SARS-COV2 (COVID) WITH FLU ANTIGEN: ICD-10-PCS | Mod: QW,,, | Performed by: NURSE PRACTITIONER

## 2023-12-18 PROCEDURE — 3066F PR DOCUMENTATION OF TREATMENT FOR NEPHROPATHY: ICD-10-PCS | Mod: ,,, | Performed by: NURSE PRACTITIONER

## 2023-12-18 PROCEDURE — 3060F PR POS MICROALBUMINURIA RESULT DOCUMENTED/REVIEW: ICD-10-PCS | Mod: ,,, | Performed by: NURSE PRACTITIONER

## 2023-12-18 RX ORDER — FERROUS SULFATE 325(65) MG
325 TABLET ORAL DAILY
Qty: 30 TABLET | Refills: 2 | Status: SHIPPED | OUTPATIENT
Start: 2023-12-18 | End: 2024-01-10

## 2023-12-18 RX ORDER — FLUTICASONE PROPIONATE 50 MCG
1 SPRAY, SUSPENSION (ML) NASAL
COMMUNITY
Start: 2023-12-08 | End: 2024-12-07

## 2023-12-18 RX ORDER — INSULIN DEGLUDEC 100 U/ML
INJECTION, SOLUTION SUBCUTANEOUS
COMMUNITY
End: 2024-02-14

## 2023-12-18 RX ORDER — METOLAZONE 5 MG/1
5 TABLET ORAL
COMMUNITY
End: 2024-02-14

## 2023-12-18 RX ORDER — PANTOPRAZOLE SODIUM 40 MG/1
40 TABLET, DELAYED RELEASE ORAL
COMMUNITY
Start: 2023-12-07 | End: 2024-12-06

## 2023-12-18 NOTE — PROGRESS NOTES
Health Maintenance Due   Topic Date Due    Shingles Vaccine (1 of 2) Never done    RSV Vaccine (Age 60+ and Pregnant patients) (1 - 1-dose 60+ series) Never done    Influenza Vaccine (1) 09/01/2023    COVID-19 Vaccine (5 - 2023-24 season) 09/01/2023    Foot Exam  11/29/2023    Diabetes Urine Screening  01/10/2024     Discussed care gaps with pt  Pt is not interested in any vaccines or screenings today

## 2023-12-18 NOTE — PROGRESS NOTES
CHAPARRITA Bella   [facility]  [address] [address]  [phone]      PATIENT NAME: Nasrin Grant  : 1950  DATE: 23  MRN: [id number]      Billing Provider: CHAPARRITA Bella  Level of Service: [place] OFFICE/OUTPT VISIT, EST, LEVL IV, 30-39 MIN  Patient PCP Information       Provider PCP Type    CHAPARRITA Bella General            Reason for Visit / Chief Complaint: Fatigue and Hospital Follow Up (Follow up from hospital stay @ [facility] was D/C on )       Update PCP  Update Chief Complaint         History of Present Illness / Problem Focused Workflow     73 year old female presents for hospital follow up   Recently d/c from [facility] with shortness of breath  Reports she if feeling better but continues to have fatigue      Review of Systems     Review of Systems   Constitutional:  Positive for fatigue. Negative for fever.   HENT:  Negative for congestion and sore throat.    Respiratory:  Positive for shortness of breath (recently admitted at [facility]; reports has now resolved). Negative for cough.    Cardiovascular:  Negative for chest pain.   Gastrointestinal:  Negative for abdominal pain, diarrhea and nausea.   Musculoskeletal:  Positive for arthralgias and gait problem (uses assistive device).   Neurological:  Positive for weakness. Negative for dizziness and headaches.   Psychiatric/Behavioral:  Negative for agitation and dysphoric mood.        Medical / Social / Family History     Past Medical History:   Diagnosis Date    Amputation of one or more toes     2 TOES RT FOOT, 3 TOES LFT FOOT    Atherosclerotic heart disease of native coronary artery with angina pectoris 2020    Depression     Diabetes mellitus, type 2     Disorder of kidney and ureter     History of carpal tunnel surgery of left wrist     History of carpal tunnel surgery of right wrist     History of repair of left rotator cuff     History of repair of right rotator  cuff     Hyperlipidemia     Hypertension     Kidney transplant status 07/13/2020    Osteoporosis 07/22/2020    Proliferative diabetic retinopathy of both eyes 09/07/2023    Stroke        Past Surgical History:   Procedure Laterality Date    Appendix      EXTRACTION OF TOOTH Left 08/11/2021    HYSTERECTOMY      kidney transplant 2016      Have had issues since transplant, 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  reports that she has never smoked. She has never used smokeless tobacco. She reports that she does not drink alcohol and does not use drugs.    Family History  Ms.'s family history includes Diabetes in her father and mother; Hearing loss in her father and mother; Heart disease in her father and mother; Hyperlipidemia in her father and mother.    Medications and Allergies     Medications  Outpatient Medications Marked as Taking for the 12/18/23 encounter (Office Visit) with Laury Enrique FNP   Medication Sig Dispense Refill    albuterol (VENTOLIN HFA) 90 mcg/actuation inhaler Inhale 2 puffs into the lungs every 6 (six) hours as needed for Wheezing. Rescue 18 g 5    aspirin (ECOTRIN) 81 MG EC tablet Take 81 mg by mouth once daily.      atorvastatin (LIPITOR) 40 MG tablet Take 1 tablet (40 mg total) by mouth once daily. 90 tablet 1    bumetanide (BUMEX) 2 MG tablet Take 1 tablet (2 mg total) by mouth once daily. 30 tablet 11    carvediloL (COREG) 25 MG tablet TAKE ONE TABLET BY MOUTH TWICE DAILY 180 tablet 1    flash glucose sensor (FREESTYLE MARY 10 DAY SENSOR MISC) 1 Device by Percutaneous route.      fluticasone propionate (FLONASE) 50 mcg/actuation nasal spray 1 spray by Nasal route.      hydrALAZINE (APRESOLINE) 100 MG tablet Take 100 mg by mouth 3 (three) times daily.      isosorbide mononitrate (IMDUR) 30 MG 24 hr tablet Take 30 mg by mouth 2 (two) times daily.      Lactobacillus acidophilus (PROBIOTIC ACIDOPHILUS ORAL) Take 4 Billion Cells by  "mouth.      losartan (COZAAR) 25 MG tablet Take 25 mg by mouth once daily.      metOLazone (ZAROXOLYN) 5 MG tablet Take 5 mg by mouth.      minoxidiL (LONITEN) 2.5 MG tablet Take 2.5 mg by mouth.      mirabegron (MYRBETRIQ) 25 mg Tb24 ER tablet Take 25 mg by mouth once daily.      mycophenolate (MYFORTIC) 180 MG TbEC Take 180 mg by mouth 2 (two) times daily.      NIFEdipine (PROCARDIA-XL) 90 MG (OSM) 24 hr tablet Take 90 mg by mouth once daily.      nitroGLYCERIN (NITROSTAT) 0.4 MG SL tablet DISSOLVE 1 TABLET UNDER THE TONGUE EVERY 5 MINUTES AS NEEDED FOR CHEST PAIN. DO NOT EXCEED A TOTAL OF 3 DOSES IN 15 MINUTES.      NOVOLOG FLEXPEN U-100 INSULIN 100 unit/mL (3 mL) InPn pen INJECT FIVE UNITS into THE SKIN THREE TIMES DAILY WITH MEALS 15 mL 1    pantoprazole (PROTONIX) 40 MG tablet Take 40 mg by mouth.      predniSONE (DELTASONE) 5 MG tablet Take 1 tablet by mouth once daily.      tacrolimus (PROGRAF) 0.5 MG Cap Take 1.5 mg by mouth.      tamsulosin (FLOMAX) 0.4 mg Cap       TRESIBA FLEXTOUCH U-100 100 unit/mL (3 mL) insulin pen Inject into the skin.      triprolidine-pseudoephedrine (APRODINE) 2.5-60 mg Tab Take 1 tablet by mouth every 6 (six) hours as needed.      [DISCONTINUED] pen needle, diabetic 31 gauge x 3/16" Ndle 1 each by NOT APPLICABLE route.      [DISCONTINUED] promethazine-dextromethorphan (PROMETHAZINE-DM) 6.25-15 mg/5 mL Syrp Take 5 mLs by mouth nightly as needed (cough). 90 mL 0       Allergies  Review of patient's allergies indicates:   Allergen Reactions    Hydrocodone-acetaminophen Rash    Gabapentin Other (See Comments)     Made her disoriented  "out of my head"      Morphine Itching    Opioids - morphine analogues        Physical Examination     Vitals:    12/18/23 1040   BP: 122/78   Pulse: 62   Resp: 18   Temp: 98 °F (36.7 °C)     Physical Exam  Constitutional:       General: She is not in acute distress.  HENT:      Head: Normocephalic.      Nose: Nose normal.      Mouth/Throat:      Mouth: " Mucous membranes are moist.   Eyes:      Extraocular Movements: Extraocular movements intact.   Cardiovascular:      Rate and Rhythm: Normal rate.   Pulmonary:      Effort: Pulmonary effort is normal. No respiratory distress.   Abdominal:      General: Bowel sounds are normal.      Palpations: Abdomen is soft.   Musculoskeletal:         General: Normal range of motion.      Cervical back: Normal range of motion.   Skin:     General: Skin is warm.   Neurological:      Mental Status: She is alert.   Psychiatric:         Behavior: Behavior normal.           Imaging / Labs     Office Visit on 12/18/2023   Component Date Value Ref Range Status    SARS Coronavirus 2 Antigen 12/18/2023 Negative  Negative Final    Rapid Influenza A Ag 12/18/2023 Negative  Negative Final    Rapid Influenza B Ag 12/18/2023 Negative  Negative Final     Acceptable 12/18/2023 Yes   Final     X-Ray Chest AP Portable  Narrative: EXAMINATION:  XR CHEST AP PORTABLE    CLINICAL HISTORY:  cough;    TECHNIQUE:  XR CHEST AP PORTABLE    COMPARISON:  9/13/23    FINDINGS:  No lines or tubes.    Multiple pulmonary nodules scattered throughout the lungs are better visualized on the CT performed 09/13/2023.    Bibasilar airspace opacities within the lower lobes.    Small bilateral pleural effusions.    Normal pleura.    Cardiac silhouette is similar to comparison exam.    No obvious acute bone findings.  Impression: Multiple pulmonary nodules scattered throughout the lungs are better visualized on the CT performed 09/13/2023.    Bibasilar airspace opacities within the lower lobes.    Small bilateral pleural effusions.    Electronically signed by: Lorne Rodriguez  Date:    12/02/2023  Time:    12:23      Assessment and Plan (including Health Maintenance)      Problem List  Smart Sets  Document Outside HM   :    Health Maintenance Due   Topic Date Due    Shingles Vaccine (1 of 2) Never done    RSV Vaccine (Age 60+ and Pregnant patients) (1 -  1-dose 60+ series) Never done    Influenza Vaccine (1) 09/01/2023    COVID-19 Vaccine (5 - 2023-24 season) 09/01/2023    Foot Exam  11/29/2023    Diabetes Urine Screening  01/10/2024       Problem List Items Addressed This Visit          Cardiac/Vascular    Essential hypertension    Current Assessment & Plan     Condition is stable today  Continue current meds         Relevant Orders    SUBSEQUENT HOME HEALTH ORDERS       Oncology    Anemia    Current Assessment & Plan     Start back on ferrous sulfate daily   Will get follow up labs in 2 weeks per HH         Relevant Medications    ferrous sulfate (FEOSOL) 325 mg (65 mg iron) Tab tablet       Endocrine    DM2 (diabetes mellitus, type 2)    Relevant Medications    TRESIBA FLEXTOUCH U-100 100 unit/mL (3 mL) insulin pen    Other Relevant Orders    SUBSEQUENT HOME HEALTH ORDERS       Other    Weakness    Relevant Orders    POCT SARS-COV2 (COVID) with Flu Antigen (Completed)    Hospital discharge follow-up - Primary    Current Assessment & Plan     Recently d/c from [facility] with shortness of breath  Reports she is feeling much better but continues to be fatigue  Covid/flu negative today  Reports she did have some of her meds changed while in the hospital  Will get HH to get follow up labs in 2 weeks         Relevant Orders    SUBSEQUENT HOME HEALTH ORDERS     Other Visit Diagnoses       Cough, unspecified type        Relevant Orders    POCT SARS-COV2 (COVID) with Flu Antigen (Completed)            Health Maintenance Topics with due status: Not Due       Topic Last Completion Date    TETANUS VACCINE 10/16/2017    DEXA Scan 09/09/2021    Colorectal Cancer Screening 08/22/2022    Lipid Panel 08/21/2023    Mammogram 10/13/2023    Eye Exam 11/09/2023    Hemoglobin A1c 12/03/2023    High Dose Statin 12/18/2023       Future Appointments   Date Time Provider Department Center   2/27/2024 10:00 AM Laury Enrique FNP [facility]   9/13/2024 10:00 AM [facility]  [facility]   9/13/2024 10:50 AM Amari Girard MD [facility]  [facility]   10/18/2024 10:15 AM [facility]          Signature:  CHAPARRITA Bella  [facility] [address] [place] [address]  [phone]    Date of encounter: 12/18/23

## 2023-12-18 NOTE — ASSESSMENT & PLAN NOTE
Recently d/c from [facility] with shortness of breath  Reports she is feeling much better but continues to be fatigue  Covid/flu negative today  Reports she did have some of her meds changed while in the hospital  Will get  to get follow up labs in 2 weeks

## 2023-12-19 PROBLEM — R06.09 DYSPNEA ON EXERTION: Status: ACTIVE | Noted: 2023-09-06

## 2023-12-19 PROBLEM — R60.1 GENERALIZED EDEMA: Status: ACTIVE | Noted: 2023-12-19

## 2023-12-19 PROBLEM — J06.9 UPPER RESPIRATORY TRACT INFECTION: Status: ACTIVE | Noted: 2023-12-19

## 2023-12-19 NOTE — ASSESSMENT & PLAN NOTE
Congestion, cough, dyspnea on exertion  Discussed risks/benefits/alternatives for injections, steroids  Rocephin, decadron IM today  Levaquin po   Rest. Increase fluids as tolerated  She has appointment for follow up cardiology next week

## 2023-12-19 NOTE — PROGRESS NOTES
CHAPARRITA Bella   [facility]  [address] [address] [address]  [phone]      PATIENT NAME: Nasrin Grant  : 1950  DATE: 23  MRN: [id number]      Billing Provider: CHAPARRITA Bella  Level of Service:   Patient PCP Information       Provider PCP Type    CHAPARRITA Bella General            Reason for Visit / Chief Complaint: Edema (Ble edema X3 wks./States she used to be on lasix but was taken off.), Shortness of Breath (With exertion./States its not as bad as it has been in the past.), and Sinus Problem (States she's having some mucous at night time that makes her cough a little.)       Update PCP  Update Chief Complaint         History of Present Illness / Problem Focused Workflow     73 year old female presents with complaints of Edema   Bilat edema X3 wks./States she used to be on lasix but was taken off  Shortness of Breath (With exertion./States its not as bad as it has been in the past.)  Congestion, cough (States she's having some mucous at night time that makes her cough a little.)      Review of Systems     Review of Systems   Constitutional:  Positive for fatigue. Negative for fever.   HENT:  Positive for congestion. Negative for ear pain and sore throat.    Respiratory:  Positive for cough and shortness of breath (on exertion).    Cardiovascular:  Negative for palpitations.   Gastrointestinal:  Negative for abdominal pain, constipation and diarrhea.   Endocrine: Negative for polydipsia and polyuria.   Musculoskeletal:  Positive for arthralgias. Negative for gait problem.   Neurological:  Negative for dizziness, weakness and headaches.   Psychiatric/Behavioral:  Negative for agitation and dysphoric mood.        Medical / Social / Family History     Past Medical History:   Diagnosis Date    Amputation of one or more toes     2 TOES RT FOOT, 3 TOES LFT FOOT    Atherosclerotic heart disease of native coronary artery with angina  pectoris 01/20/2020    Depression     Diabetes mellitus, type 2     Disorder of kidney and ureter     History of carpal tunnel surgery of left wrist     History of carpal tunnel surgery of right wrist     History of repair of left rotator cuff     History of repair of right rotator cuff     Hyperlipidemia     Hypertension     Kidney transplant status 07/13/2020    Osteoporosis 07/22/2020    Proliferative diabetic retinopathy of both eyes 09/07/2023    Stroke        Past Surgical History:   Procedure Laterality Date    Appendix      EXTRACTION OF TOOTH Left 08/11/2021    HYSTERECTOMY      kidney transplant 2016      Have had issues since transplant, 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  reports that she has never smoked. She has never used smokeless tobacco. She reports that she does not drink alcohol and does not use drugs.    Family History  Ms.'s family history includes Diabetes in her father and mother; Hearing loss in her father and mother; Heart disease in her father and mother; Hyperlipidemia in her father and mother.    Medications and Allergies     Medications  Outpatient Medications Marked as Taking for the 11/27/23 encounter (Office Visit) with Laury Enrique FNP   Medication Sig Dispense Refill    albuterol (VENTOLIN HFA) 90 mcg/actuation inhaler Inhale 2 puffs into the lungs every 6 (six) hours as needed for Wheezing. Rescue 18 g 5    aspirin (ECOTRIN) 81 MG EC tablet Take 81 mg by mouth once daily.      atorvastatin (LIPITOR) 40 MG tablet Take 1 tablet (40 mg total) by mouth once daily. 90 tablet 1    bumetanide (BUMEX) 2 MG tablet Take 1 tablet (2 mg total) by mouth once daily. 30 tablet 11    carvediloL (COREG) 25 MG tablet TAKE ONE TABLET BY MOUTH TWICE DAILY 180 tablet 1    cholecalciferol, vitamin D3, (VITAMIN D3) 25 mcg (1,000 unit) capsule Take 1,000 Units by mouth.      empagliflozin (JARDIANCE) 10 mg tablet Take 1 tablet by mouth every  "morning.      flash glucose sensor (FREESTYLE MARY 10 DAY SENSOR MISC) 1 Device by Percutaneous route.      hydrALAZINE (APRESOLINE) 100 MG tablet Take 100 mg by mouth 3 (three) times daily.      isosorbide mononitrate (IMDUR) 30 MG 24 hr tablet Take 30 mg by mouth 2 (two) times daily.      Lactobacillus acidophilus (PROBIOTIC ACIDOPHILUS ORAL) Take 4 Billion Cells by mouth.      lancets (TRUEPLUS LANCETS) 33 gauge Misc Inject 1 lancet into the skin 3 (three) times daily before meals. 400 each 2    linaCLOtide (LINZESS) 72 mcg Cap capsule Take 72 mcg by mouth.      mirabegron (MYRBETRIQ) 25 mg Tb24 ER tablet Take 25 mg by mouth once daily.      mycophenolate (MYFORTIC) 180 MG TbEC Take 180 mg by mouth 2 (two) times daily.      NIFEdipine (PROCARDIA-XL) 90 MG (OSM) 24 hr tablet Take 90 mg by mouth once daily.      nitroGLYCERIN (NITROSTAT) 0.4 MG SL tablet DISSOLVE 1 TABLET UNDER THE TONGUE EVERY 5 MINUTES AS NEEDED FOR CHEST PAIN. DO NOT EXCEED A TOTAL OF 3 DOSES IN 15 MINUTES.      NOVOLOG FLEXPEN U-100 INSULIN 100 unit/mL (3 mL) InPn pen INJECT FIVE UNITS into THE SKIN THREE TIMES DAILY WITH MEALS 15 mL 1    pen needle, diabetic 31 gauge x 3/16" Ndle 1 each by NOT APPLICABLE route.      predniSONE (DELTASONE) 5 MG tablet Take 1 tablet by mouth once daily.      tacrolimus (PROGRAF) 0.5 MG Cap Take 1.5 mg by mouth.      [DISCONTINUED] blood sugar diagnostic (TRUE METRIX GLUCOSE TEST STRIP) Strp Inject 1 strip into the skin 3 (three) times daily before meals. (Patient not taking: Reported on 12/8/2023) 400 strip 2    [DISCONTINUED] blood-glucose meter (TRUE METRIX GLUCOSE METER) kit 1 each by Other route 3 (three) times daily before meals. (Patient not taking: Reported on 12/8/2023) 1 each 0    [DISCONTINUED] cinacalcet (SENSIPAR) 30 MG Tab TAKE ONE TABLET BY MOUTH ONCE DAILY (Patient not taking: Reported on 12/8/2023) 30 tablet 11    [DISCONTINUED] pen needle, diabetic 31 gauge x 3/16" Ndle 1 each by NOT APPLICABLE " "route.      [DISCONTINUED] potassium chloride (MICRO-K) 10 MEQ CpSR Take 1 capsule (10 mEq total) by mouth once daily. (Patient not taking: Reported on 12/8/2023) 90 capsule 1       Allergies  Review of patient's allergies indicates:   Allergen Reactions    Hydrocodone-acetaminophen Rash    Gabapentin Other (See Comments)     Made her disoriented  "out of my head"      Morphine Itching    Opioids - morphine analogues        Physical Examination     Vitals:    11/27/23 1355   BP: 122/76   Pulse: 73   Resp: 20   Temp: 97.7 °F (36.5 °C)     Physical Exam  Constitutional:       General: She is not in acute distress.  HENT:      Head: Normocephalic.      Right Ear: Tympanic membrane normal.      Left Ear: Tympanic membrane normal.      Nose: Congestion present.      Mouth/Throat:      Mouth: Mucous membranes are moist.      Pharynx: Posterior oropharyngeal erythema present. No oropharyngeal exudate.   Eyes:      Extraocular Movements: Extraocular movements intact.   Cardiovascular:      Rate and Rhythm: Normal rate.   Pulmonary:      Effort: Pulmonary effort is normal. No respiratory distress.      Breath sounds: No wheezing.   Abdominal:      General: Bowel sounds are normal.      Palpations: Abdomen is soft.   Musculoskeletal:         General: Normal range of motion.      Cervical back: Normal range of motion.   Skin:     General: Skin is warm.   Neurological:      Mental Status: She is alert.   Psychiatric:         Behavior: Behavior normal.           Imaging / Labs     No visits with results within 1 Day(s) from this visit.   Latest known visit with results is:   Patient Outreach on 10/19/2023   Component Date Value Ref Range Status    Left Eye DM Retinopathy 09/07/2023 Positive   Final    Right Eye DM Retinopathy 09/07/2023 Positive   Final     X-Ray Chest AP Portable  Narrative: EXAMINATION:  XR CHEST AP PORTABLE    CLINICAL HISTORY:  cough;    TECHNIQUE:  XR CHEST AP " PORTABLE    COMPARISON:  9/13/23    FINDINGS:  No lines or tubes.    Multiple pulmonary nodules scattered throughout the lungs are better visualized on the CT performed 09/13/2023.    Bibasilar airspace opacities within the lower lobes.    Small bilateral pleural effusions.    Normal pleura.    Cardiac silhouette is similar to comparison exam.    No obvious acute bone findings.  Impression: Multiple pulmonary nodules scattered throughout the lungs are better visualized on the CT performed 09/13/2023.    Bibasilar airspace opacities within the lower lobes.    Small bilateral pleural effusions.    Electronically signed by: Lorne Rodriguez  Date:    12/02/2023  Time:    12:23      Assessment and Plan (including Health Maintenance)      Problem List  Smart Sets  Document Outside HM   :    Health Maintenance Due   Topic Date Due    Shingles Vaccine (1 of 2) Never done    RSV Vaccine (Age 60+ and Pregnant patients) (1 - 1-dose 60+ series) Never done    Influenza Vaccine (1) 09/01/2023    COVID-19 Vaccine (5 - 2023-24 season) 09/01/2023    Foot Exam  11/29/2023    Diabetes Urine Screening  01/10/2024       Problem List Items Addressed This Visit          ENT    Upper respiratory tract infection - Primary    Current Assessment & Plan     Congestion, cough, dyspnea on exertion  Discussed risks/benefits/alternatives for injections, steroids  Rocephin, decadron IM today  Levaquin po   Rest. Increase fluids as tolerated  She has appointment for follow up cardiology next week            Cardiac/Vascular    Essential hypertension    Dyspnea on exertion       Other    Generalized edema    Current Assessment & Plan     Increase po lasix x 5 days; then resume usual dosage           Other Visit Diagnoses       Cough, unspecified type                Health Maintenance Topics with due status: Not Due       Topic Last Completion Date    TETANUS VACCINE 10/16/2017    DEXA Scan 09/09/2021    Colorectal Cancer Screening 08/22/2022     Lipid Panel 08/21/2023    Mammogram 10/13/2023    Eye Exam 11/09/2023    Hemoglobin A1c 12/03/2023    High Dose Statin 12/18/2023       Future Appointments   Date Time Provider Department Center   2/27/2024 10:00 AM Laury Enrique FNP [facility]   9/13/2024 10:00 AM [facility] CT1 [facility]   9/13/2024 10:50 AM Amari Girard MD [facility]  [facility]   10/18/2024 10:15 AM [facility] MAM[facility]1 [facility]          Signature:  CHAPARRITA Bella  [facility] [address] [place] [address]  [phone]    Date of encounter: 11/27/23

## 2023-12-27 ENCOUNTER — DOCUMENT SCAN (OUTPATIENT)
Dept: HOME HEALTH SERVICES | Facility: HOSPITAL | Age: 73
End: 2023-12-27
Payer: MEDICARE

## 2024-01-05 ENCOUNTER — DOCUMENT SCAN (OUTPATIENT)
Dept: HOME HEALTH SERVICES | Facility: HOSPITAL | Age: 74
End: 2024-01-05
Payer: MEDICARE

## 2024-01-09 ENCOUNTER — OFFICE VISIT (OUTPATIENT)
Dept: FAMILY MEDICINE | Facility: CLINIC | Age: 74
End: 2024-01-09
Payer: MEDICARE

## 2024-01-09 VITALS
SYSTOLIC BLOOD PRESSURE: 124 MMHG | RESPIRATION RATE: 18 BRPM | BODY MASS INDEX: 26.87 KG/M2 | HEIGHT: 64 IN | HEART RATE: 70 BPM | OXYGEN SATURATION: 97 % | DIASTOLIC BLOOD PRESSURE: 78 MMHG | TEMPERATURE: 98 F | WEIGHT: 157.38 LBS

## 2024-01-09 DIAGNOSIS — E11.9 TYPE 2 DIABETES MELLITUS WITHOUT COMPLICATION, UNSPECIFIED WHETHER LONG TERM INSULIN USE: ICD-10-CM

## 2024-01-09 DIAGNOSIS — D64.9 ANEMIA, UNSPECIFIED TYPE: ICD-10-CM

## 2024-01-09 DIAGNOSIS — Z09 HOSPITAL DISCHARGE FOLLOW-UP: Primary | ICD-10-CM

## 2024-01-09 DIAGNOSIS — N18.9 CHRONIC KIDNEY DISEASE, UNSPECIFIED CKD STAGE: ICD-10-CM

## 2024-01-09 DIAGNOSIS — K52.9 CHRONIC DIARRHEA: ICD-10-CM

## 2024-01-09 DIAGNOSIS — Z94.0 HISTORY OF KIDNEY TRANSPLANT: ICD-10-CM

## 2024-01-09 DIAGNOSIS — I10 ESSENTIAL HYPERTENSION: ICD-10-CM

## 2024-01-09 DIAGNOSIS — Z71.89 COMPLEX CARE COORDINATION: ICD-10-CM

## 2024-01-09 PROBLEM — J06.9 UPPER RESPIRATORY TRACT INFECTION: Status: RESOLVED | Noted: 2023-12-19 | Resolved: 2024-01-09

## 2024-01-09 PROBLEM — R53.1 WEAKNESS: Status: RESOLVED | Noted: 2022-11-01 | Resolved: 2024-01-09

## 2024-01-09 LAB
ALBUMIN SERPL BCP-MCNC: 3.5 G/DL (ref 3.5–5)
ALBUMIN/GLOB SERPL: 1.2 {RATIO}
ALP SERPL-CCNC: 44 U/L (ref 55–142)
ALT SERPL W P-5'-P-CCNC: 16 U/L (ref 13–56)
ANION GAP SERPL CALCULATED.3IONS-SCNC: 11 MMOL/L (ref 7–16)
AST SERPL W P-5'-P-CCNC: 15 U/L (ref 15–37)
BASOPHILS # BLD AUTO: 0.05 K/UL (ref 0–0.2)
BASOPHILS NFR BLD AUTO: 1 % (ref 0–1)
BILIRUB SERPL-MCNC: 0.5 MG/DL (ref ?–1.2)
BUN SERPL-MCNC: 48 MG/DL (ref 7–18)
BUN/CREAT SERPL: 19 (ref 6–20)
CALCIUM SERPL-MCNC: 9.7 MG/DL (ref 8.5–10.1)
CHLORIDE SERPL-SCNC: 108 MMOL/L (ref 98–107)
CO2 SERPL-SCNC: 23 MMOL/L (ref 21–32)
CREAT SERPL-MCNC: 2.56 MG/DL (ref 0.55–1.02)
DIFFERENTIAL METHOD BLD: ABNORMAL
EGFR (NO RACE VARIABLE) (RUSH/TITUS): 19 ML/MIN/1.73M2
EOSINOPHIL # BLD AUTO: 0.13 K/UL (ref 0–0.5)
EOSINOPHIL NFR BLD AUTO: 2.6 % (ref 1–4)
ERYTHROCYTE [DISTWIDTH] IN BLOOD BY AUTOMATED COUNT: 15.5 % (ref 11.5–14.5)
GLOBULIN SER-MCNC: 2.9 G/DL (ref 2–4)
GLUCOSE SERPL-MCNC: 126 MG/DL (ref 74–106)
HCT VFR BLD AUTO: 29.4 % (ref 38–47)
HGB BLD-MCNC: 9.5 G/DL (ref 12–16)
IMM GRANULOCYTES # BLD AUTO: 0.01 K/UL (ref 0–0.04)
IMM GRANULOCYTES NFR BLD: 0.2 % (ref 0–0.4)
LYMPHOCYTES # BLD AUTO: 0.99 K/UL (ref 1–4.8)
LYMPHOCYTES NFR BLD AUTO: 20.1 % (ref 27–41)
MCH RBC QN AUTO: 26.7 PG (ref 27–31)
MCHC RBC AUTO-ENTMCNC: 32.3 G/DL (ref 32–36)
MCV RBC AUTO: 82.6 FL (ref 80–96)
MONOCYTES # BLD AUTO: 0.81 K/UL (ref 0–0.8)
MONOCYTES NFR BLD AUTO: 16.5 % (ref 2–6)
MPC BLD CALC-MCNC: 11.6 FL (ref 9.4–12.4)
NEUTROPHILS # BLD AUTO: 2.93 K/UL (ref 1.8–7.7)
NEUTROPHILS NFR BLD AUTO: 59.6 % (ref 53–65)
NRBC # BLD AUTO: 0 X10E3/UL
NRBC, AUTO (.00): 0 %
PLATELET # BLD AUTO: 129 K/UL (ref 150–400)
POTASSIUM SERPL-SCNC: 4.7 MMOL/L (ref 3.5–5.1)
PROT SERPL-MCNC: 6.4 G/DL (ref 6.4–8.2)
RBC # BLD AUTO: 3.56 M/UL (ref 4.2–5.4)
SODIUM SERPL-SCNC: 137 MMOL/L (ref 136–145)
WBC # BLD AUTO: 4.92 K/UL (ref 4.5–11)

## 2024-01-09 PROCEDURE — 3288F FALL RISK ASSESSMENT DOCD: CPT | Mod: ,,, | Performed by: NURSE PRACTITIONER

## 2024-01-09 PROCEDURE — 99214 OFFICE O/P EST MOD 30 MIN: CPT | Mod: ,,, | Performed by: NURSE PRACTITIONER

## 2024-01-09 PROCEDURE — 1160F RVW MEDS BY RX/DR IN RCRD: CPT | Mod: ,,, | Performed by: NURSE PRACTITIONER

## 2024-01-09 PROCEDURE — 80053 COMPREHEN METABOLIC PANEL: CPT | Mod: ,,, | Performed by: CLINICAL MEDICAL LABORATORY

## 2024-01-09 PROCEDURE — 3074F SYST BP LT 130 MM HG: CPT | Mod: ,,, | Performed by: NURSE PRACTITIONER

## 2024-01-09 PROCEDURE — 85025 COMPLETE CBC W/AUTO DIFF WBC: CPT | Mod: ,,, | Performed by: CLINICAL MEDICAL LABORATORY

## 2024-01-09 PROCEDURE — 1101F PT FALLS ASSESS-DOCD LE1/YR: CPT | Mod: ,,, | Performed by: NURSE PRACTITIONER

## 2024-01-09 PROCEDURE — 1126F AMNT PAIN NOTED NONE PRSNT: CPT | Mod: ,,, | Performed by: NURSE PRACTITIONER

## 2024-01-09 PROCEDURE — 3078F DIAST BP <80 MM HG: CPT | Mod: ,,, | Performed by: NURSE PRACTITIONER

## 2024-01-09 PROCEDURE — 3008F BODY MASS INDEX DOCD: CPT | Mod: ,,, | Performed by: NURSE PRACTITIONER

## 2024-01-09 PROCEDURE — 1159F MED LIST DOCD IN RCRD: CPT | Mod: ,,, | Performed by: NURSE PRACTITIONER

## 2024-01-09 RX ORDER — CINACALCET 30 MG/1
30 TABLET, FILM COATED ORAL
COMMUNITY
End: 2024-02-14

## 2024-01-09 RX ORDER — FUROSEMIDE 40 MG/1
40 TABLET ORAL 2 TIMES DAILY
COMMUNITY
Start: 2023-12-20 | End: 2024-02-14

## 2024-01-09 RX ORDER — POTASSIUM CHLORIDE 750 MG/1
10 CAPSULE, EXTENDED RELEASE ORAL
COMMUNITY

## 2024-01-09 NOTE — ASSESSMENT & PLAN NOTE
Recently d/c from [facility] with hypokalemia  Reports she is doing much better  Medications reviewed today   Will get follow up labs in clinic today

## 2024-01-09 NOTE — ASSESSMENT & PLAN NOTE
Requests referral to Dr Blount  She had referral placed 12/2023 but was not seen  States Dr Aguirre office told her owed $400 dollars and now she is paying on payment plan  Will place new referral

## 2024-01-09 NOTE — ASSESSMENT & PLAN NOTE
She had referral placed in 10/2023 but states she did not receive appointment  Request new referral for [facility] [place]

## 2024-01-09 NOTE — PROGRESS NOTES
CHAPARRITA Bella   [facility] [address] [address]  [phone]      PATIENT NAME: Nasrin Grant  : 1950  DATE: 24  MRN: [id number]      Billing Provider: CHAPARRITA Bella  Level of Service: ND OFFICE/OUTPT VISIT, EST, LEVL IV, 30-39 MIN  Patient PCP Information       Provider PCP Type    CHAPARRITA Bella General            Reason for Visit / Chief Complaint: Hospital Follow Up (Went to [facility] ER 23 r/t hypokalemia, rcvd fluids, then her K was too high./Was admitted 23 r/t to this./State she feels much better now./CMP last checked 24 and was 4.4.) and Hypokalemia (Resolved./Asymptomatic.)       Update PCP  Update Chief Complaint         History of Present Illness / Problem Focused Workflow     73 year old female presents for hospital follow up   Reports she was admitted to [facility] for hypokalemia  Admission dates 2023-2024  Reports she is feeling much better now         Review of Systems     Review of Systems   Constitutional:  Positive for fatigue. Negative for fever.   HENT:  Negative for congestion.    Respiratory:  Negative for cough and shortness of breath.    Cardiovascular:  Positive for chest pain. Negative for palpitations.   Gastrointestinal:  Positive for diarrhea. Negative for abdominal pain, constipation and vomiting.   Endocrine: Negative for polydipsia and polyuria.   Musculoskeletal:  Positive for arthralgias and gait problem (uses cane).   Neurological:  Negative for dizziness, weakness and headaches.   Psychiatric/Behavioral:  Negative for agitation and dysphoric mood.        Medical / Social / Family History     Past Medical History:   Diagnosis Date    Amputation of one or more toes     2 TOES RT FOOT, 3 TOES LFT FOOT    Atherosclerotic heart disease of native coronary artery with angina pectoris 2020    Depression     Diabetes mellitus, type 2     Disorder of kidney and ureter      History of carpal tunnel surgery of left wrist     History of carpal tunnel surgery of right wrist     History of repair of left rotator cuff     History of repair of right rotator cuff     Hyperlipidemia     Hypertension     Hypokalemia     Kidney transplant status 07/13/2020    Osteoporosis 07/22/2020    Proliferative diabetic retinopathy of both eyes 09/07/2023    Stroke        Past Surgical History:   Procedure Laterality Date    Appendix      EXTRACTION OF TOOTH Left 08/11/2021    HYSTERECTOMY      kidney transplant 2016      Have had issues since transplant, 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  reports that she has never smoked. She has never used smokeless tobacco. She reports that she does not drink alcohol and does not use drugs.    Family History  Ms.'s family history includes Diabetes in her father and mother; Hearing loss in her father and mother; Heart disease in her father and mother; Hyperlipidemia in her father and mother.    Medications and Allergies     Medications  Outpatient Medications Marked as Taking for the 1/9/24 encounter (Office Visit) with Laury Enrique FNP   Medication Sig Dispense Refill    albuterol (VENTOLIN HFA) 90 mcg/actuation inhaler Inhale 2 puffs into the lungs every 6 (six) hours as needed for Wheezing. Rescue 18 g 5    aspirin (ECOTRIN) 81 MG EC tablet Take 81 mg by mouth once daily.      atorvastatin (LIPITOR) 40 MG tablet Take 1 tablet (40 mg total) by mouth once daily. 90 tablet 1    bumetanide (BUMEX) 2 MG tablet Take 1 tablet (2 mg total) by mouth once daily. 30 tablet 11    carvediloL (COREG) 25 MG tablet TAKE ONE TABLET BY MOUTH TWICE DAILY 180 tablet 1    flash glucose sensor (FREESTYLE MARY 10 DAY SENSOR MISC) 1 Device by Percutaneous route.      fluticasone propionate (FLONASE) 50 mcg/actuation nasal spray 1 spray by Nasal route.      furosemide (LASIX) 40 MG tablet Take 40 mg by mouth 2 (two) times daily.  "     hydrALAZINE (APRESOLINE) 100 MG tablet Take 100 mg by mouth 3 (three) times daily.      isosorbide mononitrate (IMDUR) 30 MG 24 hr tablet Take 30 mg by mouth 2 (two) times daily.      Lactobacillus acidophilus (PROBIOTIC ACIDOPHILUS ORAL) Take 4 Billion Cells by mouth.      lancets (TRUEPLUS LANCETS) 33 gauge Misc Inject 1 lancet into the skin 3 (three) times daily before meals. 400 each 2    linaCLOtide (LINZESS) 72 mcg Cap capsule Take 72 mcg by mouth.      mycophenolate (MYFORTIC) 180 MG TbEC Take 180 mg by mouth 2 (two) times daily.      NIFEdipine (PROCARDIA-XL) 90 MG (OSM) 24 hr tablet Take 90 mg by mouth once daily.      nitroGLYCERIN (NITROSTAT) 0.4 MG SL tablet DISSOLVE 1 TABLET UNDER THE TONGUE EVERY 5 MINUTES AS NEEDED FOR CHEST PAIN. DO NOT EXCEED A TOTAL OF 3 DOSES IN 15 MINUTES.      NOVOLOG FLEXPEN U-100 INSULIN 100 unit/mL (3 mL) InPn pen INJECT FIVE UNITS into THE SKIN THREE TIMES DAILY WITH MEALS (Patient taking differently: Inject 3 Units into the skin 3 (three) times daily with meals.) 15 mL 1    pantoprazole (PROTONIX) 40 MG tablet Take 40 mg by mouth.      pen needle, diabetic 31 gauge x 3/16" Ndle 1 each by NOT APPLICABLE route.      potassium chloride (MICRO-K) 10 MEQ CpSR Take 10 mEq by mouth.      predniSONE (DELTASONE) 5 MG tablet Take 1 tablet by mouth once daily.      tacrolimus (PROGRAF) 0.5 MG Cap Take 1.5 mg by mouth.      tamsulosin (FLOMAX) 0.4 mg Cap       TRESIBA FLEXTOUCH U-100 100 unit/mL (3 mL) insulin pen Inject into the skin.      [DISCONTINUED] ferrous sulfate (FEOSOL) 325 mg (65 mg iron) Tab tablet Take 1 tablet (325 mg total) by mouth once daily. 30 tablet 2       Allergies  Review of patient's allergies indicates:   Allergen Reactions    Hydrocodone-acetaminophen Rash    Gabapentin Other (See Comments)     Made her disoriented  "out of my head"      Morphine Itching    Opioids - morphine analogues        Physical Examination     Vitals:    01/09/24 0904   BP: 124/78 "   Pulse: 70   Resp: 18   Temp: 98.1 °F (36.7 °C)     Physical Exam  Constitutional:       General: She is not in acute distress.  HENT:      Head: Normocephalic.      Nose: Nose normal.      Mouth/Throat:      Mouth: Mucous membranes are moist.   Eyes:      Extraocular Movements: Extraocular movements intact.   Cardiovascular:      Rate and Rhythm: Normal rate.   Pulmonary:      Effort: Pulmonary effort is normal. No respiratory distress.   Abdominal:      General: Bowel sounds are normal.      Palpations: Abdomen is soft.      Tenderness: There is no abdominal tenderness.   Musculoskeletal:         General: Normal range of motion.      Cervical back: Normal range of motion.   Skin:     General: Skin is warm.   Neurological:      Mental Status: She is alert.   Psychiatric:         Behavior: Behavior normal.           Imaging / Labs     Office Visit on 01/09/2024   Component Date Value Ref Range Status    Sodium 01/09/2024 137  136 - 145 mmol/L Final    Potassium 01/09/2024 4.7  3.5 - 5.1 mmol/L Final    Chloride 01/09/2024 108 (H)  98 - 107 mmol/L Final    CO2 01/09/2024 23  21 - 32 mmol/L Final    Anion Gap 01/09/2024 11  7 - 16 mmol/L Final    Glucose 01/09/2024 126 (H)  74 - 106 mg/dL Final    BUN 01/09/2024 48 (H)  7 - 18 mg/dL Final    Creatinine 01/09/2024 2.56 (H)  0.55 - 1.02 mg/dL Final    BUN/Creatinine Ratio 01/09/2024 19  6 - 20 Final    Calcium 01/09/2024 9.7  8.5 - 10.1 mg/dL Final    Total Protein 01/09/2024 6.4  6.4 - 8.2 g/dL Final    Albumin 01/09/2024 3.5  3.5 - 5.0 g/dL Final    Globulin 01/09/2024 2.9  2.0 - 4.0 g/dL Final    A/G Ratio 01/09/2024 1.2   Final    Bilirubin, Total 01/09/2024 0.5  >0.0 - 1.2 mg/dL Final    Alk Phos 01/09/2024 44 (L)  55 - 142 U/L Final    ALT 01/09/2024 16  13 - 56 U/L Final    AST 01/09/2024 15  15 - 37 U/L Final    eGFR 01/09/2024 19 (L)  >=60 mL/min/1.73m2 Final    WBC 01/09/2024 4.92  4.50 - 11.00 K/uL Final    RBC 01/09/2024 3.56 (L)  4.20 - 5.40 M/uL Final     Hemoglobin 01/09/2024 9.5 (L)  12.0 - 16.0 g/dL Final    Hematocrit 01/09/2024 29.4 (L)  38.0 - 47.0 % Final    MCV 01/09/2024 82.6  80.0 - 96.0 fL Final    MCH 01/09/2024 26.7 (L)  27.0 - 31.0 pg Final    MCHC 01/09/2024 32.3  32.0 - 36.0 g/dL Final    RDW 01/09/2024 15.5 (H)  11.5 - 14.5 % Final    Platelet Count 01/09/2024 129 (L)  150 - 400 K/uL Final    MPV 01/09/2024 11.6  9.4 - 12.4 fL Final    Neutrophils % 01/09/2024 59.6  53.0 - 65.0 % Final    Lymphocytes % 01/09/2024 20.1 (L)  27.0 - 41.0 % Final    Monocytes % 01/09/2024 16.5 (H)  2.0 - 6.0 % Final    Eosinophils % 01/09/2024 2.6  1.0 - 4.0 % Final    Basophils % 01/09/2024 1.0  0.0 - 1.0 % Final    Immature Granulocytes % 01/09/2024 0.2  0.0 - 0.4 % Final    nRBC, Auto 01/09/2024 0.0  <=0.0 % Final    Neutrophils, Abs 01/09/2024 2.93  1.80 - 7.70 K/uL Final    Lymphocytes, Absolute 01/09/2024 0.99 (L)  1.00 - 4.80 K/uL Final    Monocytes, Absolute 01/09/2024 0.81 (H)  0.00 - 0.80 K/uL Final    Eosinophils, Absolute 01/09/2024 0.13  0.00 - 0.50 K/uL Final    Basophils, Absolute 01/09/2024 0.05  0.00 - 0.20 K/uL Final    Immature Granulocytes, Absolute 01/09/2024 0.01  0.00 - 0.04 K/uL Final    nRBC, Absolute 01/09/2024 0.00  <=0.00 x10e3/uL Final    Diff Type 01/09/2024 Auto   Final     X-Ray Chest AP Portable  Narrative: EXAMINATION:  XR CHEST AP PORTABLE    CLINICAL HISTORY:  cough;    TECHNIQUE:  XR CHEST AP PORTABLE    COMPARISON:  9/13/23    FINDINGS:  No lines or tubes.    Multiple pulmonary nodules scattered throughout the lungs are better visualized on the CT performed 09/13/2023.    Bibasilar airspace opacities within the lower lobes.    Small bilateral pleural effusions.    Normal pleura.    Cardiac silhouette is similar to comparison exam.    No obvious acute bone findings.  Impression: Multiple pulmonary nodules scattered throughout the lungs are better visualized on the CT performed 09/13/2023.    Bibasilar airspace opacities within the  lower lobes.    Small bilateral pleural effusions.    Electronically signed by: Lorne Rodriguez  Date:    12/02/2023  Time:    12:23      Assessment and Plan (including Health Maintenance)      Problem List  Smart Sets  Document Outside HM   :    Health Maintenance Due   Topic Date Due    Shingles Vaccine (1 of 2) Never done    RSV Vaccine (Age 60+ and Pregnant patients) (1 - 1-dose 60+ series) Never done    Influenza Vaccine (1) 09/01/2023    COVID-19 Vaccine (5 - 2023-24 season) 09/01/2023    Foot Exam  11/29/2023    Diabetes Urine Screening  01/10/2024       Problem List Items Addressed This Visit          Cardiac/Vascular    Essential hypertension       Renal/    History of kidney transplant    Relevant Orders    Ambulatory referral/consult to Nephrology    Chronic kidney disease    Current Assessment & Plan     Requests referral to Dr Blount  She had referral placed 12/2023 but was not seen  States Dr Aguirre office told her owed $400 dollars and now she is paying on payment plan  Will place new referral         Relevant Orders    Ambulatory referral/consult to Nephrology       Oncology    Anemia    Relevant Medications    ferrous sulfate (FEOSOL) 325 mg (65 mg iron) Tab tablet    Other Relevant Orders    CBC Auto Differential (Completed)       Endocrine    DM2 (diabetes mellitus, type 2)       GI    Chronic diarrhea    Current Assessment & Plan     She had referral placed in 10/2023 but states she did not receive appointment  Request new referral for [facility] [place]          Relevant Orders    Ambulatory referral/consult to Gastroenterology       Other    Hospital discharge follow-up - Primary    Current Assessment & Plan     Recently d/c from [facility] with hypokalemia  Reports she is doing much better  Medications reviewed today   Will get follow up labs in clinic today          Relevant Orders    CBC Auto Differential (Completed)    Comprehensive Metabolic Panel (Completed)       Health Maintenance  Topics with due status: Not Due       Topic Last Completion Date    TETANUS VACCINE 10/16/2017    DEXA Scan 09/09/2021    Colorectal Cancer Screening 08/22/2022    Lipid Panel 08/21/2023    Mammogram 10/13/2023    Eye Exam 11/09/2023    Hemoglobin A1c 12/03/2023    High Dose Statin 01/09/2024       Future Appointments   Date Time Provider Department Center   4/9/2024  9:00 AM [facility]   9/13/2024 10:00 AM [facility] CT[facility]   9/13/2024 10:50 AM Amari Girard MD [facility]  [facility]   10/18/2024 10:15 AM [facility] M[facility]          Signature:  CHAPARRITA Bella  [facility] [address] [place] [address]  [phone]    Date of encounter: 1/9/24

## 2024-01-09 NOTE — PROGRESS NOTES
Health Maintenance Due   Topic Date Due    Shingles Vaccine (1 of 2) Never done    RSV Vaccine (Age 60+ and Pregnant patients) (1 - 1-dose 60+ series) Never done    Influenza Vaccine (1) 09/01/2023    COVID-19 Vaccine (5 - 2023-24 season) 09/01/2023    Foot Exam  11/29/2023    Diabetes Urine Screening  01/10/2024     Discussed care gaps.  Not interested in vaccs/screenings/foot exam.

## 2024-01-10 RX ORDER — FERROUS SULFATE 325(65) MG
325 TABLET ORAL 2 TIMES DAILY
Qty: 600 TABLET | Refills: 2 | Status: SHIPPED | OUTPATIENT
Start: 2024-01-10

## 2024-01-10 NOTE — PROGRESS NOTES
Discussed lab results and recommendations with pt via phone  No concerns noted  Voiced understanding

## 2024-01-13 ENCOUNTER — EXTERNAL HOME HEALTH (OUTPATIENT)
Dept: HOME HEALTH SERVICES | Facility: HOSPITAL | Age: 74
End: 2024-01-13
Payer: MEDICARE

## 2024-01-25 RX ORDER — AMLODIPINE BESYLATE 10 MG/1
10 TABLET ORAL
Qty: 90 TABLET | Refills: 1 | Status: SHIPPED | OUTPATIENT
Start: 2024-01-25 | End: 2024-02-14

## 2024-02-14 ENCOUNTER — OFFICE VISIT (OUTPATIENT)
Dept: FAMILY MEDICINE | Facility: CLINIC | Age: 74
End: 2024-02-14
Payer: MEDICARE

## 2024-02-14 VITALS
BODY MASS INDEX: 25.98 KG/M2 | HEART RATE: 79 BPM | DIASTOLIC BLOOD PRESSURE: 80 MMHG | TEMPERATURE: 98 F | RESPIRATION RATE: 18 BRPM | SYSTOLIC BLOOD PRESSURE: 120 MMHG | OXYGEN SATURATION: 97 % | WEIGHT: 152.19 LBS | HEIGHT: 64 IN

## 2024-02-14 DIAGNOSIS — M25.512 BILATERAL SHOULDER PAIN, UNSPECIFIED CHRONICITY: Primary | ICD-10-CM

## 2024-02-14 DIAGNOSIS — I50.9 OTHER CONGESTIVE HEART FAILURE: ICD-10-CM

## 2024-02-14 DIAGNOSIS — R64 CACHEXIA: ICD-10-CM

## 2024-02-14 DIAGNOSIS — M25.511 BILATERAL SHOULDER PAIN, UNSPECIFIED CHRONICITY: Primary | ICD-10-CM

## 2024-02-14 DIAGNOSIS — I50.33 ACUTE ON CHRONIC HEART FAILURE WITH PRESERVED EJECTION FRACTION: ICD-10-CM

## 2024-02-14 DIAGNOSIS — N18.9 CHRONIC KIDNEY DISEASE, UNSPECIFIED CKD STAGE: ICD-10-CM

## 2024-02-14 DIAGNOSIS — E78.2 MIXED HYPERLIPIDEMIA: ICD-10-CM

## 2024-02-14 DIAGNOSIS — Z79.4 OTHER SPECIFIED DIABETES MELLITUS WITH CHRONIC KIDNEY DISEASE, WITH LONG-TERM CURRENT USE OF INSULIN, UNSPECIFIED CKD STAGE: ICD-10-CM

## 2024-02-14 DIAGNOSIS — E13.22 OTHER SPECIFIED DIABETES MELLITUS WITH CHRONIC KIDNEY DISEASE, WITH LONG-TERM CURRENT USE OF INSULIN, UNSPECIFIED CKD STAGE: ICD-10-CM

## 2024-02-14 PROCEDURE — 3079F DIAST BP 80-89 MM HG: CPT | Mod: ,,, | Performed by: NURSE PRACTITIONER

## 2024-02-14 PROCEDURE — 96372 THER/PROPH/DIAG INJ SC/IM: CPT | Mod: ,,, | Performed by: NURSE PRACTITIONER

## 2024-02-14 PROCEDURE — 3288F FALL RISK ASSESSMENT DOCD: CPT | Mod: ,,, | Performed by: NURSE PRACTITIONER

## 2024-02-14 PROCEDURE — 99214 OFFICE O/P EST MOD 30 MIN: CPT | Mod: 25,,, | Performed by: NURSE PRACTITIONER

## 2024-02-14 PROCEDURE — 1159F MED LIST DOCD IN RCRD: CPT | Mod: ,,, | Performed by: NURSE PRACTITIONER

## 2024-02-14 PROCEDURE — 1101F PT FALLS ASSESS-DOCD LE1/YR: CPT | Mod: ,,, | Performed by: NURSE PRACTITIONER

## 2024-02-14 PROCEDURE — 3008F BODY MASS INDEX DOCD: CPT | Mod: ,,, | Performed by: NURSE PRACTITIONER

## 2024-02-14 PROCEDURE — 1160F RVW MEDS BY RX/DR IN RCRD: CPT | Mod: ,,, | Performed by: NURSE PRACTITIONER

## 2024-02-14 PROCEDURE — 3074F SYST BP LT 130 MM HG: CPT | Mod: ,,, | Performed by: NURSE PRACTITIONER

## 2024-02-14 RX ORDER — MEGESTROL ACETATE 20 MG/1
20 TABLET ORAL DAILY
Qty: 30 TABLET | Refills: 2 | Status: SHIPPED | OUTPATIENT
Start: 2024-02-14 | End: 2024-05-01

## 2024-02-14 RX ORDER — INSULIN DEGLUDEC 100 U/ML
10 INJECTION, SOLUTION SUBCUTANEOUS DAILY
COMMUNITY
End: 2024-02-14 | Stop reason: SDUPTHER

## 2024-02-14 RX ORDER — METHYLPREDNISOLONE ACETATE 80 MG/ML
80 INJECTION, SUSPENSION INTRA-ARTICULAR; INTRALESIONAL; INTRAMUSCULAR; SOFT TISSUE ONCE
Qty: 1 ML | Refills: 0 | Status: SHIPPED | OUTPATIENT
Start: 2024-02-14 | End: 2024-02-14

## 2024-02-14 RX ORDER — INSULIN DEGLUDEC 100 U/ML
10 INJECTION, SOLUTION SUBCUTANEOUS NIGHTLY
COMMUNITY
End: 2024-02-14

## 2024-02-14 RX ORDER — INSULIN DEGLUDEC 100 U/ML
10 INJECTION, SOLUTION SUBCUTANEOUS DAILY
Qty: 9 ML | Refills: 2 | Status: SHIPPED | OUTPATIENT
Start: 2024-02-14

## 2024-02-14 RX ORDER — PROMETHAZINE HYDROCHLORIDE AND DEXTROMETHORPHAN HYDROBROMIDE 6.25; 15 MG/5ML; MG/5ML
SYRUP ORAL
COMMUNITY
End: 2024-02-14

## 2024-02-14 RX ORDER — TIZANIDINE 4 MG/1
4 TABLET ORAL EVERY 6 HOURS PRN
Qty: 24 TABLET | Refills: 0 | Status: SHIPPED | OUTPATIENT
Start: 2024-02-14 | End: 2024-02-24

## 2024-02-14 RX ORDER — METHYLPREDNISOLONE ACETATE 40 MG/ML
40 INJECTION, SUSPENSION INTRA-ARTICULAR; INTRALESIONAL; INTRAMUSCULAR; SOFT TISSUE
Status: COMPLETED | OUTPATIENT
Start: 2024-02-14 | End: 2024-02-14

## 2024-02-14 RX ORDER — BUMETANIDE 2 MG/1
2 TABLET ORAL DAILY
COMMUNITY
End: 2024-05-01

## 2024-02-14 RX ORDER — DOCUSATE SODIUM 100 MG/1
100 CAPSULE, LIQUID FILLED ORAL 2 TIMES DAILY PRN
COMMUNITY

## 2024-02-14 RX ADMIN — METHYLPREDNISOLONE ACETATE 40 MG: 40 INJECTION, SUSPENSION INTRA-ARTICULAR; INTRALESIONAL; INTRAMUSCULAR; SOFT TISSUE at 09:02

## 2024-02-14 NOTE — PROGRESS NOTES
CHAPARRITA Bella   [facility]  [address] [address]  [phone]      PATIENT NAME: Nasrin Grant  : 1950  DATE: 24  MRN: [id number]      Billing Provider: CHAPARRITA Bella  Level of Service:   Patient PCP Information       Provider PCP Type    CHAPARRITA Bella General            Reason for Visit / Chief Complaint: Fatigue (C/O extreme weakness and no energy the last few days/Noticed getting weaker and weaker each day the past month or so ), Shoulder Pain (C/O shoulder pain /No injuries voiced ), Anorexia (C/O no appetite with weight loss ), and Gait Problem (C/O weakness when getting up/Stumbling recently )       Update PCP  Update Chief Complaint         History of Present Illness / Problem Focused Workflow     73 year old female presents with complaints of bilat shoulder pain  Complaints of having decreased appetite and fatigue that seems to be getting worse  She is due for maintenance labs but lab is not available in clinic today; states she will return for fasting labs this week      Review of Systems     Review of Systems   Constitutional:  Positive for fatigue. Negative for fever.   HENT:  Negative for congestion.    Respiratory:  Negative for cough and shortness of breath.    Cardiovascular:  Negative for chest pain and palpitations.   Gastrointestinal:  Positive for diarrhea. Negative for abdominal pain, constipation and vomiting.   Endocrine: Negative for polydipsia and polyuria.   Musculoskeletal:  Positive for arthralgias, gait problem (uses cane), myalgias and neck pain.   Neurological:  Negative for dizziness, weakness and headaches.   Psychiatric/Behavioral:  Negative for agitation and dysphoric mood.        Medical / Social / Family History     Past Medical History:   Diagnosis Date    Amputation of one or more toes     2 TOES RT FOOT, 3 TOES LFT FOOT    Atherosclerotic heart disease of native coronary artery  with angina pectoris 01/20/2020    Depression     Diabetes mellitus, type 2     Disorder of kidney and ureter     History of carpal tunnel surgery of left wrist     History of carpal tunnel surgery of right wrist     History of repair of left rotator cuff     History of repair of right rotator cuff     Hyperlipidemia     Hypertension     Hypokalemia     Kidney transplant status 07/13/2020    Osteoporosis 07/22/2020    Proliferative diabetic retinopathy of both eyes 09/07/2023    Stroke        Past Surgical History:   Procedure Laterality Date    Appendix      EXTRACTION OF TOOTH Left 08/11/2021    HYSTERECTOMY      kidney transplant 2016      Have had issues since transplant, 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  reports that she has never smoked. She has never been exposed to tobacco smoke. She has never used smokeless tobacco. She reports that she does not drink alcohol and does not use drugs.    Family History  Ms.'s family history includes Diabetes in her father and mother; Hearing loss in her father and mother; Heart disease in her father and mother; Hyperlipidemia in her father and mother.    Medications and Allergies     Medications  Outpatient Medications Marked as Taking for the 2/14/24 encounter (Office Visit) with Laury Enrique FNP   Medication Sig Dispense Refill    albuterol (VENTOLIN HFA) 90 mcg/actuation inhaler Inhale 2 puffs into the lungs every 6 (six) hours as needed for Wheezing. Rescue 18 g 5    aspirin (ECOTRIN) 81 MG EC tablet Take 81 mg by mouth once daily.      atorvastatin (LIPITOR) 40 MG tablet Take 1 tablet (40 mg total) by mouth once daily. 90 tablet 1    bumetanide (BUMEX) 2 MG tablet Take 2 mg by mouth once daily.      carvediloL (COREG) 25 MG tablet TAKE ONE TABLET BY MOUTH TWICE DAILY 180 tablet 1    docusate sodium (COLACE) 100 MG capsule Take 100 mg by mouth 2 (two) times daily as needed for Constipation.      ferrous  "sulfate (FEOSOL) 325 mg (65 mg iron) Tab tablet Take 1 tablet (325 mg total) by mouth 2 (two) times daily. 600 tablet 2    flash glucose sensor (FREESTYLE MARY 10 DAY SENSOR [facility]) 1 Device by Percutaneous route.      fluticasone propionate (FLONASE) 50 mcg/actuation nasal spray 1 spray by Nasal route.      hydrALAZINE (APRESOLINE) 100 MG tablet Take 100 mg by mouth 3 (three) times daily.      Lactobacillus acidophilus (PROBIOTIC ACIDOPHILUS ORAL) Take 4 Billion Cells by mouth.      lancets (TRUEPLUS LANCETS) 33 gauge Misc Inject 1 lancet into the skin 3 (three) times daily before meals. 400 each 2    linaCLOtide (LINZESS) 72 mcg Cap capsule Take 72 mcg by mouth.      NIFEdipine (PROCARDIA-XL) 90 MG (OSM) 24 hr tablet Take 90 mg by mouth once daily.      nitroGLYCERIN (NITROSTAT) 0.4 MG SL tablet DISSOLVE 1 TABLET UNDER THE TONGUE EVERY 5 MINUTES AS NEEDED FOR CHEST PAIN. DO NOT EXCEED A TOTAL OF 3 DOSES IN 15 MINUTES.      NOVOLOG FLEXPEN U-100 INSULIN 100 unit/mL (3 mL) InPn pen INJECT FIVE UNITS into THE SKIN THREE TIMES DAILY WITH MEALS (Patient taking differently: Inject 3 Units into the skin 3 (three) times daily with meals.) 15 mL 1    pantoprazole (PROTONIX) 40 MG tablet Take 40 mg by mouth.      pen needle, diabetic 31 gauge x 3/16" Ndle 1 each by NOT APPLICABLE route.      potassium chloride (MICRO-K) 10 MEQ CpSR Take 10 mEq by mouth.      predniSONE (DELTASONE) 5 MG tablet Take 1 tablet by mouth once daily.      tacrolimus, ASTAGRAF XL, (ASTAGRAF XL) 0.5 mg Cp24 Take 0.5 mg by mouth.      [DISCONTINUED] amLODIPine (NORVASC) 10 MG tablet TAKE ONE TABLET BY MOUTH ONCE DAILY 90 tablet 1    [DISCONTINUED] bumetanide (BUMEX) 2 MG tablet Take 1 tablet (2 mg total) by mouth once daily. 30 tablet 11    [DISCONTINUED] cinacalcet (SENSIPAR) 30 MG Tab Take 30 mg by mouth.      [DISCONTINUED] insulin degludec (TRESIBA FLEXTOUCH U-100) 100 unit/mL (3 mL) insulin pen Inject 10 Units into the skin once daily.      " "[DISCONTINUED] losartan (COZAAR) 25 MG tablet Take 25 mg by mouth once daily.      [DISCONTINUED] metOLazone (ZAROXOLYN) 5 MG tablet Take 5 mg by mouth.      [DISCONTINUED] minoxidiL (LONITEN) 2.5 MG tablet Take 2.5 mg by mouth.      [DISCONTINUED] mirabegron (MYRBETRIQ) 25 mg Tb24 ER tablet Take 25 mg by mouth once daily.      [DISCONTINUED] mycophenolate (MYFORTIC) 180 MG TbEC Take 180 mg by mouth 2 (two) times daily.      [DISCONTINUED] promethazine-dextromethorphan (PROMETHAZINE-DM) 6.25-15 mg/5 mL Syrp Take 5 mL as needed by oral route as directed for 18 days.      [DISCONTINUED] tamsulosin (FLOMAX) 0.4 mg Cap       [DISCONTINUED] TRESIBA FLEXTOUCH U-100 100 unit/mL (3 mL) insulin pen Inject into the skin.      [DISCONTINUED] triprolidine-pseudoephedrine (APRODINE) 2.5-60 mg Tab Take 1 tablet by mouth every 6 (six) hours as needed.       Current Facility-Administered Medications for the 2/14/24 encounter (Office Visit) with Laury Enrique FNP   Medication Dose Route Frequency Provider Last Rate Last Admin    [COMPLETED] methylPREDNISolone acetate injection 40 mg  40 mg Intramuscular 1 time in Clinic/HOD Laury Enrique FNP   40 mg at 02/14/24 0940       Allergies  Review of patient's allergies indicates:   Allergen Reactions    Hydrocodone-acetaminophen Rash    Gabapentin Other (See Comments)     Made her disoriented  "out of my head"      Morphine Itching    Opioids - morphine analogues        Physical Examination     Vitals:    02/14/24 0859   BP: 120/80   Pulse: 79   Resp: 18   Temp: 98.2 °F (36.8 °C)     Physical Exam  Constitutional:       General: She is not in acute distress.     Appearance: She is ill-appearing.   HENT:      Head: Normocephalic.      Nose: Nose normal.      Mouth/Throat:      Mouth: Mucous membranes are moist.   Eyes:      Extraocular Movements: Extraocular movements intact.   Cardiovascular:      Rate and Rhythm: Normal rate.   Pulmonary:      Effort: Pulmonary effort is normal. No " respiratory distress.   Abdominal:      General: Bowel sounds are normal.      Palpations: Abdomen is soft.      Tenderness: There is no abdominal tenderness.   Musculoskeletal:         General: Tenderness present. No signs of injury. Normal range of motion.      Cervical back: Normal range of motion.   Skin:     General: Skin is warm.   Neurological:      Mental Status: She is alert.   Psychiatric:         Behavior: Behavior normal.           Imaging / Labs     No visits with results within 1 Day(s) from this visit.   Latest known visit with results is:   Office Visit on 01/09/2024   Component Date Value Ref Range Status    Sodium 01/09/2024 137  136 - 145 mmol/L Final    Potassium 01/09/2024 4.7  3.5 - 5.1 mmol/L Final    Chloride 01/09/2024 108 (H)  98 - 107 mmol/L Final    CO2 01/09/2024 23  21 - 32 mmol/L Final    Anion Gap 01/09/2024 11  7 - 16 mmol/L Final    Glucose 01/09/2024 126 (H)  74 - 106 mg/dL Final    BUN 01/09/2024 48 (H)  7 - 18 mg/dL Final    Creatinine 01/09/2024 2.56 (H)  0.55 - 1.02 mg/dL Final    BUN/Creatinine Ratio 01/09/2024 19  6 - 20 Final    Calcium 01/09/2024 9.7  8.5 - 10.1 mg/dL Final    Total Protein 01/09/2024 6.4  6.4 - 8.2 g/dL Final    Albumin 01/09/2024 3.5  3.5 - 5.0 g/dL Final    Globulin 01/09/2024 2.9  2.0 - 4.0 g/dL Final    A/G Ratio 01/09/2024 1.2   Final    Bilirubin, Total 01/09/2024 0.5  >0.0 - 1.2 mg/dL Final    Alk Phos 01/09/2024 44 (L)  55 - 142 U/L Final    ALT 01/09/2024 16  13 - 56 U/L Final    AST 01/09/2024 15  15 - 37 U/L Final    eGFR 01/09/2024 19 (L)  >=60 mL/min/1.73m2 Final    WBC 01/09/2024 4.92  4.50 - 11.00 K/uL Final    RBC 01/09/2024 3.56 (L)  4.20 - 5.40 M/uL Final    Hemoglobin 01/09/2024 9.5 (L)  12.0 - 16.0 g/dL Final    Hematocrit 01/09/2024 29.4 (L)  38.0 - 47.0 % Final    MCV 01/09/2024 82.6  80.0 - 96.0 fL Final    MCH 01/09/2024 26.7 (L)  27.0 - 31.0 pg Final    MCHC 01/09/2024 32.3  32.0 - 36.0 g/dL Final    RDW 01/09/2024 15.5 (H)  11.5 -  14.5 % Final    Platelet Count 01/09/2024 129 (L)  150 - 400 K/uL Final    MPV 01/09/2024 11.6  9.4 - 12.4 fL Final    Neutrophils % 01/09/2024 59.6  53.0 - 65.0 % Final    Lymphocytes % 01/09/2024 20.1 (L)  27.0 - 41.0 % Final    Monocytes % 01/09/2024 16.5 (H)  2.0 - 6.0 % Final    Eosinophils % 01/09/2024 2.6  1.0 - 4.0 % Final    Basophils % 01/09/2024 1.0  0.0 - 1.0 % Final    Immature Granulocytes % 01/09/2024 0.2  0.0 - 0.4 % Final    nRBC, Auto 01/09/2024 0.0  <=0.0 % Final    Neutrophils, Abs 01/09/2024 2.93  1.80 - 7.70 K/uL Final    Lymphocytes, Absolute 01/09/2024 0.99 (L)  1.00 - 4.80 K/uL Final    Monocytes, Absolute 01/09/2024 0.81 (H)  0.00 - 0.80 K/uL Final    Eosinophils, Absolute 01/09/2024 0.13  0.00 - 0.50 K/uL Final    Basophils, Absolute 01/09/2024 0.05  0.00 - 0.20 K/uL Final    Immature Granulocytes, Absolute 01/09/2024 0.01  0.00 - 0.04 K/uL Final    nRBC, Absolute 01/09/2024 0.00  <=0.00 x10e3/uL Final    Diff Type 01/09/2024 Auto   Final     X-Ray Chest AP Portable  Narrative: EXAMINATION:  XR CHEST AP PORTABLE    CLINICAL HISTORY:  cough;    TECHNIQUE:  XR CHEST AP PORTABLE    COMPARISON:  9/13/23    FINDINGS:  No lines or tubes.    Multiple pulmonary nodules scattered throughout the lungs are better visualized on the CT performed 09/13/2023.    Bibasilar airspace opacities within the lower lobes.    Small bilateral pleural effusions.    Normal pleura.    Cardiac silhouette is similar to comparison exam.    No obvious acute bone findings.  Impression: Multiple pulmonary nodules scattered throughout the lungs are better visualized on the CT performed 09/13/2023.    Bibasilar airspace opacities within the lower lobes.    Small bilateral pleural effusions.    Electronically signed by: Lorne Rodriguez  Date:    12/02/2023  Time:    12:23      Assessment and Plan (including Health Maintenance)      Problem List  Smart Sets  Document Outside HM   :    Health Maintenance Due   Topic Date Due     Shingles Vaccine (1 of 2) Never done    RSV Vaccine (Age 60+ and Pregnant patients) (1 - 1-dose 60+ series) Never done    Influenza Vaccine (1) 09/01/2023    COVID-19 Vaccine (5 - 2023-24 season) 09/01/2023    Foot Exam  11/29/2023    Diabetes Urine Screening  01/10/2024       Problem List Items Addressed This Visit          Cardiac/Vascular    Congestive heart failure    Current Assessment & Plan     Denies any SOB or chest pain  She has follow up scheduled with cardiology @[facility] 03/06/2024         Acute on chronic heart failure with preserved ejection fraction    Mixed hyperlipidemia    Relevant Orders    Lipid Panel       Renal/    Chronic kidney disease    Current Assessment & Plan     Reports scheduled appointment with nephrology 04/2024            Endocrine    Diabetes mellitus with chronic kidney disease, with long-term current use of insulin    Relevant Medications    insulin degludec (TRESIBA FLEXTOUCH U-100) 100 unit/mL (3 mL) insulin pen    Other Relevant Orders    Hemoglobin A1C       Orthopedic    Bilateral shoulder pain - Primary    Current Assessment & Plan     Reports bilat shoulder pain that is increasing over the last several days  Risks/benefits/alternatives discussed for treatment; patient voices understanding  Depomedrol 40 mg IM today  Zanaflex po prn           Relevant Medications    tiZANidine (ZANAFLEX) 4 MG tablet    methylPREDNISolone acetate injection 40 mg (Completed)       Other    Cachexia    Current Assessment & Plan     Reports fatigue and decreased appetite  She has had 5 lb weight loss since last visit 01/09/2024  Discussed diet and will start megace po daily  Will get HH to monitor weight          Relevant Medications    megestroL (MEGACE) 20 MG Tab       Health Maintenance Topics with due status: Not Due       Topic Last Completion Date    TETANUS VACCINE 10/16/2017    DEXA Scan 09/09/2021    Colorectal Cancer Screening 08/22/2022    Lipid Panel 08/21/2023    Mammogram  10/13/2023    Eye Exam 11/09/2023    Hemoglobin A1c 12/03/2023    High Dose Statin 02/14/2024       Future Appointments   Date Time Provider Department Center   5/14/2024  9:40 AM Laury Enrique FNP [facility]Replaced by [facility]   9/13/2024 10:00 AM [facility] CT1 [facility] CTIC [facility]   9/13/2024 10:50 AM Amari Girard MD OB  PULM [facility]   10/18/2024 10:15 AM [facility] MAMMO1 [facility] MMIC [facility]          Signature:  CHAPARRITA Bella  [facility] [address] [place] [address]  [phone]    Date of encounter: 2/14/24

## 2024-02-14 NOTE — ASSESSMENT & PLAN NOTE
Reports bilat shoulder pain that is increasing over the last several days  Risks/benefits/alternatives discussed for treatment; patient voices understanding  Depomedrol 40 mg IM today  Zanaflex po prn

## 2024-02-14 NOTE — PROGRESS NOTES
Health Maintenance Due   Topic Date Due    Shingles Vaccine (1 of 2) Never done    RSV Vaccine (Age 60+ and Pregnant patients) (1 - 1-dose 60+ series) Never done    Influenza Vaccine (1) 09/01/2023    COVID-19 Vaccine (5 - 2023-24 season) 09/01/2023    Foot Exam  11/29/2023    Diabetes Urine Screening  01/10/2024     Discussed care gaps with pt   Pt is not interested in any vaccines or screenings

## 2024-02-14 NOTE — ASSESSMENT & PLAN NOTE
Denies any SOB or chest pain  She has follow up scheduled with cardiology @[facility] 03/06/2024

## 2024-02-23 ENCOUNTER — EXTERNAL HOME HEALTH (OUTPATIENT)
Dept: HOME HEALTH SERVICES | Facility: HOSPITAL | Age: 74
End: 2024-02-23
Payer: MEDICARE

## 2024-03-04 ENCOUNTER — OFFICE VISIT (OUTPATIENT)
Dept: FAMILY MEDICINE | Facility: CLINIC | Age: 74
End: 2024-03-04
Payer: MEDICARE

## 2024-03-04 VITALS
HEART RATE: 65 BPM | SYSTOLIC BLOOD PRESSURE: 130 MMHG | OXYGEN SATURATION: 96 % | TEMPERATURE: 97 F | HEIGHT: 64 IN | RESPIRATION RATE: 18 BRPM | BODY MASS INDEX: 26.8 KG/M2 | WEIGHT: 157 LBS | DIASTOLIC BLOOD PRESSURE: 82 MMHG

## 2024-03-04 DIAGNOSIS — N18.4 CHRONIC KIDNEY DISEASE (CKD), STAGE IV (SEVERE): ICD-10-CM

## 2024-03-04 DIAGNOSIS — M19.072 ARTHRITIS OF LEFT FOOT: ICD-10-CM

## 2024-03-04 DIAGNOSIS — M79.672 LEFT FOOT PAIN: ICD-10-CM

## 2024-03-04 DIAGNOSIS — Z09 HOSPITAL DISCHARGE FOLLOW-UP: Primary | ICD-10-CM

## 2024-03-04 PROCEDURE — 3044F HG A1C LEVEL LT 7.0%: CPT | Mod: ,,, | Performed by: NURSE PRACTITIONER

## 2024-03-04 PROCEDURE — 1159F MED LIST DOCD IN RCRD: CPT | Mod: ,,, | Performed by: NURSE PRACTITIONER

## 2024-03-04 PROCEDURE — 3079F DIAST BP 80-89 MM HG: CPT | Mod: ,,, | Performed by: NURSE PRACTITIONER

## 2024-03-04 PROCEDURE — 1160F RVW MEDS BY RX/DR IN RCRD: CPT | Mod: ,,, | Performed by: NURSE PRACTITIONER

## 2024-03-04 PROCEDURE — 1126F AMNT PAIN NOTED NONE PRSNT: CPT | Mod: ,,, | Performed by: NURSE PRACTITIONER

## 2024-03-04 PROCEDURE — 3075F SYST BP GE 130 - 139MM HG: CPT | Mod: ,,, | Performed by: NURSE PRACTITIONER

## 2024-03-04 PROCEDURE — 99496 TRANSJ CARE MGMT HIGH F2F 7D: CPT | Mod: ,,, | Performed by: NURSE PRACTITIONER

## 2024-03-04 RX ORDER — EMPAGLIFLOZIN 10 MG/1
10 TABLET, FILM COATED ORAL
COMMUNITY
Start: 2024-03-01

## 2024-03-04 RX ORDER — CINACALCET 30 MG/1
30 TABLET, FILM COATED ORAL
COMMUNITY
Start: 2024-03-01

## 2024-03-04 RX ORDER — MYCOPHENOLIC ACID 180 MG/1
1 TABLET, DELAYED RELEASE ORAL 2 TIMES DAILY
COMMUNITY
Start: 2024-02-15

## 2024-03-04 RX ORDER — MINOXIDIL 2.5 MG/1
2.5 TABLET ORAL
COMMUNITY
Start: 2024-03-01

## 2024-03-04 RX ORDER — AMLODIPINE BESYLATE 10 MG/1
10 TABLET ORAL
COMMUNITY
Start: 2024-03-01

## 2024-03-04 RX ORDER — FUROSEMIDE 40 MG/1
40 TABLET ORAL 2 TIMES DAILY
COMMUNITY
Start: 2024-03-01 | End: 2024-05-01

## 2024-03-04 RX ORDER — PREDNISONE 20 MG/1
20 TABLET ORAL DAILY
Qty: 5 TABLET | Refills: 0 | Status: SHIPPED | OUTPATIENT
Start: 2024-03-04 | End: 2024-05-14

## 2024-03-04 RX ORDER — POTASSIUM CHLORIDE 750 MG/1
10 CAPSULE, EXTENDED RELEASE ORAL
Qty: 90 CAPSULE | Refills: 1 | Status: SHIPPED | OUTPATIENT
Start: 2024-03-04

## 2024-03-04 RX ORDER — CALCIUM CITRATE/VITAMIN D3 200MG-6.25
1 TABLET ORAL 3 TIMES DAILY
COMMUNITY
Start: 2024-02-22

## 2024-03-04 RX ORDER — SODIUM BICARBONATE 650 MG/1
1 TABLET ORAL 2 TIMES DAILY
COMMUNITY
Start: 2024-02-22 | End: 2025-02-21

## 2024-03-04 NOTE — PROGRESS NOTES
Health Maintenance Due   Topic Date Due    Shingles Vaccine (1 of 2) Never done    RSV Vaccine (Age 60+ and Pregnant patients) (1 - 1-dose 60+ series) Never done    Influenza Vaccine (1) 09/01/2023    COVID-19 Vaccine (5 - 2023-24 season) 09/01/2023    Foot Exam  11/29/2023    Diabetes Urine Screening  01/10/2024     Discussed care gaps.  Not interested in vaccs/exam/screenings.

## 2024-03-04 NOTE — PROGRESS NOTES
CHAPARRITA Bella   [facility]  [address] [address]  [phone]      PATIENT NAME: Nasrin Grant  : 1950  DATE: 3/4/24  MRN: [id number]      Billing Provider: CHAPARRITA Bella  Level of Service: TRANSITIONAL CARE MANAGE SERVICE 7 DAY DISCHARGE  Patient PCP Information       Provider PCP Type    CHAPARRITA Bella General            Reason for Visit / Chief Complaint: TCC Call (Was admitted into [facility] r/t sob/edema/acute kidney failure 24 - 24.), Shortness of Breath (Improving.), and Edema (Improving.)       Update PCP  Update Chief Complaint         History of Present Illness / Problem Focused Workflow     73 year old female presents for hospital follow up   Admitted to [facility] related to SOB, swelling, renal failure  Reports swelling has improved and SOB has resolved       Review of Systems     Review of Systems   Constitutional:  Positive for fatigue. Negative for fever.   HENT:  Negative for congestion.    Respiratory:  Negative for cough and shortness of breath.    Cardiovascular:  Positive for leg swelling. Negative for chest pain and palpitations.   Gastrointestinal:  Positive for diarrhea. Negative for abdominal pain, constipation and vomiting.   Endocrine: Negative for polydipsia and polyuria.   Musculoskeletal:  Positive for arthralgias, gait problem (uses cane), myalgias and neck pain.   Neurological:  Negative for dizziness, weakness and headaches.   Psychiatric/Behavioral:  Negative for agitation and dysphoric mood.        Medical / Social / Family History     Past Medical History:   Diagnosis Date    Amputation of one or more toes     2 TOES RT FOOT, 3 TOES LFT FOOT    Atherosclerotic heart disease of native coronary artery with angina pectoris 2020    Depression     Diabetes mellitus, type 2     Disorder of kidney and ureter     History of carpal tunnel surgery of left wrist     History of carpal tunnel  surgery of right wrist     History of repair of left rotator cuff     History of repair of right rotator cuff     Hyperlipidemia     Hypertension     Hypokalemia     Kidney transplant status 07/13/2020    Osteoporosis 07/22/2020    Proliferative diabetic retinopathy of both eyes 09/07/2023    Stroke        Past Surgical History:   Procedure Laterality Date    Appendix      EXTRACTION OF TOOTH Left 08/11/2021    HYSTERECTOMY      kidney transplant 2016      Have had issues since transplant, 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  reports that she has never smoked. She has never been exposed to tobacco smoke. She has never used smokeless tobacco. She reports that she does not drink alcohol and does not use drugs.    Family History  Ms.'s family history includes Diabetes in her father and mother; Hearing loss in her father and mother; Heart disease in her father and mother; Hyperlipidemia in her father and mother.    Medications and Allergies     Medications  Outpatient Medications Marked as Taking for the 3/4/24 encounter (Office Visit) with Laury Enrique FNP   Medication Sig Dispense Refill    albuterol (VENTOLIN HFA) 90 mcg/actuation inhaler Inhale 2 puffs into the lungs every 6 (six) hours as needed for Wheezing. Rescue 18 g 5    amLODIPine (NORVASC) 10 MG tablet Take 10 mg by mouth.      aspirin (ECOTRIN) 81 MG EC tablet Take 81 mg by mouth once daily.      atorvastatin (LIPITOR) 40 MG tablet Take 1 tablet (40 mg total) by mouth once daily. 90 tablet 1    bumetanide (BUMEX) 2 MG tablet Take 2 mg by mouth once daily.      carvediloL (COREG) 25 MG tablet TAKE ONE TABLET BY MOUTH TWICE DAILY 180 tablet 1    cholecalciferol, vitamin D3, (VITAMIN D3) 25 mcg (1,000 unit) capsule Take 1,000 Units by mouth.      cinacalcet (SENSIPAR) 30 MG Tab Take 30 mg by mouth.      docusate sodium (COLACE) 100 MG capsule Take 100 mg by mouth 2 (two) times daily as needed for  "Constipation.      ferrous sulfate (FEOSOL) 325 mg (65 mg iron) Tab tablet Take 1 tablet (325 mg total) by mouth 2 (two) times daily. 600 tablet 2    flash glucose sensor (FREESTYLE MARY 10 DAY SENSOR MISC) 1 Device by Percutaneous route.      fluticasone propionate (FLONASE) 50 mcg/actuation nasal spray 1 spray by Nasal route.      furosemide (LASIX) 40 MG tablet Take 40 mg by mouth 2 (two) times daily.      hydrALAZINE (APRESOLINE) 100 MG tablet Take 50 mg by mouth 3 (three) times daily.      insulin degludec (TRESIBA FLEXTOUCH U-100) 100 unit/mL (3 mL) insulin pen Inject 10 Units into the skin once daily. 9 mL 2    isosorbide mononitrate (IMDUR) 30 MG 24 hr tablet Take 30 mg by mouth once daily.      JARDIANCE 10 mg tablet Take 10 mg by mouth.      Lactobacillus acidophilus (PROBIOTIC ACIDOPHILUS ORAL) Take 4 Billion Cells by mouth.      lancets (TRUEPLUS LANCETS) 33 gauge Misc Inject 1 lancet into the skin 3 (three) times daily before meals. 400 each 2    linaCLOtide (LINZESS) 72 mcg Cap capsule Take 72 mcg by mouth.      minoxidiL (LONITEN) 2.5 MG tablet Take 2.5 mg by mouth.      mycophenolate sodium 180 MG TbEC Take 1 tablet by mouth 2 (two) times daily.      NIFEdipine (PROCARDIA-XL) 90 MG (OSM) 24 hr tablet Take 60 mg by mouth once daily.      nitroGLYCERIN (NITROSTAT) 0.4 MG SL tablet DISSOLVE 1 TABLET UNDER THE TONGUE EVERY 5 MINUTES AS NEEDED FOR CHEST PAIN. DO NOT EXCEED A TOTAL OF 3 DOSES IN 15 MINUTES.      pantoprazole (PROTONIX) 40 MG tablet Take 40 mg by mouth.      pen needle, diabetic 31 gauge x 3/16" Ndle 1 each by NOT APPLICABLE route.      potassium chloride (MICRO-K) 10 MEQ CpSR TAKE ONE CAPSULE BY MOUTH ONCE DAILY 90 capsule 1    predniSONE (DELTASONE) 5 MG tablet Take 1 tablet by mouth once daily.      sodium bicarbonate 650 MG tablet Take 1 tablet by mouth 2 (two) times daily.      tacrolimus, ASTAGRAF XL, (ASTAGRAF XL) 0.5 mg Cp24 Take by mouth. Take 4 capsules by mouth 2 times daily   " "   TRUE METRIX GLUCOSE TEST STRIP Strp 1 strip 3 (three) times daily.         Allergies  Review of patient's allergies indicates:   Allergen Reactions    Hydrocodone-acetaminophen Rash    Gabapentin Other (See Comments)     Made her disoriented  "out of my head"      Morphine Itching    Opioids - morphine analogues        Physical Examination     Vitals:    03/04/24 1007   BP: 130/82   Pulse: 65   Resp: 18   Temp: 97 °F (36.1 °C)     Physical Exam  Constitutional:       General: She is not in acute distress.  HENT:      Head: Normocephalic.      Nose: Nose normal.      Mouth/Throat:      Mouth: Mucous membranes are moist.   Eyes:      Extraocular Movements: Extraocular movements intact.   Cardiovascular:      Rate and Rhythm: Normal rate.   Pulmonary:      Effort: Pulmonary effort is normal. No respiratory distress.   Abdominal:      General: Bowel sounds are normal.      Palpations: Abdomen is soft.      Tenderness: There is no abdominal tenderness.   Musculoskeletal:         General: Tenderness (left foot near heel) present. No swelling or signs of injury. Normal range of motion.      Cervical back: Normal range of motion.      Right lower leg: No edema.      Left lower leg: No edema.   Skin:     General: Skin is warm.      Findings: No bruising.   Neurological:      Mental Status: She is alert.   Psychiatric:         Behavior: Behavior normal.           Imaging / Labs     No visits with results within 1 Day(s) from this visit.   Latest known visit with results is:   Lab Visit on 02/15/2024   Component Date Value Ref Range Status    Triglycerides 02/15/2024 54  35 - 150 mg/dL Final    Cholesterol 02/15/2024 117  0 - 200 mg/dL Final    HDL Cholesterol 02/15/2024 53  40 - 60 mg/dL Final    Cholesterol/HDL Ratio (Risk Factor) 02/15/2024 2.2   Final    Non-HDL 02/15/2024 64  mg/dL Final    LDL Calculated 02/15/2024 53  mg/dL Final    LDL/HDL 02/15/2024 1.0   Final    VLDL 02/15/2024 11  mg/dL Final    Hemoglobin A1C " 02/15/2024 6.8 (H)  4.5 - 6.6 % Final    Estimated Average Glucose 02/15/2024 148  mg/dL Final     X-Ray Chest AP Portable  Narrative: EXAMINATION:  XR CHEST AP PORTABLE    CLINICAL HISTORY:  cough;    TECHNIQUE:  XR CHEST AP PORTABLE    COMPARISON:  9/13/23    FINDINGS:  No lines or tubes.    Multiple pulmonary nodules scattered throughout the lungs are better visualized on the CT performed 09/13/2023.    Bibasilar airspace opacities within the lower lobes.    Small bilateral pleural effusions.    Normal pleura.    Cardiac silhouette is similar to comparison exam.    No obvious acute bone findings.  Impression: Multiple pulmonary nodules scattered throughout the lungs are better visualized on the CT performed 09/13/2023.    Bibasilar airspace opacities within the lower lobes.    Small bilateral pleural effusions.    Electronically signed by: Lorne Rodriguez  Date:    12/02/2023  Time:    12:23      Assessment and Plan (including Health Maintenance)      Problem List  Smart Sets  Document Outside HM   :    Health Maintenance Due   Topic Date Due    Shingles Vaccine (1 of 2) Never done    RSV Vaccine (Age 60+ and Pregnant patients) (1 - 1-dose 60+ series) Never done    Influenza Vaccine (1) 09/01/2023    COVID-19 Vaccine (5 - 2023-24 season) 09/01/2023    Foot Exam  11/29/2023    Diabetes Urine Screening  01/10/2024       Problem List Items Addressed This Visit          Renal/    Chronic kidney disease (CKD), stage IV (severe)    Current Assessment & Plan     Reports appt with Dr Blount next month  She has seen him in the past and was referred back to him             Orthopedic    Left foot pain    Relevant Medications    predniSONE (DELTASONE) 20 MG tablet    Arthritis of left foot       Other    Hospital discharge follow-up - Primary    Current Assessment & Plan     Recently d/c from  [facility] inpatient with swelling and some SOB  She had several med changes and reports edema has improved  Reports appt with with  cardiology- Dr Robledo next week              Health Maintenance Topics with due status: Not Due       Topic Last Completion Date    TETANUS VACCINE 10/16/2017    DEXA Scan 09/09/2021    Colorectal Cancer Screening 08/22/2022    Mammogram 10/13/2023    Eye Exam 11/09/2023    Lipid Panel 02/15/2024    Hemoglobin A1c 02/15/2024    High Dose Statin 03/04/2024       Future Appointments   Date Time Provider Department Center   5/14/2024  9:40 AM Laury Enrique FNP [facility]   9/13/2024 10:00 AM [facility] CT1 [facility]IC [facility]   9/13/2024 10:50 AM Amari Girard MD OB  [facility]   10/18/2024 10:15 AM [facility] MAMMO1 [facility] MMIC [place] [facility]          Signature:  CHAPARRITA Bella [facility] [address] [place] [address]  [phone]    Date of encounter: 3/4/24

## 2024-03-15 ENCOUNTER — DOCUMENT SCAN (OUTPATIENT)
Dept: HOME HEALTH SERVICES | Facility: HOSPITAL | Age: 74
End: 2024-03-15
Payer: MEDICARE

## 2024-03-18 PROBLEM — Z09 HOSPITAL DISCHARGE FOLLOW-UP: Status: RESOLVED | Noted: 2023-12-18 | Resolved: 2024-03-18

## 2024-03-27 ENCOUNTER — DOCUMENT SCAN (OUTPATIENT)
Dept: HOME HEALTH SERVICES | Facility: HOSPITAL | Age: 74
End: 2024-03-27
Payer: MEDICARE

## 2024-05-01 DIAGNOSIS — R60.1 GENERALIZED EDEMA: Primary | ICD-10-CM

## 2024-05-01 DIAGNOSIS — R64 CACHEXIA: ICD-10-CM

## 2024-05-01 RX ORDER — MEGESTROL ACETATE 20 MG/1
20 TABLET ORAL
Qty: 30 TABLET | Refills: 0 | Status: SHIPPED | OUTPATIENT
Start: 2024-05-01 | End: 2024-05-08

## 2024-05-01 RX ORDER — BUMETANIDE 2 MG/1
2 TABLET ORAL
Qty: 30 TABLET | Refills: 0 | Status: SHIPPED | OUTPATIENT
Start: 2024-05-01

## 2024-05-01 RX ORDER — PANTOPRAZOLE SODIUM 40 MG/1
40 TABLET, DELAYED RELEASE ORAL EVERY MORNING
Qty: 30 TABLET | Refills: 0 | Status: SHIPPED | OUTPATIENT
Start: 2024-05-01 | End: 2024-05-08

## 2024-05-08 ENCOUNTER — EXTERNAL HOME HEALTH (OUTPATIENT)
Dept: HOME HEALTH SERVICES | Facility: HOSPITAL | Age: 74
End: 2024-05-08
Payer: MEDICARE

## 2024-05-08 DIAGNOSIS — R64 CACHEXIA: ICD-10-CM

## 2024-05-08 RX ORDER — PANTOPRAZOLE SODIUM 40 MG/1
40 TABLET, DELAYED RELEASE ORAL EVERY MORNING
Qty: 30 TABLET | Refills: 0 | Status: SHIPPED | OUTPATIENT
Start: 2024-05-08 | End: 2024-06-11

## 2024-05-08 RX ORDER — MEGESTROL ACETATE 20 MG/1
20 TABLET ORAL
Qty: 30 TABLET | Refills: 3 | Status: SHIPPED | OUTPATIENT
Start: 2024-05-08

## 2024-05-14 ENCOUNTER — OFFICE VISIT (OUTPATIENT)
Dept: FAMILY MEDICINE | Facility: CLINIC | Age: 74
End: 2024-05-14
Payer: MEDICARE

## 2024-05-14 VITALS
HEIGHT: 64 IN | WEIGHT: 156.38 LBS | SYSTOLIC BLOOD PRESSURE: 128 MMHG | RESPIRATION RATE: 18 BRPM | DIASTOLIC BLOOD PRESSURE: 82 MMHG | BODY MASS INDEX: 26.7 KG/M2 | TEMPERATURE: 98 F | OXYGEN SATURATION: 99 % | HEART RATE: 62 BPM

## 2024-05-14 DIAGNOSIS — R30.0 DYSURIA: ICD-10-CM

## 2024-05-14 DIAGNOSIS — D64.9 ANEMIA, UNSPECIFIED TYPE: ICD-10-CM

## 2024-05-14 DIAGNOSIS — I73.9 PERIPHERAL VASCULAR DISEASE: ICD-10-CM

## 2024-05-14 DIAGNOSIS — E11.3553 STABLE PROLIFERATIVE DIABETIC RETINOPATHY OF BOTH EYES ASSOCIATED WITH TYPE 2 DIABETES MELLITUS: ICD-10-CM

## 2024-05-14 DIAGNOSIS — Z79.4 CONTROLLED TYPE 2 DIABETES MELLITUS WITH CHRONIC KIDNEY DISEASE, WITH LONG-TERM CURRENT USE OF INSULIN, UNSPECIFIED CKD STAGE: Primary | ICD-10-CM

## 2024-05-14 DIAGNOSIS — E11.22 CONTROLLED TYPE 2 DIABETES MELLITUS WITH CHRONIC KIDNEY DISEASE, WITH LONG-TERM CURRENT USE OF INSULIN, UNSPECIFIED CKD STAGE: Primary | ICD-10-CM

## 2024-05-14 DIAGNOSIS — N39.0 URINARY TRACT INFECTION WITHOUT HEMATURIA, SITE UNSPECIFIED: ICD-10-CM

## 2024-05-14 LAB
BILIRUB SERPL-MCNC: NEGATIVE MG/DL
BLOOD URINE, POC: NEGATIVE
COLOR, POC UA: YELLOW
EST. AVERAGE GLUCOSE BLD GHB EST-MCNC: 151 MG/DL
GLUCOSE UR QL STRIP: NORMAL
HBA1C MFR BLD HPLC: 6.9 % (ref 4.5–6.6)
HCT VFR BLD AUTO: 31 % (ref 38–47)
HGB BLD-MCNC: 9.3 G/DL (ref 12–16)
KETONES UR QL STRIP: NEGATIVE
LEUKOCYTE ESTERASE URINE, POC: NORMAL
NITRITE, POC UA: NEGATIVE
PH, POC UA: 6
PROTEIN, POC: NEGATIVE
SPECIFIC GRAVITY, POC UA: 1.01
UROBILINOGEN, POC UA: 0.2

## 2024-05-14 PROCEDURE — 1101F PT FALLS ASSESS-DOCD LE1/YR: CPT | Mod: ,,, | Performed by: NURSE PRACTITIONER

## 2024-05-14 PROCEDURE — 85018 HEMOGLOBIN: CPT | Mod: ,,, | Performed by: CLINICAL MEDICAL LABORATORY

## 2024-05-14 PROCEDURE — 85014 HEMATOCRIT: CPT | Mod: ,,, | Performed by: CLINICAL MEDICAL LABORATORY

## 2024-05-14 PROCEDURE — 1126F AMNT PAIN NOTED NONE PRSNT: CPT | Mod: ,,, | Performed by: NURSE PRACTITIONER

## 2024-05-14 PROCEDURE — 81003 URINALYSIS AUTO W/O SCOPE: CPT | Mod: QW,,, | Performed by: NURSE PRACTITIONER

## 2024-05-14 PROCEDURE — 83036 HEMOGLOBIN GLYCOSYLATED A1C: CPT | Mod: ,,, | Performed by: CLINICAL MEDICAL LABORATORY

## 2024-05-14 PROCEDURE — 3008F BODY MASS INDEX DOCD: CPT | Mod: ,,, | Performed by: NURSE PRACTITIONER

## 2024-05-14 PROCEDURE — 3079F DIAST BP 80-89 MM HG: CPT | Mod: ,,, | Performed by: NURSE PRACTITIONER

## 2024-05-14 PROCEDURE — 3288F FALL RISK ASSESSMENT DOCD: CPT | Mod: ,,, | Performed by: NURSE PRACTITIONER

## 2024-05-14 PROCEDURE — 3074F SYST BP LT 130 MM HG: CPT | Mod: ,,, | Performed by: NURSE PRACTITIONER

## 2024-05-14 PROCEDURE — 99214 OFFICE O/P EST MOD 30 MIN: CPT | Mod: ,,, | Performed by: NURSE PRACTITIONER

## 2024-05-14 PROCEDURE — 87086 URINE CULTURE/COLONY COUNT: CPT | Mod: ,,, | Performed by: CLINICAL MEDICAL LABORATORY

## 2024-05-14 PROCEDURE — 1160F RVW MEDS BY RX/DR IN RCRD: CPT | Mod: ,,, | Performed by: NURSE PRACTITIONER

## 2024-05-14 PROCEDURE — 1159F MED LIST DOCD IN RCRD: CPT | Mod: ,,, | Performed by: NURSE PRACTITIONER

## 2024-05-14 PROCEDURE — 3044F HG A1C LEVEL LT 7.0%: CPT | Mod: ,,, | Performed by: NURSE PRACTITIONER

## 2024-05-14 RX ORDER — AMOXICILLIN 500 MG/1
500 CAPSULE ORAL EVERY 12 HOURS
Qty: 20 CAPSULE | Refills: 0 | Status: SHIPPED | OUTPATIENT
Start: 2024-05-14

## 2024-05-14 RX ORDER — TACROLIMUS 0.5 MG/1
1.5 CAPSULE ORAL
COMMUNITY
Start: 2024-04-15

## 2024-05-14 NOTE — PROGRESS NOTES
Health Maintenance Due   Topic Date Due    Shingles Vaccine (1 of 2) Never done    RSV Vaccine (Age 60+ and Pregnant patients) (1 - 1-dose 60+ series) Never done    COVID-19 Vaccine (5 - 2023-24 season) 09/01/2023    Foot Exam  11/29/2023    Diabetes Urine Screening  01/10/2024     Discussed care gaps.  Not interested in vaccs/exam/screenings.

## 2024-05-15 ENCOUNTER — TELEPHONE (OUTPATIENT)
Dept: FAMILY MEDICINE | Facility: CLINIC | Age: 74
End: 2024-05-15
Payer: MEDICARE

## 2024-05-16 LAB — UA COMPLETE W REFLEX CULTURE PNL UR: NORMAL

## 2024-05-20 PROBLEM — Z12.31 ENCOUNTER FOR SCREENING MAMMOGRAM FOR MALIGNANT NEOPLASM OF BREAST: Status: RESOLVED | Noted: 2023-06-05 | Resolved: 2024-05-20

## 2024-05-20 PROBLEM — E11.9 TYPE 2 DIABETES MELLITUS WITHOUT COMPLICATION: Status: ACTIVE | Noted: 2024-05-20

## 2024-05-20 PROBLEM — N39.0 URINARY TRACT INFECTION WITHOUT HEMATURIA: Status: ACTIVE | Noted: 2024-05-20

## 2024-05-20 PROBLEM — K59.00 CONSTIPATION: Status: RESOLVED | Noted: 2023-04-13 | Resolved: 2024-05-20

## 2024-05-20 PROBLEM — E11.9 TYPE 2 DIABETES MELLITUS WITHOUT COMPLICATION: Status: RESOLVED | Noted: 2024-05-20 | Resolved: 2024-05-20

## 2024-05-20 NOTE — ASSESSMENT & PLAN NOTE
Lab Results   Component Value Date    HGBA1C 6.9 (H) 05/14/2024     Will get A1C today  Continue current plan

## 2024-05-20 NOTE — PROGRESS NOTES
CHAPARRITA Bella   [facility]  [address] [address]  [phone]      PATIENT NAME: Nasrin Grant  : 1950  DATE: 24  MRN: [id number]      Billing Provider: CHAPARRITA Bella  Level of Service: MD OFFICE/OUTPT VISIT, EST, LEVL IV, 30-39 MIN  Patient PCP Information       Provider PCP Type    CHAPARRITA Bella General            Reason for Visit / Chief Complaint: Follow-up, Diabetes (Due for A1C recheck.), and Edema (RLE./Denies injury./States it always swells but has been worse the past 4 months./States it does get better when elevated.)       Update PCP  Update Chief Complaint         History of Present Illness / Problem Focused Workflow     74 year old female presents for follow up   Complaints of right lower ext edema that has worsened over the past 4 mo  States edema does improve with elevation  She is followed by nephrology and cardiology        Review of Systems     Review of Systems   Constitutional:  Positive for fatigue and unexpected weight change. Negative for fever.   HENT:  Negative for congestion.    Respiratory:  Negative for cough and shortness of breath.    Cardiovascular:  Positive for leg swelling. Negative for chest pain and palpitations.   Gastrointestinal:  Positive for nausea and vomiting. Negative for abdominal pain and constipation.   Endocrine: Negative for polydipsia and polyuria.   Musculoskeletal:  Positive for arthralgias. Negative for gait problem.   Neurological:  Negative for dizziness, weakness and headaches.   Psychiatric/Behavioral:  Negative for agitation and dysphoric mood.        Medical / Social / Family History     Past Medical History:   Diagnosis Date    Amputation of one or more toes     2 TOES RT FOOT, 3 TOES LFT FOOT    Atherosclerotic heart disease of native coronary artery with angina pectoris 2020    Depression     Diabetes mellitus, type 2     Disorder of kidney and ureter      History of carpal tunnel surgery of left wrist     History of carpal tunnel surgery of right wrist     History of repair of left rotator cuff     History of repair of right rotator cuff     Hyperlipidemia     Hypertension     Hypokalemia     Kidney transplant status 07/13/2020    Osteoporosis 07/22/2020    Proliferative diabetic retinopathy of both eyes 09/07/2023    Stroke        Past Surgical History:   Procedure Laterality Date    Appendix      EXTRACTION OF TOOTH Left 08/11/2021    HYSTERECTOMY      kidney transplant 2016      Have had issues since transplant, 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  reports that she has never smoked. She has never been exposed to tobacco smoke. She has never used smokeless tobacco. She reports that she does not drink alcohol and does not use drugs.    Family History  Ms.'s family history includes Diabetes in her father and mother; Hearing loss in her father and mother; Heart disease in her father and mother; Hyperlipidemia in her father and mother.    Medications and Allergies     Medications  Outpatient Medications Marked as Taking for the 5/14/24 encounter (Office Visit) with Laury Enrique FNP   Medication Sig Dispense Refill    albuterol (VENTOLIN HFA) 90 mcg/actuation inhaler Inhale 2 puffs into the lungs every 6 (six) hours as needed for Wheezing. Rescue 18 g 5    amLODIPine (NORVASC) 10 MG tablet Take 10 mg by mouth.      aspirin (ECOTRIN) 81 MG EC tablet Take 81 mg by mouth once daily.      atorvastatin (LIPITOR) 40 MG tablet Take 1 tablet (40 mg total) by mouth once daily. 90 tablet 1    bumetanide (BUMEX) 2 MG tablet TAKE ONE TABLET BY MOUTH DAILY 30 tablet 0    carvediloL (COREG) 25 MG tablet TAKE ONE TABLET BY MOUTH TWICE DAILY 180 tablet 1    cholecalciferol, vitamin D3, (VITAMIN D3) 25 mcg (1,000 unit) capsule Take 1,000 Units by mouth.      cinacalcet (SENSIPAR) 30 MG Tab Take 30 mg by mouth.       "docusate sodium (COLACE) 100 MG capsule Take 100 mg by mouth 2 (two) times daily as needed for Constipation.      ferrous sulfate (FEOSOL) 325 mg (65 mg iron) Tab tablet Take 1 tablet (325 mg total) by mouth 2 (two) times daily. 600 tablet 2    flash glucose sensor (FREESTYLE MARY 10 DAY SENSOR MISC) 1 Device by Percutaneous route.      fluticasone propionate (FLONASE) 50 mcg/actuation nasal spray 1 spray by Nasal route.      hydrALAZINE (APRESOLINE) 100 MG tablet Take 50 mg by mouth 3 (three) times daily.      insulin degludec (TRESIBA FLEXTOUCH U-100) 100 unit/mL (3 mL) insulin pen Inject 10 Units into the skin once daily. 9 mL 2    isosorbide mononitrate (IMDUR) 30 MG 24 hr tablet Take 30 mg by mouth once daily.      JARDIANCE 10 mg tablet Take 10 mg by mouth.      Lactobacillus acidophilus (PROBIOTIC ACIDOPHILUS ORAL) Take 4 Billion Cells by mouth.      lancets (TRUEPLUS LANCETS) 33 gauge Misc Inject 1 lancet into the skin 3 (three) times daily before meals. 400 each 2    linaCLOtide (LINZESS) 145 mcg Cap capsule Take 1 capsule every day by oral route as directed.      megestroL (MEGACE) 20 MG Tab TAKE ONE TABLET BY MOUTH EVERY DAY 30 tablet 3    minoxidiL (LONITEN) 2.5 MG tablet Take 2.5 mg by mouth.      mycophenolate sodium 180 MG TbEC Take 1 tablet by mouth 2 (two) times daily.      NIFEdipine (PROCARDIA-XL) 90 MG (OSM) 24 hr tablet Take 60 mg by mouth once daily.      nitroGLYCERIN (NITROSTAT) 0.4 MG SL tablet DISSOLVE 1 TABLET UNDER THE TONGUE EVERY 5 MINUTES AS NEEDED FOR CHEST PAIN. DO NOT EXCEED A TOTAL OF 3 DOSES IN 15 MINUTES.      pantoprazole (PROTONIX) 40 MG tablet TAKE ONE TABLET BY MOUTH EVERY MORNING 30 tablet 0    pen needle, diabetic 31 gauge x 3/16" Ndle 1 each by NOT APPLICABLE route.      potassium chloride (MICRO-K) 10 MEQ CpSR TAKE ONE CAPSULE BY MOUTH ONCE DAILY 90 capsule 1    predniSONE (DELTASONE) 5 MG tablet Take 1 tablet by mouth once daily.      sodium bicarbonate 650 MG tablet " "Take 1 tablet by mouth 2 (two) times daily.      tacrolimus (PROGRAF) 0.5 MG Cap Take 1.5 mg by mouth.      TRUE METRIX GLUCOSE TEST STRIP Strp 1 strip 3 (three) times daily.      [DISCONTINUED] predniSONE (DELTASONE) 20 MG tablet Take 1 tablet (20 mg total) by mouth once daily. 5 tablet 0       Allergies  Review of patient's allergies indicates:   Allergen Reactions    Hydrocodone-acetaminophen Rash    Gabapentin Other (See Comments)     Made her disoriented  "out of my head"      Morphine Itching    Opioids - morphine analogues        Physical Examination     Vitals:    05/14/24 0923   BP: 128/82   Pulse: 62   Resp: 18   Temp: 98 °F (36.7 °C)     Physical Exam  Constitutional:       General: She is not in acute distress.  HENT:      Head: Normocephalic.      Nose: Nose normal.      Mouth/Throat:      Mouth: Mucous membranes are moist.   Eyes:      Extraocular Movements: Extraocular movements intact.   Cardiovascular:      Rate and Rhythm: Normal rate.   Pulmonary:      Effort: Pulmonary effort is normal. No respiratory distress.   Abdominal:      General: Bowel sounds are normal.      Palpations: Abdomen is soft.   Musculoskeletal:         General: Normal range of motion.      Cervical back: Normal range of motion.      Right lower leg: Edema present.      Left lower leg: Edema present.   Skin:     General: Skin is warm.   Neurological:      Mental Status: She is alert.   Psychiatric:         Behavior: Behavior normal.           Imaging / Labs     Office Visit on 05/14/2024   Component Date Value Ref Range Status    Color, UA 05/14/2024 Yellow   Final    Spec Grav UA 05/14/2024 1.015   Final    pH, UA 05/14/2024 6.0   Final    WBC, UA 05/14/2024 TRACE   Final    Nitrite, UA 05/14/2024 NEGATIVE   Final    Protein, POC 05/14/2024 NEGATIVE   Final    Glucose, UA 05/14/2024 500MG   Final    Ketones, UA 05/14/2024 NEGATIVE   Final    Bilirubin, POC 05/14/2024 NEGATIVE   Final    Urobilinogen, UA 05/14/2024 0.2   Final "    Blood, UA 05/14/2024 NEGATIVE   Final    Hemoglobin A1C 05/14/2024 6.9 (H)  4.5 - 6.6 % Final    Estimated Average Glucose 05/14/2024 151  mg/dL Final    Hemoglobin 05/14/2024 9.3 (L)  12.0 - 16.0 g/dL Final    Hematocrit 05/14/2024 31.0 (L)  38.0 - 47.0 % Final    Culture, Urine 05/14/2024 Skin/Urogenital Dot Isolated, no further workup.   Final     X-Ray Chest AP Portable  Narrative: EXAMINATION:  XR CHEST AP PORTABLE    CLINICAL HISTORY:  cough;    TECHNIQUE:  XR CHEST AP PORTABLE    COMPARISON:  9/13/23    FINDINGS:  No lines or tubes.    Multiple pulmonary nodules scattered throughout the lungs are better visualized on the CT performed 09/13/2023.    Bibasilar airspace opacities within the lower lobes.    Small bilateral pleural effusions.    Normal pleura.    Cardiac silhouette is similar to comparison exam.    No obvious acute bone findings.  Impression: Multiple pulmonary nodules scattered throughout the lungs are better visualized on the CT performed 09/13/2023.    Bibasilar airspace opacities within the lower lobes.    Small bilateral pleural effusions.    Electronically signed by: Lorne Rodriguez  Date:    12/02/2023  Time:    12:23      Assessment and Plan (including Health Maintenance)      Problem List  Smart Sets  Document Outside HM   :    Health Maintenance Due   Topic Date Due    Shingles Vaccine (1 of 2) Never done    RSV Vaccine (Age 60+ and Pregnant patients) (1 - 1-dose 60+ series) Never done    COVID-19 Vaccine (5 - 2023-24 season) 09/01/2023    Foot Exam  11/29/2023    Diabetes Urine Screening  01/10/2024       Problem List Items Addressed This Visit          Ophtho    Stable proliferative diabetic retinopathy of both eyes associated with type 2 diabetes mellitus       Cardiac/Vascular    Peripheral vascular disease    Current Assessment & Plan     Bilat lower ext edema; right greater than left  Reports edema improves with elevation   She is currently on bumex and is followed by  nephrology for prior renal transplant and also by cardiology   Denies any SOB             Renal/    Urinary tract infection without hematuria    Current Assessment & Plan     Dysuria  UA today; will get culture  Start amoxicillin po BID  Avoid caffeine, increase water intake as tolerated          Relevant Medications    amoxicillin (AMOXIL) 500 MG capsule    Other Relevant Orders    Urine culture (Completed)       Oncology    Anemia    Relevant Orders    Hemoglobin and Hematocrit (Completed)       Endocrine    Controlled type 2 diabetes mellitus with chronic kidney disease, with long-term current use of insulin - Primary    Current Assessment & Plan     Lab Results   Component Value Date    HGBA1C 6.9 (H) 05/14/2024     Will get A1C today  Continue current plan          Relevant Orders    Hemoglobin A1C (Completed)     Other Visit Diagnoses       Dysuria        Relevant Orders    POCT URINALYSIS W/O SCOPE (Completed)            Health Maintenance Topics with due status: Not Due       Topic Last Completion Date    TETANUS VACCINE 10/16/2017    DEXA Scan 09/09/2021    Colorectal Cancer Screening 08/22/2022    Influenza Vaccine 10/05/2022    Mammogram 10/13/2023    Eye Exam 11/09/2023    Lipid Panel 02/15/2024    Hemoglobin A1c 05/14/2024       Future Appointments   Date Time Provider Department Center   7/11/2024  9:00 AM [facility] NURSE, [facility]   9/13/2024 10:00 AM [facility] CT1 OB [facility]IC [facility]   9/13/2024 10:50 AM Amari Girard MD [facility]  [facility]   10/18/2024 10:15 AM [facility] MAMMO1 OB [facility]   11/14/2024  9:40 AM Laury Enrique FNP [facility]          Signature:  CHAPARRITA Bella  [facility] [address] [place] [address]  [phone]    Date of encounter: 5/14/24

## 2024-05-20 NOTE — ASSESSMENT & PLAN NOTE
Bilat lower ext edema; right greater than left  Reports edema improves with elevation   She is currently on bumex and is followed by nephrology for prior renal transplant and also by cardiology   Denies any SOB

## 2024-06-05 ENCOUNTER — TELEPHONE (OUTPATIENT)
Dept: FAMILY MEDICINE | Facility: CLINIC | Age: 74
End: 2024-06-05
Payer: MEDICARE

## 2024-06-05 RX ORDER — INSULIN ASPART 100 [IU]/ML
3 INJECTION, SOLUTION INTRAVENOUS; SUBCUTANEOUS
COMMUNITY

## 2024-06-05 NOTE — TELEPHONE ENCOUNTER
Med changes were made at her Endo appt 6/5/24 w/ Dr Kayleigh Delgado at [facility].  Decreased her Tresiba to 8 units D.  Started Novolog 3 units w/ meals.

## 2024-06-11 RX ORDER — PANTOPRAZOLE SODIUM 40 MG/1
40 TABLET, DELAYED RELEASE ORAL EVERY MORNING
Qty: 30 TABLET | Refills: 0 | Status: SHIPPED | OUTPATIENT
Start: 2024-06-11

## 2024-07-01 ENCOUNTER — EXTERNAL HOME HEALTH (OUTPATIENT)
Dept: HOME HEALTH SERVICES | Facility: HOSPITAL | Age: 74
End: 2024-07-01
Payer: MEDICARE

## 2024-07-17 RX ORDER — PANTOPRAZOLE SODIUM 40 MG/1
40 TABLET, DELAYED RELEASE ORAL EVERY MORNING
Qty: 30 TABLET | Refills: 0 | Status: SHIPPED | OUTPATIENT
Start: 2024-07-17

## 2024-07-25 DIAGNOSIS — R60.1 GENERALIZED EDEMA: ICD-10-CM

## 2024-07-25 RX ORDER — BUMETANIDE 2 MG/1
2 TABLET ORAL DAILY
Qty: 90 TABLET | Refills: 1 | Status: SHIPPED | OUTPATIENT
Start: 2024-07-25

## 2024-07-29 RX ORDER — CALCIUM CITRATE/VITAMIN D3 200MG-6.25
TABLET ORAL
Qty: 300 STRIP | Refills: 3 | Status: SHIPPED | OUTPATIENT
Start: 2024-07-29

## 2024-08-05 DIAGNOSIS — K25.9 GASTRIC ULCER, UNSPECIFIED CHRONICITY, UNSPECIFIED WHETHER GASTRIC ULCER HEMORRHAGE OR PERFORATION PRESENT: Primary | ICD-10-CM

## 2024-08-06 ENCOUNTER — OFFICE VISIT (OUTPATIENT)
Dept: FAMILY MEDICINE | Facility: CLINIC | Age: 74
End: 2024-08-06
Payer: MEDICARE

## 2024-08-06 VITALS
TEMPERATURE: 98 F | HEART RATE: 82 BPM | WEIGHT: 156 LBS | SYSTOLIC BLOOD PRESSURE: 142 MMHG | RESPIRATION RATE: 18 BRPM | BODY MASS INDEX: 26.63 KG/M2 | HEIGHT: 64 IN | DIASTOLIC BLOOD PRESSURE: 80 MMHG | OXYGEN SATURATION: 99 %

## 2024-08-06 DIAGNOSIS — Z13.820 ENCOUNTER FOR OSTEOPOROSIS SCREENING IN ASYMPTOMATIC POSTMENOPAUSAL PATIENT: ICD-10-CM

## 2024-08-06 DIAGNOSIS — F41.9 ANXIETY AND DEPRESSION: ICD-10-CM

## 2024-08-06 DIAGNOSIS — Z79.4 CONTROLLED TYPE 2 DIABETES MELLITUS WITH CHRONIC KIDNEY DISEASE, WITH LONG-TERM CURRENT USE OF INSULIN, UNSPECIFIED CKD STAGE: ICD-10-CM

## 2024-08-06 DIAGNOSIS — F41.9 ANXIETY: ICD-10-CM

## 2024-08-06 DIAGNOSIS — L97.512 SKIN ULCER OF SECOND TOE OF RIGHT FOOT WITH FAT LAYER EXPOSED: ICD-10-CM

## 2024-08-06 DIAGNOSIS — E78.2 MIXED HYPERLIPIDEMIA: ICD-10-CM

## 2024-08-06 DIAGNOSIS — Z94.0 HISTORY OF KIDNEY TRANSPLANT: ICD-10-CM

## 2024-08-06 DIAGNOSIS — Z78.0 ENCOUNTER FOR OSTEOPOROSIS SCREENING IN ASYMPTOMATIC POSTMENOPAUSAL PATIENT: ICD-10-CM

## 2024-08-06 DIAGNOSIS — F32.A ANXIETY AND DEPRESSION: ICD-10-CM

## 2024-08-06 DIAGNOSIS — I10 ESSENTIAL HYPERTENSION: ICD-10-CM

## 2024-08-06 DIAGNOSIS — E13.22 OTHER SPECIFIED DIABETES MELLITUS WITH CHRONIC KIDNEY DISEASE, WITH LONG-TERM CURRENT USE OF INSULIN, UNSPECIFIED CKD STAGE: ICD-10-CM

## 2024-08-06 DIAGNOSIS — I50.9 OTHER CONGESTIVE HEART FAILURE: ICD-10-CM

## 2024-08-06 DIAGNOSIS — Z00.00 ENCOUNTER FOR SUBSEQUENT ANNUAL WELLNESS VISIT (AWV) IN MEDICARE PATIENT: Primary | ICD-10-CM

## 2024-08-06 DIAGNOSIS — E11.22 CONTROLLED TYPE 2 DIABETES MELLITUS WITH CHRONIC KIDNEY DISEASE, WITH LONG-TERM CURRENT USE OF INSULIN, UNSPECIFIED CKD STAGE: ICD-10-CM

## 2024-08-06 DIAGNOSIS — Z79.4 OTHER SPECIFIED DIABETES MELLITUS WITH CHRONIC KIDNEY DISEASE, WITH LONG-TERM CURRENT USE OF INSULIN, UNSPECIFIED CKD STAGE: ICD-10-CM

## 2024-08-06 DIAGNOSIS — Z86.73 PERSONAL HISTORY OF TRANSIENT ISCHEMIC ATTACK (TIA), AND CEREBRAL INFARCTION WITHOUT RESIDUAL DEFICITS: ICD-10-CM

## 2024-08-06 DIAGNOSIS — G62.9 NEUROPATHY: ICD-10-CM

## 2024-08-06 PROBLEM — G89.29 CHRONIC INTRACTABLE HEADACHE: Status: RESOLVED | Noted: 2018-06-27 | Resolved: 2024-08-06

## 2024-08-06 PROBLEM — R51.9 CHRONIC INTRACTABLE HEADACHE: Status: RESOLVED | Noted: 2018-06-27 | Resolved: 2024-08-06

## 2024-08-06 LAB
CREAT UR-MCNC: 42 MG/DL (ref 28–219)
MICROALBUMIN UR-MCNC: 2.2 MG/DL (ref 0–2.8)
MICROALBUMIN/CREAT RATIO PNL UR: 52.4 MG/G (ref 0–30)

## 2024-08-06 PROCEDURE — 1125F AMNT PAIN NOTED PAIN PRSNT: CPT | Mod: ,,, | Performed by: NURSE PRACTITIONER

## 2024-08-06 PROCEDURE — 3044F HG A1C LEVEL LT 7.0%: CPT | Mod: ,,, | Performed by: NURSE PRACTITIONER

## 2024-08-06 PROCEDURE — 1158F ADVNC CARE PLAN TLK DOCD: CPT | Mod: ,,, | Performed by: NURSE PRACTITIONER

## 2024-08-06 PROCEDURE — 1101F PT FALLS ASSESS-DOCD LE1/YR: CPT | Mod: ,,, | Performed by: NURSE PRACTITIONER

## 2024-08-06 PROCEDURE — 3079F DIAST BP 80-89 MM HG: CPT | Mod: ,,, | Performed by: NURSE PRACTITIONER

## 2024-08-06 PROCEDURE — 3077F SYST BP >= 140 MM HG: CPT | Mod: ,,, | Performed by: NURSE PRACTITIONER

## 2024-08-06 PROCEDURE — G0439 PPPS, SUBSEQ VISIT: HCPCS | Mod: ,,, | Performed by: NURSE PRACTITIONER

## 2024-08-06 PROCEDURE — 1159F MED LIST DOCD IN RCRD: CPT | Mod: ,,, | Performed by: NURSE PRACTITIONER

## 2024-08-06 PROCEDURE — 3288F FALL RISK ASSESSMENT DOCD: CPT | Mod: ,,, | Performed by: NURSE PRACTITIONER

## 2024-08-06 PROCEDURE — 82570 ASSAY OF URINE CREATININE: CPT | Mod: ,,, | Performed by: CLINICAL MEDICAL LABORATORY

## 2024-08-06 PROCEDURE — 1170F FXNL STATUS ASSESSED: CPT | Mod: ,,, | Performed by: NURSE PRACTITIONER

## 2024-08-06 PROCEDURE — 82043 UR ALBUMIN QUANTITATIVE: CPT | Mod: ,,, | Performed by: CLINICAL MEDICAL LABORATORY

## 2024-08-06 RX ORDER — CLONIDINE 0.1 MG/24H
1 PATCH, EXTENDED RELEASE TRANSDERMAL WEEKLY
COMMUNITY

## 2024-08-06 RX ORDER — ESCITALOPRAM OXALATE 20 MG/1
20 TABLET ORAL DAILY
Qty: 30 TABLET | Refills: 2 | Status: SHIPPED | OUTPATIENT
Start: 2024-08-06 | End: 2025-08-06

## 2024-08-06 RX ORDER — PANTOPRAZOLE SODIUM 40 MG/1
40 TABLET, DELAYED RELEASE ORAL EVERY MORNING
Qty: 30 TABLET | Refills: 0 | Status: SHIPPED | OUTPATIENT
Start: 2024-08-06

## 2024-08-09 DIAGNOSIS — Z71.89 COMPLEX CARE COORDINATION: ICD-10-CM

## 2024-08-13 ENCOUNTER — HOSPITAL ENCOUNTER (INPATIENT)
Facility: HOSPITAL | Age: 74
LOS: 4 days | Discharge: HOME HEALTH | DRG: 291 | End: 2024-08-17
Attending: EMERGENCY MEDICINE | Admitting: INTERNAL MEDICINE
Payer: MEDICARE

## 2024-08-13 ENCOUNTER — OFFICE VISIT (OUTPATIENT)
Dept: FAMILY MEDICINE | Facility: CLINIC | Age: 74
End: 2024-08-13
Payer: MEDICARE

## 2024-08-13 VITALS
SYSTOLIC BLOOD PRESSURE: 124 MMHG | HEART RATE: 77 BPM | WEIGHT: 160.63 LBS | DIASTOLIC BLOOD PRESSURE: 62 MMHG | TEMPERATURE: 97 F | BODY MASS INDEX: 27.42 KG/M2 | OXYGEN SATURATION: 97 % | HEIGHT: 64 IN | RESPIRATION RATE: 18 BRPM

## 2024-08-13 DIAGNOSIS — Z94.0 HISTORY OF KIDNEY TRANSPLANT: ICD-10-CM

## 2024-08-13 DIAGNOSIS — Z12.39 ENCOUNTER FOR OTHER SCREENING FOR MALIGNANT NEOPLASM OF BREAST: ICD-10-CM

## 2024-08-13 DIAGNOSIS — N17.9 AKI (ACUTE KIDNEY INJURY): ICD-10-CM

## 2024-08-13 DIAGNOSIS — R07.89 SENSATION OF CHEST TIGHTNESS: ICD-10-CM

## 2024-08-13 DIAGNOSIS — N04.9 ANASARCA ASSOCIATED WITH DISORDER OF KIDNEY: Primary | ICD-10-CM

## 2024-08-13 DIAGNOSIS — R79.89 ELEVATED BRAIN NATRIURETIC PEPTIDE (BNP) LEVEL: ICD-10-CM

## 2024-08-13 DIAGNOSIS — R06.00 DYSPNEA: ICD-10-CM

## 2024-08-13 DIAGNOSIS — I10 ESSENTIAL HYPERTENSION: ICD-10-CM

## 2024-08-13 DIAGNOSIS — I50.33 ACUTE ON CHRONIC HEART FAILURE WITH PRESERVED EJECTION FRACTION: Primary | ICD-10-CM

## 2024-08-13 DIAGNOSIS — R07.9 CHEST PAIN: ICD-10-CM

## 2024-08-13 PROBLEM — R51.9 ACUTE NONINTRACTABLE HEADACHE: Status: RESOLVED | Noted: 2022-11-01 | Resolved: 2024-08-13

## 2024-08-13 PROBLEM — R06.09 DYSPNEA ON EXERTION: Status: RESOLVED | Noted: 2023-09-06 | Resolved: 2024-08-13

## 2024-08-13 PROBLEM — E87.1 HYPONATREMIA: Status: RESOLVED | Noted: 2022-11-01 | Resolved: 2024-08-13

## 2024-08-13 PROBLEM — N39.0 URINARY TRACT INFECTION WITHOUT HEMATURIA: Status: RESOLVED | Noted: 2024-05-20 | Resolved: 2024-08-13

## 2024-08-13 LAB
ALBUMIN SERPL BCP-MCNC: 3.7 G/DL (ref 3.5–5)
ALBUMIN/GLOB SERPL: 1.4 {RATIO}
ALP SERPL-CCNC: 47 U/L (ref 55–142)
ALT SERPL W P-5'-P-CCNC: 16 U/L (ref 13–56)
ANION GAP SERPL CALCULATED.3IONS-SCNC: 13 MMOL/L (ref 7–16)
APTT PPP: 22.5 SECONDS (ref 25.2–37.3)
AST SERPL W P-5'-P-CCNC: 15 U/L (ref 15–37)
BASOPHILS # BLD AUTO: 0.02 K/UL (ref 0–0.2)
BASOPHILS NFR BLD AUTO: 0.3 % (ref 0–1)
BILIRUB SERPL-MCNC: 0.5 MG/DL (ref ?–1.2)
BUN SERPL-MCNC: 83 MG/DL (ref 7–18)
BUN/CREAT SERPL: 20 (ref 6–20)
CALCIUM SERPL-MCNC: 8.9 MG/DL (ref 8.5–10.1)
CHLORIDE SERPL-SCNC: 107 MMOL/L (ref 98–107)
CO2 SERPL-SCNC: 20 MMOL/L (ref 21–32)
CREAT SERPL-MCNC: 4.19 MG/DL (ref 0.55–1.02)
DIFFERENTIAL METHOD BLD: ABNORMAL
EGFR (NO RACE VARIABLE) (RUSH/TITUS): 11 ML/MIN/1.73M2
EOSINOPHIL # BLD AUTO: 0.02 K/UL (ref 0–0.5)
EOSINOPHIL NFR BLD AUTO: 0.3 % (ref 1–4)
ERYTHROCYTE [DISTWIDTH] IN BLOOD BY AUTOMATED COUNT: 15.5 % (ref 11.5–14.5)
GLOBULIN SER-MCNC: 2.6 G/DL (ref 2–4)
GLUCOSE SERPL-MCNC: 209 MG/DL (ref 74–106)
HCT VFR BLD AUTO: 30.4 % (ref 38–47)
HGB BLD-MCNC: 9.5 G/DL (ref 12–16)
IMM GRANULOCYTES # BLD AUTO: 0.04 K/UL (ref 0–0.04)
IMM GRANULOCYTES NFR BLD: 0.6 % (ref 0–0.4)
INR BLD: 1.24
LYMPHOCYTES # BLD AUTO: 0.48 K/UL (ref 1–4.8)
LYMPHOCYTES NFR BLD AUTO: 7.8 % (ref 27–41)
MAGNESIUM SERPL-MCNC: 1.8 MG/DL (ref 1.7–2.3)
MCH RBC QN AUTO: 25.6 PG (ref 27–31)
MCHC RBC AUTO-ENTMCNC: 31.3 G/DL (ref 32–36)
MCV RBC AUTO: 81.9 FL (ref 80–96)
MONOCYTES # BLD AUTO: 0.41 K/UL (ref 0–0.8)
MONOCYTES NFR BLD AUTO: 6.6 % (ref 2–6)
MPC BLD CALC-MCNC: 11 FL (ref 9.4–12.4)
NEUTROPHILS # BLD AUTO: 5.21 K/UL (ref 1.8–7.7)
NEUTROPHILS NFR BLD AUTO: 84.4 % (ref 53–65)
NRBC # BLD AUTO: 0 X10E3/UL
NRBC, AUTO (.00): 0 %
NT-PROBNP SERPL-MCNC: ABNORMAL PG/ML (ref 1–125)
PLATELET # BLD AUTO: 117 K/UL (ref 150–400)
POTASSIUM SERPL-SCNC: 4.1 MMOL/L (ref 3.5–5.1)
PROT SERPL-MCNC: 6.3 G/DL (ref 6.4–8.2)
PROTHROMBIN TIME: 15.5 SECONDS (ref 11.7–14.7)
RBC # BLD AUTO: 3.71 M/UL (ref 4.2–5.4)
SARS-COV-2 RDRP RESP QL NAA+PROBE: NEGATIVE
SODIUM SERPL-SCNC: 136 MMOL/L (ref 136–145)
TROPONIN I SERPL DL<=0.01 NG/ML-MCNC: 45.3 PG/ML
WBC # BLD AUTO: 6.18 K/UL (ref 4.5–11)

## 2024-08-13 PROCEDURE — 3060F POS MICROALBUMINURIA REV: CPT | Mod: ,,, | Performed by: NURSE PRACTITIONER

## 2024-08-13 PROCEDURE — 25000003 PHARM REV CODE 250

## 2024-08-13 PROCEDURE — 3008F BODY MASS INDEX DOCD: CPT | Mod: ,,, | Performed by: NURSE PRACTITIONER

## 2024-08-13 PROCEDURE — 99223 1ST HOSP IP/OBS HIGH 75: CPT | Mod: AI,,, | Performed by: INTERNAL MEDICINE

## 2024-08-13 PROCEDURE — 1101F PT FALLS ASSESS-DOCD LE1/YR: CPT | Mod: ,,, | Performed by: NURSE PRACTITIONER

## 2024-08-13 PROCEDURE — 63600175 PHARM REV CODE 636 W HCPCS

## 2024-08-13 PROCEDURE — 36415 COLL VENOUS BLD VENIPUNCTURE: CPT | Performed by: EMERGENCY MEDICINE

## 2024-08-13 PROCEDURE — 99285 EMERGENCY DEPT VISIT HI MDM: CPT | Mod: 25

## 2024-08-13 PROCEDURE — 85025 COMPLETE CBC W/AUTO DIFF WBC: CPT | Performed by: EMERGENCY MEDICINE

## 2024-08-13 PROCEDURE — 3044F HG A1C LEVEL LT 7.0%: CPT | Mod: ,,, | Performed by: NURSE PRACTITIONER

## 2024-08-13 PROCEDURE — 85730 THROMBOPLASTIN TIME PARTIAL: CPT | Performed by: EMERGENCY MEDICINE

## 2024-08-13 PROCEDURE — 11000001 HC ACUTE MED/SURG PRIVATE ROOM

## 2024-08-13 PROCEDURE — 93005 ELECTROCARDIOGRAM TRACING: CPT

## 2024-08-13 PROCEDURE — 83735 ASSAY OF MAGNESIUM: CPT | Performed by: EMERGENCY MEDICINE

## 2024-08-13 PROCEDURE — 3066F NEPHROPATHY DOC TX: CPT | Mod: ,,, | Performed by: NURSE PRACTITIONER

## 2024-08-13 PROCEDURE — 83880 ASSAY OF NATRIURETIC PEPTIDE: CPT | Performed by: EMERGENCY MEDICINE

## 2024-08-13 PROCEDURE — 85610 PROTHROMBIN TIME: CPT | Performed by: EMERGENCY MEDICINE

## 2024-08-13 PROCEDURE — 3074F SYST BP LT 130 MM HG: CPT | Mod: ,,, | Performed by: NURSE PRACTITIONER

## 2024-08-13 PROCEDURE — 3288F FALL RISK ASSESSMENT DOCD: CPT | Mod: ,,, | Performed by: NURSE PRACTITIONER

## 2024-08-13 PROCEDURE — 3078F DIAST BP <80 MM HG: CPT | Mod: ,,, | Performed by: NURSE PRACTITIONER

## 2024-08-13 PROCEDURE — 87635 SARS-COV-2 COVID-19 AMP PRB: CPT

## 2024-08-13 PROCEDURE — 80053 COMPREHEN METABOLIC PANEL: CPT | Performed by: EMERGENCY MEDICINE

## 2024-08-13 PROCEDURE — 99213 OFFICE O/P EST LOW 20 MIN: CPT | Mod: ,,, | Performed by: NURSE PRACTITIONER

## 2024-08-13 PROCEDURE — 84484 ASSAY OF TROPONIN QUANT: CPT | Performed by: EMERGENCY MEDICINE

## 2024-08-13 RX ORDER — IBUPROFEN 200 MG
16 TABLET ORAL
Status: DISCONTINUED | OUTPATIENT
Start: 2024-08-13 | End: 2024-08-17 | Stop reason: HOSPADM

## 2024-08-13 RX ORDER — DOCUSATE SODIUM 100 MG/1
100 CAPSULE, LIQUID FILLED ORAL 2 TIMES DAILY PRN
Status: DISCONTINUED | OUTPATIENT
Start: 2024-08-13 | End: 2024-08-17 | Stop reason: HOSPADM

## 2024-08-13 RX ORDER — CHOLESTYRAMINE 4 G/4.8G
1 POWDER, FOR SUSPENSION ORAL 2 TIMES DAILY
Status: DISCONTINUED | OUTPATIENT
Start: 2024-08-13 | End: 2024-08-15

## 2024-08-13 RX ORDER — POLYETHYLENE GLYCOL 3350 17 G/17G
17 POWDER, FOR SOLUTION ORAL DAILY PRN
Status: DISCONTINUED | OUTPATIENT
Start: 2024-08-13 | End: 2024-08-17 | Stop reason: HOSPADM

## 2024-08-13 RX ORDER — NALOXONE HCL 0.4 MG/ML
0.02 VIAL (ML) INJECTION
Status: DISCONTINUED | OUTPATIENT
Start: 2024-08-13 | End: 2024-08-17 | Stop reason: HOSPADM

## 2024-08-13 RX ORDER — CARVEDILOL 25 MG/1
25 TABLET ORAL 2 TIMES DAILY
Status: DISCONTINUED | OUTPATIENT
Start: 2024-08-13 | End: 2024-08-17 | Stop reason: HOSPADM

## 2024-08-13 RX ORDER — MYCOPHENOLATE MOFETIL 250 MG/1
250 CAPSULE ORAL 2 TIMES DAILY
Status: DISCONTINUED | OUTPATIENT
Start: 2024-08-13 | End: 2024-08-17 | Stop reason: HOSPADM

## 2024-08-13 RX ORDER — MULTIVITAMIN
1 TABLET ORAL DAILY
COMMUNITY

## 2024-08-13 RX ORDER — HYDRALAZINE HYDROCHLORIDE 20 MG/ML
10 INJECTION INTRAMUSCULAR; INTRAVENOUS EVERY 6 HOURS PRN
Status: DISCONTINUED | OUTPATIENT
Start: 2024-08-13 | End: 2024-08-17 | Stop reason: HOSPADM

## 2024-08-13 RX ORDER — CLONIDINE 0.1 MG/24H
1 PATCH, EXTENDED RELEASE TRANSDERMAL WEEKLY
Status: DISCONTINUED | OUTPATIENT
Start: 2024-08-19 | End: 2024-08-17 | Stop reason: HOSPADM

## 2024-08-13 RX ORDER — IBUPROFEN 200 MG
24 TABLET ORAL
Status: DISCONTINUED | OUTPATIENT
Start: 2024-08-13 | End: 2024-08-17 | Stop reason: HOSPADM

## 2024-08-13 RX ORDER — HYDRALAZINE HYDROCHLORIDE 50 MG/1
100 TABLET, FILM COATED ORAL 3 TIMES DAILY
Status: DISCONTINUED | OUTPATIENT
Start: 2024-08-13 | End: 2024-08-13

## 2024-08-13 RX ORDER — TALC
6 POWDER (GRAM) TOPICAL NIGHTLY PRN
Status: DISCONTINUED | OUTPATIENT
Start: 2024-08-13 | End: 2024-08-17 | Stop reason: HOSPADM

## 2024-08-13 RX ORDER — SODIUM BICARBONATE 650 MG/1
650 TABLET ORAL 2 TIMES DAILY
Status: DISCONTINUED | OUTPATIENT
Start: 2024-08-13 | End: 2024-08-17 | Stop reason: HOSPADM

## 2024-08-13 RX ORDER — ACETAMINOPHEN 325 MG/1
650 TABLET ORAL EVERY 8 HOURS PRN
Status: DISCONTINUED | OUTPATIENT
Start: 2024-08-13 | End: 2024-08-17 | Stop reason: HOSPADM

## 2024-08-13 RX ORDER — SELENIUM 50 MCG
1 TABLET ORAL
Status: DISCONTINUED | OUTPATIENT
Start: 2024-08-14 | End: 2024-08-17 | Stop reason: HOSPADM

## 2024-08-13 RX ORDER — ONDANSETRON HYDROCHLORIDE 2 MG/ML
4 INJECTION, SOLUTION INTRAVENOUS EVERY 8 HOURS PRN
Status: DISCONTINUED | OUTPATIENT
Start: 2024-08-13 | End: 2024-08-17 | Stop reason: HOSPADM

## 2024-08-13 RX ORDER — HYDRALAZINE HYDROCHLORIDE 100 MG/1
100 TABLET, FILM COATED ORAL 3 TIMES DAILY
Status: DISCONTINUED | OUTPATIENT
Start: 2024-08-13 | End: 2024-08-17 | Stop reason: HOSPADM

## 2024-08-13 RX ORDER — LANOLIN ALCOHOL/MO/W.PET/CERES
1 CREAM (GRAM) TOPICAL 2 TIMES DAILY
Status: DISCONTINUED | OUTPATIENT
Start: 2024-08-13 | End: 2024-08-17 | Stop reason: HOSPADM

## 2024-08-13 RX ORDER — FLUTICASONE PROPIONATE 50 MCG
2 SPRAY, SUSPENSION (ML) NASAL DAILY PRN
Status: DISCONTINUED | OUTPATIENT
Start: 2024-08-13 | End: 2024-08-17 | Stop reason: HOSPADM

## 2024-08-13 RX ORDER — GLUCAGON 1 MG
1 KIT INJECTION
Status: DISCONTINUED | OUTPATIENT
Start: 2024-08-13 | End: 2024-08-17 | Stop reason: HOSPADM

## 2024-08-13 RX ORDER — HEPARIN SODIUM 5000 [USP'U]/ML
5000 INJECTION, SOLUTION INTRAVENOUS; SUBCUTANEOUS EVERY 8 HOURS
Status: DISCONTINUED | OUTPATIENT
Start: 2024-08-13 | End: 2024-08-17 | Stop reason: HOSPADM

## 2024-08-13 RX ORDER — ACETAMINOPHEN 325 MG/1
650 TABLET ORAL EVERY 4 HOURS PRN
Status: DISCONTINUED | OUTPATIENT
Start: 2024-08-13 | End: 2024-08-17 | Stop reason: HOSPADM

## 2024-08-13 RX ORDER — CHOLESTYRAMINE 4 G/9G
1 POWDER, FOR SUSPENSION ORAL DAILY
COMMUNITY
Start: 2024-08-09

## 2024-08-13 RX ORDER — MEGESTROL ACETATE 20 MG/1
20 TABLET ORAL DAILY
Status: DISCONTINUED | OUTPATIENT
Start: 2024-08-14 | End: 2024-08-17 | Stop reason: HOSPADM

## 2024-08-13 RX ORDER — INSULIN ASPART 100 [IU]/ML
0-10 INJECTION, SOLUTION INTRAVENOUS; SUBCUTANEOUS
Status: DISCONTINUED | OUTPATIENT
Start: 2024-08-13 | End: 2024-08-17 | Stop reason: HOSPADM

## 2024-08-13 RX ORDER — ALBUTEROL SULFATE 90 UG/1
2 INHALANT RESPIRATORY (INHALATION) EVERY 6 HOURS PRN
Status: DISCONTINUED | OUTPATIENT
Start: 2024-08-13 | End: 2024-08-13

## 2024-08-13 RX ORDER — PREDNISONE 5 MG/1
5 TABLET ORAL DAILY
Status: DISCONTINUED | OUTPATIENT
Start: 2024-08-14 | End: 2024-08-17 | Stop reason: HOSPADM

## 2024-08-13 RX ORDER — ISOSORBIDE MONONITRATE 30 MG/1
30 TABLET, EXTENDED RELEASE ORAL DAILY
Status: DISCONTINUED | OUTPATIENT
Start: 2024-08-14 | End: 2024-08-17 | Stop reason: HOSPADM

## 2024-08-13 RX ORDER — ASPIRIN 81 MG/1
81 TABLET ORAL DAILY
Status: DISCONTINUED | OUTPATIENT
Start: 2024-08-14 | End: 2024-08-17 | Stop reason: HOSPADM

## 2024-08-13 RX ORDER — ALBUTEROL SULFATE 0.83 MG/ML
2.5 SOLUTION RESPIRATORY (INHALATION) EVERY 6 HOURS PRN
Status: DISCONTINUED | OUTPATIENT
Start: 2024-08-13 | End: 2024-08-17 | Stop reason: HOSPADM

## 2024-08-13 RX ORDER — SODIUM CHLORIDE 0.9 % (FLUSH) 0.9 %
10 SYRINGE (ML) INJECTION EVERY 12 HOURS PRN
Status: DISCONTINUED | OUTPATIENT
Start: 2024-08-13 | End: 2024-08-17 | Stop reason: HOSPADM

## 2024-08-13 RX ORDER — PANTOPRAZOLE SODIUM 40 MG/1
40 TABLET, DELAYED RELEASE ORAL EVERY MORNING
Status: DISCONTINUED | OUTPATIENT
Start: 2024-08-14 | End: 2024-08-17 | Stop reason: HOSPADM

## 2024-08-13 RX ORDER — MINOXIDIL 2.5 MG/1
2.5 TABLET ORAL DAILY
Status: DISCONTINUED | OUTPATIENT
Start: 2024-08-14 | End: 2024-08-17 | Stop reason: HOSPADM

## 2024-08-13 RX ADMIN — HEPARIN SODIUM 5000 UNITS: 5000 INJECTION, SOLUTION INTRAVENOUS; SUBCUTANEOUS at 09:08

## 2024-08-13 RX ADMIN — SODIUM BICARBONATE 650 MG: 650 TABLET ORAL at 09:08

## 2024-08-13 RX ADMIN — CARVEDILOL 25 MG: 25 TABLET, FILM COATED ORAL at 09:08

## 2024-08-13 RX ADMIN — FERROUS SULFATE TAB 325 MG (65 MG ELEMENTAL FE) 1 EACH: 325 (65 FE) TAB at 09:08

## 2024-08-13 RX ADMIN — MYCOPHENOLATE MOFETIL 250 MG: 250 CAPSULE ORAL at 09:08

## 2024-08-13 RX ADMIN — HYDRALAZINE HYDROCHLORIDE 100 MG: 100 TABLET ORAL at 09:08

## 2024-08-13 RX ADMIN — CHOLESTYRAMINE LIGHT 4 G: 4 POWDER, FOR SUSPENSION ORAL at 09:08

## 2024-08-13 NOTE — HPI
73yo female with a PMH of CKD s/p renal transplant with CMV in 2016 followed Dr. Blount (last seen 7/6/24), HFpEF (EF 65% on last Echo 12/4/2023), CAD s/p 1 stent, CHF, HTN, HLD, DM2 s/p toe amputation 2 on right foot, 3 on left foot, diabetic retinopathy, CVA, depression, osteoporosis who presents to [facility] ED via EMS from [facility] with SOB and leg swelling. SOB and bilateral leg swelling started at least 1 month ago and gradually worsened especially in the past week. Endorses OSORIO, orthopnea, PND, nocturnal coughs. Endorses chest and abdominal tightness from swelling. Denies f/c/cp/n/v/d, denies appetite, urinary, or bowel habit changes.    Upon presentation, VS remarkable for 172/61. Labs remarkable for H/H 9.5/30.4 (baseline H/H 10.5/33.9 on 8/21/23), MCV 81.9, RDW 15.5, plt 117, CO2 20, BUN/Cr 83/4.19, GFR 11 (baseline Cr 2.56, GFR 19 on 1/9/24), glucose 209. PTT 22.5, PT 15.5, INR 1.24. Troponin wnl 45.3, BNP 60538, Mg 1.8. EKG SR 82 without ischemic changes compared to previous. CXR shows increased bilateral lung density suggesting mild cardiac decompensation and or pneumonitis. Patient is admitted to hospital medicine for further management and care.

## 2024-08-13 NOTE — ED TRIAGE NOTES
Patient arrives to ED via EMS from clinic in [place] with complaints of worsening SOB and bilateral leg swelling over the past week. Patient states a hx of a kidney transplant in 2016 and states seeing a transplant specialist and Dr. Blount locally for nephrology.

## 2024-08-13 NOTE — ASSESSMENT & PLAN NOTE
"Generalized edema; noticeable more to face, hands, and lower ext. Reports having a sensation "getting tight" starting this am and has gotten worse, some shortness of breath as well. She has hx of kidney transplant with complications. She will need more acute care and labs to be done quickly that we can not provide here in clinic. Discussed risks/benefits/alternatives for treatment with patient; she is in agreement and voices understanding. Will transfer per EMS to [facility] for eval. She has transplant provider in [place]   "

## 2024-08-13 NOTE — ED PROVIDER NOTES
"Encounter Date: 8/13/2024    SCRIBE #1 NOTE: I, Jeannie Willard, am scribing for, and in the presence of,  Moises Resendiz MD.       History     Chief Complaint   Patient presents with    Shortness of Breath    Leg Swelling     This patient is a 74 y.o. Female that presents to the ED via EMS from [facility] with c/o SOB and bilateral lexy swelling. The bilateral swelling has been on going for over past week. Patient stated she has a hx of CHF, and a kidney transplant in 2016 and states seeing a transplant specialist and Dr. Blount locally for nephrology. She stated she has tightness in her chest and collection of fluid in her body. There are no other issues to report with this patient at this time.     The history is provided by the patient and the EMS personnel.     Review of patient's allergies indicates:   Allergen Reactions    Hydrocodone-acetaminophen Rash    Gabapentin Other (See Comments)     Made her disoriented  "out of my head"      Morphine Itching    Opioids - morphine analogues      Past Medical History:   Diagnosis Date    Amputation of one or more toes     2 TOES RT FOOT, 3 TOES LFT FOOT    Atherosclerotic heart disease of native coronary artery with angina pectoris 01/20/2020    CVA (cerebral vascular accident)     Depression     Diabetes mellitus, type 2     Disorder of kidney and ureter     History of carpal tunnel surgery of left wrist     History of carpal tunnel surgery of right wrist     History of repair of left rotator cuff     History of repair of right rotator cuff     Hyperlipidemia     Hypertension     Hypokalemia     Kidney transplant status 07/13/2020    Osteoporosis 07/22/2020    Proliferative diabetic retinopathy of both eyes 09/07/2023    Stroke      Past Surgical History:   Procedure Laterality Date    Appendix      EXTRACTION OF TOOTH Left 08/11/2021    HYSTERECTOMY      kidney transplant 2016      Have had issues since transplant, kidney had a virus per patient    OOPHORECTOMY      " TOE AMPUTATION       Family History   Problem Relation Name Age of Onset    Diabetes Mother      Hyperlipidemia Mother      Heart disease Mother      Hearing loss Mother      Diabetes Father      Hearing loss Father      Heart disease Father      Hyperlipidemia Father       Social History     Tobacco Use    Smoking status: Never     Passive exposure: Never    Smokeless tobacco: Never   Substance Use Topics    Alcohol use: Never    Drug use: Never     Review of Systems   Constitutional:  Positive for fever.   Respiratory:  Positive for cough and chest tightness.    Cardiovascular:  Positive for chest pain and leg swelling.   Gastrointestinal:  Negative for diarrhea, nausea and vomiting.       Physical Exam     Initial Vitals [08/13/24 1524]   BP Pulse Resp Temp SpO2   (!) 172/61 82 18 98.6 °F (37 °C) 100 %      MAP       --         Physical Exam    Nursing note and vitals reviewed.  Constitutional: She appears well-developed and well-nourished.   HENT:   Head: Normocephalic and atraumatic.   Eyes: Conjunctivae and EOM are normal. Pupils are equal, round, and reactive to light.   Neck: Neck supple.   Normal range of motion.  Cardiovascular:  Normal rate, regular rhythm, normal heart sounds and intact distal pulses.           Pulmonary/Chest:   Patient has fine crackles in her lungs.    Abdominal: Abdomen is soft. Bowel sounds are normal.   Musculoskeletal:         General: Edema present. Normal range of motion.      Cervical back: Normal range of motion and neck supple.      Comments: Patient has bilateral edema in legs.     Neurological: She is alert and oriented to person, place, and time. She has normal strength.   Skin: Skin is warm and dry. Capillary refill takes less than 2 seconds.   Psychiatric: She has a normal mood and affect. Thought content normal.         ED Course   Procedures  Labs Reviewed   NT-PRO NATRIURETIC PEPTIDE - Abnormal       Result Value    ProBNP 13,354 (*)    COMPREHENSIVE METABOLIC PANEL -  Abnormal    Sodium 136      Potassium 4.1      Chloride 107      CO2 20 (*)     Anion Gap 13      Glucose 209 (*)     BUN 83 (*)     Creatinine 4.19 (*)     BUN/Creatinine Ratio 20      Calcium 8.9      Total Protein 6.3 (*)     Albumin 3.7      Globulin 2.6      A/G Ratio 1.4      Bilirubin, Total 0.5      Alk Phos 47 (*)     ALT 16      AST 15      eGFR 11 (*)    APTT - Abnormal    PTT 22.5 (*)    PROTIME-INR - Abnormal    PT 15.5 (*)     INR 1.24     CBC WITH DIFFERENTIAL - Abnormal    WBC 6.18      RBC 3.71 (*)     Hemoglobin 9.5 (*)     Hematocrit 30.4 (*)     MCV 81.9      MCH 25.6 (*)     MCHC 31.3 (*)     RDW 15.5 (*)     Platelet Count 117 (*)     MPV 11.0      Neutrophils % 84.4 (*)     Lymphocytes % 7.8 (*)     Monocytes % 6.6 (*)     Eosinophils % 0.3 (*)     Basophils % 0.3      Immature Granulocytes % 0.6 (*)     nRBC, Auto 0.0      Neutrophils, Abs 5.21      Lymphocytes, Absolute 0.48 (*)     Monocytes, Absolute 0.41      Eosinophils, Absolute 0.02      Basophils, Absolute 0.02      Immature Granulocytes, Absolute 0.04      nRBC, Absolute 0.00      Diff Type Auto     BASIC METABOLIC PANEL - Abnormal    Sodium 139      Potassium 3.9      Chloride 109 (*)     CO2 22      Anion Gap 12      Glucose 169 (*)     BUN 82 (*)     Creatinine 3.96 (*)     BUN/Creatinine Ratio 21 (*)     Calcium 8.8      eGFR 11 (*)    CBC WITH DIFFERENTIAL - Abnormal    WBC 5.39      RBC 3.40 (*)     Hemoglobin 8.8 (*)     Hematocrit 27.1 (*)     MCV 79.7 (*)     MCH 25.9 (*)     MCHC 32.5      RDW 15.2 (*)     Platelet Count 136 (*)     MPV 12.1      Neutrophils % 68.9 (*)     Lymphocytes % 18.2 (*)     Monocytes % 11.5 (*)     Eosinophils % 0.6 (*)     Basophils % 0.4      Immature Granulocytes % 0.4      nRBC, Auto 0.0      Neutrophils, Abs 3.72      Lymphocytes, Absolute 0.98 (*)     Monocytes, Absolute 0.62      Eosinophils, Absolute 0.03      Basophils, Absolute 0.02      Immature Granulocytes, Absolute 0.02      nRBC,  Absolute 0.00      Diff Type Auto     TROPONIN I - Abnormal    Troponin I High Sensitivity 66.9 (*)    URINALYSIS, REFLEX TO URINE CULTURE - Abnormal    Color, UA Colorless      Clarity, UA Clear      pH, UA 6.0      Leukocytes, UA Trace (*)     Nitrites, UA Negative      Protein, UA Negative      Glucose, UA Normal      Ketones, UA Negative      Urobilinogen, UA Normal      Bilirubin, UA Negative      Blood, UA Negative      Specific Hiller, UA 1.008     POCT GLUCOSE MONITORING CONTINUOUS - Abnormal    POC Glucose 172 (*)    POCT GLUCOSE MONITORING CONTINUOUS - Abnormal    POC Glucose 174 (*)    POCT GLUCOSE MONITORING CONTINUOUS - Abnormal    POC Glucose 106 (*)    MAGNESIUM - Normal    Magnesium 1.8     TROPONIN I - Normal    Troponin I High Sensitivity 45.3     SARS-COV-2 RNA AMPLIFICATION, QUAL - Normal    SARS COV-2 Molecular Negative      Narrative:     Negative SARS-CoV results should not be used as the sole basis for treatment or patient management decisions; negative results should be considered in the context of a patient's recent exposures, history and the presene of clinical signs and symptoms consistent with COVID-19.  Negative results should be treated as presumptive and confirmed by molecular assay, if necessary for patient management.   URINALYSIS, MICROSCOPIC - Normal    WBC, UA 1      RBC, UA <1     CBC W/ AUTO DIFFERENTIAL    Narrative:     The following orders were created for panel order CBC auto differential.  Procedure                               Abnormality         Status                     ---------                               -----------         ------                     CBC with Differential[[id number]]       Abnormal            Final result                 Please view results for these tests on the individual orders.   CBC W/ AUTO DIFFERENTIAL    Narrative:     The following orders were created for panel order CBC with Automated Differential.  Procedure                                Abnormality         Status                     ---------                               -----------         ------                     CBC with Differential[[id number]]       Abnormal            Final result                 Please view results for these tests on the individual orders.   PROTEIN / CREATININE RATIO, URINE    Creatinine, Urine 55      Protein, Urine 8.8      Protein/Creatinine Ratio 0.160       EKG Readings: (Independently Interpreted)   Heart Rate: 82 bpm.   Sinus rhythm   Possible anteroseptal infarct - age undetermined   Lateral ST-T abnormality may be due to myocardial ischemia   Abnormal ECG     ECG Results              EKG 12-lead (Final result)        Collection Time Result Time QRS Duration OHS QTC Calculation    08/13/24 15:15:25 08/14/24 18:01:30 112 405                     Final result by Interface, Lab In [facility] (08/14/24 18:01:33)                   Narrative:    Test Reason : R06.00,    Vent. Rate : 082 BPM     Atrial Rate : 000 BPM     P-R Int : 176 ms          QRS Dur : 112 ms      QT Int : 366 ms       P-R-T Axes : 077 -06 110 degrees     QTc Int : 405 ms    Sinus rhythm  Possible anteroseptal infarct - age undetermined  Lateral ST-T abnormality may be due to myocardial ischemia  Abnormal ECG    Confirmed by Emmett ALLRED, Cristian UDwayne ([id number]) on 8/14/2024 6:01:28 PM    Referred By: [email or web address]   SELF           Confirmed By:Rehmapavithra UDwayne Christine MD                                  Imaging Results              X-Ray Chest AP Portable (Final result)  Result time 08/13/24 15:19:34      Final result by Sean Martínez II, MD (08/13/24 15:19:34)                   Impression:      Findings suggest mild cardiac decompensation and / or pneumonitis.      Electronically signed by: Sean Martínez  Date:    08/13/2024  Time:    15:19               Narrative:    EXAMINATION:  XR CHEST AP PORTABLE    CLINICAL HISTORY:  Dyspnea, unspecified    COMPARISON:  2 December 2023    TECHNIQUE:  XR CHEST AP  PORTABLE    FINDINGS:  The heart and mediastinum are stable in size and configuration.  The pulmonary vascularity is slightly increased with bilateral increased interstitial lung density.  No other lung infiltrates, effusions, pneumothorax or other abnormality is demonstrated.                                    X-Rays:   Independently Interpreted Readings:   Other Readings:  EXAMINATION:  XR CHEST AP PORTABLE     CLINICAL HISTORY:  Dyspnea, unspecified     COMPARISON:  2 December 2023     TECHNIQUE:  XR CHEST AP PORTABLE     FINDINGS:  The heart and mediastinum are stable in size and configuration.  The pulmonary vascularity is slightly increased with bilateral increased interstitial lung density.  No other lung infiltrates, effusions, pneumothorax or other abnormality is demonstrated.     Impression:     Findings suggest mild cardiac decompensation and / or pneumonitis.        Electronically signed by:Sean Martínez  Date:                                            08/13/2024  Time:                                           15:19      Medications - No data to display  Medical Decision Making  Swelling.  Kidney transplant.  Increased creatinine.      Ddx:  volume overload +/- eder vs other    admit    Amount and/or Complexity of Data Reviewed  Labs: ordered. Decision-making details documented in ED Course.  Radiology: ordered. Decision-making details documented in ED Course.  ECG/medicine tests: ordered. Decision-making details documented in ED Course.  Discussion of management or test interpretation with external provider(s): Discussed with admitting service.      Risk  Decision regarding hospitalization.              Attending Attestation:           Physician Attestation for Scribe:  Physician Attestation Statement for Scribe #1: I, Moises Resendiz MD, reviewed documentation, as scribed by Jeannie Willard in my presence, and it is both accurate and complete.             ED Course as of 08/30/24 0552   Tue Aug 13,  2024   1622 08/13/24 1521  X-Ray Chest AP Portable  Performed: 08/13/24 1516  Final         Impression: Findings suggest mild cardiac decompensation and / or pneumonitis. Electronically signed by: Sean Martínez Date: 08/13/2024 Time: 15:19       [CM]      ED Course User Index  [CM] Jeannie Willard                           Clinical Impression:  Final diagnoses:  [R06.00] Dyspnea  [N17.9] KYLER (acute kidney injury)          ED Disposition Condition    Admit Stable                Moises Resendiz MD  08/30/24 0552

## 2024-08-13 NOTE — PROGRESS NOTES
"   CHAPARRITA Bella   [facility]  [id number] [address] [address]  [phone]      PATIENT NAME: Nasrin Grant  : 1950  DATE: 24  MRN: [id number]      Billing Provider: CHAPARRITA Bella  Level of Service: CO OFFICE/OUTPT VISIT, EST, LEVL III, 20-29 MIN  Patient PCP Information       Provider PCP Type    CHAPARRITA Bella General            Reason for Visit / Chief Complaint: Edema (Pt stated she woke up this morning and face and legs were swollen /Pt stated she has been taking current fluid medication but not getting the fluid off faster then her body is retaining the fluid ), Facial Swelling, and Leg Swelling       Update PCP  Update Chief Complaint         History of Present Illness / Problem Focused Workflow     74 year old female presents with complaints of generalized edema. Reports she has been taking meds as prescribed. States symptoms started yesterday but has gotten worse this morning. Complaints of some shortness of breath and "feeling tight" all over body and in chest.   She has hx of kidney transplant and has had some recent complications.         Review of Systems     Review of Systems   Constitutional:  Positive for fatigue and unexpected weight change. Negative for fever.   HENT:  Negative for congestion.    Respiratory:  Positive for shortness of breath. Negative for cough.    Cardiovascular:  Positive for leg swelling. Negative for chest pain and palpitations.   Gastrointestinal:  Negative for abdominal pain, constipation and nausea.   Endocrine: Negative for polydipsia and polyuria.   Musculoskeletal:  Positive for arthralgias. Negative for gait problem.   Neurological:  Negative for dizziness, weakness and headaches.   Psychiatric/Behavioral:  Negative for agitation and dysphoric mood.        Medical / Social / Family History     Past Medical History:   Diagnosis Date    Amputation of one or more toes     2 TOES RT FOOT, 3 " TOES LFT FOOT    Atherosclerotic heart disease of native coronary artery with angina pectoris 01/20/2020    Depression     Diabetes mellitus, type 2     Disorder of kidney and ureter     History of carpal tunnel surgery of left wrist     History of carpal tunnel surgery of right wrist     History of repair of left rotator cuff     History of repair of right rotator cuff     Hyperlipidemia     Hypertension     Hypokalemia     Kidney transplant status 07/13/2020    Osteoporosis 07/22/2020    Proliferative diabetic retinopathy of both eyes 09/07/2023    Stroke        Past Surgical History:   Procedure Laterality Date    Appendix      EXTRACTION OF TOOTH Left 08/11/2021    HYSTERECTOMY      kidney transplant 2016      Have had issues since transplant, 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  reports that she has never smoked. She has never been exposed to tobacco smoke. She has never used smokeless tobacco. She reports that she does not drink alcohol and does not use drugs.    Family History  Ms.'s family history includes Diabetes in her father and mother; Hearing loss in her father and mother; Heart disease in her father and mother; Hyperlipidemia in her father and mother.    Medications and Allergies     Medications  Outpatient Medications Marked as Taking for the 8/13/24 encounter (Office Visit) with Laury Enrique FNP   Medication Sig Dispense Refill    albuterol (VENTOLIN HFA) 90 mcg/actuation inhaler Inhale 2 puffs into the lungs every 6 (six) hours as needed for Wheezing. Rescue 18 g 5    aspirin (ECOTRIN) 81 MG EC tablet Take 81 mg by mouth once daily.      atorvastatin (LIPITOR) 40 MG tablet Take 1 tablet (40 mg total) by mouth once daily. 90 tablet 1    bumetanide (BUMEX) 2 MG tablet Take 1 tablet (2 mg total) by mouth once daily. 90 tablet 1    carvediloL (COREG) 25 MG tablet TAKE ONE TABLET BY MOUTH TWICE DAILY 180 tablet 1    cholestyramine (QUESTRAN)  4 gram packet Take 1 packet by mouth.      cloNIDine 0.1 mg/24 hr td ptwk (CATAPRES) 0.1 mg/24 hr Place 1 patch onto the skin once a week.      docusate sodium (COLACE) 100 MG capsule Take 100 mg by mouth 2 (two) times daily as needed for Constipation.      EScitalopram oxalate (LEXAPRO) 20 MG tablet Take 1 tablet (20 mg total) by mouth once daily. 30 tablet 2    ferrous sulfate (FEOSOL) 325 mg (65 mg iron) Tab tablet Take 1 tablet (325 mg total) by mouth 2 (two) times daily. 600 tablet 2    flash glucose sensor (FREESTYLE MARY 10 DAY SENSOR MISC) 1 Device by Percutaneous route.      fluticasone propionate (FLONASE) 50 mcg/actuation nasal spray 1 spray by Nasal route.      hydrALAZINE (APRESOLINE) 100 MG tablet Take 50 mg by mouth 3 (three) times daily.      insulin aspart U-100 (NOVOLOG) 100 unit/mL injection Inject 3 Units into the skin 3 (three) times daily before meals. Per Dr Kayleigh Morris at [facility]      insulin degludec (TRESIBA FLEXTOUCH U-100) 100 unit/mL (3 mL) insulin pen Inject 10 Units into the skin once daily. (Patient taking differently: Inject 8 Units into the skin once daily. Per Dr Kayleigh Morris at [facility]) 9 mL 2    isosorbide mononitrate (IMDUR) 30 MG 24 hr tablet Take 30 mg by mouth once daily.      Lactobacillus acidophilus (PROBIOTIC ACIDOPHILUS ORAL) Take 4 Billion Cells by mouth.      linaCLOtide (LINZESS) 145 mcg Cap capsule Take 1 capsule every day by oral route as directed.      megestroL (MEGACE) 20 MG Tab TAKE ONE TABLET BY MOUTH EVERY DAY 30 tablet 3    minoxidiL (LONITEN) 2.5 MG tablet Take 2.5 mg by mouth.      mycophenolate sodium 180 MG TbEC Take 1 tablet by mouth 2 (two) times daily.      NIFEdipine (PROCARDIA-XL) 90 MG (OSM) 24 hr tablet Take 60 mg by mouth once daily.      nitroGLYCERIN (NITROSTAT) 0.4 MG SL tablet DISSOLVE 1 TABLET UNDER THE TONGUE EVERY 5 MINUTES AS NEEDED FOR CHEST PAIN. DO NOT EXCEED A TOTAL OF 3 DOSES IN 15 MINUTES.      pantoprazole (PROTONIX)  "40 MG tablet TAKE ONE TABLET BY MOUTH EVERY MORNING 30 tablet 0    pen needle, diabetic 31 gauge x 3/16" Ndle 1 each by NOT APPLICABLE route.      potassium chloride (MICRO-K) 10 MEQ CpSR TAKE ONE CAPSULE BY MOUTH ONCE DAILY 90 capsule 1    predniSONE (DELTASONE) 5 MG tablet Take 1 tablet by mouth once daily.      sodium bicarbonate 650 MG tablet Take 1 tablet by mouth 2 (two) times daily.      tacrolimus (PROGRAF) 0.5 MG Cap Take 1.5 mg by mouth.      TRUE METRIX GLUCOSE TEST STRIP Strp TEST BLOOD SUGAR THREE TIMES DAILY BEFORE MEALS 300 strip 3       Allergies  Review of patient's allergies indicates:   Allergen Reactions    Hydrocodone-acetaminophen Rash    Gabapentin Other (See Comments)     Made her disoriented  "out of my head"      Morphine Itching    Opioids - morphine analogues        Physical Examination     Vitals:    08/13/24 1354   BP: 124/62   Pulse: 77   Resp: 18   Temp: 97.1 °F (36.2 °C)     Physical Exam  Constitutional:       General: She is not in acute distress.  HENT:      Head: Normocephalic.      Nose: Nose normal.      Mouth/Throat:      Mouth: Mucous membranes are moist.   Eyes:      Extraocular Movements: Extraocular movements intact.   Cardiovascular:      Rate and Rhythm: Normal rate.   Pulmonary:      Effort: Pulmonary effort is normal. No respiratory distress.      Breath sounds: No wheezing.   Abdominal:      General: Bowel sounds are normal.      Palpations: Abdomen is soft.   Musculoskeletal:         General: Normal range of motion.      Cervical back: Normal range of motion and neck supple.   Skin:     General: Skin is warm.   Neurological:      Mental Status: She is alert.   Psychiatric:         Behavior: Behavior normal.           Imaging / Labs     No visits with results within 1 Day(s) from this visit.   Latest known visit with results is:   Office Visit on 08/06/2024   Component Date Value Ref Range Status    Creatinine, Urine 08/06/2024 42  28 - 219 mg/dL Final    Microalbumin " "08/06/2024 2.2  0.0 - 2.8 mg/dL Final    Microalbumin/Creatinine Ratio 08/06/2024 52.4 (H)  0.0 - 30.0 mg/g Final     X-Ray Chest AP Portable  Narrative: EXAMINATION:  XR CHEST AP PORTABLE    CLINICAL HISTORY:  cough;    TECHNIQUE:  XR CHEST AP PORTABLE    COMPARISON:  9/13/23    FINDINGS:  No lines or tubes.    Multiple pulmonary nodules scattered throughout the lungs are better visualized on the CT performed 09/13/2023.    Bibasilar airspace opacities within the lower lobes.    Small bilateral pleural effusions.    Normal pleura.    Cardiac silhouette is similar to comparison exam.    No obvious acute bone findings.  Impression: Multiple pulmonary nodules scattered throughout the lungs are better visualized on the CT performed 09/13/2023.    Bibasilar airspace opacities within the lower lobes.    Small bilateral pleural effusions.    Electronically signed by: Lorne Rodriguez  Date:    12/02/2023  Time:    12:23      Assessment and Plan (including Health Maintenance)      Problem List  Smart Sets  Document Outside HM   :    Health Maintenance Due   Topic Date Due    Shingles Vaccine (1 of 2) Never done    RSV Vaccine (Age 60+ and Pregnant patients) (1 - 1-dose 60+ series) Never done    COVID-19 Vaccine (5 - 2023-24 season) 09/01/2023    DEXA Scan  09/09/2024    Mammogram  10/13/2024    Eye Exam  11/09/2024       Problem List Items Addressed This Visit          Cardiac/Vascular    RESOLVED: Sensation of chest tightness       Renal/    History of kidney transplant    Anasarca associated with disorder of kidney - Primary    Current Assessment & Plan     Generalized edema; noticeable more to face, hands, and lower ext. Reports having a sensation "getting tight" starting this am and has gotten worse, some shortness of breath as well. She has hx of kidney transplant with complications. She will need more acute care and labs to be done quickly that we can not provide here in clinic. Discussed " risks/benefits/alternatives for treatment with patient; she is in agreement and voices understanding. Will transfer per EMS to [facility] for eval. She has transplant provider in [place]           Other Visit Diagnoses       Encounter for other screening for malignant neoplasm of breast                Health Maintenance Topics with due status: Not Due       Topic Last Completion Date    TETANUS VACCINE 10/16/2017    Colorectal Cancer Screening 08/22/2022    Influenza Vaccine 10/05/2022    Lipid Panel 02/15/2024    Hemoglobin A1c 05/14/2024    Diabetes Urine Screening 08/06/2024    Foot Exam 08/06/2024       Future Appointments   Date Time Provider Department Center   9/13/2024 10:00 AM [facility] CT1 [facility]IC [facility]   9/13/2024 10:50 AM Amari Girard MD OB  PULM [facility]   10/18/2024 10:20 AM [facility] MAMMO1 [facility] MMIC [facility]   10/18/2024 10:40 AM [facility] DEXA1 [facility] BDIC [facility]   11/14/2024  9:40 AM Laury Enrique FNP [facility]   8/7/2025  9:00 AM [facility]          Signature:  CHAPARRITA Bella  [facility]  [address] [place] [address] [place] [address]  [phone]    Date of encounter: 8/13/24

## 2024-08-13 NOTE — SUBJECTIVE & OBJECTIVE
"Past Medical History:   Diagnosis Date    Amputation of one or more toes     2 TOES RT FOOT, 3 TOES LFT FOOT    Atherosclerotic heart disease of native coronary artery with angina pectoris 01/20/2020    CVA (cerebral vascular accident)     Depression     Diabetes mellitus, type 2     Disorder of kidney and ureter     History of carpal tunnel surgery of left wrist     History of carpal tunnel surgery of right wrist     History of repair of left rotator cuff     History of repair of right rotator cuff     Hyperlipidemia     Hypertension     Hypokalemia     Kidney transplant status 07/13/2020    Osteoporosis 07/22/2020    Proliferative diabetic retinopathy of both eyes 09/07/2023    Stroke        Past Surgical History:   Procedure Laterality Date    Appendix      EXTRACTION OF TOOTH Left 08/11/2021    HYSTERECTOMY      kidney transplant 2016      Have had issues since transplant, 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     Current Outpatient Medications on File Prior to Encounter   Medication Sig    albuterol (VENTOLIN HFA) 90 mcg/actuation inhaler Inhale 2 puffs into the lungs every 6 (six) hours as needed for Wheezing. Rescue    aspirin (ECOTRIN) 81 MG EC tablet Take 81 mg by mouth once daily.    atorvastatin (LIPITOR) 40 MG tablet Take 1 tablet (40 mg total) by mouth once daily.    bumetanide (BUMEX) 2 MG tablet Take 1 tablet (2 mg total) by mouth once daily.    carvediloL (COREG) 25 MG tablet TAKE ONE TABLET BY MOUTH TWICE DAILY    cholestyramine (QUESTRAN) 4 gram packet Take 1 packet by mouth.    cloNIDine 0.1 mg/24 hr td ptwk (CATAPRES) 0.1 mg/24 hr Place 1 patch onto the skin " once a week.    docusate sodium (COLACE) 100 MG capsule Take 100 mg by mouth 2 (two) times daily as needed for Constipation.    EScitalopram oxalate (LEXAPRO) 20 MG tablet Take 1 tablet (20 mg total) by mouth once daily.    ferrous sulfate (FEOSOL) 325 mg (65 mg iron) Tab tablet Take 1 tablet (325 mg total) by mouth 2 (two) times daily.    flash glucose sensor (FREESTYLE MARY 10 DAY SENSOR MISC) 1 Device by Percutaneous route.    fluticasone propionate (FLONASE) 50 mcg/actuation nasal spray 1 spray by Nasal route.    hydrALAZINE (APRESOLINE) 100 MG tablet Take 50 mg by mouth 3 (three) times daily.    insulin aspart U-100 (NOVOLOG) 100 unit/mL injection Inject 3 Units into the skin 3 (three) times daily before meals. Per Dr Kayleigh Morris at [facility]    insulin degludec (TRESIBA FLEXTOUCH U-100) 100 unit/mL (3 mL) insulin pen Inject 10 Units into the skin once daily. (Patient taking differently: Inject 8 Units into the skin once daily. Per Dr Kayleigh Morris at [facility])    isosorbide mononitrate (IMDUR) 30 MG 24 hr tablet Take 30 mg by mouth once daily.    Lactobacillus acidophilus (PROBIOTIC ACIDOPHILUS ORAL) Take 4 Billion Cells by mouth.    lancets (TRUEPLUS LANCETS) 33 gauge Misc Inject 1 lancet into the skin 3 (three) times daily before meals.    linaCLOtide (LINZESS) 145 mcg Cap capsule Take 1 capsule every day by oral route as directed.    megestroL (MEGACE) 20 MG Tab TAKE ONE TABLET BY MOUTH EVERY DAY    minoxidiL (LONITEN) 2.5 MG tablet Take 2.5 mg by mouth.    mycophenolate sodium 180 MG TbEC Take 1 tablet by mouth 2 (two) times daily.    NIFEdipine (PROCARDIA-XL) 90 MG (OSM) 24 hr tablet Take 60 mg by mouth once daily.    nitroGLYCERIN (NITROSTAT) 0.4 MG SL tablet DISSOLVE 1 TABLET UNDER THE TONGUE EVERY 5 MINUTES AS NEEDED FOR CHEST PAIN. DO NOT EXCEED A TOTAL OF 3 DOSES IN 15 MINUTES.    pantoprazole (PROTONIX) 40 MG tablet TAKE ONE TABLET BY MOUTH EVERY MORNING    pen needle, diabetic 31 gauge x  "3/16" Ndle 1 each by NOT APPLICABLE route.    potassium chloride (MICRO-K) 10 MEQ CpSR TAKE ONE CAPSULE BY MOUTH ONCE DAILY    predniSONE (DELTASONE) 5 MG tablet Take 1 tablet by mouth once daily.    sodium bicarbonate 650 MG tablet Take 1 tablet by mouth 2 (two) times daily.    tacrolimus (PROGRAF) 0.5 MG Cap Take 1.5 mg by mouth.    TRUE METRIX GLUCOSE TEST STRIP Strp TEST BLOOD SUGAR THREE TIMES DAILY BEFORE MEALS    [DISCONTINUED] amLODIPine (NORVASC) 10 MG tablet Take 10 mg by mouth. (Patient not taking: Reported on 8/13/2024)    [DISCONTINUED] cholecalciferol, vitamin D3, (VITAMIN D3) 25 mcg (1,000 unit) capsule Take 1,000 Units by mouth. (Patient not taking: Reported on 8/6/2024)    [DISCONTINUED] cinacalcet (SENSIPAR) 30 MG Tab Take 30 mg by mouth. (Patient not taking: Reported on 8/6/2024)    [DISCONTINUED] JARDIANCE 10 mg tablet Take 10 mg by mouth. (Patient not taking: Reported on 8/6/2024)     Family History       Problem Relation (Age of Onset)    Diabetes Mother, Father    Hearing loss Mother, Father    Heart disease Mother, Father    Hyperlipidemia Mother, Father          Tobacco Use    Smoking status: Never     Passive exposure: Never    Smokeless tobacco: Never   Substance and Sexual Activity    Alcohol use: Never    Drug use: Never    Sexual activity: Not Currently     Partners: Male     Review of Systems   Constitutional:  Negative for appetite change, chills and fever.   HENT:  Negative for trouble swallowing.    Eyes:  Positive for visual disturbance (right from retinopathy).   Respiratory:  Positive for shortness of breath.    Cardiovascular:  Positive for leg swelling. Negative for chest pain and palpitations.   Gastrointestinal:  Positive for abdominal distention. Negative for abdominal pain, diarrhea, nausea and vomiting.   Genitourinary:  Negative for difficulty urinating and hematuria.   Musculoskeletal:  Negative for back pain.   Skin:  Negative for wound.   Neurological:  Negative for " dizziness, syncope and light-headedness.   Psychiatric/Behavioral:  Positive for sleep disturbance.      Objective:     Vital Signs (Most Recent):  Temp: 98.6 °F (37 °C) (08/13/24 1524)  Pulse: 85 (08/13/24 1846)  Resp: 20 (08/13/24 1846)  BP: (!) 189/67 (08/13/24 1846)  SpO2: 99 % (08/13/24 1846) Vital Signs (24h Range):  Temp:  [97.1 °F (36.2 °C)-98.6 °F (37 °C)] 98.6 °F (37 °C)  Pulse:  [77-85] 85  Resp:  [14-20] 20  SpO2:  [97 %-100 %] 99 %  BP: (124-189)/(61-67) 189/67     Weight: 68.9 kg (152 lb)  Body mass index is 26.09 kg/m².     Physical Exam  Vitals and nursing note reviewed.   Constitutional:       General: She is not in acute distress.     Appearance: She is not toxic-appearing or diaphoretic.   HENT:      Head: Normocephalic and atraumatic.      Right Ear: External ear normal.      Left Ear: External ear normal.      Nose: Nose normal. No congestion or rhinorrhea.      Mouth/Throat:      Mouth: Mucous membranes are dry.      Pharynx: No oropharyngeal exudate or posterior oropharyngeal erythema.   Eyes:      General: No scleral icterus.        Right eye: No discharge.         Left eye: No discharge.      Pupils: Pupils are equal, round, and reactive to light.   Cardiovascular:      Rate and Rhythm: Normal rate and regular rhythm.      Pulses: Normal pulses.      Heart sounds: Normal heart sounds. No murmur heard.  Pulmonary:      Effort: Pulmonary effort is normal. No respiratory distress.      Breath sounds: Normal breath sounds. No wheezing.   Abdominal:      General: Bowel sounds are normal. There is no distension.      Palpations: Abdomen is soft.      Tenderness: There is no abdominal tenderness. There is no guarding or rebound.   Musculoskeletal:         General: Swelling present. No tenderness or deformity.      Cervical back: Neck supple.      Right lower leg: Edema present.      Left lower leg: Edema present.   Skin:     General: Skin is warm and dry.      Coloration: Skin is not jaundiced.       Findings: No lesion.   Neurological:      Mental Status: She is alert.      Sensory: No sensory deficit.   Psychiatric:         Mood and Affect: Mood normal.         Behavior: Behavior normal.              CRANIAL NERVES     CN III, IV, VI   Pupils are equal, round, and reactive to light.       Significant Labs: All pertinent labs within the past 24 hours have been reviewed.    Significant Imaging: I have reviewed all pertinent imaging results/findings within the past 24 hours.

## 2024-08-13 NOTE — PROGRESS NOTES
Health Maintenance Due   Topic Date Due    Shingles Vaccine (1 of 2) Never done    RSV Vaccine (Age 60+ and Pregnant patients) (1 - 1-dose 60+ series) Never done    COVID-19 Vaccine (5 - 2023-24 season) 09/01/2023    DEXA Scan  09/09/2024    Mammogram  10/13/2024    Eye Exam  11/09/2024     Discussed care gaps with pt   Pt is not interested in any vaccines   Mammo and DEXA scan is scheduled for October 18, 2024 @ [facility] [place]      Postoperative hypothyroidism Hyponatremia

## 2024-08-14 PROBLEM — F32.A DEPRESSION: Status: ACTIVE | Noted: 2024-08-14

## 2024-08-14 PROBLEM — N18.6 END-STAGE RENAL FAILURE WITH RENAL TRANSPLANT: Status: ACTIVE | Noted: 2024-08-14

## 2024-08-14 PROBLEM — I50.9 CONGESTIVE HEART FAILURE: Status: RESOLVED | Noted: 2017-10-19 | Resolved: 2024-08-14

## 2024-08-14 PROBLEM — N18.4 ACUTE RENAL FAILURE SUPERIMPOSED ON STAGE 4 CHRONIC KIDNEY DISEASE: Status: ACTIVE | Noted: 2022-11-02

## 2024-08-14 PROBLEM — T86.19 END-STAGE RENAL FAILURE WITH RENAL TRANSPLANT: Status: ACTIVE | Noted: 2024-08-14

## 2024-08-14 PROBLEM — N18.4 CHRONIC KIDNEY DISEASE (CKD), STAGE IV (SEVERE): Status: RESOLVED | Noted: 2024-01-09 | Resolved: 2024-08-14

## 2024-08-14 PROBLEM — E11.40 DIABETIC NEUROPATHY: Status: ACTIVE | Noted: 2024-08-14

## 2024-08-14 PROBLEM — N17.9 ACUTE RENAL FAILURE SUPERIMPOSED ON STAGE 4 CHRONIC KIDNEY DISEASE: Status: ACTIVE | Noted: 2022-11-02

## 2024-08-14 PROBLEM — D64.9 NORMOCYTIC ANEMIA: Status: ACTIVE | Noted: 2024-08-14

## 2024-08-14 PROBLEM — E11.319 DIABETIC RETINOPATHY: Status: ACTIVE | Noted: 2024-08-14

## 2024-08-14 PROBLEM — R19.7 ACUTE DIARRHEA: Status: RESOLVED | Noted: 2018-09-12 | Resolved: 2024-08-14

## 2024-08-14 LAB
ANION GAP SERPL CALCULATED.3IONS-SCNC: 12 MMOL/L (ref 7–16)
AORTIC ROOT ANNULUS: 2.31 CM
AORTIC VALVE CUSP SEPERATION: 1.85 CM
APICAL FOUR CHAMBER EJECTION FRACTION: 62 %
AV INDEX (PROSTH): 0.86
AV MEAN GRADIENT: 5 MMHG
AV PEAK GRADIENT: 9 MMHG
AV VALVE AREA BY VELOCITY RATIO: 2.29 CM²
AV VALVE AREA: 2.38 CM²
AV VELOCITY RATIO: 0.82
BASOPHILS # BLD AUTO: 0.02 K/UL (ref 0–0.2)
BASOPHILS NFR BLD AUTO: 0.4 % (ref 0–1)
BILIRUB UR QL STRIP: NEGATIVE
BSA FOR ECHO PROCEDURE: 1.7 M2
BUN SERPL-MCNC: 82 MG/DL (ref 7–18)
BUN/CREAT SERPL: 21 (ref 6–20)
CALCIUM SERPL-MCNC: 8.8 MG/DL (ref 8.5–10.1)
CHLORIDE SERPL-SCNC: 109 MMOL/L (ref 98–107)
CLARITY UR: CLEAR
CO2 SERPL-SCNC: 22 MMOL/L (ref 21–32)
COLOR UR: COLORLESS
CREAT SERPL-MCNC: 3.96 MG/DL (ref 0.55–1.02)
CREAT UR-MCNC: 55 MG/DL (ref 28–219)
CV ECHO LV RWT: 0.8 CM
DIFFERENTIAL METHOD BLD: ABNORMAL
DOP CALC AO PEAK VEL: 1.54 M/S
DOP CALC AO VTI: 37.7 CM
DOP CALC LVOT AREA: 2.8 CM2
DOP CALC LVOT DIAMETER: 1.88 CM
DOP CALC LVOT PEAK VEL: 1.27 M/S
DOP CALC LVOT STROKE VOLUME: 89.62 CM3
DOP CALCLVOT PEAK VEL VTI: 32.3 CM
E WAVE DECELERATION TIME: 194.67 MSEC
E/A RATIO: 1.16
E/E' RATIO: 25 M/S
ECHO LV POSTERIOR WALL: 1.57 CM (ref 0.6–1.1)
EGFR (NO RACE VARIABLE) (RUSH/TITUS): 11 ML/MIN/1.73M2
EJECTION FRACTION: 65 %
EOSINOPHIL # BLD AUTO: 0.03 K/UL (ref 0–0.5)
EOSINOPHIL NFR BLD AUTO: 0.6 % (ref 1–4)
ERYTHROCYTE [DISTWIDTH] IN BLOOD BY AUTOMATED COUNT: 15.2 % (ref 11.5–14.5)
FRACTIONAL SHORTENING: 38 % (ref 28–44)
GLUCOSE SERPL-MCNC: 106 MG/DL (ref 70–105)
GLUCOSE SERPL-MCNC: 169 MG/DL (ref 74–106)
GLUCOSE SERPL-MCNC: 172 MG/DL (ref 70–105)
GLUCOSE SERPL-MCNC: 174 MG/DL (ref 70–105)
GLUCOSE SERPL-MCNC: 181 MG/DL (ref 70–105)
GLUCOSE UR STRIP-MCNC: NORMAL MG/DL
HCT VFR BLD AUTO: 27.1 % (ref 38–47)
HGB BLD-MCNC: 8.8 G/DL (ref 12–16)
IMM GRANULOCYTES # BLD AUTO: 0.02 K/UL (ref 0–0.04)
IMM GRANULOCYTES NFR BLD: 0.4 % (ref 0–0.4)
INTERVENTRICULAR SEPTUM: 1.62 CM (ref 0.6–1.1)
IVC DIAMETER: 1.32 CM
KETONES UR STRIP-SCNC: NEGATIVE MG/DL
LEFT ATRIUM AREA SYSTOLIC (APICAL 4 CHAMBER): 15.14 CM2
LEFT ATRIUM SIZE: 3.96 CM
LEFT INTERNAL DIMENSION IN SYSTOLE: 2.44 CM (ref 2.1–4)
LEFT VENTRICLE DIASTOLIC VOLUME INDEX: 39.31 ML/M2
LEFT VENTRICLE DIASTOLIC VOLUME: 66.43 ML
LEFT VENTRICLE END DIASTOLIC VOLUME APICAL 4 CHAMBER: 40.94 ML
LEFT VENTRICLE END SYSTOLIC VOLUME APICAL 4 CHAMBER: 37.25 ML
LEFT VENTRICLE MASS INDEX: 147 G/M2
LEFT VENTRICLE SYSTOLIC VOLUME INDEX: 12.4 ML/M2
LEFT VENTRICLE SYSTOLIC VOLUME: 20.95 ML
LEFT VENTRICULAR INTERNAL DIMENSION IN DIASTOLE: 3.91 CM (ref 3.5–6)
LEFT VENTRICULAR MASS: 248.65 G
LEUKOCYTE ESTERASE UR QL STRIP: ABNORMAL
LV LATERAL E/E' RATIO: 21.43 M/S
LV SEPTAL E/E' RATIO: 30 M/S
LVED V (TEICH): 66.43 ML
LVES V (TEICH): 20.95 ML
LVOT MG: 3.64 MMHG
LVOT MV: 0.9 CM/S
LYMPHOCYTES # BLD AUTO: 0.98 K/UL (ref 1–4.8)
LYMPHOCYTES NFR BLD AUTO: 18.2 % (ref 27–41)
MCH RBC QN AUTO: 25.9 PG (ref 27–31)
MCHC RBC AUTO-ENTMCNC: 32.5 G/DL (ref 32–36)
MCV RBC AUTO: 79.7 FL (ref 80–96)
MONOCYTES # BLD AUTO: 0.62 K/UL (ref 0–0.8)
MONOCYTES NFR BLD AUTO: 11.5 % (ref 2–6)
MPC BLD CALC-MCNC: 12.1 FL (ref 9.4–12.4)
MV PEAK A VEL: 1.29 M/S
MV PEAK E VEL: 1.5 M/S
NEUTROPHILS # BLD AUTO: 3.72 K/UL (ref 1.8–7.7)
NEUTROPHILS NFR BLD AUTO: 68.9 % (ref 53–65)
NITRITE UR QL STRIP: NEGATIVE
NRBC # BLD AUTO: 0 X10E3/UL
NRBC, AUTO (.00): 0 %
OHS QRS DURATION: 112 MS
OHS QTC CALCULATION: 405 MS
PH UR STRIP: 6 PH UNITS
PISA TR MAX VEL: 2.88 M/S
PLATELET # BLD AUTO: 136 K/UL (ref 150–400)
POTASSIUM SERPL-SCNC: 3.9 MMOL/L (ref 3.5–5.1)
PROT UR QL STRIP: NEGATIVE
PROT UR-MCNC: 8.8 MG/DL (ref 0–11.9)
PROT/CREAT 24H UR-RTO: 0.16
PV PEAK GRADIENT: 5 MMHG
PV PEAK VELOCITY: 1.15 M/S
RA MAJOR: 4.63 CM
RA PRESSURE ESTIMATED: 3 MMHG
RBC # BLD AUTO: 3.4 M/UL (ref 4.2–5.4)
RBC # UR STRIP: NEGATIVE /UL
RBC #/AREA URNS HPF: <1 /HPF
RIGHT VENTRICLE DIASTOLIC BASEL DIMENSION: 3.9 CM
RIGHT VENTRICLE DIASTOLIC LENGTH: 5.3 CM
RIGHT VENTRICLE DIASTOLIC MID DIMENSION: 2.9 CM
RIGHT VENTRICULAR LENGTH IN DIASTOLE (APICAL 4-CHAMBER VIEW): 5.32 CM
RV MID DIAMA: 2.85 CM
RV TB RVSP: 6 MMHG
SODIUM SERPL-SCNC: 139 MMOL/L (ref 136–145)
SP GR UR STRIP: 1.01
TDI LATERAL: 0.07 M/S
TDI SEPTAL: 0.05 M/S
TDI: 0.06 M/S
TR MAX PG: 33 MMHG
TRICUSPID ANNULAR PLANE SYSTOLIC EXCURSION: 2.8 CM
TROPONIN I SERPL DL<=0.01 NG/ML-MCNC: 66.9 PG/ML
TV REST PULMONARY ARTERY PRESSURE: 36 MMHG
UROBILINOGEN UR STRIP-ACNC: NORMAL MG/DL
WBC # BLD AUTO: 5.39 K/UL (ref 4.5–11)
WBC #/AREA URNS HPF: 1 /HPF
Z-SCORE OF LEFT VENTRICULAR DIMENSION IN END DIASTOLE: -1.85
Z-SCORE OF LEFT VENTRICULAR DIMENSION IN END SYSTOLE: -1.41

## 2024-08-14 PROCEDURE — 84484 ASSAY OF TROPONIN QUANT: CPT

## 2024-08-14 PROCEDURE — 36415 COLL VENOUS BLD VENIPUNCTURE: CPT | Performed by: STUDENT IN AN ORGANIZED HEALTH CARE EDUCATION/TRAINING PROGRAM

## 2024-08-14 PROCEDURE — 81003 URINALYSIS AUTO W/O SCOPE: CPT | Performed by: STUDENT IN AN ORGANIZED HEALTH CARE EDUCATION/TRAINING PROGRAM

## 2024-08-14 PROCEDURE — 82962 GLUCOSE BLOOD TEST: CPT

## 2024-08-14 PROCEDURE — 81001 URINALYSIS AUTO W/SCOPE: CPT | Performed by: STUDENT IN AN ORGANIZED HEALTH CARE EDUCATION/TRAINING PROGRAM

## 2024-08-14 PROCEDURE — 96372 THER/PROPH/DIAG INJ SC/IM: CPT

## 2024-08-14 PROCEDURE — 99233 SBSQ HOSP IP/OBS HIGH 50: CPT | Mod: ,,, | Performed by: STUDENT IN AN ORGANIZED HEALTH CARE EDUCATION/TRAINING PROGRAM

## 2024-08-14 PROCEDURE — 36415 COLL VENOUS BLD VENIPUNCTURE: CPT

## 2024-08-14 PROCEDURE — 85025 COMPLETE CBC W/AUTO DIFF WBC: CPT

## 2024-08-14 PROCEDURE — 25000003 PHARM REV CODE 250

## 2024-08-14 PROCEDURE — 99900035 HC TECH TIME PER 15 MIN (STAT)

## 2024-08-14 PROCEDURE — 11000001 HC ACUTE MED/SURG PRIVATE ROOM

## 2024-08-14 PROCEDURE — 63600175 PHARM REV CODE 636 W HCPCS

## 2024-08-14 PROCEDURE — 82570 ASSAY OF URINE CREATININE: CPT | Performed by: STUDENT IN AN ORGANIZED HEALTH CARE EDUCATION/TRAINING PROGRAM

## 2024-08-14 PROCEDURE — 80048 BASIC METABOLIC PNL TOTAL CA: CPT

## 2024-08-14 PROCEDURE — 80197 ASSAY OF TACROLIMUS: CPT | Mod: 90 | Performed by: STUDENT IN AN ORGANIZED HEALTH CARE EDUCATION/TRAINING PROGRAM

## 2024-08-14 RX ORDER — FUROSEMIDE 10 MG/ML
60 INJECTION INTRAMUSCULAR; INTRAVENOUS EVERY 12 HOURS
Status: DISCONTINUED | OUTPATIENT
Start: 2024-08-14 | End: 2024-08-16

## 2024-08-14 RX ADMIN — ASPIRIN 81 MG: 81 TABLET, COATED ORAL at 08:08

## 2024-08-14 RX ADMIN — MYCOPHENOLATE MOFETIL 250 MG: 250 CAPSULE ORAL at 08:08

## 2024-08-14 RX ADMIN — INSULIN ASPART 2 UNITS: 100 INJECTION, SOLUTION INTRAVENOUS; SUBCUTANEOUS at 08:08

## 2024-08-14 RX ADMIN — PANTOPRAZOLE SODIUM 40 MG: 40 TABLET, DELAYED RELEASE ORAL at 06:08

## 2024-08-14 RX ADMIN — FUROSEMIDE 60 MG: 10 INJECTION, SOLUTION INTRAMUSCULAR; INTRAVENOUS at 08:08

## 2024-08-14 RX ADMIN — MYCOPHENOLATE MOFETIL 250 MG: 250 CAPSULE ORAL at 09:08

## 2024-08-14 RX ADMIN — ISOSORBIDE MONONITRATE 30 MG: 30 TABLET, EXTENDED RELEASE ORAL at 08:08

## 2024-08-14 RX ADMIN — HEPARIN SODIUM 5000 UNITS: 5000 INJECTION, SOLUTION INTRAVENOUS; SUBCUTANEOUS at 09:08

## 2024-08-14 RX ADMIN — FERROUS SULFATE TAB 325 MG (65 MG ELEMENTAL FE) 1 EACH: 325 (65 FE) TAB at 09:08

## 2024-08-14 RX ADMIN — Medication 1 CAPSULE: at 12:08

## 2024-08-14 RX ADMIN — SODIUM BICARBONATE 650 MG: 650 TABLET ORAL at 08:08

## 2024-08-14 RX ADMIN — HYDRALAZINE HYDROCHLORIDE 100 MG: 100 TABLET ORAL at 09:08

## 2024-08-14 RX ADMIN — HEPARIN SODIUM 5000 UNITS: 5000 INJECTION, SOLUTION INTRAVENOUS; SUBCUTANEOUS at 06:08

## 2024-08-14 RX ADMIN — CARVEDILOL 25 MG: 25 TABLET, FILM COATED ORAL at 08:08

## 2024-08-14 RX ADMIN — FERROUS SULFATE TAB 325 MG (65 MG ELEMENTAL FE) 1 EACH: 325 (65 FE) TAB at 08:08

## 2024-08-14 RX ADMIN — THERA TABS 1 TABLET: TAB at 08:08

## 2024-08-14 RX ADMIN — CHOLESTYRAMINE LIGHT 4 G: 4 POWDER, FOR SUSPENSION ORAL at 08:08

## 2024-08-14 RX ADMIN — HYDRALAZINE HYDROCHLORIDE 100 MG: 100 TABLET ORAL at 08:08

## 2024-08-14 RX ADMIN — INSULIN ASPART 1 UNITS: 100 INJECTION, SOLUTION INTRAVENOUS; SUBCUTANEOUS at 11:08

## 2024-08-14 RX ADMIN — SODIUM BICARBONATE 650 MG: 650 TABLET ORAL at 09:08

## 2024-08-14 RX ADMIN — TACROLIMUS 1.5 MG: 1 CAPSULE ORAL at 08:08

## 2024-08-14 RX ADMIN — CHOLESTYRAMINE LIGHT 4 G: 4 POWDER, FOR SUSPENSION ORAL at 09:08

## 2024-08-14 RX ADMIN — FUROSEMIDE 60 MG: 10 INJECTION, SOLUTION INTRAMUSCULAR; INTRAVENOUS at 09:08

## 2024-08-14 RX ADMIN — CARVEDILOL 25 MG: 25 TABLET, FILM COATED ORAL at 09:08

## 2024-08-14 RX ADMIN — HYDRALAZINE HYDROCHLORIDE 100 MG: 100 TABLET ORAL at 02:08

## 2024-08-14 RX ADMIN — MEGESTROL ACETATE 20 MG: 20 TABLET ORAL at 08:08

## 2024-08-14 RX ADMIN — Medication 1 CAPSULE: at 05:08

## 2024-08-14 RX ADMIN — MINOXIDIL 2.5 MG: 2.5 TABLET ORAL at 08:08

## 2024-08-14 RX ADMIN — Medication 1 CAPSULE: at 08:08

## 2024-08-14 RX ADMIN — PREDNISONE 5 MG: 5 TABLET ORAL at 08:08

## 2024-08-14 RX ADMIN — HEPARIN SODIUM 5000 UNITS: 5000 INJECTION, SOLUTION INTRAVENOUS; SUBCUTANEOUS at 02:08

## 2024-08-14 NOTE — SUBJECTIVE & OBJECTIVE
Interval History: naeo    Review of Systems  Objective:     Vital Signs (Most Recent):  Temp: 98.4 °F (36.9 °C) (08/14/24 1201)  Pulse: 73 (08/14/24 1201)  Resp: 13 (08/14/24 1201)  BP: (!) 154/51 (08/14/24 1201)  SpO2: 99 % (08/14/24 1201) Vital Signs (24h Range):  Temp:  [97.1 °F (36.2 °C)-98.6 °F (37 °C)] 98.4 °F (36.9 °C)  Pulse:  [73-85] 73  Resp:  [13-25] 13  SpO2:  [96 %-100 %] 99 %  BP: (124-189)/(34-68) 154/51     Weight: 64 kg (141 lb 1.5 oz)  Body mass index is 24.22 kg/m².    Intake/Output Summary (Last 24 hours) at 8/14/2024 1243  Last data filed at 8/14/2024 1212  Gross per 24 hour   Intake 480 ml   Output 1500 ml   Net -1020 ml         Physical Exam  Vitals and nursing note reviewed.   HENT:      Head: Normocephalic and atraumatic.      Right Ear: External ear normal.      Left Ear: External ear normal.      Nose: Nose normal.      Mouth/Throat:      Mouth: Mucous membranes are dry.   Eyes:      Pupils: Pupils are equal, round, and reactive to light.   Cardiovascular:      Rate and Rhythm: Normal rate and regular rhythm.      Pulses: Normal pulses.      Heart sounds: Normal heart sounds.   Pulmonary:      Effort: Pulmonary effort is normal.      Breath sounds: Normal breath sounds.   Abdominal:      General: Bowel sounds are normal.      Palpations: Abdomen is soft.   Musculoskeletal:         General: Swelling present.      Cervical back: Neck supple.      Right lower leg: Edema present.      Left lower leg: Edema present.   Skin:     General: Skin is warm and dry.   Neurological:      Mental Status: She is alert.   Psychiatric:         Mood and Affect: Mood normal.         Behavior: Behavior normal.             Significant Labs: All pertinent labs within the past 24 hours have been reviewed.    Significant Imaging: I have reviewed all pertinent imaging results/findings within the past 24 hours.

## 2024-08-14 NOTE — H&P
[facility] - Emergency Department  Hospital Medicine  History & Physical    Patient Name: Nasrin Grant  MRN: [id number]  Patient Class: IP- Inpatient  Admission Date: 8/13/2024  Attending Physician: Abdullahi Zamora MD   Primary Care Provider: Laury Enrique FNP         Patient information was obtained from patient, past medical records, and ER records.     Subjective:     Principal Problem:Acute on chronic heart failure with preserved ejection fraction    Chief Complaint:   Chief Complaint   Patient presents with    Shortness of Breath    Leg Swelling        HPI: 73yo female with a PMH of CKD s/p renal transplant with CMV in 2016 followed Dr. Blount (last seen 7/6/24), HFpEF (EF 65% on last Echo 12/4/2023), CAD s/p 1 stent, CHF, HTN, HLD, DM2 s/p toe amputation 2 on right foot, 3 on left foot, diabetic retinopathy, CVA, depression, osteoporosis who presents to [facility] ED via EMS from [facility] with SOB and leg swelling. SOB and bilateral leg swelling started at least 1 month ago and gradually worsened especially in the past week. Endorses OSORIO, orthopnea, PND, nocturnal coughs. Endorses chest and abdominal tightness from swelling. Denies f/c/cp/n/v/d, denies appetite, urinary, or bowel habit changes.    Upon presentation, VS remarkable for 172/61. Labs remarkable for H/H 9.5/30.4 (baseline H/H 10.5/33.9 on 8/21/23), MCV 81.9, RDW 15.5, plt 117, CO2 20, BUN/Cr 83/4.19, GFR 11 (baseline Cr 2.56, GFR 19 on 1/9/24), glucose 209. PTT 22.5, PT 15.5, INR 1.24. Troponin wnl 45.3, BNP 63050, Mg 1.8. EKG SR 82 without ischemic changes compared to previous. CXR shows increased bilateral lung density suggesting mild cardiac decompensation and or pneumonitis. Patient is admitted to hospital medicine for further management and care.        Past Medical History:   Diagnosis Date    Amputation of one or more toes     2 TOES RT FOOT, 3 TOES LFT FOOT    Atherosclerotic heart disease of native coronary artery with angina pectoris  "01/20/2020    CVA (cerebral vascular accident)     Depression     Diabetes mellitus, type 2     Disorder of kidney and ureter     History of carpal tunnel surgery of left wrist     History of carpal tunnel surgery of right wrist     History of repair of left rotator cuff     History of repair of right rotator cuff     Hyperlipidemia     Hypertension     Hypokalemia     Kidney transplant status 07/13/2020    Osteoporosis 07/22/2020    Proliferative diabetic retinopathy of both eyes 09/07/2023    Stroke        Past Surgical History:   Procedure Laterality Date    Appendix      EXTRACTION OF TOOTH Left 08/11/2021    HYSTERECTOMY      kidney transplant 2016      Have had issues since transplant, 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     Current Outpatient Medications on File Prior to Encounter   Medication Sig    albuterol (VENTOLIN HFA) 90 mcg/actuation inhaler Inhale 2 puffs into the lungs every 6 (six) hours as needed for Wheezing. Rescue    aspirin (ECOTRIN) 81 MG EC tablet Take 81 mg by mouth once daily.    atorvastatin (LIPITOR) 40 MG tablet Take 1 tablet (40 mg total) by mouth once daily.    bumetanide (BUMEX) 2 MG tablet Take 1 tablet (2 mg total) by mouth once daily.    carvediloL (COREG) 25 MG tablet TAKE ONE TABLET BY MOUTH TWICE DAILY    cholestyramine (QUESTRAN) 4 gram packet Take 1 packet by mouth.    cloNIDine 0.1 mg/24 hr td ptwk (CATAPRES) 0.1 mg/24 hr Place 1 patch onto the skin once a week.    docusate sodium (COLACE) 100 MG capsule Take 100 mg by mouth 2 (two) times daily as needed for Constipation.    EScitalopram oxalate (LEXAPRO) 20 MG tablet Take 1 tablet " "(20 mg total) by mouth once daily.    ferrous sulfate (FEOSOL) 325 mg (65 mg iron) Tab tablet Take 1 tablet (325 mg total) by mouth 2 (two) times daily.    flash glucose sensor (FREESTYLE MARY 10 DAY SENSOR MISC) 1 Device by Percutaneous route.    fluticasone propionate (FLONASE) 50 mcg/actuation nasal spray 1 spray by Nasal route.    hydrALAZINE (APRESOLINE) 100 MG tablet Take 50 mg by mouth 3 (three) times daily.    insulin aspart U-100 (NOVOLOG) 100 unit/mL injection Inject 3 Units into the skin 3 (three) times daily before meals. Per Dr Kayleigh Morris at [facility]    insulin degludec (TRESIBA FLEXTOUCH U-100) 100 unit/mL (3 mL) insulin pen Inject 10 Units into the skin once daily. (Patient taking differently: Inject 8 Units into the skin once daily. Per Dr Kayleigh Morris at [facility])    isosorbide mononitrate (IMDUR) 30 MG 24 hr tablet Take 30 mg by mouth once daily.    Lactobacillus acidophilus (PROBIOTIC ACIDOPHILUS ORAL) Take 4 Billion Cells by mouth.    lancets (TRUEPLUS LANCETS) 33 gauge Misc Inject 1 lancet into the skin 3 (three) times daily before meals.    linaCLOtide (LINZESS) 145 mcg Cap capsule Take 1 capsule every day by oral route as directed.    megestroL (MEGACE) 20 MG Tab TAKE ONE TABLET BY MOUTH EVERY DAY    minoxidiL (LONITEN) 2.5 MG tablet Take 2.5 mg by mouth.    mycophenolate sodium 180 MG TbEC Take 1 tablet by mouth 2 (two) times daily.    NIFEdipine (PROCARDIA-XL) 90 MG (OSM) 24 hr tablet Take 60 mg by mouth once daily.    nitroGLYCERIN (NITROSTAT) 0.4 MG SL tablet DISSOLVE 1 TABLET UNDER THE TONGUE EVERY 5 MINUTES AS NEEDED FOR CHEST PAIN. DO NOT EXCEED A TOTAL OF 3 DOSES IN 15 MINUTES.    pantoprazole (PROTONIX) 40 MG tablet TAKE ONE TABLET BY MOUTH EVERY MORNING    pen needle, diabetic 31 gauge x 3/16" Ndle 1 each by NOT APPLICABLE route.    potassium chloride (MICRO-K) 10 MEQ CpSR TAKE ONE CAPSULE BY MOUTH ONCE DAILY    predniSONE (DELTASONE) 5 MG tablet Take 1 tablet by mouth " once daily.    sodium bicarbonate 650 MG tablet Take 1 tablet by mouth 2 (two) times daily.    tacrolimus (PROGRAF) 0.5 MG Cap Take 1.5 mg by mouth.    TRUE METRIX GLUCOSE TEST STRIP Strp TEST BLOOD SUGAR THREE TIMES DAILY BEFORE MEALS    [DISCONTINUED] amLODIPine (NORVASC) 10 MG tablet Take 10 mg by mouth. (Patient not taking: Reported on 8/13/2024)    [DISCONTINUED] cholecalciferol, vitamin D3, (VITAMIN D3) 25 mcg (1,000 unit) capsule Take 1,000 Units by mouth. (Patient not taking: Reported on 8/6/2024)    [DISCONTINUED] cinacalcet (SENSIPAR) 30 MG Tab Take 30 mg by mouth. (Patient not taking: Reported on 8/6/2024)    [DISCONTINUED] JARDIANCE 10 mg tablet Take 10 mg by mouth. (Patient not taking: Reported on 8/6/2024)     Family History       Problem Relation (Age of Onset)    Diabetes Mother, Father    Hearing loss Mother, Father    Heart disease Mother, Father    Hyperlipidemia Mother, Father          Tobacco Use    Smoking status: Never     Passive exposure: Never    Smokeless tobacco: Never   Substance and Sexual Activity    Alcohol use: Never    Drug use: Never    Sexual activity: Not Currently     Partners: Male     Review of Systems   Constitutional:  Negative for appetite change, chills and fever.   HENT:  Negative for trouble swallowing.    Eyes:  Positive for visual disturbance (right from retinopathy).   Respiratory:  Positive for shortness of breath.    Cardiovascular:  Positive for leg swelling. Negative for chest pain and palpitations.   Gastrointestinal:  Positive for abdominal distention. Negative for abdominal pain, diarrhea, nausea and vomiting.   Genitourinary:  Negative for difficulty urinating and hematuria.   Musculoskeletal:  Negative for back pain.   Skin:  Negative for wound.   Neurological:  Negative for dizziness, syncope and light-headedness.   Psychiatric/Behavioral:  Positive for sleep disturbance.      Objective:     Vital Signs (Most Recent):  Temp: 98.6 °F (37 °C) (08/13/24  1524)  Pulse: 85 (08/13/24 1846)  Resp: 20 (08/13/24 1846)  BP: (!) 189/67 (08/13/24 1846)  SpO2: 99 % (08/13/24 1846) Vital Signs (24h Range):  Temp:  [97.1 °F (36.2 °C)-98.6 °F (37 °C)] 98.6 °F (37 °C)  Pulse:  [77-85] 85  Resp:  [14-20] 20  SpO2:  [97 %-100 %] 99 %  BP: (124-189)/(61-67) 189/67     Weight: 68.9 kg (152 lb)  Body mass index is 26.09 kg/m².     Physical Exam  Vitals and nursing note reviewed.   Constitutional:       General: She is not in acute distress.     Appearance: She is not toxic-appearing or diaphoretic.   HENT:      Head: Normocephalic and atraumatic.      Right Ear: External ear normal.      Left Ear: External ear normal.      Nose: Nose normal. No congestion or rhinorrhea.      Mouth/Throat:      Mouth: Mucous membranes are dry.      Pharynx: No oropharyngeal exudate or posterior oropharyngeal erythema.   Eyes:      General: No scleral icterus.        Right eye: No discharge.         Left eye: No discharge.      Pupils: Pupils are equal, round, and reactive to light.   Cardiovascular:      Rate and Rhythm: Normal rate and regular rhythm.      Pulses: Normal pulses.      Heart sounds: Normal heart sounds. No murmur heard.  Pulmonary:      Effort: Pulmonary effort is normal. No respiratory distress.      Breath sounds: Normal breath sounds. No wheezing.   Abdominal:      General: Bowel sounds are normal. There is no distension.      Palpations: Abdomen is soft.      Tenderness: There is no abdominal tenderness. There is no guarding or rebound.   Musculoskeletal:         General: Swelling present. No tenderness or deformity.      Cervical back: Neck supple.      Right lower leg: Edema present.      Left lower leg: Edema present.   Skin:     General: Skin is warm and dry.      Coloration: Skin is not jaundiced.      Findings: No lesion.   Neurological:      Mental Status: She is alert.      Sensory: No sensory deficit.   Psychiatric:         Mood and Affect: Mood normal.         Behavior:  "Behavior normal.              CRANIAL NERVES     CN III, IV, VI   Pupils are equal, round, and reactive to light.       Significant Labs: All pertinent labs within the past 24 hours have been reviewed.    Significant Imaging: I have reviewed all pertinent imaging results/findings within the past 24 hours.  Assessment/Plan:     * Acute on chronic heart failure with preserved ejection fraction  KYLER on CKDS4 likely 2/2 cardiorenal syndrome  Lasix 60 iv bid, monitor volume status and decrease as symptoms improve  Last echo EF 65% on 12/4/2023, repeat echo pending  Home Coreg halved from 50 to 25mg bid, imdur  Holding home bumex   Tele monitor    Patient is identified as having Diastolic (HFpEF) heart failure that is Acute on chronic. CHF is currently uncontrolled due to Continued edema of extremities and >3 pillow orthopnea. Latest ECHO performed and demonstrates- No results found for this or any previous visit.  . Continue Beta Blocker, Furosemide, and Nitrate/Vasodilator and monitor clinical status closely. Monitor on telemetry. Patient is off CHF pathway.  Monitor strict Is&Os and daily weights.  Place on fluid restriction of 1.5 L. Cardiology has not been consulted. Continue to stress to patient importance of self efficacy and  on diet for CHF. Last BNP reviewed- and noted below No results for input(s): "BNP", "BNPTRIAGEBLO" in the last 168 hours.    Acute renal failure superimposed on stage 4 chronic kidney disease  - baseline Cr 2.56 on 1/9/24, today 4.19  - likely 2/2 cardiorenal syndrome with HFpEF  - lasix 60 iv bid, monitor volume status  - holding home bumex  - nephrology Dr. Blount consulted, appreciate recommendations and assistance  - home sodium bicarbonate tabs  - renal diet    KYLER is likely due to  cardiorenal syndrome . Baseline creatinine is  2.56 . Most recent creatinine and eGFR are listed below.  Recent Labs     08/13/24  1650   CREATININE 4.19*   EGFRNORACEVR 11*      Plan  - KYLER is " worsening. Will continue current treatment  - Avoid nephrotoxins and renally dose meds for GFR listed above  - Monitor urine output, serial BMP, and adjust therapy as needed    Essential hypertension  Chronic, uncontrolled. Latest blood pressure and vitals reviewed-     Temp:  [97.1 °F (36.2 °C)-98.6 °F (37 °C)]   Pulse:  [73-85]   Resp:  [14-21]   BP: (124-189)/(34-67)   SpO2:  [96 %-100 %] .   Home meds for hypertension were reviewed and noted below.   Hypertension Medications               bumetanide (BUMEX) 2 MG tablet Take 1 tablet (2 mg total) by mouth once daily.    carvediloL (COREG) 25 MG tablet TAKE ONE TABLET BY MOUTH TWICE DAILY    cloNIDine 0.1 mg/24 hr td ptwk (CATAPRES) 0.1 mg/24 hr Place 1 patch onto the skin once a week.    hydrALAZINE (APRESOLINE) 100 MG tablet Take 100 mg by mouth 3 (three) times daily.    isosorbide mononitrate (IMDUR) 30 MG 24 hr tablet Take 30 mg by mouth once daily.    minoxidiL (LONITEN) 2.5 MG tablet Take 2.5 mg by mouth once daily.    nitroGLYCERIN (NITROSTAT) 0.4 MG SL tablet DISSOLVE 1 TABLET UNDER THE TONGUE EVERY 5 MINUTES AS NEEDED FOR CHEST PAIN. DO NOT EXCEED A TOTAL OF 3 DOSES IN 15 MINUTES.            While in the hospital, will manage blood pressure as follows; Adjust home antihypertensive regimen as follows- see below    Will utilize p.r.n. blood pressure medication only if patient's blood pressure greater than 180/110 and she develops symptoms such as worsening chest pain or shortness of breath.    Home imdur, minoxidil, clonidine patch, hydralazine  Halved Coreg from 50 to 25 bid d/t potential acute CHF exacerbation  Lasix 60 iv bid for cardiorenal syndrome, monitor volume status  Holding bumex  Adjust regimen as needed    Controlled type 2 diabetes mellitus with chronic kidney disease, with long-term current use of insulin  Patient's FSGs are uncontrolled due to hyperglycemia on current medication regimen.  Last A1c reviewed-   Lab Results   Component Value  "Date    HGBA1C 6.9 (H) 05/14/2024     Most recent fingerstick glucose reviewed- No results for input(s): "POCTGLUCOSE" in the last 24 hours.  Current correctional scale  Medium  Maintain anti-hyperglycemic dose as follows-   Antihyperglycemics (From admission, onward)      Start     Stop Route Frequency Ordered    08/13/24 2150  insulin aspart U-100 injection 0-10 Units         -- SubQ Before meals & nightly PRN 08/13/24 2051          Hold Oral hypoglycemics while patient is in the hospital.      Normocytic anemia  - baseline Hgb 10.5 on 8/21/23, today 9.5  - 2/2 CKD stage 4 and/or WASHINGTON  - home ferrous tabs    Anemia is likely due to Iron deficiency and chronic disease due to Chronic Kidney Disease. Most recent hemoglobin and hematocrit are listed below.  Recent Labs     08/13/24  1650   HGB 9.5*   HCT 30.4*     Plan  - Monitor serial CBC: Daily  - Transfuse PRBC if patient becomes hemodynamically unstable, symptomatic or H/H drops below 7/21.  - Patient has not received any PRBC transfusions to date  - Patient's anemia is currently stable    History of kidney transplant  Home Cellcept 250mg bid, prednisone 5mg qd, tacrolimus 1.5mg qam    Coronary artery disease involving native coronary artery of native heart without angina pectoris  Patient with known CAD s/p stent placement, which is controlled Will continue ASA and monitor for S/Sx of angina/ACS. Continue to monitor on telemetry.     Home ASA, not on statin d/t history of transaminitis  Home cholestyramine    Mixed hyperlipidemia  Home cholestyramine     Lab Results   Component Value Date    CHOL 117 02/15/2024    CHOL 95 08/21/2023    CHOL 101 04/13/2023     Lab Results   Component Value Date    HDL 53 02/15/2024    HDL 54 08/21/2023    HDL 47 04/13/2023     Lab Results   Component Value Date    LDLCALC 53 02/15/2024    LDLCALC 35 08/21/2023    LDLCALC 46 04/13/2023     Lab Results   Component Value Date    TRIG 54 02/15/2024    TRIG 29 (L) 08/21/2023    TRIG 39 " 04/13/2023       Lab Results   Component Value Date    CHOLHDL 2.2 02/15/2024    CHOLHDL 1.8 08/21/2023    CHOLHDL 2.1 04/13/2023          Gastroesophageal reflux disease without esophagitis  Home protonix        VTE Risk Mitigation (From admission, onward)           Ordered     heparin (porcine) injection 5,000 Units  Every 8 hours         08/13/24 2045     IP VTE HIGH RISK PATIENT  Once         08/13/24 2045     Place sequential compression device  Until discontinued         08/13/24 2045                                    Stephania Oviedo DO  Department of Hospital Medicine  [facility] - Emergency Department

## 2024-08-14 NOTE — ASSESSMENT & PLAN NOTE
"Patient's FSGs are uncontrolled due to hyperglycemia on current medication regimen.  Last A1c reviewed-   Lab Results   Component Value Date    HGBA1C 6.9 (H) 05/14/2024     Most recent fingerstick glucose reviewed- No results for input(s): "POCTGLUCOSE" in the last 24 hours.  Current correctional scale  Low  Maintain anti-hyperglycemic dose as follows-   Antihyperglycemics (From admission, onward)      Start     Stop Route Frequency Ordered    08/13/24 2150  insulin aspart U-100 injection 0-10 Units         -- SubQ Before meals & nightly PRN 08/13/24 2051          Hold Oral hypoglycemics while patient is in the hospital.  "

## 2024-08-14 NOTE — ASSESSMENT & PLAN NOTE
"Patient's FSGs are controlled on current medication regimen.  Last A1c reviewed-   Lab Results   Component Value Date    HGBA1C 6.9 (H) 05/14/2024     Most recent fingerstick glucose reviewed- No results for input(s): "POCTGLUCOSE" in the last 24 hours.  Current correctional scale  Medium  Maintain anti-hyperglycemic dose as follows-   Antihyperglycemics (From admission, onward)      Start     Stop Route Frequency Ordered    08/13/24 2150  insulin aspart U-100 injection 0-10 Units         -- SubQ Before meals & nightly PRN 08/13/24 2051          Hold Oral hypoglycemics while patient is in the hospital.  "

## 2024-08-14 NOTE — ASSESSMENT & PLAN NOTE
"Patient's FSGs are uncontrolled due to hyperglycemia on current medication regimen.  Last A1c reviewed-   Lab Results   Component Value Date    HGBA1C 6.9 (H) 05/14/2024     Most recent fingerstick glucose reviewed- No results for input(s): "POCTGLUCOSE" in the last 24 hours.  Current correctional scale  medium  Maintain anti-hyperglycemic dose as follows-   Antihyperglycemics (From admission, onward)      Start     Stop Route Frequency Ordered    08/13/24 2150  insulin aspart U-100 injection 0-10 Units         -- SubQ Before meals & nightly PRN 08/13/24 2051          Hold Oral hypoglycemics while patient is in the hospital.  "

## 2024-08-14 NOTE — ASSESSMENT & PLAN NOTE
"Patient's FSGs are uncontrolled due to hyperglycemia on current medication regimen.  Last A1c reviewed-   Lab Results   Component Value Date    HGBA1C 6.9 (H) 05/14/2024     Most recent fingerstick glucose reviewed- No results for input(s): "POCTGLUCOSE" in the last 24 hours.  Current correctional scale  Medium  Maintain anti-hyperglycemic dose as follows-   Antihyperglycemics (From admission, onward)      Start     Stop Route Frequency Ordered    08/13/24 2150  insulin aspart U-100 injection 0-10 Units         -- SubQ Before meals & nightly PRN 08/13/24 2051          Hold Oral hypoglycemics while patient is in the hospital.    " Health Maintenance Due   Topic Date Due   • Hepatitis B Vaccine (1 of 3 - 3-dose series) Never done   • COVID-19 Vaccine (1) Never done   • Depression Screening  09/21/2022   • Colorectal Cancer Risk - Colonoscopy  11/25/2022       Patient is due for topics listed above, she wishes to proceed with Colorectal Cancer Screening: Colonoscopy and Depression Screening , but is not proceeding with Immunization(s) COVID-19 and Hep B at this time. The following has occurred: Education provided for Colorectal Cancer Screening: Colonoscopy and Depression Screening .     Recent PHQ 2/9 Score    PHQ 2:  Date Adult PHQ 2 Score Adult PHQ 2 Interpretation   9/21/2021 0 No further screening needed       PHQ 9:        Most Recent NANCY 7 Score

## 2024-08-14 NOTE — ASSESSMENT & PLAN NOTE
- baseline Hgb 10.5 on 8/21/23, today 9.5  - 2/2 CKD stage 4 and/or WASHINGTON  - home ferrous tabs    Anemia is likely due to Iron deficiency and chronic disease due to Chronic Kidney Disease. Most recent hemoglobin and hematocrit are listed below.  Recent Labs     08/13/24  1650   HGB 9.5*   HCT 30.4*     Plan  - Monitor serial CBC: Daily  - Transfuse PRBC if patient becomes hemodynamically unstable, symptomatic or H/H drops below 7/21.  - Patient has not received any PRBC transfusions to date  - Patient's anemia is currently stable

## 2024-08-14 NOTE — PLAN OF CARE
[facility] - Emergency Department  Initial Discharge Assessment       Primary Care Provider: Laury Enrique FNP    Admission Diagnosis: KYLER (acute kidney injury) [N17.9]    Admission Date: 8/13/2024  Expected Discharge Date:     Transition of Care Barriers: None    Payor: HU[employer] MANAGED MEDICARE / Plan: HU[employer] MEDICARE PPO / Product Type: Medicare Advantage /     Extended Emergency Contact Information  Primary Emergency Contact: JENNIFER LOBO  Mobile Phone: [phone]  Relation: Relative  Preferred language: English   needed? No  Secondary Emergency Contact: TreyDulce  Mobile Phone: [phone]  Relation: Grandchild  Preferred language: English   needed? No    Discharge Plan A: Home with family, Home Health  Discharge Plan B: Home with family, Home Health      [facility] [place] - [address] [place] [address]  Phone: [phone] Fax: [phone]    [facility] - [facility] [address] [facility]  [address] [address]  Phone: [phone] Fax: [phone]      Initial Assessment (most recent)       Adult Discharge Assessment - 08/14/24 0928          Discharge Assessment    Assessment Type Discharge Planning Assessment     Source of Information patient     Communicated ARIEL with patient/caregiver Date not available/Unable to determine     People in Home child(chico), adult     Do you expect to return to your current living situation? Yes     Do you have help at home or someone to help you manage your care at home? Yes     Who are your caregiver(s) and their phone number(s)? adele landa [phone]     Prior to hospitilization cognitive status: Alert/Oriented     Current cognitive status: Alert/Oriented     Walking or Climbing Stairs Difficulty yes     Walking or Climbing Stairs ambulation difficulty, requires equipment;stair climbing difficulty, requires equipment     Mobility  Management cane     Dressing/Bathing Difficulty no     Equipment Currently Used at Home cane, straight     Patient currently being followed by outpatient case management? No     Do you currently have service(s) that help you manage your care at home? Yes     Name and Contact number of agency [facility]     Is the pt/caregiver preference to resume services with current agency Yes     Do you take prescription medications? Yes     Do you have prescription coverage? Yes     Coverage [employer]     Do you have any problems affording any of your prescribed medications? No     Is the patient taking medications as prescribed? yes     Who is going to help you get home at discharge? family or [employer] transport     How do you get to doctors appointments? family or friend will provide;health plan transportation     Are you on dialysis? No     Do you take coumadin? No     Discharge Plan A Home with family;Home Health     Discharge Plan B Home with family;Home Health     DME Needed Upon Discharge  none     Discharge Plan discussed with: Patient     Transition of Care Barriers None                   Spoke with patient in her room. Her son syeda and his girlfriend live with patient. She uses a cane. No other dme. She is current with [facility]. Verbal choice to resume at [place]. Will fax. ID plan is home with family and home health. SDOH completed 1 week ago. Cm will follow.

## 2024-08-14 NOTE — ASSESSMENT & PLAN NOTE
Chronic, uncontrolled. Latest blood pressure and vitals reviewed-     Temp:  [97.1 °F (36.2 °C)-98.6 °F (37 °C)]   Pulse:  [73-85]   Resp:  [13-25]   BP: (124-189)/(34-68)   SpO2:  [96 %-100 %] .   Home meds for hypertension were reviewed and noted below.   Hypertension Medications               bumetanide (BUMEX) 2 MG tablet Take 1 tablet (2 mg total) by mouth once daily.    carvediloL (COREG) 25 MG tablet TAKE ONE TABLET BY MOUTH TWICE DAILY    cloNIDine 0.1 mg/24 hr td ptwk (CATAPRES) 0.1 mg/24 hr Place 1 patch onto the skin once a week.    hydrALAZINE (APRESOLINE) 100 MG tablet Take 100 mg by mouth 3 (three) times daily.    isosorbide mononitrate (IMDUR) 30 MG 24 hr tablet Take 30 mg by mouth once daily.    minoxidiL (LONITEN) 2.5 MG tablet Take 2.5 mg by mouth once daily.    nitroGLYCERIN (NITROSTAT) 0.4 MG SL tablet DISSOLVE 1 TABLET UNDER THE TONGUE EVERY 5 MINUTES AS NEEDED FOR CHEST PAIN. DO NOT EXCEED A TOTAL OF 3 DOSES IN 15 MINUTES.            While in the hospital, will manage blood pressure as follows; Adjust home antihypertensive regimen as follows- see below    Will utilize p.r.n. blood pressure medication only if patient's blood pressure greater than 180/110 and she develops symptoms such as worsening chest pain or shortness of breath.    Home imdur, minoxidil, clonidine patch, hydralazine  Halved Coreg from 50 to 25 bid d/t potential acute CHF exacerbation  Lasix 60 iv bid for cardiorenal syndrome, monitor volume status  Holding bumex  Adjust regimen as needed

## 2024-08-14 NOTE — ASSESSMENT & PLAN NOTE
"KYLER on CKDS4 likely 2/2 cardiorenal syndrome  Lasix 60 iv bid, monitor volume status and decrease as symptoms improve  Last echo EF 65% on 12/4/2023, repeat echo pending  Home Coreg halved from 50 to 25mg bid, imdur  Holding home bumex   Tele monitor    Patient is identified as having Diastolic (HFpEF) heart failure that is Acute on chronic. CHF is currently uncontrolled due to Continued edema of extremities and >3 pillow orthopnea. Latest ECHO performed and demonstrates- No results found for this or any previous visit.  . Continue Beta Blocker, Furosemide, and Nitrate/Vasodilator and monitor clinical status closely. Monitor on telemetry. Patient is off CHF pathway.  Monitor strict Is&Os and daily weights.  Place on fluid restriction of 1.5 L. Cardiology has not been consulted. Continue to stress to patient importance of self efficacy and  on diet for CHF. Last BNP reviewed- and noted below No results for input(s): "BNP", "BNPTRIAGEBLO" in the last 168 hours.    Continue diuresis  "

## 2024-08-14 NOTE — ASSESSMENT & PLAN NOTE
"Patient's FSGs are uncontrolled due to hyperglycemia on current medication regimen.  Last A1c reviewed-   Lab Results   Component Value Date    HGBA1C 6.9 (H) 05/14/2024     Most recent fingerstick glucose reviewed- No results for input(s): "POCTGLUCOSE" in the last 24 hours.  Current correctional scale  Medium  Maintain anti-hyperglycemic dose as follows-   Antihyperglycemics (From admission, onward)      Start     Stop Route Frequency Ordered    08/13/24 2150  insulin aspart U-100 injection 0-10 Units         -- SubQ Before meals & nightly PRN 08/13/24 2051          Hold Oral hypoglycemics while patient is in the hospital.  Continue current regimen  "

## 2024-08-14 NOTE — ASSESSMENT & PLAN NOTE
"KYLER on CKDS4 likely 2/2 cardiorenal syndrome  Lasix 60 iv bid, monitor volume status and decrease as symptoms improve  Last echo EF 65% on 12/4/2023, repeat echo pending  Home Coreg halved from 50 to 25mg bid, imdur  Holding home bumex   Tele monitor    Patient is identified as having Diastolic (HFpEF) heart failure that is Acute on chronic. CHF is currently uncontrolled due to Continued edema of extremities and >3 pillow orthopnea. Latest ECHO performed and demonstrates- No results found for this or any previous visit.  . Continue Beta Blocker, Furosemide, and Nitrate/Vasodilator and monitor clinical status closely. Monitor on telemetry. Patient is off CHF pathway.  Monitor strict Is&Os and daily weights.  Place on fluid restriction of 1.5 L. Cardiology has not been consulted. Continue to stress to patient importance of self efficacy and  on diet for CHF. Last BNP reviewed- and noted below No results for input(s): "BNP", "BNPTRIAGEBLO" in the last 168 hours.  "

## 2024-08-14 NOTE — PROGRESS NOTES
[facility] - Emergency Department  Hospital Medicine  Progress Note    Patient Name: Nasrin Grant  MRN: [id number]  Patient Class: IP- Inpatient   Admission Date: 8/13/2024  Length of Stay: 1 days  Attending Physician: Anatoliy Banda DO  Primary Care Provider: Laury Enrique FNP        Subjective:     Principal Problem:Acute on chronic heart failure with preserved ejection fraction        HPI:  73yo female with a PMH of CKD s/p renal transplant with CMV in 2016 followed Dr. Blount (last seen 7/6/24), HFpEF (EF 65% on last Echo 12/4/2023), CAD s/p 1 stent, CHF, HTN, HLD, DM2 s/p toe amputation 2 on right foot, 3 on left foot, diabetic retinopathy, CVA, depression, osteoporosis who presents to [facility] ED via EMS from [facility] with SOB and leg swelling. SOB and bilateral leg swelling started at least 1 month ago and gradually worsened especially in the past week. Endorses OSORIO, orthopnea, PND, nocturnal coughs. Endorses chest and abdominal tightness from swelling. Denies f/c/cp/n/v/d, denies appetite, urinary, or bowel habit changes.    Upon presentation, VS remarkable for 172/61. Labs remarkable for H/H 9.5/30.4 (baseline H/H 10.5/33.9 on 8/21/23), MCV 81.9, RDW 15.5, plt 117, CO2 20, BUN/Cr 83/4.19, GFR 11 (baseline Cr 2.56, GFR 19 on 1/9/24), glucose 209. PTT 22.5, PT 15.5, INR 1.24. Troponin wnl 45.3, BNP 20953, Mg 1.8. EKG SR 82 without ischemic changes compared to previous. CXR shows increased bilateral lung density suggesting mild cardiac decompensation and or pneumonitis. Patient is admitted to hospital medicine for further management and care.        Overview/Hospital Course:  8/14- feels better today    Interval History: naeo    Review of Systems  Objective:     Vital Signs (Most Recent):  Temp: 98.4 °F (36.9 °C) (08/14/24 1201)  Pulse: 73 (08/14/24 1201)  Resp: 13 (08/14/24 1201)  BP: (!) 154/51 (08/14/24 1201)  SpO2: 99 % (08/14/24 1201) Vital Signs (24h Range):  Temp:  [97.1 °F (36.2 °C)-98.6 °F  (37 °C)] 98.4 °F (36.9 °C)  Pulse:  [73-85] 73  Resp:  [13-25] 13  SpO2:  [96 %-100 %] 99 %  BP: (124-189)/(34-68) 154/51     Weight: 64 kg (141 lb 1.5 oz)  Body mass index is 24.22 kg/m².    Intake/Output Summary (Last 24 hours) at 8/14/2024 1243  Last data filed at 8/14/2024 1212  Gross per 24 hour   Intake 480 ml   Output 1500 ml   Net -1020 ml         Physical Exam  Vitals and nursing note reviewed.   HENT:      Head: Normocephalic and atraumatic.      Right Ear: External ear normal.      Left Ear: External ear normal.      Nose: Nose normal.      Mouth/Throat:      Mouth: Mucous membranes are dry.   Eyes:      Pupils: Pupils are equal, round, and reactive to light.   Cardiovascular:      Rate and Rhythm: Normal rate and regular rhythm.      Pulses: Normal pulses.      Heart sounds: Normal heart sounds.   Pulmonary:      Effort: Pulmonary effort is normal.      Breath sounds: Normal breath sounds.   Abdominal:      General: Bowel sounds are normal.      Palpations: Abdomen is soft.   Musculoskeletal:         General: Swelling present.      Cervical back: Neck supple.      Right lower leg: Edema present.      Left lower leg: Edema present.   Skin:     General: Skin is warm and dry.   Neurological:      Mental Status: She is alert.   Psychiatric:         Mood and Affect: Mood normal.         Behavior: Behavior normal.             Significant Labs: All pertinent labs within the past 24 hours have been reviewed.    Significant Imaging: I have reviewed all pertinent imaging results/findings within the past 24 hours.    Assessment/Plan:      * Acute on chronic heart failure with preserved ejection fraction  KYLER on CKDS4 likely 2/2 cardiorenal syndrome  Lasix 60 iv bid, monitor volume status and decrease as symptoms improve  Last echo EF 65% on 12/4/2023, repeat echo pending  Home Coreg halved from 50 to 25mg bid, imdur  Holding home bumex   Tele monitor    Patient is identified as having Diastolic (HFpEF) heart  "failure that is Acute on chronic. CHF is currently uncontrolled due to Continued edema of extremities and >3 pillow orthopnea. Latest ECHO performed and demonstrates- No results found for this or any previous visit.  . Continue Beta Blocker, Furosemide, and Nitrate/Vasodilator and monitor clinical status closely. Monitor on telemetry. Patient is off CHF pathway.  Monitor strict Is&Os and daily weights.  Place on fluid restriction of 1.5 L. Cardiology has not been consulted. Continue to stress to patient importance of self efficacy and  on diet for CHF. Last BNP reviewed- and noted below No results for input(s): "BNP", "BNPTRIAGEBLO" in the last 168 hours.    Continue diuresis    Diabetic neuropathy  Patient's FSGs are controlled on current medication regimen.  Last A1c reviewed-   Lab Results   Component Value Date    HGBA1C 6.9 (H) 05/14/2024     Most recent fingerstick glucose reviewed- No results for input(s): "POCTGLUCOSE" in the last 24 hours.  Current correctional scale  Medium  Maintain anti-hyperglycemic dose as follows-   Antihyperglycemics (From admission, onward)      Start     Stop Route Frequency Ordered    08/13/24 2150  insulin aspart U-100 injection 0-10 Units         -- SubQ Before meals & nightly PRN 08/13/24 2051          Hold Oral hypoglycemics while patient is in the hospital.    Depression  Patient has persistent depression which is mild and is currently controlled. Will Continue anti-depressant medications. We will not consult psychiatry at this time. Patient does not display psychosis at this time. Continue to monitor closely and adjust plan of care as needed.        Diabetic retinopathy  Patient's FSGs are uncontrolled due to hyperglycemia on current medication regimen.  Last A1c reviewed-   Lab Results   Component Value Date    HGBA1C 6.9 (H) 05/14/2024     Most recent fingerstick glucose reviewed- No results for input(s): "POCTGLUCOSE" in the last 24 hours.  Current correctional " "scale  medium  Maintain anti-hyperglycemic dose as follows-   Antihyperglycemics (From admission, onward)      Start     Stop Route Frequency Ordered    08/13/24 2150  insulin aspart U-100 injection 0-10 Units         -- SubQ Before meals & nightly PRN 08/13/24 2051          Hold Oral hypoglycemics while patient is in the hospital.    End-stage renal failure with renal transplant  Creatine stable for now. BMP reviewed- noted Estimated Creatinine Clearance: 10.9 mL/min (A) (based on SCr of 3.96 mg/dL (H)). according to latest data. Based on current GFR, CKD stage is end stage.  Monitor UOP and serial BMP and adjust therapy as needed. Renally dose meds. Avoid nephrotoxic medications and procedures.  Patient with transplant now with KYLER.  Nephrology consulted.    Normocytic anemia  - baseline Hgb 10.5 on 8/21/23, today 9.5  - 2/2 CKD stage 4 and/or [place]  - home ferrous tabs    Anemia is likely due to Iron deficiency and chronic disease due to Chronic Kidney Disease. Most recent hemoglobin and hematocrit are listed below.  Recent Labs     08/13/24  1650 08/14/24  0427   HGB 9.5* 8.8*   HCT 30.4* 27.1*       Plan  - Monitor serial CBC: Daily  - Transfuse PRBC if patient becomes hemodynamically unstable, symptomatic or H/H drops below 7/21.  - Patient has not received any PRBC transfusions to date  - Patient's anemia is currently stable  8/14 follow no evidence of bleeding    Diabetes  Patient's FSGs are uncontrolled due to hyperglycemia on current medication regimen.  Last A1c reviewed-   Lab Results   Component Value Date    HGBA1C 6.9 (H) 05/14/2024     Most recent fingerstick glucose reviewed- No results for input(s): "POCTGLUCOSE" in the last 24 hours.  Current correctional scale  Low  Maintain anti-hyperglycemic dose as follows-   Antihyperglycemics (From admission, onward)      Start     Stop Route Frequency Ordered    08/13/24 2150  insulin aspart U-100 injection 0-10 Units         -- SubQ Before meals & nightly " PRN 08/13/24 2051          Hold Oral hypoglycemics while patient is in the hospital.    Peripheral arterial disease  Continue asa    Mixed hyperlipidemia  Home cholestyramine     Lab Results   Component Value Date    CHOL 117 02/15/2024    CHOL 95 08/21/2023    CHOL 101 04/13/2023     Lab Results   Component Value Date    HDL 53 02/15/2024    HDL 54 08/21/2023    HDL 47 04/13/2023     Lab Results   Component Value Date    LDLCALC 53 02/15/2024    LDLCALC 35 08/21/2023    LDLCALC 46 04/13/2023     Lab Results   Component Value Date    TRIG 54 02/15/2024    TRIG 29 (L) 08/21/2023    TRIG 39 04/13/2023       Lab Results   Component Value Date    CHOLHDL 2.2 02/15/2024    CHOLHDL 1.8 08/21/2023    CHOLHDL 2.1 04/13/2023          Acute renal failure superimposed on stage 4 chronic kidney disease  - baseline Cr 2.56 on 1/9/24, today 4.19  - likely 2/2 cardiorenal syndrome with HFpEF  - lasix 60 iv bid, monitor volume status  - holding home bumex  - nephrology Dr. Blount consulted, appreciate recommendations and assistance  - home sodium bicarbonate tabs  - renal diet    KYLER is likely due to  cardiorenal syndrome . Baseline creatinine is  2.56 . Most recent creatinine and eGFR are listed below.  Recent Labs     08/13/24  1650 08/14/24  0427   CREATININE 4.19* 3.96*   EGFRNORACEVR 11* 11*        Plan  - KYLER is worsening. Will continue current treatment  - Avoid nephrotoxins and renally dose meds for GFR listed above  - Monitor urine output, serial BMP, and adjust therapy as needed    KYLER in setting of renal transplant, consult nephrology    Long-term use of immunosuppressant medication  Check tacrol level      Essential hypertension  Chronic, uncontrolled. Latest blood pressure and vitals reviewed-     Temp:  [97.1 °F (36.2 °C)-98.6 °F (37 °C)]   Pulse:  [73-85]   Resp:  [13-25]   BP: (124-189)/(34-68)   SpO2:  [96 %-100 %] .   Home meds for hypertension were reviewed and noted below.   Hypertension Medications                "bumetanide (BUMEX) 2 MG tablet Take 1 tablet (2 mg total) by mouth once daily.    carvediloL (COREG) 25 MG tablet TAKE ONE TABLET BY MOUTH TWICE DAILY    cloNIDine 0.1 mg/24 hr td ptwk (CATAPRES) 0.1 mg/24 hr Place 1 patch onto the skin once a week.    hydrALAZINE (APRESOLINE) 100 MG tablet Take 100 mg by mouth 3 (three) times daily.    isosorbide mononitrate (IMDUR) 30 MG 24 hr tablet Take 30 mg by mouth once daily.    minoxidiL (LONITEN) 2.5 MG tablet Take 2.5 mg by mouth once daily.    nitroGLYCERIN (NITROSTAT) 0.4 MG SL tablet DISSOLVE 1 TABLET UNDER THE TONGUE EVERY 5 MINUTES AS NEEDED FOR CHEST PAIN. DO NOT EXCEED A TOTAL OF 3 DOSES IN 15 MINUTES.            While in the hospital, will manage blood pressure as follows; Adjust home antihypertensive regimen as follows- see below    Will utilize p.r.n. blood pressure medication only if patient's blood pressure greater than 180/110 and she develops symptoms such as worsening chest pain or shortness of breath.    Home imdur, minoxidil, clonidine patch, hydralazine  Halved Coreg from 50 to 25 bid d/t potential acute CHF exacerbation  Lasix 60 iv bid for cardiorenal syndrome, monitor volume status  Holding bumex  Adjust regimen as needed    History of kidney transplant  Home Cellcept 250mg bid, prednisone 5mg qd, tacrolimus 1.5mg qam  Continue  Consult Dr. Blount    Controlled type 2 diabetes mellitus with chronic kidney disease, with long-term current use of insulin  Patient's FSGs are uncontrolled due to hyperglycemia on current medication regimen.  Last A1c reviewed-   Lab Results   Component Value Date    HGBA1C 6.9 (H) 05/14/2024     Most recent fingerstick glucose reviewed- No results for input(s): "POCTGLUCOSE" in the last 24 hours.  Current correctional scale  Medium  Maintain anti-hyperglycemic dose as follows-   Antihyperglycemics (From admission, onward)      Start     Stop Route Frequency Ordered    08/13/24 2150  insulin aspart U-100 injection 0-10 Units  "        -- SubQ Before meals & nightly PRN 08/13/24 2051          Hold Oral hypoglycemics while patient is in the hospital.  Continue current regimen    Coronary artery disease involving native coronary artery of native heart without angina pectoris  Patient with known CAD s/p stent placement, which is controlled Will continue ASA and monitor for S/Sx of angina/ACS. Continue to monitor on telemetry.     Home ASA, not on statin d/t history of transaminitis  Home cholestyramine    Gastroesophageal reflux disease without esophagitis  Home protonix        VTE Risk Mitigation (From admission, onward)           Ordered     heparin (porcine) injection 5,000 Units  Every 8 hours         08/13/24 2045     IP VTE HIGH RISK PATIENT  Once         08/13/24 2045     Place sequential compression device  Until discontinued         08/13/24 2045                    Discharge Planning   ARIEL:      Code Status: Full Code   Is the patient medically ready for discharge?:     Reason for patient still in hospital (select all that apply): Treatment  Discharge Plan A: Home with family, Home Health        Labs, outside records reviewed.  We will check tacrolimus level today.  Chest x-ray reviewed and independently interpreted diffuse opacities consistent with edema.  No obvious effusion or consolidation.            Anatoliy Banda DO  Department of Hospital Medicine   [facility] - Emergency Department

## 2024-08-14 NOTE — ASSESSMENT & PLAN NOTE
- baseline Cr 2.56 on 1/9/24, today 4.19  - likely 2/2 cardiorenal syndrome with HFpEF  - lasix 60 iv bid, monitor volume status  - holding home bumex  - nephrology Dr. Blount consulted, appreciate recommendations and assistance  - home sodium bicarbonate tabs  - renal diet    KYLER is likely due to  cardiorenal syndrome . Baseline creatinine is  2.56 . Most recent creatinine and eGFR are listed below.  Recent Labs     08/13/24  1650   CREATININE 4.19*   EGFRNORACEVR 11*      Plan  - KYLER is worsening. Will continue current treatment  - Avoid nephrotoxins and renally dose meds for GFR listed above  - Monitor urine output, serial BMP, and adjust therapy as needed

## 2024-08-14 NOTE — PLAN OF CARE
Problem: Fall Injury Risk  Goal: Absence of Fall and Fall-Related Injury  Outcome: Progressing  Intervention: Identify and Manage Contributors  Flowsheets (Taken 8/14/2024 1701)  Self-Care Promotion:   independence encouraged   adaptive equipment use encouraged   BADL personal objects within reach   BADL personal routines maintained  Medication Review/Management:   medications reviewed   high-risk medications identified  Intervention: Promote Injury-Free Environment  Flowsheets (Taken 8/14/2024 1701)  Safety Promotion/Fall Prevention:   assistive device/personal item within reach   bed alarm set   instructed to call staff for mobility   lighting adjusted   nonskid shoes/socks when out of bed   muscle strengthening facilitated   medications reviewed   observed patient noncompliance with fall prevention instructions   patient expresses understanding of fall risk and prevention   side rails raised x 3

## 2024-08-14 NOTE — PHARMACY MED REC
"Admission Medication History     The home medication history was taken by Mel Sanders.    You may go to "Admission" then "Reconcile Home Medications" tabs to review and/or act upon these items.     The home medication list has been updated by the Pharmacy department.   Please read ALL comments highlighted in yellow.   Please address this information as you see fit.    Feel free to contact us if you have any questions or require assistance.  Medications Updated:  Carvedilol 25 mg  Takes 50 mg twice daily  Hydralazine 100 mg three times a day instead of 50 mg  Medication Added:  Multivitamin  Patient had a printed list with her that was current as of 06/24/24. I went over the list with the patient and asked about any discrepancies or missing medications.      Patient reports no longer taking the following medication(s). The medication(s) listed below were removed from the home medication list. Please reorder if appropriate:  Atorvastatin 40 mg  Linzess 145 mcg  Nifedipine 90 mg    Medications listed below were obtained from: Patient/family and Analytic software- [employer]  (Not in a hospital admission)        Current Outpatient Medications on File Prior to Encounter   Medication Sig Dispense Refill Last Dose    albuterol (VENTOLIN HFA) 90 mcg/actuation inhaler Inhale 2 puffs into the lungs every 6 (six) hours as needed for Wheezing. Rescue 18 g 5 Past Month    aspirin (ECOTRIN) 81 MG EC tablet Take 81 mg by mouth once daily.   8/13/2024    bumetanide (BUMEX) 2 MG tablet Take 1 tablet (2 mg total) by mouth once daily. 90 tablet 1 8/13/2024    carvediloL (COREG) 25 MG tablet TAKE ONE TABLET BY MOUTH TWICE DAILY (Patient taking differently: Take 50 mg by mouth 2 (two) times daily.) 180 tablet 1 8/13/2024    cholestyramine (QUESTRAN) 4 gram packet Take 1 packet by mouth once daily.   8/13/2024    cloNIDine 0.1 mg/24 hr td ptwk (CATAPRES) 0.1 mg/24 hr Place 1 patch onto the skin once a week.   8/12/2024    docusate sodium " (COLACE) 100 MG capsule Take 100 mg by mouth 2 (two) times daily as needed for Constipation.   8/13/2024    ferrous sulfate (FEOSOL) 325 mg (65 mg iron) Tab tablet Take 1 tablet (325 mg total) by mouth 2 (two) times daily. 600 tablet 2 8/13/2024    flash glucose sensor (FREESTYLE MARY 10 DAY SENSOR MISC) 1 Device by Percutaneous route.   8/5/2024    fluticasone propionate (FLONASE) 50 mcg/actuation nasal spray 1 spray by Nasal route once daily.   8/13/2024    hydrALAZINE (APRESOLINE) 100 MG tablet Take 100 mg by mouth 3 (three) times daily.   8/13/2024    insulin aspart U-100 (NOVOLOG) 100 unit/mL injection Inject 3 Units into the skin 3 (three) times daily before meals. Per Dr Kayleigh Morris at [facility]   8/13/2024    insulin degludec (TRESIBA FLEXTOUCH U-100) 100 unit/mL (3 mL) insulin pen Inject 10 Units into the skin once daily. (Patient taking differently: Inject 8 Units into the skin once daily. Per Dr Kayleigh Morris at [facility]) 9 mL 2 8/12/2024    isosorbide mononitrate (IMDUR) 30 MG 24 hr tablet Take 30 mg by mouth once daily.   8/13/2024    Lactobacillus acidophilus (PROBIOTIC ACIDOPHILUS ORAL) Take 4 Billion Cells by mouth.   8/13/2024    megestroL (MEGACE) 20 MG Tab TAKE ONE TABLET BY MOUTH EVERY DAY 30 tablet 3 8/13/2024    minoxidiL (LONITEN) 2.5 MG tablet Take 2.5 mg by mouth once daily.   8/13/2024    multivitamin (THERAGRAN) per tablet Take 1 tablet by mouth once daily.   8/13/2024    mycophenolate sodium 180 MG TbEC Take 1 tablet by mouth 2 (two) times daily.   8/13/2024    pantoprazole (PROTONIX) 40 MG tablet TAKE ONE TABLET BY MOUTH EVERY MORNING 30 tablet 0 8/13/2024    potassium chloride (MICRO-K) 10 MEQ CpSR TAKE ONE CAPSULE BY MOUTH ONCE DAILY 90 capsule 1 8/13/2024    predniSONE (DELTASONE) 5 MG tablet Take 1 tablet by mouth once daily.   8/13/2024    sodium bicarbonate 650 MG tablet Take 1 tablet by mouth 2 (two) times daily.   8/13/2024    tacrolimus (PROGRAF) 0.5 MG Cap Take 1.5 mg  "by mouth.   8/13/2024    lancets (TRUEPLUS LANCETS) 33 gauge Misc Inject 1 lancet into the skin 3 (three) times daily before meals. 400 each 2     nitroGLYCERIN (NITROSTAT) 0.4 MG SL tablet DISSOLVE 1 TABLET UNDER THE TONGUE EVERY 5 MINUTES AS NEEDED FOR CHEST PAIN. DO NOT EXCEED A TOTAL OF 3 DOSES IN 15 MINUTES.       pen needle, diabetic 31 gauge x 3/16" Ndle 1 each by NOT APPLICABLE route.       [DISCONTINUED] amLODIPine (NORVASC) 10 MG tablet Take 10 mg by mouth. (Patient not taking: Reported on 8/13/2024)       [DISCONTINUED] atorvastatin (LIPITOR) 40 MG tablet Take 1 tablet (40 mg total) by mouth once daily. 90 tablet 1     [DISCONTINUED] cholecalciferol, vitamin D3, (VITAMIN D3) 25 mcg (1,000 unit) capsule Take 1,000 Units by mouth. (Patient not taking: Reported on 8/6/2024)       [DISCONTINUED] cinacalcet (SENSIPAR) 30 MG Tab Take 30 mg by mouth. (Patient not taking: Reported on 8/6/2024)       [DISCONTINUED] EScitalopram oxalate (LEXAPRO) 20 MG tablet Take 1 tablet (20 mg total) by mouth once daily. 30 tablet 2     [DISCONTINUED] JARDIANCE 10 mg tablet Take 10 mg by mouth. (Patient not taking: Reported on 8/6/2024)       [DISCONTINUED] linaCLOtide (LINZESS) 145 mcg Cap capsule Take 1 capsule every day by oral route as directed.       [DISCONTINUED] NIFEdipine (PROCARDIA-XL) 90 MG (OSM) 24 hr tablet Take 60 mg by mouth once daily.       [DISCONTINUED] TRUE METRIX GLUCOSE TEST STRIP Strp TEST BLOOD SUGAR THREE TIMES DAILY BEFORE MEALS 300 strip 3          Potential issues to be addressed PRIOR TO DISCHARGE  No issues identified.    [facility] [place] Specialist - Medication History  EXT. [id number]    .          "

## 2024-08-14 NOTE — HOSPITAL COURSE
8/14- feels better today  8/15 feels better today.  Renal function continues to improve.  Continue IV diuresis  8/16 creatinine trended up.  Need to see trend down before discharge.  8/17 creatinine trending in the right direction.  She is stable for discharge.  She has follow up with Dr. Belcher arranged she has follow up with her transplant nephrologist in September.  We will refer her to see a PCP in 1 week otherwise keep follow up appointments.  Made referral to follow up with Cardiology.  She follows with Dr. Grewal in [place]

## 2024-08-14 NOTE — ASSESSMENT & PLAN NOTE
Chronic, uncontrolled. Latest blood pressure and vitals reviewed-     Temp:  [97.1 °F (36.2 °C)-98.6 °F (37 °C)]   Pulse:  [73-85]   Resp:  [14-21]   BP: (124-189)/(34-67)   SpO2:  [96 %-100 %] .   Home meds for hypertension were reviewed and noted below.   Hypertension Medications               bumetanide (BUMEX) 2 MG tablet Take 1 tablet (2 mg total) by mouth once daily.    carvediloL (COREG) 25 MG tablet TAKE ONE TABLET BY MOUTH TWICE DAILY    cloNIDine 0.1 mg/24 hr td ptwk (CATAPRES) 0.1 mg/24 hr Place 1 patch onto the skin once a week.    hydrALAZINE (APRESOLINE) 100 MG tablet Take 100 mg by mouth 3 (three) times daily.    isosorbide mononitrate (IMDUR) 30 MG 24 hr tablet Take 30 mg by mouth once daily.    minoxidiL (LONITEN) 2.5 MG tablet Take 2.5 mg by mouth once daily.    nitroGLYCERIN (NITROSTAT) 0.4 MG SL tablet DISSOLVE 1 TABLET UNDER THE TONGUE EVERY 5 MINUTES AS NEEDED FOR CHEST PAIN. DO NOT EXCEED A TOTAL OF 3 DOSES IN 15 MINUTES.            While in the hospital, will manage blood pressure as follows; Adjust home antihypertensive regimen as follows- see below    Will utilize p.r.n. blood pressure medication only if patient's blood pressure greater than 180/110 and she develops symptoms such as worsening chest pain or shortness of breath.    Home imdur, minoxidil, clonidine patch, hydralazine  Halved Coreg from 50 to 25 bid d/t potential acute CHF exacerbation  Lasix 60 iv bid for cardiorenal syndrome, monitor volume status  Holding bumex  Adjust regimen as needed

## 2024-08-14 NOTE — ASSESSMENT & PLAN NOTE
- baseline Cr 2.56 on 1/9/24, today 4.19  - likely 2/2 cardiorenal syndrome with HFpEF  - lasix 60 iv bid, monitor volume status  - holding home bumex  - nephrology Dr. Blount consulted, appreciate recommendations and assistance  - home sodium bicarbonate tabs  - renal diet    KYLER is likely due to  cardiorenal syndrome . Baseline creatinine is  2.56 . Most recent creatinine and eGFR are listed below.  Recent Labs     08/13/24  1650 08/14/24  0427   CREATININE 4.19* 3.96*   EGFRNORACEVR 11* 11*        Plan  - KYLER is worsening. Will continue current treatment  - Avoid nephrotoxins and renally dose meds for GFR listed above  - Monitor urine output, serial BMP, and adjust therapy as needed    KYLER in setting of renal transplant, consult nephrology

## 2024-08-14 NOTE — ASSESSMENT & PLAN NOTE
Patient with known CAD s/p stent placement, which is controlled Will continue ASA and monitor for S/Sx of angina/ACS. Continue to monitor on telemetry.     Home ASA, not on statin d/t history of transaminitis  Home cholestyramine

## 2024-08-14 NOTE — ASSESSMENT & PLAN NOTE
- baseline Hgb 10.5 on 8/21/23, today 9.5  - 2/2 CKD stage 4 and/or WASHINGTON  - home ferrous tabs    Anemia is likely due to Iron deficiency and chronic disease due to Chronic Kidney Disease. Most recent hemoglobin and hematocrit are listed below.  Recent Labs     08/13/24  1650 08/14/24  0427   HGB 9.5* 8.8*   HCT 30.4* 27.1*       Plan  - Monitor serial CBC: Daily  - Transfuse PRBC if patient becomes hemodynamically unstable, symptomatic or H/H drops below 7/21.  - Patient has not received any PRBC transfusions to date  - Patient's anemia is currently stable  8/14 follow no evidence of bleeding

## 2024-08-14 NOTE — ASSESSMENT & PLAN NOTE
Creatine stable for now. BMP reviewed- noted Estimated Creatinine Clearance: 10.9 mL/min (A) (based on SCr of 3.96 mg/dL (H)). according to latest data. Based on current GFR, CKD stage is end stage.  Monitor UOP and serial BMP and adjust therapy as needed. Renally dose meds. Avoid nephrotoxic medications and procedures.  Patient with transplant now with KYLER.  Nephrology consulted.

## 2024-08-14 NOTE — ASSESSMENT & PLAN NOTE
Home cholestyramine     Lab Results   Component Value Date    CHOL 117 02/15/2024    CHOL 95 08/21/2023    CHOL 101 04/13/2023     Lab Results   Component Value Date    HDL 53 02/15/2024    HDL 54 08/21/2023    HDL 47 04/13/2023     Lab Results   Component Value Date    LDLCALC 53 02/15/2024    LDLCALC 35 08/21/2023    LDLCALC 46 04/13/2023     Lab Results   Component Value Date    TRIG 54 02/15/2024    TRIG 29 (L) 08/21/2023    TRIG 39 04/13/2023       Lab Results   Component Value Date    CHOLHDL 2.2 02/15/2024    CHOLHDL 1.8 08/21/2023    CHOLHDL 2.1 04/13/2023

## 2024-08-14 NOTE — ED NOTES
Pt ambulated with cane to hospital bed. Pt states she has no needs at this time. Bedside potty placed in room for when she needs to urinate. Pt given call bell and instructed to call when she wants to get up. Pt lights dimmed and door closed for pt to try and rest

## 2024-08-15 LAB
ANION GAP SERPL CALCULATED.3IONS-SCNC: 11 MMOL/L (ref 7–16)
BASOPHILS # BLD AUTO: 0.05 K/UL (ref 0–0.2)
BASOPHILS NFR BLD AUTO: 1 % (ref 0–1)
BUN SERPL-MCNC: 85 MG/DL (ref 7–18)
BUN/CREAT SERPL: 23 (ref 6–20)
CALCIUM SERPL-MCNC: 9.3 MG/DL (ref 8.5–10.1)
CHLORIDE SERPL-SCNC: 109 MMOL/L (ref 98–107)
CO2 SERPL-SCNC: 22 MMOL/L (ref 21–32)
CREAT SERPL-MCNC: 3.72 MG/DL (ref 0.55–1.02)
DIFFERENTIAL METHOD BLD: ABNORMAL
EGFR (NO RACE VARIABLE) (RUSH/TITUS): 12 ML/MIN/1.73M2
EOSINOPHIL # BLD AUTO: 0.12 K/UL (ref 0–0.5)
EOSINOPHIL NFR BLD AUTO: 2.4 % (ref 1–4)
ERYTHROCYTE [DISTWIDTH] IN BLOOD BY AUTOMATED COUNT: 15.3 % (ref 11.5–14.5)
GLUCOSE SERPL-MCNC: 140 MG/DL (ref 74–106)
GLUCOSE SERPL-MCNC: 155 MG/DL (ref 70–105)
GLUCOSE SERPL-MCNC: 237 MG/DL (ref 70–105)
GLUCOSE SERPL-MCNC: 251 MG/DL (ref 70–105)
GLUCOSE SERPL-MCNC: 259 MG/DL (ref 70–105)
HCT VFR BLD AUTO: 27.6 % (ref 38–47)
HGB BLD-MCNC: 8.8 G/DL (ref 12–16)
IMM GRANULOCYTES # BLD AUTO: 0.03 K/UL (ref 0–0.04)
IMM GRANULOCYTES NFR BLD: 0.6 % (ref 0–0.4)
LYMPHOCYTES # BLD AUTO: 0.94 K/UL (ref 1–4.8)
LYMPHOCYTES NFR BLD AUTO: 18.7 % (ref 27–41)
MCH RBC QN AUTO: 25.5 PG (ref 27–31)
MCHC RBC AUTO-ENTMCNC: 31.9 G/DL (ref 32–36)
MCV RBC AUTO: 80 FL (ref 80–96)
MONOCYTES # BLD AUTO: 0.65 K/UL (ref 0–0.8)
MONOCYTES NFR BLD AUTO: 12.9 % (ref 2–6)
MPC BLD CALC-MCNC: 12.1 FL (ref 9.4–12.4)
NEUTROPHILS # BLD AUTO: 3.23 K/UL (ref 1.8–7.7)
NEUTROPHILS NFR BLD AUTO: 64.4 % (ref 53–65)
NRBC # BLD AUTO: 0 X10E3/UL
NRBC, AUTO (.00): 0 %
PLATELET # BLD AUTO: 141 K/UL (ref 150–400)
POTASSIUM SERPL-SCNC: 3.9 MMOL/L (ref 3.5–5.1)
RBC # BLD AUTO: 3.45 M/UL (ref 4.2–5.4)
SODIUM SERPL-SCNC: 138 MMOL/L (ref 136–145)
WBC # BLD AUTO: 5.02 K/UL (ref 4.5–11)

## 2024-08-15 PROCEDURE — 82962 GLUCOSE BLOOD TEST: CPT

## 2024-08-15 PROCEDURE — 96372 THER/PROPH/DIAG INJ SC/IM: CPT

## 2024-08-15 PROCEDURE — 36415 COLL VENOUS BLD VENIPUNCTURE: CPT

## 2024-08-15 PROCEDURE — 25000003 PHARM REV CODE 250

## 2024-08-15 PROCEDURE — 80048 BASIC METABOLIC PNL TOTAL CA: CPT

## 2024-08-15 PROCEDURE — 85025 COMPLETE CBC W/AUTO DIFF WBC: CPT

## 2024-08-15 PROCEDURE — 25000003 PHARM REV CODE 250: Performed by: STUDENT IN AN ORGANIZED HEALTH CARE EDUCATION/TRAINING PROGRAM

## 2024-08-15 PROCEDURE — 11000001 HC ACUTE MED/SURG PRIVATE ROOM

## 2024-08-15 PROCEDURE — 63600175 PHARM REV CODE 636 W HCPCS

## 2024-08-15 PROCEDURE — 99233 SBSQ HOSP IP/OBS HIGH 50: CPT | Mod: ,,, | Performed by: STUDENT IN AN ORGANIZED HEALTH CARE EDUCATION/TRAINING PROGRAM

## 2024-08-15 RX ORDER — CHOLESTYRAMINE 4 G/4.8G
4 POWDER, FOR SUSPENSION ORAL 2 TIMES DAILY
Status: DISCONTINUED | OUTPATIENT
Start: 2024-08-15 | End: 2024-08-17 | Stop reason: HOSPADM

## 2024-08-15 RX ORDER — NIFEDIPINE 60 MG/1
60 TABLET, EXTENDED RELEASE ORAL DAILY
Status: DISCONTINUED | OUTPATIENT
Start: 2024-08-15 | End: 2024-08-16

## 2024-08-15 RX ADMIN — HYDRALAZINE HYDROCHLORIDE 100 MG: 100 TABLET ORAL at 02:08

## 2024-08-15 RX ADMIN — MINOXIDIL 2.5 MG: 2.5 TABLET ORAL at 08:08

## 2024-08-15 RX ADMIN — MYCOPHENOLATE MOFETIL 250 MG: 250 CAPSULE ORAL at 08:08

## 2024-08-15 RX ADMIN — FUROSEMIDE 60 MG: 10 INJECTION, SOLUTION INTRAMUSCULAR; INTRAVENOUS at 08:08

## 2024-08-15 RX ADMIN — FERROUS SULFATE TAB 325 MG (65 MG ELEMENTAL FE) 1 EACH: 325 (65 FE) TAB at 08:08

## 2024-08-15 RX ADMIN — HYDRALAZINE HYDROCHLORIDE 100 MG: 100 TABLET ORAL at 09:08

## 2024-08-15 RX ADMIN — FERROUS SULFATE TAB 325 MG (65 MG ELEMENTAL FE) 1 EACH: 325 (65 FE) TAB at 09:08

## 2024-08-15 RX ADMIN — HYDRALAZINE HYDROCHLORIDE 100 MG: 100 TABLET ORAL at 08:08

## 2024-08-15 RX ADMIN — MYCOPHENOLATE MOFETIL 250 MG: 250 CAPSULE ORAL at 09:08

## 2024-08-15 RX ADMIN — SODIUM BICARBONATE 650 MG: 650 TABLET ORAL at 09:08

## 2024-08-15 RX ADMIN — HEPARIN SODIUM 5000 UNITS: 5000 INJECTION, SOLUTION INTRAVENOUS; SUBCUTANEOUS at 06:08

## 2024-08-15 RX ADMIN — Medication 1 CAPSULE: at 08:08

## 2024-08-15 RX ADMIN — SODIUM BICARBONATE 650 MG: 650 TABLET ORAL at 08:08

## 2024-08-15 RX ADMIN — PANTOPRAZOLE SODIUM 40 MG: 40 TABLET, DELAYED RELEASE ORAL at 06:08

## 2024-08-15 RX ADMIN — MEGESTROL ACETATE 20 MG: 20 TABLET ORAL at 08:08

## 2024-08-15 RX ADMIN — ISOSORBIDE MONONITRATE 30 MG: 30 TABLET, EXTENDED RELEASE ORAL at 08:08

## 2024-08-15 RX ADMIN — CHOLESTYRAMINE LIGHT 4 GRAMS OF ANHYDROUS CHOLESTYRAMINE: 4 POWDER, FOR SUSPENSION ORAL at 10:08

## 2024-08-15 RX ADMIN — INSULIN ASPART 6 UNITS: 100 INJECTION, SOLUTION INTRAVENOUS; SUBCUTANEOUS at 05:08

## 2024-08-15 RX ADMIN — INSULIN ASPART 4 UNITS: 100 INJECTION, SOLUTION INTRAVENOUS; SUBCUTANEOUS at 12:08

## 2024-08-15 RX ADMIN — CHOLESTYRAMINE LIGHT 4 G: 4 POWDER, FOR SUSPENSION ORAL at 08:08

## 2024-08-15 RX ADMIN — TACROLIMUS 1.5 MG: 1 CAPSULE ORAL at 08:08

## 2024-08-15 RX ADMIN — HEPARIN SODIUM 5000 UNITS: 5000 INJECTION, SOLUTION INTRAVENOUS; SUBCUTANEOUS at 09:08

## 2024-08-15 RX ADMIN — HEPARIN SODIUM 5000 UNITS: 5000 INJECTION, SOLUTION INTRAVENOUS; SUBCUTANEOUS at 02:08

## 2024-08-15 RX ADMIN — NIFEDIPINE 60 MG: 60 TABLET, FILM COATED, EXTENDED RELEASE ORAL at 11:08

## 2024-08-15 RX ADMIN — PREDNISONE 5 MG: 5 TABLET ORAL at 08:08

## 2024-08-15 RX ADMIN — ASPIRIN 81 MG: 81 TABLET, COATED ORAL at 08:08

## 2024-08-15 RX ADMIN — Medication 1 CAPSULE: at 11:08

## 2024-08-15 RX ADMIN — CARVEDILOL 25 MG: 25 TABLET, FILM COATED ORAL at 09:08

## 2024-08-15 RX ADMIN — THERA TABS 1 TABLET: TAB at 08:08

## 2024-08-15 RX ADMIN — Medication 1 CAPSULE: at 05:08

## 2024-08-15 RX ADMIN — CARVEDILOL 25 MG: 25 TABLET, FILM COATED ORAL at 08:08

## 2024-08-15 NOTE — PROGRESS NOTES
[facility]    Patient Name:  Nasrin Grant   MRN:  [id number]  Date: 8/15/2024  Diagnosis: Acute on chronic heart failure with preserved ejection fraction    History:     Past Medical History:   Diagnosis Date    Amputation of one or more toes     2 TOES RT FOOT, 3 TOES LFT FOOT    Atherosclerotic heart disease of native coronary artery with angina pectoris 01/20/2020    CVA (cerebral vascular accident)     Depression     Diabetes mellitus, type 2     Disorder of kidney and ureter     History of carpal tunnel surgery of left wrist     History of carpal tunnel surgery of right wrist     History of repair of left rotator cuff     History of repair of right rotator cuff     Hyperlipidemia     Hypertension     Hypokalemia     Kidney transplant status 07/13/2020    Osteoporosis 07/22/2020    Proliferative diabetic retinopathy of both eyes 09/07/2023    Stroke        Social History     Socioeconomic History    Marital status:    Occupational History     Comment: Disabled   Tobacco Use    Smoking status: Never     Passive exposure: Never    Smokeless tobacco: Never   Substance and Sexual Activity    Alcohol use: Never    Drug use: Never    Sexual activity: Not Currently     Partners: Male     Social Determinants of Health     Financial Resource Strain: Low Risk  (8/15/2024)    Overall Financial Resource Strain (CARDIA)     Difficulty of Paying Living Expenses: Not hard at all   Recent Concern: Financial Resource Strain - Medium Risk (8/6/2024)    Overall Financial Resource Strain (CARDIA)     Difficulty of Paying Living Expenses: Somewhat hard   Food Insecurity: No Food Insecurity (8/15/2024)    Hunger Vital Sign     Worried About Running Out of Food in the Last Year: Never true     Ran Out of Food in the Last Year: Never true   Recent Concern: Food Insecurity - Food Insecurity Present (8/6/2024)    Hunger Vital Sign     Worried About Running Out of Food in the Last Year: Sometimes  true     Ran Out of Food in the Last Year: Sometimes true   Transportation Needs: No Transportation Needs (8/15/2024)    TRANSPORTATION NEEDS     Transportation : No   Recent Concern: Transportation Needs - Unmet Transportation Needs (8/6/2024)    PRAPARE - Transportation     Lack of Transportation (Medical): Yes     Lack of Transportation (Non-Medical): Yes   Physical Activity: Insufficiently Active (8/6/2024)    Exercise Vital Sign     Days of Exercise per Week: 7 days     Minutes of Exercise per Session: 20 min   Stress: No Stress Concern Present (8/15/2024)    [employer] - Occupational Stress Questionnaire     Feeling of Stress : Not at all   Recent Concern: Stress - Stress Concern Present (8/6/2024)    [employer] - Occupational Stress Questionnaire     Feeling of Stress : Rather much   Housing Stability: Low Risk  (8/15/2024)    Housing Stability Vital Sign     Unable to Pay for Housing in the Last Year: No     Homeless in the Last Year: No       Precautions:     Allergies as of 08/13/2024 - Reviewed 08/13/2024   Allergen Reaction Noted    Hydrocodone-acetaminophen Rash 04/03/2023    Gabapentin Other (See Comments) 12/11/2012    Morphine Itching 04/05/2016    Opioids - morphine analogues  06/22/2021       [facility] Assessment Details/Treatment       Narrative: Seen patient for initiation of preventative skin care measures    Sacral and Bilateral heels with out pressure injury noted. Sees primary doctor about Right 3rd toe and Left amp great toe. Right 3rd toe with dry back scab formation noted. Dry, open to air.  Daniel score 20    Consult skin care for any skin issues      08/15/2024

## 2024-08-15 NOTE — ASSESSMENT & PLAN NOTE
Creatine stable for now. BMP reviewed- noted Estimated Creatinine Clearance: 11.6 mL/min (A) (based on SCr of 3.72 mg/dL (H)). according to latest data. Based on current GFR, CKD stage is end stage.  Monitor UOP and serial BMP and adjust therapy as needed. Renally dose meds. Avoid nephrotoxic medications and procedures.  Patient with transplant now with KYLER.  Nephrology consulted.  8/15 renal function improved but not at baseline.

## 2024-08-15 NOTE — PLAN OF CARE
Problem: Diabetes Comorbidity  Goal: Blood Glucose Level Within Targeted Range  Outcome: Progressing  Intervention: Monitor and Manage Glycemia  Flowsheets (Taken 8/15/2024 1419)  Glycemic Management:   blood glucose monitored   oral hydration promoted   supplemental insulin given

## 2024-08-15 NOTE — SUBJECTIVE & OBJECTIVE
Interval History: naeo    Review of Systems  Objective:     Vital Signs (Most Recent):  Temp: 98 °F (36.7 °C) (08/15/24 1138)  Pulse: 77 (08/15/24 1138)  Resp: 18 (08/15/24 1138)  BP: (!) 151/65 (08/15/24 1138)  SpO2: 99 % (08/15/24 1138) Vital Signs (24h Range):  Temp:  [97.8 °F (36.6 °C)-98.7 °F (37.1 °C)] 98 °F (36.7 °C)  Pulse:  [69-80] 77  Resp:  [18-20] 18  SpO2:  [98 %-100 %] 99 %  BP: (129-205)/(44-67) 151/65     Weight: 64 kg (141 lb 1.5 oz)  Body mass index is 24.22 kg/m².    Intake/Output Summary (Last 24 hours) at 8/15/2024 1216  Last data filed at 8/15/2024 1020  Gross per 24 hour   Intake 240 ml   Output 1000 ml   Net -760 ml         Physical Exam  Vitals and nursing note reviewed.   HENT:      Head: Normocephalic and atraumatic.      Right Ear: External ear normal.      Left Ear: External ear normal.      Nose: Nose normal.      Mouth/Throat:      Mouth: Mucous membranes are dry.   Eyes:      Pupils: Pupils are equal, round, and reactive to light.   Cardiovascular:      Rate and Rhythm: Normal rate and regular rhythm.      Pulses: Normal pulses.      Heart sounds: Normal heart sounds.   Pulmonary:      Effort: Pulmonary effort is normal.      Breath sounds: Normal breath sounds.   Abdominal:      General: Bowel sounds are normal.      Palpations: Abdomen is soft.   Musculoskeletal:         General: Swelling present.      Cervical back: Neck supple.      Right lower leg: Edema present.      Left lower leg: Edema present.   Skin:     General: Skin is warm and dry.   Neurological:      Mental Status: She is alert.   Psychiatric:         Mood and Affect: Mood normal.         Behavior: Behavior normal.             Significant Labs: All pertinent labs within the past 24 hours have been reviewed.    Significant Imaging: I have reviewed all pertinent imaging results/findings within the past 24 hours.

## 2024-08-15 NOTE — PLAN OF CARE
Problem: Adult Inpatient Plan of Care  Goal: Plan of Care Review  Outcome: Progressing  Goal: Patient-Specific Goal (Individualized)  Outcome: Progressing  Goal: Absence of Hospital-Acquired Illness or Injury  Outcome: Progressing  Goal: Optimal Comfort and Wellbeing  Outcome: Progressing  Goal: Readiness for Transition of Care  Outcome: Progressing     Problem: Fall Injury Risk  Goal: Absence of Fall and Fall-Related Injury  Outcome: Progressing     Problem: Diabetes Comorbidity  Goal: Blood Glucose Level Within Targeted Range  Outcome: Progressing     Problem: Acute Kidney Injury/Impairment  Goal: Fluid and Electrolyte Balance  Outcome: Progressing  Goal: Improved Oral Intake  Outcome: Progressing  Goal: Effective Renal Function  Outcome: Progressing     Problem: ARDS (Acute Respiratory Distress Syndrome)  Goal: Effective Oxygenation  Outcome: Progressing

## 2024-08-15 NOTE — ASSESSMENT & PLAN NOTE
- baseline Hgb 10.5 on 8/21/23, today 9.5  - 2/2 CKD stage 4 and/or WASHINGTON  - home ferrous tabs    Anemia is likely due to Iron deficiency and chronic disease due to Chronic Kidney Disease. Most recent hemoglobin and hematocrit are listed below.  Recent Labs     08/13/24  1650 08/14/24  0427 08/15/24  0235   HGB 9.5* 8.8* 8.8*   HCT 30.4* 27.1* 27.6*       Plan  - Monitor serial CBC: Daily  - Transfuse PRBC if patient becomes hemodynamically unstable, symptomatic or H/H drops below 7/21.  - Patient has not received any PRBC transfusions to date  - Patient's anemia is currently stable  8/14 follow no evidence of bleeding

## 2024-08-15 NOTE — ASSESSMENT & PLAN NOTE
Home Cellcept 250mg bid, prednisone 5mg qd, tacrolimus 1.5mg qam  Continue  Consult Dr. Blount  8/15 Renal function improving.  UA look good.  Upc look good

## 2024-08-15 NOTE — ASSESSMENT & PLAN NOTE
"KYLER on CKDS4 likely 2/2 cardiorenal syndrome  Lasix 60 iv bid, monitor volume status and decrease as symptoms improve  Last echo EF 65% on 12/4/2023, repeat echo pending  Home Coreg halved from 50 to 25mg bid, imdur  Holding home bumex   Tele monitor    Patient is identified as having Diastolic (HFpEF) heart failure that is Acute on chronic. CHF is currently uncontrolled due to Continued edema of extremities and >3 pillow orthopnea. Latest ECHO performed and demonstrates- No results found for this or any previous visit.  . Continue Beta Blocker, Furosemide, and Nitrate/Vasodilator and monitor clinical status closely. Monitor on telemetry. Patient is off CHF pathway.  Monitor strict Is&Os and daily weights.  Place on fluid restriction of 1.5 L. Cardiology has not been consulted. Continue to stress to patient importance of self efficacy and  on diet for CHF. Last BNP reviewed- and noted below No results for input(s): "BNP", "BNPTRIAGEBLO" in the last 168 hours.    Continue diuresis  Fluid and sodium restrict.  Continue diuresis  "

## 2024-08-15 NOTE — ASSESSMENT & PLAN NOTE
Patient has persistent depression which is mild and is currently controlled. Will Continue anti-depressant medications. We will not consult psychiatry at this time. Patient does not display psychosis at this time. Continue to monitor closely and adjust plan of care as needed.    Stable

## 2024-08-15 NOTE — PLAN OF CARE
08/15/24 1513   Rounds   Attendance Provider;;Charge nurse;Physical therapist;Pharmacist   Discharge Plan A Home with family;Home Health   Why the patient remains in the hospital Requires continued medical care   Transition of Care Barriers None     Chart reviewed. Continue POC. Pt to return home with son and lara patel at d/c. Ss following.

## 2024-08-15 NOTE — ASSESSMENT & PLAN NOTE
- baseline Cr 2.56 on 1/9/24, today 4.19  - likely 2/2 cardiorenal syndrome with HFpEF  - lasix 60 iv bid, monitor volume status  - holding home bumex  - nephrology Dr. Blount consulted, appreciate recommendations and assistance  - home sodium bicarbonate tabs  - renal diet    KYLER is likely due to  cardiorenal syndrome . Baseline creatinine is  2.56 . Most recent creatinine and eGFR are listed below.  Recent Labs     08/13/24  1650 08/14/24  0427 08/15/24  0235   CREATININE 4.19* 3.96* 3.72*   EGFRNORACEVR 11* 11* 12*        Plan  - KYLER is worsening. Will continue current treatment  - Avoid nephrotoxins and renally dose meds for GFR listed above  - Monitor urine output, serial BMP, and adjust therapy as needed    KYLER in setting of renal transplant, consult nephrology  8/15 renal function improving

## 2024-08-15 NOTE — ASSESSMENT & PLAN NOTE
Chronic, uncontrolled. Latest blood pressure and vitals reviewed-     Temp:  [97.8 °F (36.6 °C)-98.7 °F (37.1 °C)]   Pulse:  [69-80]   Resp:  [18-20]   BP: (129-205)/(44-67)   SpO2:  [98 %-100 %] .   Home meds for hypertension were reviewed and noted below.   Hypertension Medications               bumetanide (BUMEX) 2 MG tablet Take 1 tablet (2 mg total) by mouth once daily.    carvediloL (COREG) 25 MG tablet TAKE ONE TABLET BY MOUTH TWICE DAILY    cloNIDine 0.1 mg/24 hr td ptwk (CATAPRES) 0.1 mg/24 hr Place 1 patch onto the skin once a week.    hydrALAZINE (APRESOLINE) 100 MG tablet Take 100 mg by mouth 3 (three) times daily.    isosorbide mononitrate (IMDUR) 30 MG 24 hr tablet Take 30 mg by mouth once daily.    minoxidiL (LONITEN) 2.5 MG tablet Take 2.5 mg by mouth once daily.    nitroGLYCERIN (NITROSTAT) 0.4 MG SL tablet DISSOLVE 1 TABLET UNDER THE TONGUE EVERY 5 MINUTES AS NEEDED FOR CHEST PAIN. DO NOT EXCEED A TOTAL OF 3 DOSES IN 15 MINUTES.            While in the hospital, will manage blood pressure as follows; Adjust home antihypertensive regimen as follows- see below    Will utilize p.r.n. blood pressure medication only if patient's blood pressure greater than 180/110 and she develops symptoms such as worsening chest pain or shortness of breath.    Home imdur, minoxidil, clonidine patch, hydralazine  Halved Coreg from 50 to 25 bid d/t potential acute CHF exacerbation  Lasix 60 iv bid for cardiorenal syndrome, monitor volume status  Holding bumex  Adjust regimen as needed

## 2024-08-15 NOTE — PROGRESS NOTES
[facility]  [facility] Medicine  Progress Note    Patient Name: Nasrin Grant  MRN: [id number]  Patient Class: IP- Inpatient   Admission Date: 8/13/2024  Length of Stay: 2 days  Attending Physician: Anatoliy Banda DO  Primary Care Provider: Laury Enrique FNP        Subjective:     Principal Problem:Acute on chronic heart failure with preserved ejection fraction        HPI:  75yo female with a PMH of CKD s/p renal transplant with CMV in 2016 followed Dr. Blount (last seen 7/6/24), HFpEF (EF 65% on last Echo 12/4/2023), CAD s/p 1 stent, CHF, HTN, HLD, DM2 s/p toe amputation 2 on right foot, 3 on left foot, diabetic retinopathy, CVA, depression, osteoporosis who presents to [facility] ED via EMS from [facility] with SOB and leg swelling. SOB and bilateral leg swelling started at least 1 month ago and gradually worsened especially in the past week. Endorses OSORIO, orthopnea, PND, nocturnal coughs. Endorses chest and abdominal tightness from swelling. Denies f/c/cp/n/v/d, denies appetite, urinary, or bowel habit changes.    Upon presentation, VS remarkable for 172/61. Labs remarkable for H/H 9.5/30.4 (baseline H/H 10.5/33.9 on 8/21/23), MCV 81.9, RDW 15.5, plt 117, CO2 20, BUN/Cr 83/4.19, GFR 11 (baseline Cr 2.56, GFR 19 on 1/9/24), glucose 209. PTT 22.5, PT 15.5, INR 1.24. Troponin wnl 45.3, BNP 03824, Mg 1.8. EKG SR 82 without ischemic changes compared to previous. CXR shows increased bilateral lung density suggesting mild cardiac decompensation and or pneumonitis. Patient is admitted to hospital medicine for further management and care.        Overview/Hospital Course:  8/14- feels better today  8/15 feels better today.  Renal function continues to improve.  Continue IV diuresis    Interval History: naeo    Review of Systems  Objective:     Vital Signs (Most Recent):  Temp: 98 °F (36.7 °C) (08/15/24 1138)  Pulse: 77 (08/15/24 1138)  Resp: 18 (08/15/24 1138)  BP: (!) 151/65 (08/15/24 1138)  SpO2: 99 %  (08/15/24 1138) Vital Signs (24h Range):  Temp:  [97.8 °F (36.6 °C)-98.7 °F (37.1 °C)] 98 °F (36.7 °C)  Pulse:  [69-80] 77  Resp:  [18-20] 18  SpO2:  [98 %-100 %] 99 %  BP: (129-205)/(44-67) 151/65     Weight: 64 kg (141 lb 1.5 oz)  Body mass index is 24.22 kg/m².    Intake/Output Summary (Last 24 hours) at 8/15/2024 1216  Last data filed at 8/15/2024 1020  Gross per 24 hour   Intake 240 ml   Output 1000 ml   Net -760 ml         Physical Exam  Vitals and nursing note reviewed.   HENT:      Head: Normocephalic and atraumatic.      Right Ear: External ear normal.      Left Ear: External ear normal.      Nose: Nose normal.      Mouth/Throat:      Mouth: Mucous membranes are dry.   Eyes:      Pupils: Pupils are equal, round, and reactive to light.   Cardiovascular:      Rate and Rhythm: Normal rate and regular rhythm.      Pulses: Normal pulses.      Heart sounds: Normal heart sounds.   Pulmonary:      Effort: Pulmonary effort is normal.      Breath sounds: Normal breath sounds.   Abdominal:      General: Bowel sounds are normal.      Palpations: Abdomen is soft.   Musculoskeletal:         General: Swelling present.      Cervical back: Neck supple.      Right lower leg: Edema present.      Left lower leg: Edema present.   Skin:     General: Skin is warm and dry.   Neurological:      Mental Status: She is alert.   Psychiatric:         Mood and Affect: Mood normal.         Behavior: Behavior normal.             Significant Labs: All pertinent labs within the past 24 hours have been reviewed.    Significant Imaging: I have reviewed all pertinent imaging results/findings within the past 24 hours.    Assessment/Plan:      * Acute on chronic heart failure with preserved ejection fraction  KYLER on CKDS4 likely 2/2 cardiorenal syndrome  Lasix 60 iv bid, monitor volume status and decrease as symptoms improve  Last echo EF 65% on 12/4/2023, repeat echo pending  Home Coreg halved from 50 to 25mg bid, imdur  Holding home bumex  "  Tele monitor    Patient is identified as having Diastolic (HFpEF) heart failure that is Acute on chronic. CHF is currently uncontrolled due to Continued edema of extremities and >3 pillow orthopnea. Latest ECHO performed and demonstrates- No results found for this or any previous visit.  . Continue Beta Blocker, Furosemide, and Nitrate/Vasodilator and monitor clinical status closely. Monitor on telemetry. Patient is off CHF pathway.  Monitor strict Is&Os and daily weights.  Place on fluid restriction of 1.5 L. Cardiology has not been consulted. Continue to stress to patient importance of self efficacy and  on diet for CHF. Last BNP reviewed- and noted below No results for input(s): "BNP", "BNPTRIAGEBLO" in the last 168 hours.    Continue diuresis  Fluid and sodium restrict.  Continue diuresis    Diabetic neuropathy  Patient's FSGs are controlled on current medication regimen.  Last A1c reviewed-   Lab Results   Component Value Date    HGBA1C 6.9 (H) 05/14/2024     Most recent fingerstick glucose reviewed- No results for input(s): "POCTGLUCOSE" in the last 24 hours.  Current correctional scale  Medium  Maintain anti-hyperglycemic dose as follows-   Antihyperglycemics (From admission, onward)      Start     Stop Route Frequency Ordered    08/13/24 2150  insulin aspart U-100 injection 0-10 Units         -- SubQ Before meals & nightly PRN 08/13/24 2051          Hold Oral hypoglycemics while patient is in the hospital.    Depression  Patient has persistent depression which is mild and is currently controlled. Will Continue anti-depressant medications. We will not consult psychiatry at this time. Patient does not display psychosis at this time. Continue to monitor closely and adjust plan of care as needed.    Stable    Diabetic retinopathy  Patient's FSGs are uncontrolled due to hyperglycemia on current medication regimen.  Last A1c reviewed-   Lab Results   Component Value Date    HGBA1C 6.9 (H) 05/14/2024 " "    Most recent fingerstick glucose reviewed- No results for input(s): "POCTGLUCOSE" in the last 24 hours.  Current correctional scale  medium  Maintain anti-hyperglycemic dose as follows-   Antihyperglycemics (From admission, onward)      Start     Stop Route Frequency Ordered    08/13/24 2150  insulin aspart U-100 injection 0-10 Units         -- SubQ Before meals & nightly PRN 08/13/24 2051          Hold Oral hypoglycemics while patient is in the hospital.    End-stage renal failure with renal transplant  Creatine stable for now. BMP reviewed- noted Estimated Creatinine Clearance: 11.6 mL/min (A) (based on SCr of 3.72 mg/dL (H)). according to latest data. Based on current GFR, CKD stage is end stage.  Monitor UOP and serial BMP and adjust therapy as needed. Renally dose meds. Avoid nephrotoxic medications and procedures.  Patient with transplant now with KYLER.  Nephrology consulted.  8/15 renal function improved but not at baseline.    Normocytic anemia  - baseline Hgb 10.5 on 8/21/23, today 9.5  - 2/2 CKD stage 4 and/or WASHINGTON  - home ferrous tabs    Anemia is likely due to Iron deficiency and chronic disease due to Chronic Kidney Disease. Most recent hemoglobin and hematocrit are listed below.  Recent Labs     08/13/24  1650 08/14/24  0427 08/15/24  0235   HGB 9.5* 8.8* 8.8*   HCT 30.4* 27.1* 27.6*       Plan  - Monitor serial CBC: Daily  - Transfuse PRBC if patient becomes hemodynamically unstable, symptomatic or H/H drops below 7/21.  - Patient has not received any PRBC transfusions to date  - Patient's anemia is currently stable  8/14 follow no evidence of bleeding    Diabetes  Patient's FSGs are uncontrolled due to hyperglycemia on current medication regimen.  Last A1c reviewed-   Lab Results   Component Value Date    HGBA1C 6.9 (H) 05/14/2024     Most recent fingerstick glucose reviewed- No results for input(s): "POCTGLUCOSE" in the last 24 hours.  Current correctional scale  Low  Maintain anti-hyperglycemic " dose as follows-   Antihyperglycemics (From admission, onward)      Start     Stop Route Frequency Ordered    08/13/24 2150  insulin aspart U-100 injection 0-10 Units         -- SubQ Before meals & nightly PRN 08/13/24 2051          Hold Oral hypoglycemics while patient is in the hospital.    Peripheral arterial disease  Continue asa    Mixed hyperlipidemia  Home cholestyramine     Lab Results   Component Value Date    CHOL 117 02/15/2024    CHOL 95 08/21/2023    CHOL 101 04/13/2023     Lab Results   Component Value Date    HDL 53 02/15/2024    HDL 54 08/21/2023    HDL 47 04/13/2023     Lab Results   Component Value Date    LDLCALC 53 02/15/2024    LDLCALC 35 08/21/2023    LDLCALC 46 04/13/2023     Lab Results   Component Value Date    TRIG 54 02/15/2024    TRIG 29 (L) 08/21/2023    TRIG 39 04/13/2023       Lab Results   Component Value Date    CHOLHDL 2.2 02/15/2024    CHOLHDL 1.8 08/21/2023    CHOLHDL 2.1 04/13/2023          Acute renal failure superimposed on stage 4 chronic kidney disease  - baseline Cr 2.56 on 1/9/24, today 4.19  - likely 2/2 cardiorenal syndrome with HFpEF  - lasix 60 iv bid, monitor volume status  - holding home bumex  - nephrology Dr. Blount consulted, appreciate recommendations and assistance  - home sodium bicarbonate tabs  - renal diet    KYLER is likely due to  cardiorenal syndrome . Baseline creatinine is  2.56 . Most recent creatinine and eGFR are listed below.  Recent Labs     08/13/24  1650 08/14/24  0427 08/15/24  0235   CREATININE 4.19* 3.96* 3.72*   EGFRNORACEVR 11* 11* 12*        Plan  - KYLER is worsening. Will continue current treatment  - Avoid nephrotoxins and renally dose meds for GFR listed above  - Monitor urine output, serial BMP, and adjust therapy as needed    KYLER in setting of renal transplant, consult nephrology  8/15 renal function improving    Long-term use of immunosuppressant medication  Check tacrol level  Pending.  Continue steroid mycophenolate tacro    Essential  hypertension  Chronic, uncontrolled. Latest blood pressure and vitals reviewed-     Temp:  [97.8 °F (36.6 °C)-98.7 °F (37.1 °C)]   Pulse:  [69-80]   Resp:  [18-20]   BP: (129-205)/(44-67)   SpO2:  [98 %-100 %] .   Home meds for hypertension were reviewed and noted below.   Hypertension Medications               bumetanide (BUMEX) 2 MG tablet Take 1 tablet (2 mg total) by mouth once daily.    carvediloL (COREG) 25 MG tablet TAKE ONE TABLET BY MOUTH TWICE DAILY    cloNIDine 0.1 mg/24 hr td ptwk (CATAPRES) 0.1 mg/24 hr Place 1 patch onto the skin once a week.    hydrALAZINE (APRESOLINE) 100 MG tablet Take 100 mg by mouth 3 (three) times daily.    isosorbide mononitrate (IMDUR) 30 MG 24 hr tablet Take 30 mg by mouth once daily.    minoxidiL (LONITEN) 2.5 MG tablet Take 2.5 mg by mouth once daily.    nitroGLYCERIN (NITROSTAT) 0.4 MG SL tablet DISSOLVE 1 TABLET UNDER THE TONGUE EVERY 5 MINUTES AS NEEDED FOR CHEST PAIN. DO NOT EXCEED A TOTAL OF 3 DOSES IN 15 MINUTES.            While in the hospital, will manage blood pressure as follows; Adjust home antihypertensive regimen as follows- see below    Will utilize p.r.n. blood pressure medication only if patient's blood pressure greater than 180/110 and she develops symptoms such as worsening chest pain or shortness of breath.    Home imdur, minoxidil, clonidine patch, hydralazine  Halved Coreg from 50 to 25 bid d/t potential acute CHF exacerbation  Lasix 60 iv bid for cardiorenal syndrome, monitor volume status  Holding bumex  Adjust regimen as needed    History of kidney transplant  Home Cellcept 250mg bid, prednisone 5mg qd, tacrolimus 1.5mg qam  Continue  Consult Dr. Blount  8/15 Renal function improving.  UA look good.  Upc look good    Controlled type 2 diabetes mellitus with chronic kidney disease, with long-term current use of insulin  Patient's FSGs are uncontrolled due to hyperglycemia on current medication regimen.  Last A1c reviewed-   Lab Results   Component  "Value Date    HGBA1C 6.9 (H) 05/14/2024     Most recent fingerstick glucose reviewed- No results for input(s): "POCTGLUCOSE" in the last 24 hours.  Current correctional scale  Medium  Maintain anti-hyperglycemic dose as follows-   Antihyperglycemics (From admission, onward)      Start     Stop Route Frequency Ordered    08/13/24 2150  insulin aspart U-100 injection 0-10 Units         -- SubQ Before meals & nightly PRN 08/13/24 2051          Hold Oral hypoglycemics while patient is in the hospital.  Continue current regimen    Coronary artery disease involving native coronary artery of native heart without angina pectoris  Patient with known CAD s/p stent placement, which is controlled Will continue ASA and monitor for S/Sx of angina/ACS. Continue to monitor on telemetry.     Home ASA, not on statin d/t history of transaminitis  Home cholestyramine    Gastroesophageal reflux disease without esophagitis  Home protonix        VTE Risk Mitigation (From admission, onward)           Ordered     heparin (porcine) injection 5,000 Units  Every 8 hours         08/13/24 2045     IP VTE HIGH RISK PATIENT  Once         08/13/24 2045     Place sequential compression device  Until discontinued         08/13/24 2045                    Discharge Planning   ARIEL:      Code Status: Full Code   Is the patient medically ready for discharge?:     Reason for patient still in hospital (select all that apply): Treatment  Discharge Plan A: Home with family, Home Health        52 minutes spent on face to face patient interaction, discussion with family, ancillary staff, and/or other physicians as well as review of pertinent labs, images, and notes.             Anatoliy Banda DO  Department of Hospital Medicine   [facility]    "

## 2024-08-16 LAB
ANION GAP SERPL CALCULATED.3IONS-SCNC: 14 MMOL/L (ref 7–16)
BASOPHILS # BLD AUTO: 0.02 K/UL (ref 0–0.2)
BASOPHILS NFR BLD AUTO: 0.4 % (ref 0–1)
BUN SERPL-MCNC: 82 MG/DL (ref 7–18)
BUN/CREAT SERPL: 21 (ref 6–20)
CALCIUM SERPL-MCNC: 9.3 MG/DL (ref 8.5–10.1)
CHLORIDE SERPL-SCNC: 110 MMOL/L (ref 98–107)
CO2 SERPL-SCNC: 19 MMOL/L (ref 21–32)
CREAT SERPL-MCNC: 3.88 MG/DL (ref 0.55–1.02)
DIFFERENTIAL METHOD BLD: ABNORMAL
EGFR (NO RACE VARIABLE) (RUSH/TITUS): 12 ML/MIN/1.73M2
EOSINOPHIL # BLD AUTO: 0.06 K/UL (ref 0–0.5)
EOSINOPHIL NFR BLD AUTO: 1.1 % (ref 1–4)
ERYTHROCYTE [DISTWIDTH] IN BLOOD BY AUTOMATED COUNT: 15.5 % (ref 11.5–14.5)
GLUCOSE SERPL-MCNC: 202 MG/DL (ref 70–105)
GLUCOSE SERPL-MCNC: 202 MG/DL (ref 70–105)
GLUCOSE SERPL-MCNC: 214 MG/DL (ref 74–106)
GLUCOSE SERPL-MCNC: 226 MG/DL (ref 70–105)
GLUCOSE SERPL-MCNC: 232 MG/DL (ref 70–105)
HCT VFR BLD AUTO: 27 % (ref 38–47)
HGB BLD-MCNC: 8.5 G/DL (ref 12–16)
IMM GRANULOCYTES # BLD AUTO: 0.03 K/UL (ref 0–0.04)
IMM GRANULOCYTES NFR BLD: 0.5 % (ref 0–0.4)
LYMPHOCYTES # BLD AUTO: 0.77 K/UL (ref 1–4.8)
LYMPHOCYTES NFR BLD AUTO: 13.9 % (ref 27–41)
MCH RBC QN AUTO: 25.8 PG (ref 27–31)
MCHC RBC AUTO-ENTMCNC: 31.5 G/DL (ref 32–36)
MCV RBC AUTO: 82.1 FL (ref 80–96)
MONOCYTES # BLD AUTO: 0.85 K/UL (ref 0–0.8)
MONOCYTES NFR BLD AUTO: 15.4 % (ref 2–6)
MPC BLD CALC-MCNC: 12.2 FL (ref 9.4–12.4)
NEUTROPHILS # BLD AUTO: 3.79 K/UL (ref 1.8–7.7)
NEUTROPHILS NFR BLD AUTO: 68.7 % (ref 53–65)
NRBC # BLD AUTO: 0 X10E3/UL
NRBC, AUTO (.00): 0 %
PLATELET # BLD AUTO: 136 K/UL (ref 150–400)
POTASSIUM SERPL-SCNC: 3.9 MMOL/L (ref 3.5–5.1)
RBC # BLD AUTO: 3.29 M/UL (ref 4.2–5.4)
SODIUM SERPL-SCNC: 139 MMOL/L (ref 136–145)
WBC # BLD AUTO: 5.52 K/UL (ref 4.5–11)

## 2024-08-16 PROCEDURE — 94761 N-INVAS EAR/PLS OXIMETRY MLT: CPT

## 2024-08-16 PROCEDURE — 85025 COMPLETE CBC W/AUTO DIFF WBC: CPT

## 2024-08-16 PROCEDURE — 25000003 PHARM REV CODE 250

## 2024-08-16 PROCEDURE — 36415 COLL VENOUS BLD VENIPUNCTURE: CPT

## 2024-08-16 PROCEDURE — 82962 GLUCOSE BLOOD TEST: CPT

## 2024-08-16 PROCEDURE — 99233 SBSQ HOSP IP/OBS HIGH 50: CPT | Mod: ,,, | Performed by: STUDENT IN AN ORGANIZED HEALTH CARE EDUCATION/TRAINING PROGRAM

## 2024-08-16 PROCEDURE — 25000003 PHARM REV CODE 250: Performed by: HOSPITALIST

## 2024-08-16 PROCEDURE — 25000003 PHARM REV CODE 250: Performed by: STUDENT IN AN ORGANIZED HEALTH CARE EDUCATION/TRAINING PROGRAM

## 2024-08-16 PROCEDURE — 80048 BASIC METABOLIC PNL TOTAL CA: CPT

## 2024-08-16 PROCEDURE — 11000001 HC ACUTE MED/SURG PRIVATE ROOM

## 2024-08-16 PROCEDURE — 63600175 PHARM REV CODE 636 W HCPCS: Performed by: INTERNAL MEDICINE

## 2024-08-16 PROCEDURE — 63600175 PHARM REV CODE 636 W HCPCS

## 2024-08-16 RX ORDER — GUAIFENESIN 100 MG/5ML
200 SOLUTION ORAL EVERY 4 HOURS PRN
Status: DISCONTINUED | OUTPATIENT
Start: 2024-08-16 | End: 2024-08-17 | Stop reason: HOSPADM

## 2024-08-16 RX ORDER — BUMETANIDE 1 MG/1
2 TABLET ORAL DAILY
Status: DISCONTINUED | OUTPATIENT
Start: 2024-08-17 | End: 2024-08-17 | Stop reason: HOSPADM

## 2024-08-16 RX ADMIN — CHOLESTYRAMINE LIGHT 4 GRAMS OF ANHYDROUS CHOLESTYRAMINE: 4 POWDER, FOR SUSPENSION ORAL at 08:08

## 2024-08-16 RX ADMIN — ISOSORBIDE MONONITRATE 30 MG: 30 TABLET, EXTENDED RELEASE ORAL at 08:08

## 2024-08-16 RX ADMIN — INSULIN ASPART 2 UNITS: 100 INJECTION, SOLUTION INTRAVENOUS; SUBCUTANEOUS at 08:08

## 2024-08-16 RX ADMIN — Medication 1 CAPSULE: at 12:08

## 2024-08-16 RX ADMIN — CARVEDILOL 25 MG: 25 TABLET, FILM COATED ORAL at 08:08

## 2024-08-16 RX ADMIN — FERROUS SULFATE TAB 325 MG (65 MG ELEMENTAL FE) 1 EACH: 325 (65 FE) TAB at 08:08

## 2024-08-16 RX ADMIN — PANTOPRAZOLE SODIUM 40 MG: 40 TABLET, DELAYED RELEASE ORAL at 08:08

## 2024-08-16 RX ADMIN — HEPARIN SODIUM 5000 UNITS: 5000 INJECTION, SOLUTION INTRAVENOUS; SUBCUTANEOUS at 06:08

## 2024-08-16 RX ADMIN — MYCOPHENOLATE MOFETIL 250 MG: 250 CAPSULE ORAL at 08:08

## 2024-08-16 RX ADMIN — SODIUM BICARBONATE 650 MG: 650 TABLET ORAL at 08:08

## 2024-08-16 RX ADMIN — PREDNISONE 5 MG: 5 TABLET ORAL at 08:08

## 2024-08-16 RX ADMIN — INSULIN ASPART 4 UNITS: 100 INJECTION, SOLUTION INTRAVENOUS; SUBCUTANEOUS at 08:08

## 2024-08-16 RX ADMIN — GUAIFENESIN 200 MG: 200 SOLUTION ORAL at 08:08

## 2024-08-16 RX ADMIN — HEPARIN SODIUM 5000 UNITS: 5000 INJECTION, SOLUTION INTRAVENOUS; SUBCUTANEOUS at 03:08

## 2024-08-16 RX ADMIN — Medication 1 CAPSULE: at 08:08

## 2024-08-16 RX ADMIN — TACROLIMUS 1.5 MG: 1 CAPSULE ORAL at 05:08

## 2024-08-16 RX ADMIN — HEPARIN SODIUM 5000 UNITS: 5000 INJECTION, SOLUTION INTRAVENOUS; SUBCUTANEOUS at 10:08

## 2024-08-16 RX ADMIN — HYDRALAZINE HYDROCHLORIDE 100 MG: 100 TABLET ORAL at 08:08

## 2024-08-16 RX ADMIN — INSULIN ASPART 4 UNITS: 100 INJECTION, SOLUTION INTRAVENOUS; SUBCUTANEOUS at 05:08

## 2024-08-16 RX ADMIN — INSULIN ASPART 4 UNITS: 100 INJECTION, SOLUTION INTRAVENOUS; SUBCUTANEOUS at 12:08

## 2024-08-16 RX ADMIN — THERA TABS 1 TABLET: TAB at 08:08

## 2024-08-16 RX ADMIN — MINOXIDIL 2.5 MG: 2.5 TABLET ORAL at 08:08

## 2024-08-16 RX ADMIN — HYDRALAZINE HYDROCHLORIDE 100 MG: 100 TABLET ORAL at 03:08

## 2024-08-16 RX ADMIN — MEGESTROL ACETATE 20 MG: 20 TABLET ORAL at 08:08

## 2024-08-16 RX ADMIN — TACROLIMUS 1.5 MG: 1 CAPSULE ORAL at 08:08

## 2024-08-16 RX ADMIN — ASPIRIN 81 MG: 81 TABLET, COATED ORAL at 08:08

## 2024-08-16 RX ADMIN — NIFEDIPINE 90 MG: 60 TABLET, FILM COATED, EXTENDED RELEASE ORAL at 08:08

## 2024-08-16 RX ADMIN — Medication 1 CAPSULE: at 05:08

## 2024-08-16 NOTE — ASSESSMENT & PLAN NOTE
"KYLER on CKDS4 likely 2/2 cardiorenal syndrome  Lasix 60 iv bid, monitor volume status and decrease as symptoms improve  Last echo EF 65% on 12/4/2023, repeat echo pending  Home Coreg halved from 50 to 25mg bid, imdur  Holding home bumex   Tele monitor    Patient is identified as having Diastolic (HFpEF) heart failure that is Acute on chronic. CHF is currently uncontrolled due to Continued edema of extremities and >3 pillow orthopnea. Latest ECHO performed and demonstrates- No results found for this or any previous visit.  . Continue Beta Blocker, Furosemide, and Nitrate/Vasodilator and monitor clinical status closely. Monitor on telemetry. Patient is off CHF pathway.  Monitor strict Is&Os and daily weights.  Place on fluid restriction of 1.5 L. Cardiology has not been consulted. Continue to stress to patient importance of self efficacy and  on diet for CHF. Last BNP reviewed- and noted below No results for input(s): "BNP", "BNPTRIAGEBLO" in the last 168 hours.    Continue diuresis  Fluid and sodium restrict.  Continue diuresis  Volume status looks good.  Transitioned to oral diuretic  "

## 2024-08-16 NOTE — ASSESSMENT & PLAN NOTE
- baseline Hgb 10.5 on 8/21/23, today 9.5  - 2/2 CKD stage 4 and/or WASHINGTON  - home ferrous tabs    Anemia is likely due to Iron deficiency and chronic disease due to Chronic Kidney Disease. Most recent hemoglobin and hematocrit are listed below.  Recent Labs     08/14/24  0427 08/15/24  0235 08/16/24  0449   HGB 8.8* 8.8* 8.5*   HCT 27.1* 27.6* 27.0*       Plan  - Monitor serial CBC: Daily  - Transfuse PRBC if patient becomes hemodynamically unstable, symptomatic or H/H drops below 7/21.  - Patient has not received any PRBC transfusions to date  - Patient's anemia is currently stable  8/14 follow no evidence of bleeding

## 2024-08-16 NOTE — PROGRESS NOTES
[facility]  [facility] Medicine  Progress Note    Patient Name: Nasrin Grant  MRN: [id number]  Patient Class: IP- Inpatient   Admission Date: 8/13/2024  Length of Stay: 3 days  Attending Physician: Anatoliy Banda DO  Primary Care Provider: Laury Enrique FNP        Subjective:     Principal Problem:Acute on chronic heart failure with preserved ejection fraction        HPI:  75yo female with a PMH of CKD s/p renal transplant with CMV in 2016 followed Dr. Blount (last seen 7/6/24), HFpEF (EF 65% on last Echo 12/4/2023), CAD s/p 1 stent, CHF, HTN, HLD, DM2 s/p toe amputation 2 on right foot, 3 on left foot, diabetic retinopathy, CVA, depression, osteoporosis who presents to [facility] ED via EMS from [facility] with SOB and leg swelling. SOB and bilateral leg swelling started at least 1 month ago and gradually worsened especially in the past week. Endorses OSORIO, orthopnea, PND, nocturnal coughs. Endorses chest and abdominal tightness from swelling. Denies f/c/cp/n/v/d, denies appetite, urinary, or bowel habit changes.    Upon presentation, VS remarkable for 172/61. Labs remarkable for H/H 9.5/30.4 (baseline H/H 10.5/33.9 on 8/21/23), MCV 81.9, RDW 15.5, plt 117, CO2 20, BUN/Cr 83/4.19, GFR 11 (baseline Cr 2.56, GFR 19 on 1/9/24), glucose 209. PTT 22.5, PT 15.5, INR 1.24. Troponin wnl 45.3, BNP 16888, Mg 1.8. EKG SR 82 without ischemic changes compared to previous. CXR shows increased bilateral lung density suggesting mild cardiac decompensation and or pneumonitis. Patient is admitted to hospital medicine for further management and care.        Overview/Hospital Course:  8/14- feels better today  8/15 feels better today.  Renal function continues to improve.  Continue IV diuresis  8/16 creatinine trended up.  Need to see trend down before discharge.    Interval History: naeo    Review of Systems  Objective:     Vital Signs (Most Recent):  Temp: 98.8 °F (37.1 °C) (08/16/24 1201)  Pulse: 73 (08/16/24  1201)  Resp: 20 (08/16/24 1201)  BP: (!) 156/63 (08/16/24 1201)  SpO2: 98 % (08/16/24 1201) Vital Signs (24h Range):  Temp:  [98 °F (36.7 °C)-99.5 °F (37.5 °C)] 98.8 °F (37.1 °C)  Pulse:  [72-80] 73  Resp:  [18-20] 20  SpO2:  [98 %] 98 %  BP: (139-156)/(55-68) 156/63     Weight: 64 kg (141 lb 1.5 oz)  Body mass index is 24.22 kg/m².    Intake/Output Summary (Last 24 hours) at 8/16/2024 1346  Last data filed at 8/16/2024 1031  Gross per 24 hour   Intake 120 ml   Output --   Net 120 ml         Physical Exam  Vitals and nursing note reviewed.   HENT:      Head: Normocephalic and atraumatic.      Right Ear: External ear normal.      Left Ear: External ear normal.      Nose: Nose normal.      Mouth/Throat:      Mouth: Mucous membranes are dry.   Eyes:      Pupils: Pupils are equal, round, and reactive to light.   Cardiovascular:      Rate and Rhythm: Normal rate and regular rhythm.      Pulses: Normal pulses.      Heart sounds: Normal heart sounds.   Pulmonary:      Effort: Pulmonary effort is normal.      Breath sounds: Normal breath sounds.   Abdominal:      General: Bowel sounds are normal.      Palpations: Abdomen is soft.   Musculoskeletal:         General: Swelling present.      Cervical back: Neck supple.      Right lower leg: Edema present.      Left lower leg: Edema present.   Skin:     General: Skin is warm and dry.   Neurological:      Mental Status: She is alert.   Psychiatric:         Mood and Affect: Mood normal.         Behavior: Behavior normal.             Significant Labs: All pertinent labs within the past 24 hours have been reviewed.    Significant Imaging: I have reviewed all pertinent imaging results/findings within the past 24 hours.    Assessment/Plan:      * Acute on chronic heart failure with preserved ejection fraction  KYLER on CKDS4 likely 2/2 cardiorenal syndrome  Lasix 60 iv bid, monitor volume status and decrease as symptoms improve  Last echo EF 65% on 12/4/2023, repeat echo pending  Home  "Coreg halved from 50 to 25mg bid, imdur  Holding home bumex   Tele monitor    Patient is identified as having Diastolic (HFpEF) heart failure that is Acute on chronic. CHF is currently uncontrolled due to Continued edema of extremities and >3 pillow orthopnea. Latest ECHO performed and demonstrates- No results found for this or any previous visit.  . Continue Beta Blocker, Furosemide, and Nitrate/Vasodilator and monitor clinical status closely. Monitor on telemetry. Patient is off CHF pathway.  Monitor strict Is&Os and daily weights.  Place on fluid restriction of 1.5 L. Cardiology has not been consulted. Continue to stress to patient importance of self efficacy and  on diet for CHF. Last BNP reviewed- and noted below No results for input(s): "BNP", "BNPTRIAGEBLO" in the last 168 hours.    Continue diuresis  Fluid and sodium restrict.  Continue diuresis  Volume status looks good.  Transitioned to oral diuretic    Diabetic neuropathy  Patient's FSGs are controlled on current medication regimen.  Last A1c reviewed-   Lab Results   Component Value Date    HGBA1C 6.9 (H) 05/14/2024     Most recent fingerstick glucose reviewed- No results for input(s): "POCTGLUCOSE" in the last 24 hours.  Current correctional scale  Medium  Maintain anti-hyperglycemic dose as follows-   Antihyperglycemics (From admission, onward)      Start     Stop Route Frequency Ordered    08/13/24 2150  insulin aspart U-100 injection 0-10 Units         -- SubQ Before meals & nightly PRN 08/13/24 2051          Hold Oral hypoglycemics while patient is in the hospital.    Depression  Patient has persistent depression which is mild and is currently controlled. Will Continue anti-depressant medications. We will not consult psychiatry at this time. Patient does not display psychosis at this time. Continue to monitor closely and adjust plan of care as needed.    Stable    Diabetic retinopathy  Patient's FSGs are uncontrolled due to hyperglycemia on " "current medication regimen.  Last A1c reviewed-   Lab Results   Component Value Date    HGBA1C 6.9 (H) 05/14/2024     Most recent fingerstick glucose reviewed- No results for input(s): "POCTGLUCOSE" in the last 24 hours.  Current correctional scale  medium  Maintain anti-hyperglycemic dose as follows-   Antihyperglycemics (From admission, onward)      Start     Stop Route Frequency Ordered    08/13/24 2150  insulin aspart U-100 injection 0-10 Units         -- SubQ Before meals & nightly PRN 08/13/24 2051          Hold Oral hypoglycemics while patient is in the hospital.    End-stage renal failure with renal transplant  Creatine stable for now. BMP reviewed- noted Estimated Creatinine Clearance: 11.2 mL/min (A) (based on SCr of 3.88 mg/dL (H)). according to latest data. Based on current GFR, CKD stage is end stage.  Monitor UOP and serial BMP and adjust therapy as needed. Renally dose meds. Avoid nephrotoxic medications and procedures.  Patient with transplant now with KYLER.  Nephrology consulted.  8/15 renal function improved but not at baseline.  Renal function not improving today.  Awaiting Nephrology input.  Stop IV diuresis.  Start oral Bumex    Normocytic anemia  - baseline Hgb 10.5 on 8/21/23, today 9.5  - 2/2 CKD stage 4 and/or WASHINGTON  - home ferrous tabs    Anemia is likely due to Iron deficiency and chronic disease due to Chronic Kidney Disease. Most recent hemoglobin and hematocrit are listed below.  Recent Labs     08/14/24  0427 08/15/24  0235 08/16/24  0449   HGB 8.8* 8.8* 8.5*   HCT 27.1* 27.6* 27.0*       Plan  - Monitor serial CBC: Daily  - Transfuse PRBC if patient becomes hemodynamically unstable, symptomatic or H/H drops below 7/21.  - Patient has not received any PRBC transfusions to date  - Patient's anemia is currently stable  8/14 follow no evidence of bleeding    Diabetes  Patient's FSGs are uncontrolled due to hyperglycemia on current medication regimen.  Last A1c reviewed-   Lab Results " "  Component Value Date    HGBA1C 6.9 (H) 05/14/2024     Most recent fingerstick glucose reviewed- No results for input(s): "POCTGLUCOSE" in the last 24 hours.  Current correctional scale  Low  Maintain anti-hyperglycemic dose as follows-   Antihyperglycemics (From admission, onward)      Start     Stop Route Frequency Ordered    08/13/24 2150  insulin aspart U-100 injection 0-10 Units         -- SubQ Before meals & nightly PRN 08/13/24 2051          Hold Oral hypoglycemics while patient is in the hospital.    Peripheral arterial disease  Continue asa  8/16 no leg pain    Mixed hyperlipidemia  Home cholestyramine     Lab Results   Component Value Date    CHOL 117 02/15/2024    CHOL 95 08/21/2023    CHOL 101 04/13/2023     Lab Results   Component Value Date    HDL 53 02/15/2024    HDL 54 08/21/2023    HDL 47 04/13/2023     Lab Results   Component Value Date    LDLCALC 53 02/15/2024    LDLCALC 35 08/21/2023    LDLCALC 46 04/13/2023     Lab Results   Component Value Date    TRIG 54 02/15/2024    TRIG 29 (L) 08/21/2023    TRIG 39 04/13/2023       Lab Results   Component Value Date    CHOLHDL 2.2 02/15/2024    CHOLHDL 1.8 08/21/2023    CHOLHDL 2.1 04/13/2023          Acute renal failure superimposed on stage 4 chronic kidney disease  - baseline Cr 2.56 on 1/9/24, today 4.19  - likely 2/2 cardiorenal syndrome with HFpEF  - lasix 60 iv bid, monitor volume status  - holding home bumex  - nephrology Dr. Blount consulted, appreciate recommendations and assistance  - home sodium bicarbonate tabs  - renal diet    KYLER is likely due to  cardiorenal syndrome . Baseline creatinine is  2.56 . Most recent creatinine and eGFR are listed below.  Recent Labs     08/14/24  0427 08/15/24  0235 08/16/24  0449   CREATININE 3.96* 3.72* 3.88*   EGFRNORACEVR 11* 12* 12*        Plan  - KYLER is worsening. Will continue current treatment  - Avoid nephrotoxins and renally dose meds for GFR listed above  - Monitor urine output, serial BMP, and adjust " therapy as needed    KYLER in setting of renal transplant, consult nephrology  8/15 renal function improving  8/16 creatinine not improving    Long-term use of immunosuppressant medication  Check tacrol level  Pending.  Continue steroid mycophenolate tacro    Essential hypertension  Chronic, uncontrolled. Latest blood pressure and vitals reviewed-     Temp:  [98 °F (36.7 °C)-99.5 °F (37.5 °C)]   Pulse:  [72-80]   Resp:  [18-20]   BP: (139-156)/(55-68)   SpO2:  [98 %] .   Home meds for hypertension were reviewed and noted below.   Hypertension Medications               bumetanide (BUMEX) 2 MG tablet Take 1 tablet (2 mg total) by mouth once daily.    carvediloL (COREG) 25 MG tablet TAKE ONE TABLET BY MOUTH TWICE DAILY    cloNIDine 0.1 mg/24 hr td ptwk (CATAPRES) 0.1 mg/24 hr Place 1 patch onto the skin once a week.    hydrALAZINE (APRESOLINE) 100 MG tablet Take 100 mg by mouth 3 (three) times daily.    isosorbide mononitrate (IMDUR) 30 MG 24 hr tablet Take 30 mg by mouth once daily.    minoxidiL (LONITEN) 2.5 MG tablet Take 2.5 mg by mouth once daily.    nitroGLYCERIN (NITROSTAT) 0.4 MG SL tablet DISSOLVE 1 TABLET UNDER THE TONGUE EVERY 5 MINUTES AS NEEDED FOR CHEST PAIN. DO NOT EXCEED A TOTAL OF 3 DOSES IN 15 MINUTES.            While in the hospital, will manage blood pressure as follows; Adjust home antihypertensive regimen as follows- see below    Will utilize p.r.n. blood pressure medication only if patient's blood pressure greater than 180/110 and she develops symptoms such as worsening chest pain or shortness of breath.    Home imdur, minoxidil, clonidine patch, hydralazine  Halved Coreg from 50 to 25 bid d/t potential acute CHF exacerbation  Lasix 60 iv bid for cardiorenal syndrome, monitor volume status  Holding bumex  Adjust regimen as needed    History of kidney transplant  Home Cellcept 250mg bid, prednisone 5mg qd, tacrolimus 1.5mg qam  Continue  Consult Dr. Blount  8/15 Renal function improving.  UA look  "good.  Up look good  8/16 creatinine not improving    Controlled type 2 diabetes mellitus with chronic kidney disease, with long-term current use of insulin  Patient's FSGs are uncontrolled due to hyperglycemia on current medication regimen.  Last A1c reviewed-   Lab Results   Component Value Date    HGBA1C 6.9 (H) 05/14/2024     Most recent fingerstick glucose reviewed- No results for input(s): "POCTGLUCOSE" in the last 24 hours.  Current correctional scale  Medium  Maintain anti-hyperglycemic dose as follows-   Antihyperglycemics (From admission, onward)      Start     Stop Route Frequency Ordered    08/13/24 2150  insulin aspart U-100 injection 0-10 Units         -- SubQ Before meals & nightly PRN 08/13/24 2051          Hold Oral hypoglycemics while patient is in the hospital.  Continue current regimen    Coronary artery disease involving native coronary artery of native heart without angina pectoris  Patient with known CAD s/p stent placement, which is controlled Will continue ASA and monitor for S/Sx of angina/ACS. Continue to monitor on telemetry.     Home ASA, not on statin d/t history of transaminitis  Home cholestyramine    Gastroesophageal reflux disease without esophagitis  Home protonix        VTE Risk Mitigation (From admission, onward)           Ordered     heparin (porcine) injection 5,000 Units  Every 8 hours         08/13/24 2045     IP VTE HIGH RISK PATIENT  Once         08/13/24 2045     Place sequential compression device  Until discontinued         08/13/24 2045                    Discharge Planning   ARIEL:      Code Status: Full Code   Is the patient medically ready for discharge?:     Reason for patient still in hospital (select all that apply): Treatment  Discharge Plan A: Home with family, Home Health        51 minutes spent on face to face patient interaction, discussion with family, ancillary staff, and/or other physicians as well as review of pertinent labs, images, and notes. "             Anatoliy Banda DO  Department of Hospital Medicine   [facility]

## 2024-08-16 NOTE — CONSULTS
[facility]  Nephrology  Consult Note    Patient Name: Nasrin Grant  MRN: [id number]  Admission Date: 8/13/2024  Hospital Length of Stay: 3 days  Attending Provider: Anatoliy Banda DO   Primary Care Physician: Laury Enrique FNP  Principal Problem:Acute on chronic heart failure with preserved ejection fraction    Consults  Subjective:     HPI:  Ms. Grant is a pleasant 74-year-old lady who underwent kidney transplantation in 2016 after being on dialysis awhile.  She presents now with some swelling and shortness of breath.  She is improved with diuresis.    She was taking CellCept and tacrolimus twice daily at home.      She is generally breathing better with diuresis but still has edema of her lower extremities and some facial edema as well.  Creatinine was 2.9 last month in Dr. Blount's  office.  Past Medical History:   Diagnosis Date    Amputation of one or more toes     2 TOES RT FOOT, 3 TOES LFT FOOT    Atherosclerotic heart disease of native coronary artery with angina pectoris 01/20/2020    CVA (cerebral vascular accident)     Depression     Diabetes mellitus, type 2     Disorder of kidney and ureter     History of carpal tunnel surgery of left wrist     History of carpal tunnel surgery of right wrist     History of repair of left rotator cuff     History of repair of right rotator cuff     Hyperlipidemia     Hypertension     Hypokalemia     Kidney transplant status 07/13/2020    Osteoporosis 07/22/2020    Proliferative diabetic retinopathy of both eyes 09/07/2023    Stroke        Past Surgical History:   Procedure Laterality Date    Appendix      EXTRACTION OF TOOTH Left 08/11/2021    HYSTERECTOMY      kidney transplant 2016      Have had issues since transplant, kidney had a virus per patient    OOPHORECTOMY      TOE AMPUTATION         Review of patient's allergies indicates:   Allergen Reactions    Hydrocodone-acetaminophen Rash    Gabapentin Other (See Comments)     Made her  "disoriented  "out of my head"      Morphine Itching    Opioids - morphine analogues      Current Facility-Administered Medications   Medication Frequency    acetaminophen tablet 650 mg Q8H PRN    acetaminophen tablet 650 mg Q4H PRN    albuterol nebulizer solution 2.5 mg Q6H PRN    aspirin EC tablet 81 mg Daily    [START ON 8/17/2024] bumetanide tablet 2 mg Daily    carvediloL tablet 25 mg BID    cholestyramine-aspartame 4 grams of anhydrous packet 4 grams of anhydrous cholestyramine BID    [START ON 8/19/2024] cloNIDine 0.1 mg/24 hr td ptwk 1 patch Weekly    dextrose 10% bolus 125 mL 125 mL PRN    dextrose 10% bolus 250 mL 250 mL PRN    docusate sodium capsule 100 mg BID PRN    ferrous sulfate tablet 1 each BID    fluticasone propionate 50 mcg/actuation nasal spray 100 mcg Daily PRN    glucagon (human recombinant) injection 1 mg PRN    glucose chewable tablet 16 g PRN    glucose chewable tablet 24 g PRN    heparin (porcine) injection 5,000 Units Q8H    hydrALAZINE injection 10 mg Q6H PRN    hydrALAZINE tablet 100 mg TID    insulin aspart U-100 injection 0-10 Units QID (AC + HS) PRN    isosorbide mononitrate 24 hr tablet 30 mg Daily    Lactobacillus acidophilus capsule 1 capsule TID WM    megestroL tablet 20 mg Daily    melatonin tablet 6 mg Nightly PRN    minoxidiL tablet 2.5 mg Daily    multivitamin tablet Daily    mycophenolate capsule 250 mg BID    naloxone 0.4 mg/mL injection 0.02 mg PRN    NIFEdipine 24 hr tablet 90 mg Daily    ondansetron injection 4 mg Q8H PRN    pantoprazole EC tablet 40 mg QAM    polyethylene glycol packet 17 g Daily PRN    predniSONE tablet 5 mg Daily    sodium bicarbonate tablet 650 mg BID    sodium chloride 0.9% flush 10 mL Q12H PRN    tacrolimus capsule 1.5 mg Daily AM     Family History       Problem Relation (Age of Onset)    Diabetes Mother, Father    Hearing loss Mother, Father    Heart disease Mother, Father    Hyperlipidemia Mother, Father          Tobacco Use    Smoking status: " Never     Passive exposure: Never    Smokeless tobacco: Never   Substance and Sexual Activity    Alcohol use: Never    Drug use: Never    Sexual activity: Not Currently     Partners: Male     Review of Systems  Objective:     Vital Signs (Most Recent):  Temp: 98.8 °F (37.1 °C) (08/16/24 1201)  Pulse: 73 (08/16/24 1201)  Resp: 20 (08/16/24 1201)  BP: (!) 156/63 (08/16/24 1201)  SpO2: 98 % (08/16/24 1201) Vital Signs (24h Range):  Temp:  [98 °F (36.7 °C)-99.5 °F (37.5 °C)] 98.8 °F (37.1 °C)  Pulse:  [72-80] 73  Resp:  [18-20] 20  SpO2:  [98 %] 98 %  BP: (139-156)/(55-68) 156/63     Weight: 64 kg (141 lb 1.5 oz) (08/14/24 1005)  Body mass index is 24.22 kg/m².  Body surface area is 1.7 meters squared.    I/O last 3 completed shifts:  In: 240 [P.O.:240]  Out: -     Physical Exam blood pressure is 150/60.  Chest is clear.  Heart without rub or gallop.  There is 2+ edema both lower legs.  She is not short of breath lying in bed at present.    Significant Labs:  BMP:   Recent Labs   Lab 08/13/24  1650 08/14/24  0427 08/16/24  0449   *   < > 214*      < > 139   K 4.1   < > 3.9      < > 110*   CO2 20*   < > 19*   BUN 83*   < > 82*   CREATININE 4.19*   < > 3.88*   CALCIUM 8.9   < > 9.3   MG 1.8  --   --     < > = values in this interval not displayed.     CBC:   Recent Labs   Lab 08/16/24  0449   WBC 5.52   RBC 3.29*   HGB 8.5*   HCT 27.0*   *   MCV 82.1   MCH 25.8*   MCHC 31.5*     All labs within the past 24 hours have been reviewed.    Significant Imaging:  Labs: Reviewed    Assessment/Plan:     Active Diagnoses:    Diagnosis Date Noted POA    PRINCIPAL PROBLEM:  Acute on chronic heart failure with preserved ejection fraction [I50.33] 11/11/2022 Yes    Normocytic anemia [D64.9] 08/14/2024 Yes    End-stage renal failure with renal transplant [T86.19, N18.6] 08/14/2024 Yes    Diabetic retinopathy [E11.319] 08/14/2024 Yes    Depression [F32.A] 08/14/2024 Yes    Diabetic neuropathy [E11.40] 08/14/2024  Yes    Diabetes [E11.9] 05/20/2024 Yes    Peripheral arterial disease [I73.9] 05/14/2024 Yes    Mixed hyperlipidemia [E78.2] 04/13/2023 Yes    Acute renal failure superimposed on stage 4 chronic kidney disease [N17.9, N18.4] 11/02/2022 Yes    Long-term use of immunosuppressant medication [Z79.60] 11/01/2022 Not Applicable    Coronary artery disease involving native coronary artery of native heart without angina pectoris [I25.10] 04/17/2017 Yes    Gastroesophageal reflux disease without esophagitis [K21.9] 03/29/2016 Yes    History of kidney transplant [Z94.0] 03/25/2016 Not Applicable    Controlled type 2 diabetes mellitus with chronic kidney disease, with long-term current use of insulin [E11.22, Z79.4] 03/24/2016 Not Applicable    Essential hypertension [I10] 03/24/2016 Yes      Problems Resolved During this Admission:    Diagnosis Date Noted Date Resolved POA    Chronic kidney disease (CKD), stage IV (severe) [N18.4] 01/09/2024 08/14/2024 Yes    Congestive heart failure [I50.9] 10/19/2017 08/14/2024 Yes       I believe we can continue her current dose of diuretics and she says she is diuresing well and feeling better.  We will continue with daily weight to document the weight loss.    We will increase her tacrolimus and CellCept to b.i.d. as she was taking at home.    Thank you for your consult. I will follow-up with patient. Please contact us if you have any additional questions.    Rg Flowers MD  Nephrology  [facility]

## 2024-08-16 NOTE — ASSESSMENT & PLAN NOTE
Home Cellcept 250mg bid, prednisone 5mg qd, tacrolimus 1.5mg qam  Continue  Consult Dr. Blount  8/15 Renal function improving.  UA look good.  Upc look good  8/16 creatinine not improving

## 2024-08-16 NOTE — ASSESSMENT & PLAN NOTE
Patient with known CAD s/p stent placement, which is controlled Will continue ASA and monitor for S/Sx of angina/ACS. Continue to monitor on telemetry.     Home ASA, not on statin d/t history of transaminitis  Home cholestyramine   ANXIETY/PARANOIA

## 2024-08-16 NOTE — ASSESSMENT & PLAN NOTE
Creatine stable for now. BMP reviewed- noted Estimated Creatinine Clearance: 11.2 mL/min (A) (based on SCr of 3.88 mg/dL (H)). according to latest data. Based on current GFR, CKD stage is end stage.  Monitor UOP and serial BMP and adjust therapy as needed. Renally dose meds. Avoid nephrotoxic medications and procedures.  Patient with transplant now with KYLER.  Nephrology consulted.  8/15 renal function improved but not at baseline.  Renal function not improving today.  Awaiting Nephrology input.  Stop IV diuresis.  Start oral Bumex

## 2024-08-16 NOTE — ASSESSMENT & PLAN NOTE
- baseline Cr 2.56 on 1/9/24, today 4.19  - likely 2/2 cardiorenal syndrome with HFpEF  - lasix 60 iv bid, monitor volume status  - holding home bumex  - nephrology Dr. Blount consulted, appreciate recommendations and assistance  - home sodium bicarbonate tabs  - renal diet    KYLER is likely due to  cardiorenal syndrome . Baseline creatinine is  2.56 . Most recent creatinine and eGFR are listed below.  Recent Labs     08/14/24  0427 08/15/24  0235 08/16/24  0449   CREATININE 3.96* 3.72* 3.88*   EGFRNORACEVR 11* 12* 12*        Plan  - KYLER is worsening. Will continue current treatment  - Avoid nephrotoxins and renally dose meds for GFR listed above  - Monitor urine output, serial BMP, and adjust therapy as needed    KYLER in setting of renal transplant, consult nephrology  8/15 renal function improving  8/16 creatinine not improving

## 2024-08-16 NOTE — ASSESSMENT & PLAN NOTE
Chronic, uncontrolled. Latest blood pressure and vitals reviewed-     Temp:  [98 °F (36.7 °C)-99.5 °F (37.5 °C)]   Pulse:  [72-80]   Resp:  [18-20]   BP: (139-156)/(55-68)   SpO2:  [98 %] .   Home meds for hypertension were reviewed and noted below.   Hypertension Medications               bumetanide (BUMEX) 2 MG tablet Take 1 tablet (2 mg total) by mouth once daily.    carvediloL (COREG) 25 MG tablet TAKE ONE TABLET BY MOUTH TWICE DAILY    cloNIDine 0.1 mg/24 hr td ptwk (CATAPRES) 0.1 mg/24 hr Place 1 patch onto the skin once a week.    hydrALAZINE (APRESOLINE) 100 MG tablet Take 100 mg by mouth 3 (three) times daily.    isosorbide mononitrate (IMDUR) 30 MG 24 hr tablet Take 30 mg by mouth once daily.    minoxidiL (LONITEN) 2.5 MG tablet Take 2.5 mg by mouth once daily.    nitroGLYCERIN (NITROSTAT) 0.4 MG SL tablet DISSOLVE 1 TABLET UNDER THE TONGUE EVERY 5 MINUTES AS NEEDED FOR CHEST PAIN. DO NOT EXCEED A TOTAL OF 3 DOSES IN 15 MINUTES.            While in the hospital, will manage blood pressure as follows; Adjust home antihypertensive regimen as follows- see below    Will utilize p.r.n. blood pressure medication only if patient's blood pressure greater than 180/110 and she develops symptoms such as worsening chest pain or shortness of breath.    Home imdur, minoxidil, clonidine patch, hydralazine  Halved Coreg from 50 to 25 bid d/t potential acute CHF exacerbation  Lasix 60 iv bid for cardiorenal syndrome, monitor volume status  Holding bumex  Adjust regimen as needed

## 2024-08-16 NOTE — SUBJECTIVE & OBJECTIVE
Interval History: naeo    Review of Systems  Objective:     Vital Signs (Most Recent):  Temp: 98.8 °F (37.1 °C) (08/16/24 1201)  Pulse: 73 (08/16/24 1201)  Resp: 20 (08/16/24 1201)  BP: (!) 156/63 (08/16/24 1201)  SpO2: 98 % (08/16/24 1201) Vital Signs (24h Range):  Temp:  [98 °F (36.7 °C)-99.5 °F (37.5 °C)] 98.8 °F (37.1 °C)  Pulse:  [72-80] 73  Resp:  [18-20] 20  SpO2:  [98 %] 98 %  BP: (139-156)/(55-68) 156/63     Weight: 64 kg (141 lb 1.5 oz)  Body mass index is 24.22 kg/m².    Intake/Output Summary (Last 24 hours) at 8/16/2024 1346  Last data filed at 8/16/2024 1031  Gross per 24 hour   Intake 120 ml   Output --   Net 120 ml         Physical Exam  Vitals and nursing note reviewed.   HENT:      Head: Normocephalic and atraumatic.      Right Ear: External ear normal.      Left Ear: External ear normal.      Nose: Nose normal.      Mouth/Throat:      Mouth: Mucous membranes are dry.   Eyes:      Pupils: Pupils are equal, round, and reactive to light.   Cardiovascular:      Rate and Rhythm: Normal rate and regular rhythm.      Pulses: Normal pulses.      Heart sounds: Normal heart sounds.   Pulmonary:      Effort: Pulmonary effort is normal.      Breath sounds: Normal breath sounds.   Abdominal:      General: Bowel sounds are normal.      Palpations: Abdomen is soft.   Musculoskeletal:         General: Swelling present.      Cervical back: Neck supple.      Right lower leg: Edema present.      Left lower leg: Edema present.   Skin:     General: Skin is warm and dry.   Neurological:      Mental Status: She is alert.   Psychiatric:         Mood and Affect: Mood normal.         Behavior: Behavior normal.             Significant Labs: All pertinent labs within the past 24 hours have been reviewed.    Significant Imaging: I have reviewed all pertinent imaging results/findings within the past 24 hours.

## 2024-08-17 VITALS
DIASTOLIC BLOOD PRESSURE: 56 MMHG | HEIGHT: 64 IN | HEART RATE: 72 BPM | WEIGHT: 161.19 LBS | OXYGEN SATURATION: 99 % | TEMPERATURE: 98 F | SYSTOLIC BLOOD PRESSURE: 145 MMHG | RESPIRATION RATE: 16 BRPM | BODY MASS INDEX: 27.52 KG/M2

## 2024-08-17 LAB
ANION GAP SERPL CALCULATED.3IONS-SCNC: 13 MMOL/L (ref 7–16)
BUN SERPL-MCNC: 85 MG/DL (ref 7–18)
BUN/CREAT SERPL: 24 (ref 6–20)
CALCIUM SERPL-MCNC: 9.3 MG/DL (ref 8.5–10.1)
CHLORIDE SERPL-SCNC: 110 MMOL/L (ref 98–107)
CO2 SERPL-SCNC: 20 MMOL/L (ref 21–32)
CREAT SERPL-MCNC: 3.53 MG/DL (ref 0.55–1.02)
EGFR (NO RACE VARIABLE) (RUSH/TITUS): 13 ML/MIN/1.73M2
GLUCOSE SERPL-MCNC: 208 MG/DL (ref 70–105)
GLUCOSE SERPL-MCNC: 210 MG/DL (ref 74–106)
GLUCOSE SERPL-MCNC: 232 MG/DL (ref 70–105)
POTASSIUM SERPL-SCNC: 3.7 MMOL/L (ref 3.5–5.1)
SODIUM SERPL-SCNC: 139 MMOL/L (ref 136–145)
TACROLIMUS TROUGH BLD-MCNC: 5.6 NG/ML

## 2024-08-17 PROCEDURE — 63600175 PHARM REV CODE 636 W HCPCS: Performed by: INTERNAL MEDICINE

## 2024-08-17 PROCEDURE — 96372 THER/PROPH/DIAG INJ SC/IM: CPT

## 2024-08-17 PROCEDURE — 82962 GLUCOSE BLOOD TEST: CPT

## 2024-08-17 PROCEDURE — 25000003 PHARM REV CODE 250

## 2024-08-17 PROCEDURE — 99239 HOSP IP/OBS DSCHRG MGMT >30: CPT | Mod: ,,, | Performed by: STUDENT IN AN ORGANIZED HEALTH CARE EDUCATION/TRAINING PROGRAM

## 2024-08-17 PROCEDURE — 63600175 PHARM REV CODE 636 W HCPCS

## 2024-08-17 PROCEDURE — 36415 COLL VENOUS BLD VENIPUNCTURE: CPT | Performed by: STUDENT IN AN ORGANIZED HEALTH CARE EDUCATION/TRAINING PROGRAM

## 2024-08-17 PROCEDURE — 25000003 PHARM REV CODE 250: Performed by: STUDENT IN AN ORGANIZED HEALTH CARE EDUCATION/TRAINING PROGRAM

## 2024-08-17 PROCEDURE — 1111F DSCHRG MED/CURRENT MED MERGE: CPT | Mod: CPTII,,, | Performed by: STUDENT IN AN ORGANIZED HEALTH CARE EDUCATION/TRAINING PROGRAM

## 2024-08-17 PROCEDURE — 80048 BASIC METABOLIC PNL TOTAL CA: CPT | Performed by: STUDENT IN AN ORGANIZED HEALTH CARE EDUCATION/TRAINING PROGRAM

## 2024-08-17 RX ORDER — CARVEDILOL 25 MG/1
25 TABLET ORAL 2 TIMES DAILY
Qty: 180 TABLET | Refills: 1 | Status: SHIPPED | OUTPATIENT
Start: 2024-08-17

## 2024-08-17 RX ORDER — LANCETS 33 GAUGE
1 EACH MISCELLANEOUS
Qty: 400 EACH | Refills: 2 | Status: SHIPPED | OUTPATIENT
Start: 2024-08-17 | End: 2025-08-17

## 2024-08-17 RX ORDER — NIFEDIPINE 90 MG/1
90 TABLET, EXTENDED RELEASE ORAL DAILY
Qty: 30 TABLET | Refills: 11 | Status: SHIPPED | OUTPATIENT
Start: 2024-08-18 | End: 2025-08-18

## 2024-08-17 RX ADMIN — NIFEDIPINE 90 MG: 60 TABLET, FILM COATED, EXTENDED RELEASE ORAL at 09:08

## 2024-08-17 RX ADMIN — BUMETANIDE 2 MG: 1 TABLET ORAL at 09:08

## 2024-08-17 RX ADMIN — INSULIN ASPART 4 UNITS: 100 INJECTION, SOLUTION INTRAVENOUS; SUBCUTANEOUS at 09:08

## 2024-08-17 RX ADMIN — TACROLIMUS 1.5 MG: 1 CAPSULE ORAL at 09:08

## 2024-08-17 RX ADMIN — HEPARIN SODIUM 5000 UNITS: 5000 INJECTION, SOLUTION INTRAVENOUS; SUBCUTANEOUS at 06:08

## 2024-08-17 RX ADMIN — PREDNISONE 5 MG: 5 TABLET ORAL at 09:08

## 2024-08-17 RX ADMIN — HYDRALAZINE HYDROCHLORIDE 100 MG: 100 TABLET ORAL at 09:08

## 2024-08-17 RX ADMIN — FERROUS SULFATE TAB 325 MG (65 MG ELEMENTAL FE) 1 EACH: 325 (65 FE) TAB at 09:08

## 2024-08-17 RX ADMIN — SODIUM BICARBONATE 650 MG: 650 TABLET ORAL at 09:08

## 2024-08-17 RX ADMIN — ISOSORBIDE MONONITRATE 30 MG: 30 TABLET, EXTENDED RELEASE ORAL at 09:08

## 2024-08-17 RX ADMIN — CARVEDILOL 25 MG: 25 TABLET, FILM COATED ORAL at 09:08

## 2024-08-17 RX ADMIN — Medication 1 CAPSULE: at 09:08

## 2024-08-17 RX ADMIN — PANTOPRAZOLE SODIUM 40 MG: 40 TABLET, DELAYED RELEASE ORAL at 06:08

## 2024-08-17 RX ADMIN — MINOXIDIL 2.5 MG: 2.5 TABLET ORAL at 09:08

## 2024-08-17 RX ADMIN — MYCOPHENOLATE MOFETIL 250 MG: 250 CAPSULE ORAL at 09:08

## 2024-08-17 RX ADMIN — CHOLESTYRAMINE LIGHT 4 GRAMS OF ANHYDROUS CHOLESTYRAMINE: 4 POWDER, FOR SUSPENSION ORAL at 09:08

## 2024-08-17 RX ADMIN — THERA TABS 1 TABLET: TAB at 09:08

## 2024-08-17 RX ADMIN — ASPIRIN 81 MG: 81 TABLET, COATED ORAL at 09:08

## 2024-08-17 NOTE — DISCHARGE SUMMARY
[facility]  [facility] Medicine  Discharge Summary      Patient Name: Nasrin Grant  MRN: [id number]  RAYMOND: [id number]  Patient Class: IP- Inpatient  Admission Date: 8/13/2024  Hospital Length of Stay: 4 days  Discharge Date and Time:  08/17/2024 9:51 AM  Attending Physician: Anatoliy Banda DO   Discharging Provider: Anatoliy Banda DO  Primary Care Provider: Laury Enrique FNP    Primary Care Team: Networked reference to record PCT     HPI:   73yo female with a PMH of CKD s/p renal transplant with CMV in 2016 followed Dr. Blount (last seen 7/6/24), HFpEF (EF 65% on last Echo 12/4/2023), CAD s/p 1 stent, CHF, HTN, HLD, DM2 s/p toe amputation 2 on right foot, 3 on left foot, diabetic retinopathy, CVA, depression, osteoporosis who presents to [facility] ED via EMS from [facility] with SOB and leg swelling. SOB and bilateral leg swelling started at least 1 month ago and gradually worsened especially in the past week. Endorses OSORIO, orthopnea, PND, nocturnal coughs. Endorses chest and abdominal tightness from swelling. Denies f/c/cp/n/v/d, denies appetite, urinary, or bowel habit changes.    Upon presentation, VS remarkable for 172/61. Labs remarkable for H/H 9.5/30.4 (baseline H/H 10.5/33.9 on 8/21/23), MCV 81.9, RDW 15.5, plt 117, CO2 20, BUN/Cr 83/4.19, GFR 11 (baseline Cr 2.56, GFR 19 on 1/9/24), glucose 209. PTT 22.5, PT 15.5, INR 1.24. Troponin wnl 45.3, BNP 11000, Mg 1.8. EKG SR 82 without ischemic changes compared to previous. CXR shows increased bilateral lung density suggesting mild cardiac decompensation and or pneumonitis. Patient is admitted to hospital medicine for further management and care.        * No surgery found *      Hospital Course:   8/14- feels better today  8/15 feels better today.  Renal function continues to improve.  Continue IV diuresis  8/16 creatinine trended up.  Need to see trend down before discharge.  8/17 creatinine trending in the right direction.  She is stable for  "discharge.  She has follow up with Dr. Belcher arranged she has follow up with her transplant nephrologist in September.  We will refer her to see a PCP in 1 week otherwise keep follow up appointments.  Made referral to follow up with Cardiology.  She follows with Dr. Grewal in [place]     Goals of Care Treatment Preferences:  Code Status: Full Code      SDOH Screening:  The patient was screened for utility difficulties, food insecurity, transport difficulties, housing insecurity, and interpersonal safety and there were no concerns identified this admission.     Consults:   Consults (From admission, onward)          Status Ordering Provider     Inpatient consult to Nephrology  Once        Provider:  Oswald Blount Jr., MD Acknowledged WONG, HOIFUNG            Neuro  Diabetic neuropathy  Patient's FSGs are controlled on current medication regimen.  Last A1c reviewed-   Lab Results   Component Value Date    HGBA1C 6.9 (H) 05/14/2024     Most recent fingerstick glucose reviewed- No results for input(s): "POCTGLUCOSE" in the last 24 hours.  Current correctional scale  Medium  Maintain anti-hyperglycemic dose as follows-   Antihyperglycemics (From admission, onward)      Start     Stop Route Frequency Ordered    08/13/24 2150  insulin aspart U-100 injection 0-10 Units         -- SubQ Before meals & nightly PRN 08/13/24 2051          Hold Oral hypoglycemics while patient is in the hospital.    Psychiatric  Depression  Patient has persistent depression which is mild and is currently controlled. Will Continue anti-depressant medications. We will not consult psychiatry at this time. Patient does not display psychosis at this time. Continue to monitor closely and adjust plan of care as needed.    Stable    Ophtho  Diabetic retinopathy  Patient's FSGs are uncontrolled due to hyperglycemia on current medication regimen.  Last A1c reviewed-   Lab Results   Component Value Date    HGBA1C 6.9 (H) 05/14/2024     Most recent fingerstick " "glucose reviewed- No results for input(s): "POCTGLUCOSE" in the last 24 hours.  Current correctional scale  medium  Maintain anti-hyperglycemic dose as follows-   Antihyperglycemics (From admission, onward)      Start     Stop Route Frequency Ordered    08/13/24 2150  insulin aspart U-100 injection 0-10 Units         -- SubQ Before meals & nightly PRN 08/13/24 2051          Hold Oral hypoglycemics while patient is in the hospital.    Cardiac/Vascular  * Acute on chronic heart failure with preserved ejection fraction  KYLER on CKDS4 likely 2/2 cardiorenal syndrome  Lasix 60 iv bid, monitor volume status and decrease as symptoms improve  Last echo EF 65% on 12/4/2023, repeat echo pending  Home Coreg halved from 50 to 25mg bid, imdur  Holding home bumex   Tele monitor    Patient is identified as having Diastolic (HFpEF) heart failure that is Acute on chronic. CHF is currently uncontrolled due to Continued edema of extremities and >3 pillow orthopnea. Latest ECHO performed and demonstrates- No results found for this or any previous visit.  . Continue Beta Blocker, Furosemide, and Nitrate/Vasodilator and monitor clinical status closely. Monitor on telemetry. Patient is off CHF pathway.  Monitor strict Is&Os and daily weights.  Place on fluid restriction of 1.5 L. Cardiology has not been consulted. Continue to stress to patient importance of self efficacy and  on diet for CHF. Last BNP reviewed- and noted below No results for input(s): "BNP", "BNPTRIAGEBLO" in the last 168 hours.    Continue diuresis  Fluid and sodium restrict.  Continue diuresis  Volume status looks good.  Transitioned to oral diuretic    Peripheral arterial disease  Continue asa  8/16 no leg pain    Mixed hyperlipidemia  Home cholestyramine     Lab Results   Component Value Date    CHOL 117 02/15/2024    CHOL 95 08/21/2023    CHOL 101 04/13/2023     Lab Results   Component Value Date    HDL 53 02/15/2024    HDL 54 08/21/2023    HDL 47 04/13/2023 "     Lab Results   Component Value Date    LDLCALC 53 02/15/2024    LDLCALC 35 08/21/2023    LDLCALC 46 04/13/2023     Lab Results   Component Value Date    TRIG 54 02/15/2024    TRIG 29 (L) 08/21/2023    TRIG 39 04/13/2023       Lab Results   Component Value Date    CHOLHDL 2.2 02/15/2024    CHOLHDL 1.8 08/21/2023    CHOLHDL 2.1 04/13/2023          Essential hypertension  Chronic, uncontrolled. Latest blood pressure and vitals reviewed-     Temp:  [98 °F (36.7 °C)-98.8 °F (37.1 °C)]   Pulse:  [73-79]   Resp:  [16-20]   BP: (110-191)/(51-67)   SpO2:  [98 %-100 %] .   Home meds for hypertension were reviewed and noted below.   Hypertension Medications               bumetanide (BUMEX) 2 MG tablet Take 1 tablet (2 mg total) by mouth once daily.    carvediloL (COREG) 25 MG tablet TAKE ONE TABLET BY MOUTH TWICE DAILY    cloNIDine 0.1 mg/24 hr td ptwk (CATAPRES) 0.1 mg/24 hr Place 1 patch onto the skin once a week.    hydrALAZINE (APRESOLINE) 100 MG tablet Take 100 mg by mouth 3 (three) times daily.    isosorbide mononitrate (IMDUR) 30 MG 24 hr tablet Take 30 mg by mouth once daily.    minoxidiL (LONITEN) 2.5 MG tablet Take 2.5 mg by mouth once daily.    nitroGLYCERIN (NITROSTAT) 0.4 MG SL tablet DISSOLVE 1 TABLET UNDER THE TONGUE EVERY 5 MINUTES AS NEEDED FOR CHEST PAIN. DO NOT EXCEED A TOTAL OF 3 DOSES IN 15 MINUTES.            While in the hospital, will manage blood pressure as follows; Adjust home antihypertensive regimen as follows- see below    Will utilize p.r.n. blood pressure medication only if patient's blood pressure greater than 180/110 and she develops symptoms such as worsening chest pain or shortness of breath.    Home imdur, minoxidil, clonidine patch, hydralazine  Halved Coreg from 50 to 25 bid d/t potential acute CHF exacerbation  Lasix 60 iv bid for cardiorenal syndrome, monitor volume status  Holding bumex  Adjust regimen as needed    Coronary artery disease involving native coronary artery of native  heart without angina pectoris  Patient with known CAD s/p stent placement, which is controlled Will continue ASA and monitor for S/Sx of angina/ACS. Continue to monitor on telemetry.     Home ASA, not on statin d/t history of transaminitis  Home cholestyramine    Renal/  End-stage renal failure with renal transplant  Creatine stable for now. BMP reviewed- noted Estimated Creatinine Clearance: 13.9 mL/min (A) (based on SCr of 3.53 mg/dL (H)). according to latest data. Based on current GFR, CKD stage is end stage.  Monitor UOP and serial BMP and adjust therapy as needed. Renally dose meds. Avoid nephrotoxic medications and procedures.  Patient with transplant now with KYLER.  Nephrology consulted.  8/15 renal function improved but not at baseline.  Renal function not improving today.  Awaiting Nephrology input.  Stop IV diuresis.  Start oral Bumex  Creatinine improved today.  Stable for discharge    Acute renal failure superimposed on stage 4 chronic kidney disease  - baseline Cr 2.56 on 1/9/24, today 4.19  - likely 2/2 cardiorenal syndrome with HFpEF  - lasix 60 iv bid, monitor volume status  - holding home bumex  - nephrology Dr. Blount consulted, appreciate recommendations and assistance  - home sodium bicarbonate tabs  - renal diet    KYLER is likely due to  cardiorenal syndrome . Baseline creatinine is  2.56 . Most recent creatinine and eGFR are listed below.  Recent Labs     08/15/24  0235 08/16/24  0449 08/17/24  0649   CREATININE 3.72* 3.88* 3.53*   EGFRNORACEVR 12* 12* 13*        Plan  - KYLER is worsening. Will continue current treatment  - Avoid nephrotoxins and renally dose meds for GFR listed above  - Monitor urine output, serial BMP, and adjust therapy as needed    KYLER in setting of renal transplant, consult nephrology  8/15 renal function improving  8/16 creatinine not improving  8/17 creatinine trending down today.  Stable for discharge.    History of kidney transplant  Home Cellcept 250mg bid, prednisone  "5mg qd, tacrolimus 1.5mg qam  Continue  Consult Dr. Blount  8/15 Renal function improving.  UA look good.  Upc look good  8/16 creatinine not improving    Oncology  Normocytic anemia  - baseline Hgb 10.5 on 8/21/23, today 9.5  - 2/2 CKD stage 4 and/or WASHINGTON  - home ferrous tabs    Anemia is likely due to Iron deficiency and chronic disease due to Chronic Kidney Disease. Most recent hemoglobin and hematocrit are listed below.  Recent Labs     08/15/24  0235 08/16/24  0449   HGB 8.8* 8.5*   HCT 27.6* 27.0*       Plan  - Monitor serial CBC: Daily  - Transfuse PRBC if patient becomes hemodynamically unstable, symptomatic or H/H drops below 7/21.  - Patient has not received any PRBC transfusions to date  - Patient's anemia is currently stable  8/14 follow no evidence of bleeding    Endocrine  Diabetes  Patient's FSGs are uncontrolled due to hyperglycemia on current medication regimen.  Last A1c reviewed-   Lab Results   Component Value Date    HGBA1C 6.9 (H) 05/14/2024     Most recent fingerstick glucose reviewed- No results for input(s): "POCTGLUCOSE" in the last 24 hours.  Current correctional scale  Low  Maintain anti-hyperglycemic dose as follows-   Antihyperglycemics (From admission, onward)      Start     Stop Route Frequency Ordered    08/13/24 2150  insulin aspart U-100 injection 0-10 Units         -- SubQ Before meals & nightly PRN 08/13/24 2051          Hold Oral hypoglycemics while patient is in the hospital.    Controlled type 2 diabetes mellitus with chronic kidney disease, with long-term current use of insulin  Patient's FSGs are uncontrolled due to hyperglycemia on current medication regimen.  Last A1c reviewed-   Lab Results   Component Value Date    HGBA1C 6.9 (H) 05/14/2024     Most recent fingerstick glucose reviewed- No results for input(s): "POCTGLUCOSE" in the last 24 hours.  Current correctional scale  Medium  Maintain anti-hyperglycemic dose as follows-   Antihyperglycemics (From admission, onward) "      Start     Stop Route Frequency Ordered    08/13/24 2150  insulin aspart U-100 injection 0-10 Units         -- SubQ Before meals & nightly PRN 08/13/24 2051          Hold Oral hypoglycemics while patient is in the hospital.  Continue current regimen    GI  Gastroesophageal reflux disease without esophagitis  Home protonix      Palliative Care  Long-term use of immunosuppressant medication  Check tacrol level  Pending.  Continue steroid mycophenolate tacro      Final Active Diagnoses:    Diagnosis Date Noted POA    PRINCIPAL PROBLEM:  Acute on chronic heart failure with preserved ejection fraction [I50.33] 11/11/2022 Yes    Normocytic anemia [D64.9] 08/14/2024 Yes    End-stage renal failure with renal transplant [T86.19, N18.6] 08/14/2024 Yes    Diabetic retinopathy [E11.319] 08/14/2024 Yes    Depression [F32.A] 08/14/2024 Yes    Diabetic neuropathy [E11.40] 08/14/2024 Yes    Diabetes [E11.9] 05/20/2024 Yes    Peripheral arterial disease [I73.9] 05/14/2024 Yes    Mixed hyperlipidemia [E78.2] 04/13/2023 Yes    Acute renal failure superimposed on stage 4 chronic kidney disease [N17.9, N18.4] 11/02/2022 Yes    Long-term use of immunosuppressant medication [Z79.60] 11/01/2022 Not Applicable    Coronary artery disease involving native coronary artery of native heart without angina pectoris [I25.10] 04/17/2017 Yes    Gastroesophageal reflux disease without esophagitis [K21.9] 03/29/2016 Yes    History of kidney transplant [Z94.0] 03/25/2016 Not Applicable    Controlled type 2 diabetes mellitus with chronic kidney disease, with long-term current use of insulin [E11.22, Z79.4] 03/24/2016 Not Applicable    Essential hypertension [I10] 03/24/2016 Yes      Problems Resolved During this Admission:    Diagnosis Date Noted Date Resolved POA    Chronic kidney disease (CKD), stage IV (severe) [N18.4] 01/09/2024 08/14/2024 Yes    Congestive heart failure [I50.9] 10/19/2017 08/14/2024 Yes       Discharged Condition:  good    Disposition: Home or Self Care    Follow Up:   Follow-up Information       Laury Enrique FNP. Schedule an appointment as soon as possible for a visit in 1 week(s).    Specialties: Family Medicine, Wound Care  Contact information:  [address] [place] [address]  [phone]               Paolo Robledo MD. Schedule an appointment as soon as possible for a visit in 2 week(s).    Specialty: Cardiovascular Disease  Contact information:  [address] [facility] [place] [address]  [phone]                           Patient Instructions:      Ambulatory referral/consult to Physical/Occupational Therapy   Standing Status: Future   Referral Priority: Routine Referral Type: Physical Medicine   Referral Reason: Specialty Services Required   Number of Visits Requested: 1     Diet renal     Activity as tolerated       Significant Diagnostic Studies: Labs: All labs within the past 24 hours have been reviewed    Pending Diagnostic Studies:       Procedure Component Value Units Date/Time    Tacrolimus () [[id number]] Collected: 08/14/24 1332    Order Status: Sent Lab Status: In process Updated: 08/14/24 1337    Specimen: Blood            Medications:  Reconciled Home Medications:      Medication List        START taking these medications      NIFEdipine 90 MG (OSM) 24 hr tablet  Commonly known as: PROCARDIA-XL  Take 1 tablet (90 mg total) by mouth once daily.  Start taking on: August 18, 2024            CHANGE how you take these medications      carvediloL 25 MG tablet  Commonly known as: COREG  Take 1 tablet (25 mg total) by mouth 2 (two) times daily.  What changed: how much to take            CONTINUE taking these medications      albuterol 90 mcg/actuation inhaler  Commonly known as: VENTOLIN HFA  Inhale 2 puffs into the lungs every 6 (six) hours as needed for Wheezing. Rescue     aspirin 81 MG EC tablet  Commonly known as: ECOTRIN  Take 81 mg by mouth once daily.     bumetanide 2 MG tablet  Commonly known as:  BUMEX  Take 1 tablet (2 mg total) by mouth once daily.     cholestyramine 4 gram packet  Commonly known as: QUESTRAN  Take 1 packet by mouth once daily.     cloNIDine 0.1 mg/24 hr td ptwk 0.1 mg/24 hr  Commonly known as: CATAPRES  Place 1 patch onto the skin once a week.     docusate sodium 100 MG capsule  Commonly known as: COLACE  Take 100 mg by mouth 2 (two) times daily as needed for Constipation.     ferrous sulfate 325 mg (65 mg iron) Tab tablet  Commonly known as: FEOSOL  Take 1 tablet (325 mg total) by mouth 2 (two) times daily.     fluticasone propionate 50 mcg/actuation nasal spray  Commonly known as: FLONASE  1 spray by Nasal route once daily.     FREESTYLE MARY 10 DAY SENSOR MISC  1 Device by Percutaneous route.     hydrALAZINE 100 MG tablet  Commonly known as: APRESOLINE  Take 100 mg by mouth 3 (three) times daily.     insulin aspart U-100 100 unit/mL injection  Commonly known as: NovoLOG  Inject 3 Units into the skin 3 (three) times daily before meals. Per Dr Kayleigh Morris at [facility]     insulin degludec 100 unit/mL (3 mL) insulin pen  Commonly known as: TRESIBA FLEXTOUCH U-100  Inject 10 Units into the skin once daily.     isosorbide mononitrate 30 MG 24 hr tablet  Commonly known as: IMDUR  Take 30 mg by mouth once daily.     lancets 33 gauge Misc  Commonly known as: TRUEPLUS LANCETS  Inject 1 lancet  into the skin 3 (three) times daily before meals.     megestroL 20 MG Tab  Commonly known as: MEGACE  TAKE ONE TABLET BY MOUTH EVERY DAY     minoxidiL 2.5 MG tablet  Commonly known as: LONITEN  Take 2.5 mg by mouth once daily.     multivitamin per tablet  Commonly known as: THERAGRAN  Take 1 tablet by mouth once daily.     mycophenolate sodium 180 MG Tbec  Take 1 tablet by mouth 2 (two) times daily.     nitroGLYCERIN 0.4 MG SL tablet  Commonly known as: NITROSTAT  DISSOLVE 1 TABLET UNDER THE TONGUE EVERY 5 MINUTES AS NEEDED FOR CHEST PAIN. DO NOT EXCEED A TOTAL OF 3 DOSES IN 15 MINUTES.    "  pantoprazole 40 MG tablet  Commonly known as: PROTONIX  TAKE ONE TABLET BY MOUTH EVERY MORNING     pen needle, diabetic 31 gauge x 3/16" Ndle  1 each by NOT APPLICABLE route.     potassium chloride 10 MEQ Cpsr  Commonly known as: MICRO-K  TAKE ONE CAPSULE BY MOUTH ONCE DAILY     predniSONE 5 MG tablet  Commonly known as: DELTASONE  Take 1 tablet by mouth once daily.     PROBIOTIC ACIDOPHILUS ORAL  Take 4 Billion Cells by mouth.     sodium bicarbonate 650 MG tablet  Take 1 tablet by mouth 2 (two) times daily.     tacrolimus 0.5 MG Cap  Commonly known as: PROGRAF  Take 1.5 mg by mouth.              Indwelling Lines/Drains at time of discharge:   Lines/Drains/Airways       None                   Time spent on the discharge of patient: >30 minutes         Anatoliy Banda DO  Department of Hospital Medicine  [facility]  "

## 2024-08-17 NOTE — PROGRESS NOTES
Ms. Grant is seen in follow-up of her renal impairment.  Creatinine continues to decrease and she is improving.  She still has edema but understands said she will continue with her diuretics at home and should not try to get to a completely edema free state.  She will follow up with Dr. Blount as schedule and we will resume her transplant medicines as she was taking prior to admission.

## 2024-08-17 NOTE — NURSING
Patient complaining of mucus being stuck in her throat and she is asking for some medication to help it come up. Her lung sounds are clear, but she keeps coughing and trying to clear her throat but nothing is coming up and out.  Notified Dr. Bhatt and got a new order for Guaifenesin syrup 200mg Q4H PRN for congestion.

## 2024-08-17 NOTE — ASSESSMENT & PLAN NOTE
- baseline Hgb 10.5 on 8/21/23, today 9.5  - 2/2 CKD stage 4 and/or WASHINGTON  - home ferrous tabs    Anemia is likely due to Iron deficiency and chronic disease due to Chronic Kidney Disease. Most recent hemoglobin and hematocrit are listed below.  Recent Labs     08/15/24  0235 08/16/24  0449   HGB 8.8* 8.5*   HCT 27.6* 27.0*       Plan  - Monitor serial CBC: Daily  - Transfuse PRBC if patient becomes hemodynamically unstable, symptomatic or H/H drops below 7/21.  - Patient has not received any PRBC transfusions to date  - Patient's anemia is currently stable  8/14 follow no evidence of bleeding

## 2024-08-17 NOTE — ASSESSMENT & PLAN NOTE
Chronic, uncontrolled. Latest blood pressure and vitals reviewed-     Temp:  [98 °F (36.7 °C)-98.8 °F (37.1 °C)]   Pulse:  [73-79]   Resp:  [16-20]   BP: (110-191)/(51-67)   SpO2:  [98 %-100 %] .   Home meds for hypertension were reviewed and noted below.   Hypertension Medications               bumetanide (BUMEX) 2 MG tablet Take 1 tablet (2 mg total) by mouth once daily.    carvediloL (COREG) 25 MG tablet TAKE ONE TABLET BY MOUTH TWICE DAILY    cloNIDine 0.1 mg/24 hr td ptwk (CATAPRES) 0.1 mg/24 hr Place 1 patch onto the skin once a week.    hydrALAZINE (APRESOLINE) 100 MG tablet Take 100 mg by mouth 3 (three) times daily.    isosorbide mononitrate (IMDUR) 30 MG 24 hr tablet Take 30 mg by mouth once daily.    minoxidiL (LONITEN) 2.5 MG tablet Take 2.5 mg by mouth once daily.    nitroGLYCERIN (NITROSTAT) 0.4 MG SL tablet DISSOLVE 1 TABLET UNDER THE TONGUE EVERY 5 MINUTES AS NEEDED FOR CHEST PAIN. DO NOT EXCEED A TOTAL OF 3 DOSES IN 15 MINUTES.            While in the hospital, will manage blood pressure as follows; Adjust home antihypertensive regimen as follows- see below    Will utilize p.r.n. blood pressure medication only if patient's blood pressure greater than 180/110 and she develops symptoms such as worsening chest pain or shortness of breath.    Home imdur, minoxidil, clonidine patch, hydralazine  Halved Coreg from 50 to 25 bid d/t potential acute CHF exacerbation  Lasix 60 iv bid for cardiorenal syndrome, monitor volume status  Holding bumex  Adjust regimen as needed

## 2024-08-17 NOTE — PLAN OF CARE
Problem: Adult Inpatient Plan of Care  Goal: Plan of Care Review  Outcome: Met  Goal: Patient-Specific Goal (Individualized)  Outcome: Met  Goal: Absence of Hospital-Acquired Illness or Injury  Outcome: Met  Goal: Optimal Comfort and Wellbeing  Outcome: Met  Goal: Readiness for Transition of Care  Outcome: Met     Problem: Fall Injury Risk  Goal: Absence of Fall and Fall-Related Injury  Outcome: Met     Problem: Diabetes Comorbidity  Goal: Blood Glucose Level Within Targeted Range  Outcome: Met     Problem: Acute Kidney Injury/Impairment  Goal: Fluid and Electrolyte Balance  Outcome: Met  Goal: Improved Oral Intake  Outcome: Met  Goal: Effective Renal Function  Outcome: Met     Problem: ARDS (Acute Respiratory Distress Syndrome)  Goal: Effective Oxygenation  Outcome: Met

## 2024-08-17 NOTE — ASSESSMENT & PLAN NOTE
Creatine stable for now. BMP reviewed- noted Estimated Creatinine Clearance: 13.9 mL/min (A) (based on SCr of 3.53 mg/dL (H)). according to latest data. Based on current GFR, CKD stage is end stage.  Monitor UOP and serial BMP and adjust therapy as needed. Renally dose meds. Avoid nephrotoxic medications and procedures.  Patient with transplant now with KYLER.  Nephrology consulted.  8/15 renal function improved but not at baseline.  Renal function not improving today.  Awaiting Nephrology input.  Stop IV diuresis.  Start oral Bumex  Creatinine improved today.  Stable for discharge

## 2024-08-17 NOTE — NURSING
At 1207 pt is taken to private car per [facility] per Brian PEARSON for discharge, her daughter is her ride.

## 2024-08-17 NOTE — ASSESSMENT & PLAN NOTE
- baseline Cr 2.56 on 1/9/24, today 4.19  - likely 2/2 cardiorenal syndrome with HFpEF  - lasix 60 iv bid, monitor volume status  - holding home bumex  - nephrology Dr. Blount consulted, appreciate recommendations and assistance  - home sodium bicarbonate tabs  - renal diet    KYLER is likely due to  cardiorenal syndrome . Baseline creatinine is  2.56 . Most recent creatinine and eGFR are listed below.  Recent Labs     08/15/24  0235 08/16/24  0449 08/17/24  0649   CREATININE 3.72* 3.88* 3.53*   EGFRNORACEVR 12* 12* 13*        Plan  - KYLER is worsening. Will continue current treatment  - Avoid nephrotoxins and renally dose meds for GFR listed above  - Monitor urine output, serial BMP, and adjust therapy as needed    KYLER in setting of renal transplant, consult nephrology  8/15 renal function improving  8/16 creatinine not improving  8/17 creatinine trending down today.  Stable for discharge.

## 2024-08-19 ENCOUNTER — PATIENT OUTREACH (OUTPATIENT)
Dept: ADMINISTRATIVE | Facility: CLINIC | Age: 74
End: 2024-08-19

## 2024-08-19 ENCOUNTER — DOCUMENT SCAN (OUTPATIENT)
Dept: HOME HEALTH SERVICES | Facility: HOSPITAL | Age: 74
End: 2024-08-19
Payer: MEDICARE

## 2024-08-19 NOTE — PLAN OF CARE
[facility]  Discharge Final Note    Primary Care Provider: Laury Enrique FNP    Expected Discharge Date: 8/17/2024    Final Discharge Note (most recent)       Final Note - 08/19/24 0816          Final Note    Assessment Type Final Discharge Note     Anticipated Discharge Disposition Home-Health Care Svc        Post-Acute Status    Post-Acute Authorization Home Health     Home Health Status Set-up Complete/Auth obtained     Patient choice form signed by patient/caregiver List with quality metrics by geographic area provided;List from System Post-Acute Care     Discharge Delays None known at this time                   Pt d/c home with lara patel.  Important Message from Medicare  Important Message from Medicare regarding Discharge Appeal Rights: Given to patient/caregiver, Explained to patient/caregiver, Signed/date by patient/caregiver     Date IMM was signed: 08/16/24  Time IMM was signed: 1000    Contact Info       Laury Enrique FNP   Specialty: Family Medicine, Wound Care   Relationship: PCP - General    [address] [place] [address] [address] [place] [address]   Phone: [phone]       Next Steps: Schedule an appointment as soon as possible for a visit in 1 week(s)    Paolo Robledo MD   Specialty: Cardiovascular Disease    [address] [facility] [place] [address]   Phone: [phone]       Next Steps: Schedule an appointment as soon as possible for a visit in 2 week(s)

## 2024-08-19 NOTE — PHYSICIAN QUERY
Due to conflicting documentation, Please further clarify the current stage of chronic kidney disease (CKD).   Chronic Kidney Disease (CKD) stage 4 - (eGFR 15-29)

## 2024-08-19 NOTE — PROGRESS NOTES
C3 nurse attempted to contact Nasrin Juanita  for a TCC post hospital discharge follow up call. No answer. Left voicemail with callback information. The patient has a scheduled HOSFU appointment with Laury Enrique FNP  on 8/26/24 @ [phone].

## 2024-08-20 NOTE — PROGRESS NOTES
C3 nurse spoke with Nasrin Grant  for a TCC post hospital discharge follow up call. The patient has a scheduled HOSFU appointment with Laury Enrique FNP  on 8/26/24 @ 3315.

## 2024-08-23 ENCOUNTER — DOCUMENT SCAN (OUTPATIENT)
Dept: HOME HEALTH SERVICES | Facility: HOSPITAL | Age: 74
End: 2024-08-23
Payer: MEDICARE

## 2024-08-26 ENCOUNTER — HOSPITAL ENCOUNTER (INPATIENT)
Facility: HOSPITAL | Age: 74
LOS: 2 days | Discharge: HOME HEALTH | DRG: 698 | End: 2024-08-28
Attending: FAMILY MEDICINE | Admitting: INTERNAL MEDICINE
Payer: MEDICARE

## 2024-08-26 DIAGNOSIS — N04.9 ANASARCA ASSOCIATED WITH DISORDER OF KIDNEY: ICD-10-CM

## 2024-08-26 DIAGNOSIS — R60.1 GENERALIZED EDEMA: ICD-10-CM

## 2024-08-26 DIAGNOSIS — R07.9 CHEST PAIN: ICD-10-CM

## 2024-08-26 DIAGNOSIS — N18.4 ACUTE RENAL FAILURE WITH ACUTE TUBULAR NECROSIS SUPERIMPOSED ON STAGE 4 CHRONIC KIDNEY DISEASE: Primary | ICD-10-CM

## 2024-08-26 DIAGNOSIS — N17.0 ACUTE RENAL FAILURE WITH ACUTE TUBULAR NECROSIS SUPERIMPOSED ON STAGE 4 CHRONIC KIDNEY DISEASE: Primary | ICD-10-CM

## 2024-08-26 DIAGNOSIS — E87.70 VOLUME OVERLOAD: ICD-10-CM

## 2024-08-26 DIAGNOSIS — I50.33 ACUTE ON CHRONIC HEART FAILURE WITH PRESERVED EJECTION FRACTION: ICD-10-CM

## 2024-08-26 LAB
ALBUMIN SERPL BCP-MCNC: 3.8 G/DL (ref 3.5–5)
ALBUMIN/GLOB SERPL: 1.5 {RATIO}
ALP SERPL-CCNC: 55 U/L (ref 55–142)
ALT SERPL W P-5'-P-CCNC: 38 U/L (ref 13–56)
ANION GAP SERPL CALCULATED.3IONS-SCNC: 11 MMOL/L (ref 7–16)
AST SERPL W P-5'-P-CCNC: 33 U/L (ref 15–37)
BASOPHILS # BLD AUTO: 0.05 K/UL (ref 0–0.2)
BASOPHILS NFR BLD AUTO: 0.8 % (ref 0–1)
BILIRUB SERPL-MCNC: 0.4 MG/DL (ref ?–1.2)
BUN SERPL-MCNC: 97 MG/DL (ref 7–18)
BUN/CREAT SERPL: 22 (ref 6–20)
CALCIUM SERPL-MCNC: 9.6 MG/DL (ref 8.5–10.1)
CHLORIDE SERPL-SCNC: 109 MMOL/L (ref 98–107)
CO2 SERPL-SCNC: 21 MMOL/L (ref 21–32)
CREAT SERPL-MCNC: 4.48 MG/DL (ref 0.55–1.02)
DIFFERENTIAL METHOD BLD: ABNORMAL
EGFR (NO RACE VARIABLE) (RUSH/TITUS): 10 ML/MIN/1.73M2
EOSINOPHIL # BLD AUTO: 0.16 K/UL (ref 0–0.5)
EOSINOPHIL NFR BLD AUTO: 2.5 % (ref 1–4)
ERYTHROCYTE [DISTWIDTH] IN BLOOD BY AUTOMATED COUNT: 15.8 % (ref 11.5–14.5)
GLOBULIN SER-MCNC: 2.6 G/DL (ref 2–4)
GLUCOSE SERPL-MCNC: 173 MG/DL (ref 74–106)
GLUCOSE SERPL-MCNC: 177 MG/DL (ref 70–105)
GLUCOSE SERPL-MCNC: 181 MG/DL (ref 70–105)
GLUCOSE SERPL-MCNC: 231 MG/DL (ref 70–105)
HCT VFR BLD AUTO: 29.1 % (ref 38–47)
HGB BLD-MCNC: 9.2 G/DL (ref 12–16)
IMM GRANULOCYTES # BLD AUTO: 0.02 K/UL (ref 0–0.04)
IMM GRANULOCYTES NFR BLD: 0.3 % (ref 0–0.4)
LYMPHOCYTES # BLD AUTO: 1.02 K/UL (ref 1–4.8)
LYMPHOCYTES NFR BLD AUTO: 15.6 % (ref 27–41)
MCH RBC QN AUTO: 25.8 PG (ref 27–31)
MCHC RBC AUTO-ENTMCNC: 31.6 G/DL (ref 32–36)
MCV RBC AUTO: 81.7 FL (ref 80–96)
MONOCYTES # BLD AUTO: 0.87 K/UL (ref 0–0.8)
MONOCYTES NFR BLD AUTO: 13.3 % (ref 2–6)
MPC BLD CALC-MCNC: 12.6 FL (ref 9.4–12.4)
NEUTROPHILS # BLD AUTO: 4.4 K/UL (ref 1.8–7.7)
NEUTROPHILS NFR BLD AUTO: 67.5 % (ref 53–65)
NRBC # BLD AUTO: 0 X10E3/UL
NRBC, AUTO (.00): 0 %
NT-PROBNP SERPL-MCNC: ABNORMAL PG/ML (ref 1–125)
OHS QRS DURATION: 116 MS
OHS QTC CALCULATION: 383 MS
PLATELET # BLD AUTO: 125 K/UL (ref 150–400)
POTASSIUM SERPL-SCNC: 4.6 MMOL/L (ref 3.5–5.1)
PROT SERPL-MCNC: 6.4 G/DL (ref 6.4–8.2)
RBC # BLD AUTO: 3.56 M/UL (ref 4.2–5.4)
SARS-COV-2 RDRP RESP QL NAA+PROBE: NEGATIVE
SODIUM SERPL-SCNC: 136 MMOL/L (ref 136–145)
TROPONIN I SERPL DL<=0.01 NG/ML-MCNC: 29.1 PG/ML
TROPONIN I SERPL DL<=0.01 NG/ML-MCNC: 36.3 PG/ML
WBC # BLD AUTO: 6.52 K/UL (ref 4.5–11)

## 2024-08-26 PROCEDURE — 99285 EMERGENCY DEPT VISIT HI MDM: CPT | Mod: 25

## 2024-08-26 PROCEDURE — 94761 N-INVAS EAR/PLS OXIMETRY MLT: CPT

## 2024-08-26 PROCEDURE — 99900035 HC TECH TIME PER 15 MIN (STAT)

## 2024-08-26 PROCEDURE — 11000001 HC ACUTE MED/SURG PRIVATE ROOM

## 2024-08-26 PROCEDURE — 82962 GLUCOSE BLOOD TEST: CPT

## 2024-08-26 PROCEDURE — 99223 1ST HOSP IP/OBS HIGH 75: CPT | Mod: AI,,, | Performed by: INTERNAL MEDICINE

## 2024-08-26 PROCEDURE — 80053 COMPREHEN METABOLIC PANEL: CPT | Performed by: FAMILY MEDICINE

## 2024-08-26 PROCEDURE — 63600175 PHARM REV CODE 636 W HCPCS: Performed by: INTERNAL MEDICINE

## 2024-08-26 PROCEDURE — 87635 SARS-COV-2 COVID-19 AMP PRB: CPT | Performed by: INTERNAL MEDICINE

## 2024-08-26 PROCEDURE — 25000003 PHARM REV CODE 250: Performed by: INTERNAL MEDICINE

## 2024-08-26 PROCEDURE — 93005 ELECTROCARDIOGRAM TRACING: CPT

## 2024-08-26 PROCEDURE — 36415 COLL VENOUS BLD VENIPUNCTURE: CPT | Performed by: FAMILY MEDICINE

## 2024-08-26 PROCEDURE — 85025 COMPLETE CBC W/AUTO DIFF WBC: CPT | Performed by: FAMILY MEDICINE

## 2024-08-26 PROCEDURE — 93010 ELECTROCARDIOGRAM REPORT: CPT | Mod: ,,, | Performed by: INTERNAL MEDICINE

## 2024-08-26 PROCEDURE — 84484 ASSAY OF TROPONIN QUANT: CPT | Performed by: FAMILY MEDICINE

## 2024-08-26 PROCEDURE — 25000242 PHARM REV CODE 250 ALT 637 W/ HCPCS: Performed by: INTERNAL MEDICINE

## 2024-08-26 PROCEDURE — 94640 AIRWAY INHALATION TREATMENT: CPT

## 2024-08-26 PROCEDURE — 83880 ASSAY OF NATRIURETIC PEPTIDE: CPT | Performed by: FAMILY MEDICINE

## 2024-08-26 RX ORDER — SODIUM BICARBONATE 650 MG/1
650 TABLET ORAL 2 TIMES DAILY
Status: DISCONTINUED | OUTPATIENT
Start: 2024-08-26 | End: 2024-08-28 | Stop reason: HOSPADM

## 2024-08-26 RX ORDER — CLONIDINE 0.1 MG/24H
1 PATCH, EXTENDED RELEASE TRANSDERMAL WEEKLY
Status: DISCONTINUED | OUTPATIENT
Start: 2024-08-26 | End: 2024-08-28 | Stop reason: HOSPADM

## 2024-08-26 RX ORDER — CARVEDILOL 25 MG/1
25 TABLET ORAL 2 TIMES DAILY
Status: DISCONTINUED | OUTPATIENT
Start: 2024-08-26 | End: 2024-08-28 | Stop reason: HOSPADM

## 2024-08-26 RX ORDER — ALBUTEROL SULFATE 90 UG/1
2 INHALANT RESPIRATORY (INHALATION) EVERY 6 HOURS PRN
Status: DISCONTINUED | OUTPATIENT
Start: 2024-08-26 | End: 2024-08-26

## 2024-08-26 RX ORDER — MEGESTROL ACETATE 20 MG/1
20 TABLET ORAL DAILY
Status: DISCONTINUED | OUTPATIENT
Start: 2024-08-26 | End: 2024-08-28 | Stop reason: HOSPADM

## 2024-08-26 RX ORDER — SODIUM CHLORIDE 0.9 % (FLUSH) 0.9 %
10 SYRINGE (ML) INJECTION EVERY 12 HOURS PRN
Status: DISCONTINUED | OUTPATIENT
Start: 2024-08-26 | End: 2024-08-28 | Stop reason: HOSPADM

## 2024-08-26 RX ORDER — ALBUTEROL SULFATE 0.83 MG/ML
2.5 SOLUTION RESPIRATORY (INHALATION) EVERY 6 HOURS
Status: DISCONTINUED | OUTPATIENT
Start: 2024-08-26 | End: 2024-08-28 | Stop reason: HOSPADM

## 2024-08-26 RX ORDER — LANOLIN ALCOHOL/MO/W.PET/CERES
1 CREAM (GRAM) TOPICAL DAILY
Status: DISCONTINUED | OUTPATIENT
Start: 2024-08-26 | End: 2024-08-28 | Stop reason: HOSPADM

## 2024-08-26 RX ORDER — HYDRALAZINE HYDROCHLORIDE 100 MG/1
100 TABLET, FILM COATED ORAL 3 TIMES DAILY
Status: DISCONTINUED | OUTPATIENT
Start: 2024-08-26 | End: 2024-08-28 | Stop reason: HOSPADM

## 2024-08-26 RX ORDER — NALOXONE HCL 0.4 MG/ML
0.02 VIAL (ML) INJECTION
Status: DISCONTINUED | OUTPATIENT
Start: 2024-08-26 | End: 2024-08-28 | Stop reason: HOSPADM

## 2024-08-26 RX ORDER — ISOSORBIDE MONONITRATE 30 MG/1
30 TABLET, EXTENDED RELEASE ORAL DAILY
Status: DISCONTINUED | OUTPATIENT
Start: 2024-08-26 | End: 2024-08-28 | Stop reason: HOSPADM

## 2024-08-26 RX ORDER — ASPIRIN 81 MG/1
81 TABLET ORAL DAILY
Status: DISCONTINUED | OUTPATIENT
Start: 2024-08-26 | End: 2024-08-28 | Stop reason: HOSPADM

## 2024-08-26 RX ORDER — SELENIUM 50 MCG
1 TABLET ORAL
Status: DISCONTINUED | OUTPATIENT
Start: 2024-08-26 | End: 2024-08-28 | Stop reason: HOSPADM

## 2024-08-26 RX ORDER — MINOXIDIL 2.5 MG/1
2.5 TABLET ORAL DAILY
Status: DISCONTINUED | OUTPATIENT
Start: 2024-08-26 | End: 2024-08-28 | Stop reason: HOSPADM

## 2024-08-26 RX ORDER — MYCOPHENOLIC ACID 180 MG/1
180 TABLET, DELAYED RELEASE ORAL 2 TIMES DAILY
Status: DISCONTINUED | OUTPATIENT
Start: 2024-08-26 | End: 2024-08-28 | Stop reason: HOSPADM

## 2024-08-26 RX ORDER — INSULIN GLARGINE 100 [IU]/ML
5 INJECTION, SOLUTION SUBCUTANEOUS NIGHTLY
Status: DISCONTINUED | OUTPATIENT
Start: 2024-08-26 | End: 2024-08-28 | Stop reason: HOSPADM

## 2024-08-26 RX ORDER — ONDANSETRON HYDROCHLORIDE 2 MG/ML
4 INJECTION, SOLUTION INTRAVENOUS EVERY 8 HOURS PRN
Status: DISCONTINUED | OUTPATIENT
Start: 2024-08-26 | End: 2024-08-28 | Stop reason: HOSPADM

## 2024-08-26 RX ORDER — ACETAMINOPHEN 325 MG/1
650 TABLET ORAL EVERY 4 HOURS PRN
Status: DISCONTINUED | OUTPATIENT
Start: 2024-08-26 | End: 2024-08-28 | Stop reason: HOSPADM

## 2024-08-26 RX ORDER — HEPARIN SODIUM 5000 [USP'U]/ML
5000 INJECTION, SOLUTION INTRAVENOUS; SUBCUTANEOUS EVERY 8 HOURS
Status: DISCONTINUED | OUTPATIENT
Start: 2024-08-26 | End: 2024-08-28 | Stop reason: HOSPADM

## 2024-08-26 RX ORDER — BUMETANIDE 0.25 MG/ML
2 INJECTION INTRAMUSCULAR; INTRAVENOUS 2 TIMES DAILY
Status: DISCONTINUED | OUTPATIENT
Start: 2024-08-26 | End: 2024-08-27

## 2024-08-26 RX ORDER — PREDNISONE 5 MG/1
5 TABLET ORAL DAILY
Status: DISCONTINUED | OUTPATIENT
Start: 2024-08-27 | End: 2024-08-28 | Stop reason: HOSPADM

## 2024-08-26 RX ORDER — NITROGLYCERIN 0.4 MG/1
0.4 TABLET SUBLINGUAL EVERY 5 MIN PRN
Status: DISCONTINUED | OUTPATIENT
Start: 2024-08-26 | End: 2024-08-28 | Stop reason: HOSPADM

## 2024-08-26 RX ORDER — DOCUSATE SODIUM 100 MG/1
100 CAPSULE, LIQUID FILLED ORAL 2 TIMES DAILY PRN
Status: DISCONTINUED | OUTPATIENT
Start: 2024-08-26 | End: 2024-08-28 | Stop reason: HOSPADM

## 2024-08-26 RX ORDER — CHOLESTYRAMINE 4 G/4.8G
4 POWDER, FOR SUSPENSION ORAL 2 TIMES DAILY
Status: DISCONTINUED | OUTPATIENT
Start: 2024-08-26 | End: 2024-08-28 | Stop reason: HOSPADM

## 2024-08-26 RX ADMIN — ALBUTEROL SULFATE 2.5 MG: 2.5 SOLUTION RESPIRATORY (INHALATION) at 07:08

## 2024-08-26 RX ADMIN — Medication 1 CAPSULE: at 05:08

## 2024-08-26 RX ADMIN — TACROLIMUS 1.5 MG: 1 CAPSULE ORAL at 05:08

## 2024-08-26 RX ADMIN — HYDRALAZINE HYDROCHLORIDE 100 MG: 100 TABLET ORAL at 09:08

## 2024-08-26 RX ADMIN — BUMETANIDE 2 MG: 0.25 INJECTION INTRAMUSCULAR; INTRAVENOUS at 09:08

## 2024-08-26 RX ADMIN — HEPARIN SODIUM 5000 UNITS: 5000 INJECTION, SOLUTION INTRAVENOUS; SUBCUTANEOUS at 09:08

## 2024-08-26 RX ADMIN — CLONIDINE TRANSDERMAL SYSTEM 1 PATCH: 0.1 PATCH TRANSDERMAL at 04:08

## 2024-08-26 RX ADMIN — CHOLESTYRAMINE LIGHT 4 GRAMS OF ANHYDROUS CHOLESTYRAMINE: 4 POWDER, FOR SUSPENSION ORAL at 09:08

## 2024-08-26 RX ADMIN — ASPIRIN 81 MG: 81 TABLET, COATED ORAL at 02:08

## 2024-08-26 RX ADMIN — CARVEDILOL 25 MG: 25 TABLET, FILM COATED ORAL at 09:08

## 2024-08-26 RX ADMIN — FERROUS SULFATE TAB 325 MG (65 MG ELEMENTAL FE) 1 EACH: 325 (65 FE) TAB at 02:08

## 2024-08-26 RX ADMIN — BUMETANIDE 2 MG: 0.25 INJECTION INTRAMUSCULAR; INTRAVENOUS at 01:08

## 2024-08-26 RX ADMIN — HEPARIN SODIUM 5000 UNITS: 5000 INJECTION, SOLUTION INTRAVENOUS; SUBCUTANEOUS at 02:08

## 2024-08-26 RX ADMIN — SODIUM BICARBONATE 650 MG: 650 TABLET ORAL at 09:08

## 2024-08-26 RX ADMIN — NIFEDIPINE 90 MG: 60 TABLET, FILM COATED, EXTENDED RELEASE ORAL at 02:08

## 2024-08-26 RX ADMIN — MEGESTROL ACETATE 20 MG: 20 TABLET ORAL at 04:08

## 2024-08-26 RX ADMIN — THERA TABS 1 TABLET: TAB at 02:08

## 2024-08-26 RX ADMIN — ISOSORBIDE MONONITRATE 30 MG: 30 TABLET, EXTENDED RELEASE ORAL at 02:08

## 2024-08-26 RX ADMIN — HYDRALAZINE HYDROCHLORIDE 100 MG: 100 TABLET ORAL at 04:08

## 2024-08-26 RX ADMIN — INSULIN GLARGINE 5 UNITS: 100 INJECTION, SOLUTION SUBCUTANEOUS at 10:08

## 2024-08-26 RX ADMIN — MINOXIDIL 2.5 MG: 2.5 TABLET ORAL at 02:08

## 2024-08-26 NOTE — HPI
Ms. Grant is a 74 Y O female with PMH of ESRD s/p renal transplant in 2016, CAD s/p PCI, HFpEF who presented to ED with worsening shortness of breath and leg swelling along with weight gain. 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 She has underlying CKD stage 4, follows with Dr. Blount. She was just hospitalized at [facility] and discharged a week ago with similar issue. She required a few days of IV diuresis which resulted in improved volume status. She was also hospitalized at [facility] last month for similar issue. She reports compliance with her home dose of Bumex and compliance with dietary intake. She denies chest pain, fever, chills, cough, nausea or vomiting. She denies hematuria.

## 2024-08-26 NOTE — HPI
This patient with history of ESRD who has a had a renal transplant since 2016.  The patient has continued to have increase in serum creatinine.  She has had increased lower leg swelling and increased shortness of breath.  The patient has had several hospitalizations for increased shortness of breath.  She mentions that over the past several days the swelling has continued to worsen despite taking diuretics.  Serum creatinine is 4.48.    Nephrology is consulted for renal issues.

## 2024-08-26 NOTE — PHARMACY MED REC
"Admission Medication History     The home medication history was taken by Mel Sanders.    You may go to "Admission" then "Reconcile Home Medications" tabs to review and/or act upon these items.     The home medication list has been updated by the Pharmacy department.   Please read ALL comments highlighted in yellow.   Please address this information as you see fit.    Feel free to contact us if you have any questions or require assistance.  Patient had a medication list with her that was current as of 08/17/24.      Medications listed below were obtained from: Patient/family and Analytic software- [employer]  (Not in a hospital admission)        Current Outpatient Medications on File Prior to Encounter   Medication Sig Dispense Refill Last Dose    albuterol (VENTOLIN HFA) 90 mcg/actuation inhaler Inhale 2 puffs into the lungs every 6 (six) hours as needed for Wheezing. Rescue 18 g 5 8/25/2024    aspirin (ECOTRIN) 81 MG EC tablet Take 81 mg by mouth once daily.   8/25/2024    bumetanide (BUMEX) 2 MG tablet Take 1 tablet (2 mg total) by mouth once daily. 90 tablet 1 8/25/2024    carvediloL (COREG) 25 MG tablet Take 1 tablet (25 mg total) by mouth 2 (two) times daily. 180 tablet 1 8/25/2024    cholestyramine (QUESTRAN) 4 gram packet Take 1 packet by mouth once daily.   8/25/2024    cloNIDine 0.1 mg/24 hr td ptwk (CATAPRES) 0.1 mg/24 hr Place 1 patch onto the skin once a week.   8/26/2024    docusate sodium (COLACE) 100 MG capsule Take 100 mg by mouth 2 (two) times daily as needed for Constipation.   8/25/2024    ferrous sulfate (FEOSOL) 325 mg (65 mg iron) Tab tablet Take 1 tablet (325 mg total) by mouth 2 (two) times daily. 600 tablet 2 8/25/2024    flash glucose sensor (FREESTYLE MARY 10 DAY SENSOR MISC) 1 Device by Percutaneous route.   8/25/2024    fluticasone propionate (FLONASE) 50 mcg/actuation nasal spray 1 spray by Nasal route once daily.   8/25/2024    hydrALAZINE (APRESOLINE) 100 MG tablet Take 100 mg by " "mouth 3 (three) times daily.   8/25/2024    insulin aspart U-100 (NOVOLOG) 100 unit/mL injection Inject 3 Units into the skin 3 (three) times daily before meals. Per Dr Kayleigh Morris at [facility]   8/25/2024    insulin degludec (TRESIBA FLEXTOUCH U-100) 100 unit/mL (3 mL) insulin pen Inject 10 Units into the skin once daily. (Patient taking differently: Inject 8 Units into the skin once daily. Per Dr Kayleigh Morris at [facility]) 9 mL 2 8/25/2024    isosorbide mononitrate (IMDUR) 30 MG 24 hr tablet Take 30 mg by mouth once daily.   8/25/2024    Lactobacillus acidophilus (PROBIOTIC ACIDOPHILUS ORAL) Take 4 Billion Cells by mouth.   8/25/2024    lancets (TRUEPLUS LANCETS) 33 gauge Misc Inject 1 lancet  into the skin 3 (three) times daily before meals. 400 each 2 8/25/2024    megestroL (MEGACE) 20 MG Tab TAKE ONE TABLET BY MOUTH EVERY DAY 30 tablet 3 8/25/2024    minoxidiL (LONITEN) 2.5 MG tablet Take 2.5 mg by mouth once daily.   8/25/2024    multivitamin (THERAGRAN) per tablet Take 1 tablet by mouth once daily.   8/25/2024    mycophenolate sodium 180 MG TbEC Take 1 tablet by mouth 2 (two) times daily.   8/25/2024    NIFEdipine (PROCARDIA-XL) 90 MG (OSM) 24 hr tablet Take 1 tablet (90 mg total) by mouth once daily. 30 tablet 11 8/25/2024    pantoprazole (PROTONIX) 40 MG tablet TAKE ONE TABLET BY MOUTH EVERY MORNING 30 tablet 0 8/25/2024    pen needle, diabetic 31 gauge x 3/16" Ndle 1 each by NOT APPLICABLE route.   8/25/2024    potassium chloride (MICRO-K) 10 MEQ CpSR TAKE ONE CAPSULE BY MOUTH ONCE DAILY 90 capsule 1 8/25/2024    predniSONE (DELTASONE) 5 MG tablet Take 1 tablet by mouth once daily.   8/25/2024    sodium bicarbonate 650 MG tablet Take 1 tablet by mouth 2 (two) times daily.   8/25/2024    tacrolimus (PROGRAF) 0.5 MG Cap Take 1.5 mg by mouth 2 (two) times a day.   8/25/2024    nitroGLYCERIN (NITROSTAT) 0.4 MG SL tablet DISSOLVE 1 TABLET UNDER THE TONGUE EVERY 5 MINUTES AS NEEDED FOR CHEST PAIN. DO " NOT EXCEED A TOTAL OF 3 DOSES IN 15 MINUTES.            Potential issues to be addressed PRIOR TO DISCHARGE  No issues identified.    Mel Sanders  Pharmacy [facility] Specialist - Medication History  EXT. [phone]    .

## 2024-08-26 NOTE — ASSESSMENT & PLAN NOTE
Sec to diabetic nephropathy and HTN.   Required dialysis from 7785-4437.  Received transplant in 2016 but unfortunately the allograft has been failing.  Resume immunosuppressives.   Follows at [facility] and Dr. Blount.

## 2024-08-26 NOTE — ASSESSMENT & PLAN NOTE
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 Baseline creatinine is  3 . Most recent creatinine and eGFR are listed below.  Recent Labs     08/26/24  0927   CREATININE 4.48*   EGFRNORACEVR 10*      Plan  - KYLER is worsening. Will adjust treatment as follows: IV diuresis  - Avoid nephrotoxins and renally dose meds for GFR listed above  - Monitor urine output, serial BMP, and adjust therapy as needed  - Nephrology consulted.

## 2024-08-26 NOTE — ASSESSMENT & PLAN NOTE
"Patient's FSGs are controlled on current medication regimen.  Last A1c reviewed-   Lab Results   Component Value Date    HGBA1C 6.9 (H) 05/14/2024     Most recent fingerstick glucose reviewed- No results for input(s): "POCTGLUCOSE" in the last 24 hours.  Current correctional scale  Low  Maintain anti-hyperglycemic dose as follows-   Antihyperglycemics (From admission, onward)      Start     Stop Route Frequency Ordered    08/26/24 2100  insulin glargine U-100 (Lantus) injection 5 Units         -- SubQ Nightly 08/26/24 1308          Hold Oral hypoglycemics while patient is in the hospital.  "

## 2024-08-26 NOTE — ASSESSMENT & PLAN NOTE
"Patient is identified as having Diastolic (HFpEF) heart failure that is Acute on chronic. CHF is currently uncontrolled due to Continued edema of extremities and Weight gain of 5 pounds. Latest ECHO performed and demonstrates- Results for orders placed during the hospital encounter of 08/13/24    Echo    Interpretation Summary    Left Ventricle: The left ventricle is normal in size. Mildly increased wall thickness. There is mild concentric hypertrophy. There is hyperdynamic systolic function. Ejection fraction by visual approximation is 65%.    Right Ventricle: Normal right ventricular cavity size. Systolic function is normal.    Right Atrium: Right atrium is mildly dilated.    Aortic Valve: The aortic valve is a trileaflet valve. Mildly calcified cusps.    Mitral Valve: There is mild bileaflet sclerosis. Mildly thickened leaflets.    Tricuspid Valve: There is mild regurgitation with an eccentrically directed jet.    IVC/SVC: Normal venous pressure at 3 mmHg.  . Continue diuretics, and monitor clinical status closely. Monitor on telemetry. Patient is off CHF pathway.  Monitor strict Is&Os and daily weights.  Place on fluid restriction of 1.5 L. Cardiology has not been consulted. Continue to stress to patient importance of self efficacy and  on diet for CHF. Last BNP reviewed- and noted below No results for input(s): "BNP", "BNPTRIAGEBLO" in the last 168 hours.  "

## 2024-08-26 NOTE — SUBJECTIVE & OBJECTIVE
"Past Medical History:   Diagnosis Date    Amputation of one or more toes     2 TOES RT FOOT, 3 TOES LFT FOOT    Atherosclerotic heart disease of native coronary artery with angina pectoris 01/20/2020    CVA (cerebral vascular accident)     Depression     Diabetes mellitus, type 2     Disorder of kidney and ureter     History of carpal tunnel surgery of left wrist     History of carpal tunnel surgery of right wrist     History of repair of left rotator cuff     History of repair of right rotator cuff     Hyperlipidemia     Hypertension     Hypokalemia     Kidney transplant status 07/13/2020    Osteoporosis 07/22/2020    Proliferative diabetic retinopathy of both eyes 09/07/2023    Stroke        Past Surgical History:   Procedure Laterality Date    Appendix      EXTRACTION OF TOOTH Left 08/11/2021    HYSTERECTOMY      kidney transplant 2016      Have had issues since transplant, 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     Current Outpatient Medications on File Prior to Encounter   Medication Sig    albuterol (VENTOLIN HFA) 90 mcg/actuation inhaler Inhale 2 puffs into the lungs every 6 (six) hours as needed for Wheezing. Rescue    aspirin (ECOTRIN) 81 MG EC tablet Take 81 mg by mouth once daily.    bumetanide (BUMEX) 2 MG tablet Take 1 tablet (2 mg total) by mouth once daily.    carvediloL (COREG) 25 MG tablet Take 1 tablet (25 mg total) by mouth 2 (two) times daily.    cholestyramine (QUESTRAN) 4 gram packet Take 1 packet by mouth once daily.    cloNIDine 0.1 mg/24 hr td ptwk (CATAPRES) 0.1 mg/24 hr Place 1 patch onto the skin once a week.    docusate sodium (COLACE) 100 MG capsule " "Take 100 mg by mouth 2 (two) times daily as needed for Constipation.    ferrous sulfate (FEOSOL) 325 mg (65 mg iron) Tab tablet Take 1 tablet (325 mg total) by mouth 2 (two) times daily.    flash glucose sensor (FREESTYLE MARY 10 DAY SENSOR MISC) 1 Device by Percutaneous route.    fluticasone propionate (FLONASE) 50 mcg/actuation nasal spray 1 spray by Nasal route once daily.    hydrALAZINE (APRESOLINE) 100 MG tablet Take 100 mg by mouth 3 (three) times daily.    insulin aspart U-100 (NOVOLOG) 100 unit/mL injection Inject 3 Units into the skin 3 (three) times daily before meals. Per Dr Kayleigh Morris at [facility]    insulin degludec (TRESIBA FLEXTOUCH U-100) 100 unit/mL (3 mL) insulin pen Inject 10 Units into the skin once daily. (Patient taking differently: Inject 8 Units into the skin once daily. Per Dr Kayleigh Morris at [facility])    isosorbide mononitrate (IMDUR) 30 MG 24 hr tablet Take 30 mg by mouth once daily.    Lactobacillus acidophilus (PROBIOTIC ACIDOPHILUS ORAL) Take 4 Billion Cells by mouth.    lancets (TRUEPLUS LANCETS) 33 gauge Misc Inject 1 lancet  into the skin 3 (three) times daily before meals.    megestroL (MEGACE) 20 MG Tab TAKE ONE TABLET BY MOUTH EVERY DAY    minoxidiL (LONITEN) 2.5 MG tablet Take 2.5 mg by mouth once daily.    multivitamin (THERAGRAN) per tablet Take 1 tablet by mouth once daily.    mycophenolate sodium 180 MG TbEC Take 1 tablet by mouth 2 (two) times daily.    NIFEdipine (PROCARDIA-XL) 90 MG (OSM) 24 hr tablet Take 1 tablet (90 mg total) by mouth once daily.    pantoprazole (PROTONIX) 40 MG tablet TAKE ONE TABLET BY MOUTH EVERY MORNING    pen needle, diabetic 31 gauge x 3/16" Ndle 1 each by NOT APPLICABLE route.    potassium chloride (MICRO-K) 10 MEQ CpSR TAKE ONE CAPSULE BY MOUTH ONCE DAILY    predniSONE (DELTASONE) 5 MG tablet Take 1 tablet by mouth once daily.    sodium bicarbonate 650 MG tablet Take 1 tablet by mouth 2 (two) times daily.    tacrolimus (PROGRAF) 0.5 " MG Cap Take 1.5 mg by mouth 2 (two) times a day.    nitroGLYCERIN (NITROSTAT) 0.4 MG SL tablet DISSOLVE 1 TABLET UNDER THE TONGUE EVERY 5 MINUTES AS NEEDED FOR CHEST PAIN. DO NOT EXCEED A TOTAL OF 3 DOSES IN 15 MINUTES.     Family History       Problem Relation (Age of Onset)    Diabetes Mother, Father    Hearing loss Mother, Father    Heart disease Mother, Father    Hyperlipidemia Mother, Father          Tobacco Use    Smoking status: Never     Passive exposure: Never    Smokeless tobacco: Never   Substance and Sexual Activity    Alcohol use: Never    Drug use: Never    Sexual activity: Not Currently     Partners: Male     Review of Systems   Constitutional:  Positive for unexpected weight change.   Respiratory:  Positive for shortness of breath.    Cardiovascular:  Positive for leg swelling.     Objective:     Vital Signs (Most Recent):  Temp: 97.7 °F (36.5 °C) (08/26/24 0842)  Pulse: 74 (08/26/24 1317)  Resp: 20 (08/26/24 1317)  BP: (!) 175/57 (08/26/24 1306)  SpO2: 100 % (08/26/24 1317) Vital Signs (24h Range):  Temp:  [97.7 °F (36.5 °C)] 97.7 °F (36.5 °C)  Pulse:  [71-74] 74  Resp:  [15-25] 20  SpO2:  [99 %-100 %] 100 %  BP: (152-181)/(50-70) 175/57     Weight: 66.2 kg (146 lb)  Body mass index is 25.06 kg/m².     Physical Exam  Vitals and nursing note reviewed.   Constitutional:       Appearance: She is ill-appearing.   HENT:      Head: Normocephalic and atraumatic.      Mouth/Throat:      Mouth: Mucous membranes are moist.   Eyes:      Extraocular Movements: Extraocular movements intact.      Pupils: Pupils are equal, round, and reactive to light.   Cardiovascular:      Rate and Rhythm: Normal rate and regular rhythm.   Pulmonary:      Effort: Pulmonary effort is normal.      Breath sounds: Wheezing present.   Abdominal:      General: Bowel sounds are normal.      Palpations: Abdomen is soft.   Musculoskeletal:         General: Normal range of motion.      Cervical back: Normal range of motion and neck  supple.      Right lower leg: Edema present.      Left lower leg: Edema present.   Neurological:      General: No focal deficit present.      Mental Status: She is alert and oriented to person, place, and time. Mental status is at baseline.   Psychiatric:         Mood and Affect: Mood normal.         Behavior: Behavior normal.              CRANIAL NERVES     CN III, IV, VI   Pupils are equal, round, and reactive to light.       Significant Labs: All pertinent labs within the past 24 hours have been reviewed.    Significant Imaging: I have reviewed all pertinent imaging results/findings within the past 24 hours.

## 2024-08-26 NOTE — CONSULTS
[facility] - [facility] Telemetry  Nephrology  Consult Note    Patient Name: Nasrin Grant  MRN: [id number]  Admission Date: 8/26/2024  Hospital Length of Stay: 0 days  Attending Provider: Jose Atwood MD   Primary Care Physician: Laury Enrique FNP  Principal Problem:Acute renal failure with acute tubular necrosis superimposed on stage 4 chronic kidney disease    Consults  Subjective:     HPI: This patient with history of ESRD who has a had a renal transplant since 2016.  The patient has continued to have increase in serum creatinine.  She has had increased lower leg swelling and increased shortness of breath.  The patient has had several hospitalizations for increased shortness of breath.  She mentions that over the past several days the swelling has continued to worsen despite taking diuretics.  Serum creatinine is 4.48.    Nephrology is consulted for renal issues.        Past Medical History:   Diagnosis Date    Amputation of one or more toes     2 TOES RT FOOT, 3 TOES LFT FOOT    Atherosclerotic heart disease of native coronary artery with angina pectoris 01/20/2020    CVA (cerebral vascular accident)     Depression     Diabetes mellitus, type 2     Disorder of kidney and ureter     History of carpal tunnel surgery of left wrist     History of carpal tunnel surgery of right wrist     History of repair of left rotator cuff     History of repair of right rotator cuff     Hyperlipidemia     Hypertension     Hypokalemia     Kidney transplant status 07/13/2020    Osteoporosis 07/22/2020    Proliferative diabetic retinopathy of both eyes 09/07/2023    Stroke        Past Surgical History:   Procedure Laterality Date    Appendix      EXTRACTION OF TOOTH Left 08/11/2021    HYSTERECTOMY      kidney transplant 2016      Have had issues since transplant, kidney had a virus per patient    OOPHORECTOMY      TOE AMPUTATION         Review of patient's allergies indicates:   Allergen Reactions     "Hydrocodone-acetaminophen Rash    Gabapentin Other (See Comments)     Made her disoriented  "out of my head"      Morphine Itching    Opioids - morphine analogues      Current Facility-Administered Medications   Medication Frequency    acetaminophen tablet 650 mg Q4H PRN    albuterol nebulizer solution 2.5 mg Q6H    aspirin EC tablet 81 mg Daily    bumetanide injection 2 mg BID    carvediloL tablet 25 mg BID    cholestyramine-aspartame 4 grams of anhydrous packet 4 grams of anhydrous cholestyramine BID    cloNIDine 0.1 mg/24 hr td ptwk 1 patch Weekly    docusate sodium capsule 100 mg BID PRN    ferrous sulfate tablet 1 each Daily    heparin (porcine) injection 5,000 Units Q8H    hydrALAZINE tablet 100 mg TID    insulin glargine U-100 (Lantus) injection 5 Units QHS    isosorbide mononitrate 24 hr tablet 30 mg Daily    Lactobacillus acidophilus capsule 1 capsule TID WM    megestroL tablet 20 mg Daily    minoxidiL tablet 2.5 mg Daily    multivitamin tablet Daily    mycophenolate sodium EC tablet 180 mg BID    naloxone 0.4 mg/mL injection 0.02 mg PRN    NIFEdipine 24 hr tablet 90 mg Daily    nitroGLYCERIN SL tablet 0.4 mg Q5 Min PRN    ondansetron injection 4 mg Q8H PRN    [START ON 8/27/2024] predniSONE tablet 5 mg Daily    sodium bicarbonate tablet 650 mg BID    sodium chloride 0.9% flush 10 mL Q12H PRN    tacrolimus capsule 1.5 mg BID     Family History       Problem Relation (Age of Onset)    Diabetes Mother, Father    Hearing loss Mother, Father    Heart disease Mother, Father    Hyperlipidemia Mother, Father          Tobacco Use    Smoking status: Never     Passive exposure: Never    Smokeless tobacco: Never   Substance and Sexual Activity    Alcohol use: Never    Drug use: Never    Sexual activity: Not Currently     Partners: Male     Review of Systems   All other systems reviewed and are negative.    Objective:     Vital Signs (Most Recent):  Temp: 97.5 °F (36.4 °C) (08/26/24 1634)  Pulse: 78 (08/26/24 " 1634)  Resp: 18 (08/26/24 1634)  BP: (!) 152/72 (08/26/24 1700)  SpO2: 96 % (08/26/24 1634) Vital Signs (24h Range):  Temp:  [97.5 °F (36.4 °C)-98.3 °F (36.8 °C)] 97.5 °F (36.4 °C)  Pulse:  [71-78] 78  Resp:  [15-25] 18  SpO2:  [96 %-100 %] 96 %  BP: (152-196)/(44-72) 152/72     Weight: 66.2 kg (146 lb) (08/26/24 0842)  Body mass index is 25.06 kg/m².  Body surface area is 1.73 meters squared.    No intake/output data recorded.     Physical Exam  Vitals reviewed.   HENT:      Head: Normocephalic and atraumatic.   Eyes:      Pupils: Pupils are equal, round, and reactive to light.   Cardiovascular:      Rate and Rhythm: Regular rhythm.   Pulmonary:      Breath sounds: Rhonchi and rales present.   Abdominal:      Palpations: Abdomen is soft.   Musculoskeletal:         General: Swelling present.      Cervical back: Neck supple.      Right lower leg: Edema present.      Left lower leg: Edema present.   Skin:     General: Skin is dry.   Neurological:      Mental Status: She is alert.   Psychiatric:         Mood and Affect: Mood normal.          Significant Labs:  BMP:   Recent Labs   Lab 08/26/24  0927   *      K 4.6   *   CO2 21   BUN 97*   CREATININE 4.48*   CALCIUM 9.6     CBC:   Recent Labs   Lab 08/26/24 0927   WBC 6.52   RBC 3.56*   HGB 9.2*   HCT 29.1*   *   MCV 81.7   MCH 25.8*   MCHC 31.6*       Significant Imaging:  Labs: Reviewed  Assessment/Plan:     Renal/  End-stage renal failure with renal transplant  Progressive renal failure    Anasarca associated with disorder of kidney  Increased swelling.    Renal function appears to be failing.    History of kidney transplant  Serum creatinine is 4.48.  Continued decline in renal function  BMP and UA in AM.  Will discuss with [facility] transplant center    Endocrine  Diabetes mellitus type II, controlled  Chronic condition    BMP in AM  Hepatitis panel  Protect left arm  Lasix 100mg IV q 8 hours    Thank you for your consult. I will follow-up  with patient. Please contact us if you have any additional questions.    Oswald Blount Jr, MD  Nephrology  [facility] - [address] [place] [address]

## 2024-08-26 NOTE — SUBJECTIVE & OBJECTIVE
"Past Medical History:   Diagnosis Date    Amputation of one or more toes     2 TOES RT FOOT, 3 TOES LFT FOOT    Atherosclerotic heart disease of native coronary artery with angina pectoris 01/20/2020    CVA (cerebral vascular accident)     Depression     Diabetes mellitus, type 2     Disorder of kidney and ureter     History of carpal tunnel surgery of left wrist     History of carpal tunnel surgery of right wrist     History of repair of left rotator cuff     History of repair of right rotator cuff     Hyperlipidemia     Hypertension     Hypokalemia     Kidney transplant status 07/13/2020    Osteoporosis 07/22/2020    Proliferative diabetic retinopathy of both eyes 09/07/2023    Stroke        Past Surgical History:   Procedure Laterality Date    Appendix      EXTRACTION OF TOOTH Left 08/11/2021    HYSTERECTOMY      kidney transplant 2016      Have had issues since transplant, kidney had a virus per patient    OOPHORECTOMY      TOE AMPUTATION         Review of patient's allergies indicates:   Allergen Reactions    Hydrocodone-acetaminophen Rash    Gabapentin Other (See Comments)     Made her disoriented  "out of my head"      Morphine Itching    Opioids - morphine analogues      Current Facility-Administered Medications   Medication Frequency    acetaminophen tablet 650 mg Q4H PRN    albuterol nebulizer solution 2.5 mg Q6H    aspirin EC tablet 81 mg Daily    bumetanide injection 2 mg BID    carvediloL tablet 25 mg BID    cholestyramine-aspartame 4 grams of anhydrous packet 4 grams of anhydrous cholestyramine BID    cloNIDine 0.1 mg/24 hr td ptwk 1 patch Weekly    docusate sodium capsule 100 mg BID PRN    ferrous sulfate tablet 1 each Daily    heparin (porcine) injection 5,000 Units Q8H    hydrALAZINE tablet 100 mg TID    insulin glargine U-100 (Lantus) injection 5 Units QHS    isosorbide mononitrate 24 hr tablet 30 mg Daily    Lactobacillus acidophilus capsule 1 capsule TID WM    megestroL tablet 20 mg Daily    " minoxidiL tablet 2.5 mg Daily    multivitamin tablet Daily    mycophenolate sodium EC tablet 180 mg BID    naloxone 0.4 mg/mL injection 0.02 mg PRN    NIFEdipine 24 hr tablet 90 mg Daily    nitroGLYCERIN SL tablet 0.4 mg Q5 Min PRN    ondansetron injection 4 mg Q8H PRN    [START ON 8/27/2024] predniSONE tablet 5 mg Daily    sodium bicarbonate tablet 650 mg BID    sodium chloride 0.9% flush 10 mL Q12H PRN    tacrolimus capsule 1.5 mg BID     Family History       Problem Relation (Age of Onset)    Diabetes Mother, Father    Hearing loss Mother, Father    Heart disease Mother, Father    Hyperlipidemia Mother, Father          Tobacco Use    Smoking status: Never     Passive exposure: Never    Smokeless tobacco: Never   Substance and Sexual Activity    Alcohol use: Never    Drug use: Never    Sexual activity: Not Currently     Partners: Male     Review of Systems   All other systems reviewed and are negative.    Objective:     Vital Signs (Most Recent):  Temp: 97.5 °F (36.4 °C) (08/26/24 1634)  Pulse: 78 (08/26/24 1634)  Resp: 18 (08/26/24 1634)  BP: (!) 152/72 (08/26/24 1700)  SpO2: 96 % (08/26/24 1634) Vital Signs (24h Range):  Temp:  [97.5 °F (36.4 °C)-98.3 °F (36.8 °C)] 97.5 °F (36.4 °C)  Pulse:  [71-78] 78  Resp:  [15-25] 18  SpO2:  [96 %-100 %] 96 %  BP: (152-196)/(44-72) 152/72     Weight: 66.2 kg (146 lb) (08/26/24 0842)  Body mass index is 25.06 kg/m².  Body surface area is 1.73 meters squared.    No intake/output data recorded.     Physical Exam  Vitals reviewed.   HENT:      Head: Normocephalic and atraumatic.   Eyes:      Pupils: Pupils are equal, round, and reactive to light.   Cardiovascular:      Rate and Rhythm: Regular rhythm.   Pulmonary:      Breath sounds: Rhonchi and rales present.   Abdominal:      Palpations: Abdomen is soft.   Musculoskeletal:         General: Swelling present.      Cervical back: Neck supple.      Right lower leg: Edema present.      Left lower leg: Edema present.   Skin:      General: Skin is dry.   Neurological:      Mental Status: She is alert.   Psychiatric:         Mood and Affect: Mood normal.          Significant Labs:  BMP:   Recent Labs   Lab 08/26/24  0927   *      K 4.6   *   CO2 21   BUN 97*   CREATININE 4.48*   CALCIUM 9.6     CBC:   Recent Labs   Lab 08/26/24 0927   WBC 6.52   RBC 3.56*   HGB 9.2*   HCT 29.1*   *   MCV 81.7   MCH 25.8*   MCHC 31.6*       Significant Imaging:  Labs: Reviewed

## 2024-08-26 NOTE — H&P
[facility] - [address] [place] [address] Medicine  History & Physical    Patient Name: Nasrin Grant  MRN: [id number]  Patient Class: IP- Inpatient  Admission Date: 8/26/2024  Attending Physician: Jose Atwood MD   Primary Care Provider: Laury Enrique FNP         Patient information was obtained from patient, past medical records, and ER records.     Subjective:     Principal Problem:Acute renal failure with acute tubular necrosis superimposed on stage 4 chronic kidney disease    Chief Complaint:   Chief Complaint   Patient presents with    Leg Swelling    Shortness of Breath     Pt reports leg swelling and SOB since Friday        HPI: Ms. Grant is a 74 Y O female with PMH of ESRD s/p renal transplant in 2016, CAD s/p PCI, HFpEF who presented to ED with worsening shortness of breath and leg swelling along with weight gain. 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 She has underlying CKD stage 4, follows with Dr. Blount. She was just hospitalized at [facility] and discharged a week ago with similar issue. She required a few days of IV diuresis which resulted in improved volume status. She was also hospitalized at [facility] last month for similar issue. She reports compliance with her home dose of Bumex and compliance with dietary intake. She denies chest pain, fever, chills, cough, nausea or vomiting. She denies hematuria.       Past Medical History:   Diagnosis Date    Amputation of one or more toes     2 TOES RT FOOT, 3 TOES LFT FOOT    Atherosclerotic heart disease of native coronary artery with angina pectoris 01/20/2020    CVA (cerebral vascular accident)     Depression     Diabetes mellitus, type 2     Disorder of kidney and ureter     History of carpal tunnel surgery of left wrist     History of carpal tunnel surgery of right wrist     History of repair of left rotator cuff     History of repair of right rotator cuff      "Hyperlipidemia     Hypertension     Hypokalemia     Kidney transplant status 07/13/2020    Osteoporosis 07/22/2020    Proliferative diabetic retinopathy of both eyes 09/07/2023    Stroke        Past Surgical History:   Procedure Laterality Date    Appendix      EXTRACTION OF TOOTH Left 08/11/2021    HYSTERECTOMY      kidney transplant 2016      Have had issues since transplant, 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     Current Outpatient Medications on File Prior to Encounter   Medication Sig    albuterol (VENTOLIN HFA) 90 mcg/actuation inhaler Inhale 2 puffs into the lungs every 6 (six) hours as needed for Wheezing. Rescue    aspirin (ECOTRIN) 81 MG EC tablet Take 81 mg by mouth once daily.    bumetanide (BUMEX) 2 MG tablet Take 1 tablet (2 mg total) by mouth once daily.    carvediloL (COREG) 25 MG tablet Take 1 tablet (25 mg total) by mouth 2 (two) times daily.    cholestyramine (QUESTRAN) 4 gram packet Take 1 packet by mouth once daily.    cloNIDine 0.1 mg/24 hr td ptwk (CATAPRES) 0.1 mg/24 hr Place 1 patch onto the skin once a week.    docusate sodium (COLACE) 100 MG capsule Take 100 mg by mouth 2 (two) times daily as needed for Constipation.    ferrous sulfate (FEOSOL) 325 mg (65 mg iron) Tab tablet Take 1 tablet (325 mg total) by mouth 2 (two) times daily.    flash glucose sensor (FREESTYLE MARY 10 DAY SENSOR MISC) 1 Device by Percutaneous route.    fluticasone propionate (FLONASE) 50 mcg/actuation nasal spray 1 spray by Nasal route once daily.    hydrALAZINE (APRESOLINE) 100 MG tablet Take 100 mg by mouth 3 (three) times daily.    insulin aspart U-100 (NOVOLOG) 100 " "unit/mL injection Inject 3 Units into the skin 3 (three) times daily before meals. Per Dr Kayleigh Morris at [facility]    insulin degludec (TRESIBA FLEXTOUCH U-100) 100 unit/mL (3 mL) insulin pen Inject 10 Units into the skin once daily. (Patient taking differently: Inject 8 Units into the skin once daily. Per Dr Kayleigh Morris at [facility])    isosorbide mononitrate (IMDUR) 30 MG 24 hr tablet Take 30 mg by mouth once daily.    Lactobacillus acidophilus (PROBIOTIC ACIDOPHILUS ORAL) Take 4 Billion Cells by mouth.    lancets (TRUEPLUS LANCETS) 33 gauge Misc Inject 1 lancet  into the skin 3 (three) times daily before meals.    megestroL (MEGACE) 20 MG Tab TAKE ONE TABLET BY MOUTH EVERY DAY    minoxidiL (LONITEN) 2.5 MG tablet Take 2.5 mg by mouth once daily.    multivitamin (THERAGRAN) per tablet Take 1 tablet by mouth once daily.    mycophenolate sodium 180 MG TbEC Take 1 tablet by mouth 2 (two) times daily.    NIFEdipine (PROCARDIA-XL) 90 MG (OSM) 24 hr tablet Take 1 tablet (90 mg total) by mouth once daily.    pantoprazole (PROTONIX) 40 MG tablet TAKE ONE TABLET BY MOUTH EVERY MORNING    pen needle, diabetic 31 gauge x 3/16" Ndle 1 each by NOT APPLICABLE route.    potassium chloride (MICRO-K) 10 MEQ CpSR TAKE ONE CAPSULE BY MOUTH ONCE DAILY    predniSONE (DELTASONE) 5 MG tablet Take 1 tablet by mouth once daily.    sodium bicarbonate 650 MG tablet Take 1 tablet by mouth 2 (two) times daily.    tacrolimus (PROGRAF) 0.5 MG Cap Take 1.5 mg by mouth 2 (two) times a day.    nitroGLYCERIN (NITROSTAT) 0.4 MG SL tablet DISSOLVE 1 TABLET UNDER THE TONGUE EVERY 5 MINUTES AS NEEDED FOR CHEST PAIN. DO NOT EXCEED A TOTAL OF 3 DOSES IN 15 MINUTES.     Family History       Problem Relation (Age of Onset)    Diabetes Mother, Father    Hearing loss Mother, Father    Heart disease Mother, Father    Hyperlipidemia Mother, Father          Tobacco Use    Smoking status: Never     Passive exposure: Never    Smokeless tobacco: Never "   Substance and Sexual Activity    Alcohol use: Never    Drug use: Never    Sexual activity: Not Currently     Partners: Male     Review of Systems   Constitutional:  Positive for unexpected weight change.   Respiratory:  Positive for shortness of breath.    Cardiovascular:  Positive for leg swelling.     Objective:     Vital Signs (Most Recent):  Temp: 97.7 °F (36.5 °C) (08/26/24 0842)  Pulse: 74 (08/26/24 1317)  Resp: 20 (08/26/24 1317)  BP: (!) 175/57 (08/26/24 1306)  SpO2: 100 % (08/26/24 1317) Vital Signs (24h Range):  Temp:  [97.7 °F (36.5 °C)] 97.7 °F (36.5 °C)  Pulse:  [71-74] 74  Resp:  [15-25] 20  SpO2:  [99 %-100 %] 100 %  BP: (152-181)/(50-70) 175/57     Weight: 66.2 kg (146 lb)  Body mass index is 25.06 kg/m².     Physical Exam  Vitals and nursing note reviewed.   Constitutional:       Appearance: She is ill-appearing.   HENT:      Head: Normocephalic and atraumatic.      Mouth/Throat:      Mouth: Mucous membranes are moist.   Eyes:      Extraocular Movements: Extraocular movements intact.      Pupils: Pupils are equal, round, and reactive to light.   Cardiovascular:      Rate and Rhythm: Normal rate and regular rhythm.   Pulmonary:      Effort: Pulmonary effort is normal.      Breath sounds: Wheezing present.   Abdominal:      General: Bowel sounds are normal.      Palpations: Abdomen is soft.   Musculoskeletal:         General: Normal range of motion.      Cervical back: Normal range of motion and neck supple.      Right lower leg: Edema present.      Left lower leg: Edema present.   Neurological:      General: No focal deficit present.      Mental Status: She is alert and oriented to person, place, and time. Mental status is at baseline.   Psychiatric:         Mood and Affect: Mood normal.         Behavior: Behavior normal.              CRANIAL NERVES     CN III, IV, VI   Pupils are equal, round, and reactive to light.       Significant Labs: All pertinent labs within the past 24 hours have been  reviewed.    Significant Imaging: I have reviewed all pertinent imaging results/findings within the past 24 hours.  Assessment/Plan:     * Acute renal failure with acute tubular necrosis superimposed on stage 4 chronic kidney disease  KYLER is likely due to acute tubular necrosis caused by progressing CKD/allograft failure after renal transplant . Baseline creatinine is  3 . Most recent creatinine and eGFR are listed below.  Recent Labs     08/26/24  0927   CREATININE 4.48*   EGFRNORACEVR 10*      Plan  - KYLER is worsening. Will adjust treatment as follows: IV diuresis  - Avoid nephrotoxins and renally dose meds for GFR listed above  - Monitor urine output, serial BMP, and adjust therapy as needed  - Nephrology consulted.     Anasarca associated with disorder of kidney  IV diuresis started.   Strict I and O, daily weight.       Acute on chronic heart failure with preserved ejection fraction  Patient is identified as having Diastolic (HFpEF) heart failure that is Acute on chronic. CHF is currently uncontrolled due to Continued edema of extremities and Weight gain of 5 pounds. Latest ECHO performed and demonstrates- Results for orders placed during the hospital encounter of 08/13/24    Echo    Interpretation Summary    Left Ventricle: The left ventricle is normal in size. Mildly increased wall thickness. There is mild concentric hypertrophy. There is hyperdynamic systolic function. Ejection fraction by visual approximation is 65%.    Right Ventricle: Normal right ventricular cavity size. Systolic function is normal.    Right Atrium: Right atrium is mildly dilated.    Aortic Valve: The aortic valve is a trileaflet valve. Mildly calcified cusps.    Mitral Valve: There is mild bileaflet sclerosis. Mildly thickened leaflets.    Tricuspid Valve: There is mild regurgitation with an eccentrically directed jet.    IVC/SVC: Normal venous pressure at 3 mmHg.  . Continue diuretics, and monitor clinical status closely. Monitor on  "telemetry. Patient is off CHF pathway.  Monitor strict Is&Os and daily weights.  Place on fluid restriction of 1.5 L. Cardiology has not been consulted. Continue to stress to patient importance of self efficacy and  on diet for CHF. Last BNP reviewed- and noted below No results for input(s): "BNP", "BNPTRIAGEBLO" in the last 168 hours.    End-stage renal failure with renal transplant  Sec to diabetic nephropathy and HTN.   Required dialysis from 0935-1109.  Received transplant in 2016 but unfortunately the allograft has been failing.  Resume immunosuppressives.   Follows at [facility] and Dr. Blount.       Essential hypertension  Chronic, uncontrolled. Latest blood pressure and vitals reviewed-     Temp:  [97.7 °F (36.5 °C)]   Pulse:  [71-74]   Resp:  [15-25]   BP: (152-181)/(50-70)   SpO2:  [99 %-100 %] .   Home meds for hypertension were reviewed and noted below.   Hypertension Medications               bumetanide (BUMEX) 2 MG tablet Take 1 tablet (2 mg total) by mouth once daily.    carvediloL (COREG) 25 MG tablet Take 1 tablet (25 mg total) by mouth 2 (two) times daily.    cloNIDine 0.1 mg/24 hr td ptwk (CATAPRES) 0.1 mg/24 hr Place 1 patch onto the skin once a week.    hydrALAZINE (APRESOLINE) 100 MG tablet Take 100 mg by mouth 3 (three) times daily.    isosorbide mononitrate (IMDUR) 30 MG 24 hr tablet Take 30 mg by mouth once daily.    minoxidiL (LONITEN) 2.5 MG tablet Take 2.5 mg by mouth once daily.    NIFEdipine (PROCARDIA-XL) 90 MG (OSM) 24 hr tablet Take 1 tablet (90 mg total) by mouth once daily.    nitroGLYCERIN (NITROSTAT) 0.4 MG SL tablet DISSOLVE 1 TABLET UNDER THE TONGUE EVERY 5 MINUTES AS NEEDED FOR CHEST PAIN. DO NOT EXCEED A TOTAL OF 3 DOSES IN 15 MINUTES.            While in the hospital, will manage blood pressure as follows; Continue home antihypertensive regimen    Will utilize p.r.n. blood pressure medication only if patient's blood pressure greater than 180/110 and she develops symptoms " "such as worsening chest pain or shortness of breath.    Diabetes mellitus type II, controlled  Patient's FSGs are controlled on current medication regimen.  Last A1c reviewed-   Lab Results   Component Value Date    HGBA1C 6.9 (H) 05/14/2024     Most recent fingerstick glucose reviewed- No results for input(s): "POCTGLUCOSE" in the last 24 hours.  Current correctional scale  Low  Maintain anti-hyperglycemic dose as follows-   Antihyperglycemics (From admission, onward)      Start     Stop Route Frequency Ordered    08/26/24 2100  insulin glargine U-100 (Lantus) injection 5 Units         -- SubQ Nightly 08/26/24 1308          Hold Oral hypoglycemics while patient is in the hospital.    Gastroesophageal reflux disease without esophagitis  Resume home oral PPI.         VTE Risk Mitigation (From admission, onward)           Ordered     heparin (porcine) injection 5,000 Units  Every 8 hours         08/26/24 1235     IP VTE HIGH RISK PATIENT  Once         08/26/24 1235     Place sequential compression device  Until discontinued         08/26/24 1235                                    LORETTA ESCALERA MD  Department of Hospital Medicine  [facility] - [address] [place] [address] Telemetry          "

## 2024-08-26 NOTE — ASSESSMENT & PLAN NOTE
Serum creatinine is 4.48.  Continued decline in renal function  BMP and UA in AM.  Will discuss with [facility] transplant center

## 2024-08-26 NOTE — ED PROVIDER NOTES
"Encounter Date: 8/26/2024       History     Chief Complaint   Patient presents with    Leg Swelling    Shortness of Breath     Pt reports leg swelling and SOB since Friday     Patient comes in with history of having a renal failure with a kidney transplant she used to be on dialysis.  She has developed episodes of shortness breath and being fluid overloaded.  She recently was hospitalized last week by Dr. Banda for fluid overload she comes in with similar symptoms shortness for breath and swelling lower extremities        Review of patient's allergies indicates:   Allergen Reactions    Hydrocodone-acetaminophen Rash    Gabapentin Other (See Comments)     Made her disoriented  "out of my head"      Morphine Itching    Opioids - morphine analogues      Past Medical History:   Diagnosis Date    Amputation of one or more toes     2 TOES RT FOOT, 3 TOES LFT FOOT    Atherosclerotic heart disease of native coronary artery with angina pectoris 01/20/2020    CVA (cerebral vascular accident)     Depression     Diabetes mellitus, type 2     Disorder of kidney and ureter     History of carpal tunnel surgery of left wrist     History of carpal tunnel surgery of right wrist     History of repair of left rotator cuff     History of repair of right rotator cuff     Hyperlipidemia     Hypertension     Hypokalemia     Kidney transplant status 07/13/2020    Osteoporosis 07/22/2020    Proliferative diabetic retinopathy of both eyes 09/07/2023    Stroke      Past Surgical History:   Procedure Laterality Date    Appendix      EXTRACTION OF TOOTH Left 08/11/2021    HYSTERECTOMY      kidney transplant 2016      Have had issues since transplant, kidney had a virus per patient    OOPHORECTOMY      TOE AMPUTATION       Family History   Problem Relation Name Age of Onset    Diabetes Mother      Hyperlipidemia Mother      Heart disease Mother      Hearing loss Mother      Diabetes Father      Hearing loss Father      Heart disease Father      " Hyperlipidemia Father       Social History     Tobacco Use    Smoking status: Never     Passive exposure: Never    Smokeless tobacco: Never   Substance Use Topics    Alcohol use: Never    Drug use: Never     Review of Systems   Constitutional: Negative.  Negative for fever.   HENT: Negative.  Negative for sore throat.    Eyes: Negative.    Respiratory:  Positive for shortness of breath.    Cardiovascular: Negative.  Negative for chest pain.   Gastrointestinal: Negative.  Negative for nausea.   Endocrine: Negative.    Genitourinary: Negative.  Negative for dysuria.   Musculoskeletal: Negative.  Negative for back pain.   Skin: Negative.  Negative for rash.   Allergic/Immunologic: Negative.    Neurological: Negative.  Negative for weakness.   Hematological: Negative.  Does not bruise/bleed easily.   Psychiatric/Behavioral: Negative.     All other systems reviewed and are negative.      Physical Exam     Initial Vitals   BP Pulse Resp Temp SpO2   08/26/24 0843 08/26/24 0842 08/26/24 0842 08/26/24 0842 08/26/24 0842   (!) 166/50 72 15 97.7 °F (36.5 °C) 100 %      MAP       --                Physical Exam    Constitutional: She appears well-developed and well-nourished.   HENT:   Head: Normocephalic and atraumatic.   Right Ear: External ear normal.   Left Ear: External ear normal.   Nose: Nose normal.   Mouth/Throat: Oropharynx is clear and moist.   Eyes: Conjunctivae and EOM are normal. Pupils are equal, round, and reactive to light.   Neck: Neck supple.   Normal range of motion.  Cardiovascular:  Normal rate, regular rhythm, normal heart sounds and intact distal pulses.           Pulmonary/Chest: Breath sounds normal.   Abdominal: Abdomen is soft. Bowel sounds are normal.   Genitourinary:    Vagina and uterus normal.     Musculoskeletal:         General: Edema present. Normal range of motion.      Cervical back: Normal range of motion and neck supple.     Neurological: She is alert and oriented to person, place, and  time. She has normal strength and normal reflexes.   Skin: Skin is warm. Capillary refill takes less than 2 seconds.   Psychiatric: She has a normal mood and affect. Her behavior is normal. Judgment and thought content normal.         Medical Screening Exam   See Full Note    ED Course   Procedures  Labs Reviewed   COMPREHENSIVE METABOLIC PANEL - Abnormal       Result Value    Sodium 136      Potassium 4.6      Chloride 109 (*)     CO2 21      Anion Gap 11      Glucose 173 (*)     BUN 97 (*)     Creatinine 4.48 (*)     BUN/Creatinine Ratio 22 (*)     Calcium 9.6      Total Protein 6.4      Albumin 3.8      Globulin 2.6      A/G Ratio 1.5      Bilirubin, Total 0.4      Alk Phos 55      ALT 38      AST 33      eGFR 10 (*)    NT-PRO NATRIURETIC PEPTIDE - Abnormal    ProBNP 12,585 (*)    CBC WITH DIFFERENTIAL - Abnormal    WBC 6.52      RBC 3.56 (*)     Hemoglobin 9.2 (*)     Hematocrit 29.1 (*)     MCV 81.7      MCH 25.8 (*)     MCHC 31.6 (*)     RDW 15.8 (*)     Platelet Count 125 (*)     MPV 12.6 (*)     Neutrophils % 67.5 (*)     Lymphocytes % 15.6 (*)     Monocytes % 13.3 (*)     Eosinophils % 2.5      Basophils % 0.8      Immature Granulocytes % 0.3      nRBC, Auto 0.0      Neutrophils, Abs 4.40      Lymphocytes, Absolute 1.02      Monocytes, Absolute 0.87 (*)     Eosinophils, Absolute 0.16      Basophils, Absolute 0.05      Immature Granulocytes, Absolute 0.02      nRBC, Absolute 0.00      Diff Type Auto     POCT GLUCOSE MONITORING CONTINUOUS - Abnormal    POC Glucose 177 (*)    TROPONIN I - Normal    Troponin I High Sensitivity 36.3     TROPONIN I - Normal    Troponin I High Sensitivity 29.1     SARS-COV-2 RNA AMPLIFICATION, QUAL - Normal    SARS COV-2 Molecular Negative      Narrative:     Negative SARS-CoV results should not be used as the sole basis for treatment or patient management decisions; negative results should be considered in the context of a patient's recent exposures, history and the presene of  clinical signs and symptoms consistent with COVID-19.  Negative results should be treated as presumptive and confirmed by molecular assay, if necessary for patient management.   CBC W/ AUTO DIFFERENTIAL    Narrative:     The following orders were created for panel order CBC auto differential.  Procedure                               Abnormality         Status                     ---------                               -----------         ------                     CBC with Differential[[id number]]       Abnormal            Final result                 Please view results for these tests on the individual orders.        ECG Results              EKG 12-lead (Final result)        Collection Time Result Time QRS Duration OHS QTC Calculation    08/26/24 08:39:46 08/26/24 19:51:19 116 383                     Final result by Interface, Lab In [facility] (08/26/24 19:51:28)                   Narrative:    Test Reason : R07.9,    Vent. Rate : 071 BPM     Atrial Rate : 000 BPM     P-R Int : 240 ms          QRS Dur : 116 ms      QT Int : 364 ms       P-R-T Axes : 127 028 252 degrees     QTc Int : 383 ms    Sinus rhythm with PVC(s) with 1st degree A-V block  Cannot rule out anteroseptal infarct - age undetermined  Possible left ventricular hypertrophy  Inferior/lateral ST-T abnormality may be due to the hypertrophy and/or  ischemia  Abnormal ECG    Confirmed by Emmett ALLRED, Rehmat U. ([id number]) on 8/26/2024 7:51:16 PM    Referred By: [email or web address]   SELF           Confirmed By:Rehmapavithra UDwayne Christine MD                                  Imaging Results              X-Ray Chest AP Portable (Final result)  Result time 08/26/24 10:05:39      Final result by Jose Keller MD (08/26/24 10:05:39)                   Impression:      Interstitial opacities bilaterally concerning for infiltrates versus edema.      Electronically signed by: Jose Keller  Date:    08/26/2024  Time:    10:05               Narrative:    EXAMINATION:  XR CHEST AP PORTABLE    CLINICAL  HISTORY:  sob;    TECHNIQUE:  Single frontal view of the chest was performed.    COMPARISON:  08/13/2024    FINDINGS:  Cardiac silhouette borderline.  The interstitial opacities are again seen more prominent in the right more than left lung base as well.  No pneumothorax.                                       Medications - No data to display  Medical Decision Making  Amount and/or Complexity of Data Reviewed  Labs: ordered.  Radiology: ordered.    Risk  Decision regarding hospitalization.                          Medical Decision Making:   Initial Assessment:   Patient comes in with history of having a renal failure with a kidney transplant she used to be on dialysis.  She has developed episodes of shortness breath and being fluid overloaded.  She recently was hospitalized last week by Dr. Banda for fluid overload she comes in with similar symptoms shortness for breath and swelling lower extremities        Differential Diagnosis:   Shortness for breath with a edema             Clinical Impression:   Final diagnoses:  [R07.9] Chest pain  [E87.70] Volume overload        ED Disposition Condition    Admit                 Guanakito Zepeda DO  08/28/24 3228

## 2024-08-26 NOTE — ASSESSMENT & PLAN NOTE
Chronic, uncontrolled. Latest blood pressure and vitals reviewed-     Temp:  [97.7 °F (36.5 °C)]   Pulse:  [71-74]   Resp:  [15-25]   BP: (152-181)/(50-70)   SpO2:  [99 %-100 %] .   Home meds for hypertension were reviewed and noted below.   Hypertension Medications               bumetanide (BUMEX) 2 MG tablet Take 1 tablet (2 mg total) by mouth once daily.    carvediloL (COREG) 25 MG tablet Take 1 tablet (25 mg total) by mouth 2 (two) times daily.    cloNIDine 0.1 mg/24 hr td ptwk (CATAPRES) 0.1 mg/24 hr Place 1 patch onto the skin once a week.    hydrALAZINE (APRESOLINE) 100 MG tablet Take 100 mg by mouth 3 (three) times daily.    isosorbide mononitrate (IMDUR) 30 MG 24 hr tablet Take 30 mg by mouth once daily.    minoxidiL (LONITEN) 2.5 MG tablet Take 2.5 mg by mouth once daily.    NIFEdipine (PROCARDIA-XL) 90 MG (OSM) 24 hr tablet Take 1 tablet (90 mg total) by mouth once daily.    nitroGLYCERIN (NITROSTAT) 0.4 MG SL tablet DISSOLVE 1 TABLET UNDER THE TONGUE EVERY 5 MINUTES AS NEEDED FOR CHEST PAIN. DO NOT EXCEED A TOTAL OF 3 DOSES IN 15 MINUTES.            While in the hospital, will manage blood pressure as follows; Continue home antihypertensive regimen    Will utilize p.r.n. blood pressure medication only if patient's blood pressure greater than 180/110 and she develops symptoms such as worsening chest pain or shortness of breath.

## 2024-08-27 LAB
ANION GAP SERPL CALCULATED.3IONS-SCNC: 13 MMOL/L (ref 7–16)
BASOPHILS # BLD AUTO: 0.04 K/UL (ref 0–0.2)
BASOPHILS NFR BLD AUTO: 0.9 % (ref 0–1)
BUN SERPL-MCNC: 97 MG/DL (ref 7–18)
BUN/CREAT SERPL: 23 (ref 6–20)
CALCIUM SERPL-MCNC: 8.7 MG/DL (ref 8.5–10.1)
CHLORIDE SERPL-SCNC: 111 MMOL/L (ref 98–107)
CO2 SERPL-SCNC: 21 MMOL/L (ref 21–32)
CREAT SERPL-MCNC: 4.14 MG/DL (ref 0.55–1.02)
DIFFERENTIAL METHOD BLD: ABNORMAL
EGFR (NO RACE VARIABLE) (RUSH/TITUS): 11 ML/MIN/1.73M2
EOSINOPHIL # BLD AUTO: 0.14 K/UL (ref 0–0.5)
EOSINOPHIL NFR BLD AUTO: 3.3 % (ref 1–4)
ERYTHROCYTE [DISTWIDTH] IN BLOOD BY AUTOMATED COUNT: 15.6 % (ref 11.5–14.5)
GLUCOSE SERPL-MCNC: 190 MG/DL (ref 70–105)
GLUCOSE SERPL-MCNC: 285 MG/DL (ref 70–105)
GLUCOSE SERPL-MCNC: 291 MG/DL (ref 70–105)
GLUCOSE SERPL-MCNC: 83 MG/DL (ref 70–105)
GLUCOSE SERPL-MCNC: 95 MG/DL (ref 74–106)
HAV IGM SER QL: NORMAL
HBV CORE IGM SER QL: NORMAL
HBV SURFACE AG SERPL QL IA: NORMAL
HCT VFR BLD AUTO: 26.7 % (ref 38–47)
HCV AB SER QL: NORMAL
HGB BLD-MCNC: 8.4 G/DL (ref 12–16)
IMM GRANULOCYTES # BLD AUTO: 0.01 K/UL (ref 0–0.04)
IMM GRANULOCYTES NFR BLD: 0.2 % (ref 0–0.4)
LYMPHOCYTES # BLD AUTO: 0.78 K/UL (ref 1–4.8)
LYMPHOCYTES NFR BLD AUTO: 18.4 % (ref 27–41)
MCH RBC QN AUTO: 25.5 PG (ref 27–31)
MCHC RBC AUTO-ENTMCNC: 31.5 G/DL (ref 32–36)
MCV RBC AUTO: 81.2 FL (ref 80–96)
MONOCYTES # BLD AUTO: 0.65 K/UL (ref 0–0.8)
MONOCYTES NFR BLD AUTO: 15.3 % (ref 2–6)
MPC BLD CALC-MCNC: 12.5 FL (ref 9.4–12.4)
NEUTROPHILS # BLD AUTO: 2.63 K/UL (ref 1.8–7.7)
NEUTROPHILS NFR BLD AUTO: 61.9 % (ref 53–65)
NRBC # BLD AUTO: 0 X10E3/UL
NRBC, AUTO (.00): 0 %
PLATELET # BLD AUTO: 126 K/UL (ref 150–400)
POTASSIUM SERPL-SCNC: 4.6 MMOL/L (ref 3.5–5.1)
RBC # BLD AUTO: 3.29 M/UL (ref 4.2–5.4)
SODIUM SERPL-SCNC: 140 MMOL/L (ref 136–145)
WBC # BLD AUTO: 4.25 K/UL (ref 4.5–11)

## 2024-08-27 PROCEDURE — 99232 SBSQ HOSP IP/OBS MODERATE 35: CPT | Mod: ,,, | Performed by: INTERNAL MEDICINE

## 2024-08-27 PROCEDURE — 25000242 PHARM REV CODE 250 ALT 637 W/ HCPCS: Performed by: INTERNAL MEDICINE

## 2024-08-27 PROCEDURE — 11000001 HC ACUTE MED/SURG PRIVATE ROOM

## 2024-08-27 PROCEDURE — 99900035 HC TECH TIME PER 15 MIN (STAT)

## 2024-08-27 PROCEDURE — 36415 COLL VENOUS BLD VENIPUNCTURE: CPT | Performed by: INTERNAL MEDICINE

## 2024-08-27 PROCEDURE — 63600175 PHARM REV CODE 636 W HCPCS: Performed by: INTERNAL MEDICINE

## 2024-08-27 PROCEDURE — 80074 ACUTE HEPATITIS PANEL: CPT | Performed by: INTERNAL MEDICINE

## 2024-08-27 PROCEDURE — 85025 COMPLETE CBC W/AUTO DIFF WBC: CPT | Performed by: INTERNAL MEDICINE

## 2024-08-27 PROCEDURE — 94761 N-INVAS EAR/PLS OXIMETRY MLT: CPT

## 2024-08-27 PROCEDURE — 80048 BASIC METABOLIC PNL TOTAL CA: CPT | Performed by: INTERNAL MEDICINE

## 2024-08-27 PROCEDURE — 94640 AIRWAY INHALATION TREATMENT: CPT

## 2024-08-27 PROCEDURE — 25000003 PHARM REV CODE 250: Performed by: INTERNAL MEDICINE

## 2024-08-27 PROCEDURE — 82962 GLUCOSE BLOOD TEST: CPT

## 2024-08-27 RX ORDER — FUROSEMIDE 10 MG/ML
100 INJECTION INTRAMUSCULAR; INTRAVENOUS EVERY 8 HOURS
Status: DISCONTINUED | OUTPATIENT
Start: 2024-08-27 | End: 2024-08-28 | Stop reason: HOSPADM

## 2024-08-27 RX ORDER — INSULIN ASPART 100 [IU]/ML
0-5 INJECTION, SOLUTION INTRAVENOUS; SUBCUTANEOUS
Status: DISCONTINUED | OUTPATIENT
Start: 2024-08-27 | End: 2024-08-28 | Stop reason: HOSPADM

## 2024-08-27 RX ADMIN — BUMETANIDE 2 MG: 0.25 INJECTION INTRAMUSCULAR; INTRAVENOUS at 09:08

## 2024-08-27 RX ADMIN — INSULIN ASPART 3 UNITS: 100 INJECTION, SOLUTION INTRAVENOUS; SUBCUTANEOUS at 08:08

## 2024-08-27 RX ADMIN — FUROSEMIDE 100 MG: 10 INJECTION, SOLUTION INTRAMUSCULAR; INTRAVENOUS at 09:08

## 2024-08-27 RX ADMIN — ALBUTEROL SULFATE 2.5 MG: 2.5 SOLUTION RESPIRATORY (INHALATION) at 01:08

## 2024-08-27 RX ADMIN — ALBUTEROL SULFATE 2.5 MG: 2.5 SOLUTION RESPIRATORY (INHALATION) at 12:08

## 2024-08-27 RX ADMIN — CHOLESTYRAMINE LIGHT 4 GRAMS OF ANHYDROUS CHOLESTYRAMINE: 4 POWDER, FOR SUSPENSION ORAL at 09:08

## 2024-08-27 RX ADMIN — HEPARIN SODIUM 5000 UNITS: 5000 INJECTION, SOLUTION INTRAVENOUS; SUBCUTANEOUS at 05:08

## 2024-08-27 RX ADMIN — NIFEDIPINE 90 MG: 60 TABLET, FILM COATED, EXTENDED RELEASE ORAL at 09:08

## 2024-08-27 RX ADMIN — ISOSORBIDE MONONITRATE 30 MG: 30 TABLET, EXTENDED RELEASE ORAL at 09:08

## 2024-08-27 RX ADMIN — MINOXIDIL 2.5 MG: 2.5 TABLET ORAL at 09:08

## 2024-08-27 RX ADMIN — MEGESTROL ACETATE 20 MG: 20 TABLET ORAL at 09:08

## 2024-08-27 RX ADMIN — SODIUM BICARBONATE 650 MG: 650 TABLET ORAL at 08:08

## 2024-08-27 RX ADMIN — CARVEDILOL 25 MG: 25 TABLET, FILM COATED ORAL at 08:08

## 2024-08-27 RX ADMIN — Medication 1 CAPSULE: at 06:08

## 2024-08-27 RX ADMIN — HEPARIN SODIUM 5000 UNITS: 5000 INJECTION, SOLUTION INTRAVENOUS; SUBCUTANEOUS at 09:08

## 2024-08-27 RX ADMIN — ALBUTEROL SULFATE 2.5 MG: 2.5 SOLUTION RESPIRATORY (INHALATION) at 07:08

## 2024-08-27 RX ADMIN — Medication 1 CAPSULE: at 09:08

## 2024-08-27 RX ADMIN — MYCOPHENOLIC ACID 180 MG: 180 TABLET, DELAYED RELEASE ORAL at 09:08

## 2024-08-27 RX ADMIN — INSULIN ASPART 3 UNITS: 100 INJECTION, SOLUTION INTRAVENOUS; SUBCUTANEOUS at 06:08

## 2024-08-27 RX ADMIN — TACROLIMUS 1.5 MG: 1 CAPSULE ORAL at 06:08

## 2024-08-27 RX ADMIN — SODIUM BICARBONATE 650 MG: 650 TABLET ORAL at 09:08

## 2024-08-27 RX ADMIN — HYDRALAZINE HYDROCHLORIDE 100 MG: 100 TABLET ORAL at 09:08

## 2024-08-27 RX ADMIN — CARVEDILOL 25 MG: 25 TABLET, FILM COATED ORAL at 09:08

## 2024-08-27 RX ADMIN — PREDNISONE 5 MG: 5 TABLET ORAL at 09:08

## 2024-08-27 RX ADMIN — HYDRALAZINE HYDROCHLORIDE 100 MG: 100 TABLET ORAL at 04:08

## 2024-08-27 RX ADMIN — Medication 1 CAPSULE: at 01:08

## 2024-08-27 RX ADMIN — TACROLIMUS 1.5 MG: 1 CAPSULE ORAL at 09:08

## 2024-08-27 RX ADMIN — CHOLESTYRAMINE LIGHT 4 GRAMS OF ANHYDROUS CHOLESTYRAMINE: 4 POWDER, FOR SUSPENSION ORAL at 08:08

## 2024-08-27 RX ADMIN — THERA TABS 1 TABLET: TAB at 09:08

## 2024-08-27 RX ADMIN — HYDRALAZINE HYDROCHLORIDE 100 MG: 100 TABLET ORAL at 08:08

## 2024-08-27 RX ADMIN — FUROSEMIDE 100 MG: 10 INJECTION, SOLUTION INTRAMUSCULAR; INTRAVENOUS at 01:08

## 2024-08-27 RX ADMIN — FERROUS SULFATE TAB 325 MG (65 MG ELEMENTAL FE) 1 EACH: 325 (65 FE) TAB at 09:08

## 2024-08-27 RX ADMIN — ASPIRIN 81 MG: 81 TABLET, COATED ORAL at 09:08

## 2024-08-27 RX ADMIN — HEPARIN SODIUM 5000 UNITS: 5000 INJECTION, SOLUTION INTRAVENOUS; SUBCUTANEOUS at 01:08

## 2024-08-27 RX ADMIN — INSULIN GLARGINE 5 UNITS: 100 INJECTION, SOLUTION SUBCUTANEOUS at 08:08

## 2024-08-27 NOTE — ASSESSMENT & PLAN NOTE
"Patient is identified as having Diastolic (HFpEF) heart failure that is Acute on chronic. CHF is currently uncontrolled due to Continued edema of extremities and Weight gain of 5 pounds. Latest ECHO performed and demonstrates- Results for orders placed during the hospital encounter of 08/13/24    Echo    Interpretation Summary    Left Ventricle: The left ventricle is normal in size. Mildly increased wall thickness. There is mild concentric hypertrophy. There is hyperdynamic systolic function. Ejection fraction by visual approximation is 65%.    Right Ventricle: Normal right ventricular cavity size. Systolic function is normal.    Right Atrium: Right atrium is mildly dilated.    Aortic Valve: The aortic valve is a trileaflet valve. Mildly calcified cusps.    Mitral Valve: There is mild bileaflet sclerosis. Mildly thickened leaflets.    Tricuspid Valve: There is mild regurgitation with an eccentrically directed jet.    IVC/SVC: Normal venous pressure at 3 mmHg.  . Continue diuretics, and monitor clinical status closely. Monitor on telemetry. Patient is off CHF pathway.  Monitor strict Is&Os and daily weights.  Place on fluid restriction of 1.5 L. Cardiology has not been consulted. Continue to stress to patient importance of self efficacy and  on diet for CHF. Last BNP reviewed- and noted below No results for input(s): "BNP", "BNPTRIAGEBLO" in the last 168 hours.  "

## 2024-08-27 NOTE — PLAN OF CARE
[facility]  Initial Discharge Assessment       Primary Care Provider: Laury Enrique FNP    Admission Diagnosis: Volume overload [E87.70]  Chest pain [R07.9]    Admission Date: 8/26/2024  Expected Discharge Date: 8/29/2024    Transition of Care Barriers: None    Payor: HU[employer] MANAGED MEDICARE / Plan: HU[employer] MEDICARE PPO / Product Type: Medicare Advantage /     Extended Emergency Contact Information  Primary Emergency Contact: JENNIFER LOBO  Mobile Phone: [phone]  Relation: Relative  Preferred language: English   needed? No  Secondary Emergency Contact: Dulce Yang  Mobile Phone: [phone]  Relation: Grandchild  Preferred language: English   needed? No    Discharge Plan A: Home with family, Home Health  Discharge Plan B: Home with family, Home Health      [facility] - [place], [place] - [address] [place] [address]  Phone: [phone] Fax: [phone]    [facility] - [place] [id number]  [address] [address]  Phone: [phone] Fax: [phone]      Initial Assessment (most recent)       Adult Discharge Assessment - 08/27/24 1003          Discharge Assessment    Assessment Type Discharge Planning Assessment     Confirmed/corrected address, phone number and insurance Yes     Confirmed Demographics Correct on Facesheet     Source of Information patient     People in Home child(chico), adult     Do you expect to return to your current living situation? Yes     Do you have help at home or someone to help you manage your care at home? Yes     Who are your caregiver(s) and their phone number(s)? Rg Grant, son, [phone]; Eve grand Alejandroson, [phone]     Prior to hospitilization cognitive status: Unable to Assess     Current cognitive status: Alert/Oriented     Walking or Climbing Stairs Difficulty yes     Walking or Climbing Stairs  ambulation difficulty, requires equipment;stair climbing difficulty, requires equipment     Mobility Management cane     Dressing/Bathing Difficulty no     Home Accessibility wheelchair accessible     Home Layout Able to live on 1st floor     Equipment Currently Used at Home cane, straight   has wc and bedside commode but is not currently using them    Readmission within 30 days? Yes     Patient currently being followed by outpatient case management? No     Do you currently have service(s) that help you manage your care at home? Yes     Name and Contact number of agency [facility]     Is the pt/caregiver preference to resume services with current agency Yes     Do you take prescription medications? Yes     Do you have prescription coverage? Yes     Coverage [employer]     Do you have any problems affording any of your prescribed medications? No     Is the patient taking medications as prescribed? yes     Who is going to help you get home at discharge? Rg Grant, son, [phone]; Eve Allen, grandson, [phone]     How do you get to doctors appointments? health plan transportation;family or friend will provide     Are you on dialysis? No     Do you take coumadin? No     Discharge Plan A Home with family;Home Health     Discharge Plan B Home with family;Home Health     DME Needed Upon Discharge  none     Discharge Plan discussed with: Patient     Transition of Care Barriers None        OTHER    Name(s) of People in Home Rg Grant, son, [phone]                   SS spoke with pt at bedside. Pt lives at home with her adult son. Pt plans to return to current living arrangements when medically ready for discharge. Pt is current with [facility], choice obtained to resume, initial info faxed, and Emmy notified of pt's admission. Pt has required DME. SDOH completed August 2024. IM obtained. SS following for anticipated dc needs.

## 2024-08-27 NOTE — ASSESSMENT & PLAN NOTE
Serum creatinine is 4.48.  Continued decline in renal function  BMP and UA in AM.  Will discuss with [facility] transplant center

## 2024-08-27 NOTE — SUBJECTIVE & OBJECTIVE
"Interval History: The patient is sitting up in bed.  She mentions that her breathing is better and the lower leg swelling.    Review of patient's allergies indicates:   Allergen Reactions    Hydrocodone-acetaminophen Rash    Gabapentin Other (See Comments)     Made her disoriented  "out of my head"      Morphine Itching    Opioids - morphine analogues      Current Facility-Administered Medications   Medication Frequency    acetaminophen tablet 650 mg Q4H PRN    albuterol nebulizer solution 2.5 mg Q6H    aspirin EC tablet 81 mg Daily    carvediloL tablet 25 mg BID    cholestyramine-aspartame 4 grams of anhydrous packet 4 grams of anhydrous cholestyramine BID    cloNIDine 0.1 mg/24 hr td ptwk 1 patch Weekly    docusate sodium capsule 100 mg BID PRN    ferrous sulfate tablet 1 each Daily    furosemide injection 100 mg Q8H    heparin (porcine) injection 5,000 Units Q8H    hydrALAZINE tablet 100 mg TID    insulin glargine U-100 (Lantus) injection 5 Units QHS    isosorbide mononitrate 24 hr tablet 30 mg Daily    Lactobacillus acidophilus capsule 1 capsule TID WM    megestroL tablet 20 mg Daily    minoxidiL tablet 2.5 mg Daily    multivitamin tablet Daily    mycophenolate sodium EC tablet 180 mg BID    naloxone 0.4 mg/mL injection 0.02 mg PRN    NIFEdipine 24 hr tablet 90 mg Daily    nitroGLYCERIN SL tablet 0.4 mg Q5 Min PRN    ondansetron injection 4 mg Q8H PRN    predniSONE tablet 5 mg Daily    sodium bicarbonate tablet 650 mg BID    sodium chloride 0.9% flush 10 mL Q12H PRN    tacrolimus capsule 1.5 mg BID       Objective:     Vital Signs (Most Recent):  Temp: 99.1 °F (37.3 °C) (08/27/24 1635)  Pulse: 75 (08/27/24 1636)  Resp: 18 (08/27/24 1635)  BP: 130/85 (08/27/24 1636)  SpO2: 98 % (08/27/24 1635) Vital Signs (24h Range):  Temp:  [98.5 °F (36.9 °C)-99.2 °F (37.3 °C)] 99.1 °F (37.3 °C)  Pulse:  [] 75  Resp:  [16-20] 18  SpO2:  [95 %-100 %] 98 %  BP: ()/(40-86) 130/85     Weight: 66.5 kg (146 lb 9.7 oz) " (08/27/24 0600)  Body mass index is 25.16 kg/m².  Body surface area is 1.73 meters squared.    I/O last 3 completed shifts:  In: 60 [P.O.:60]  Out: 1300 [Urine:1300]     Physical Exam  Vitals reviewed.   Constitutional:       Appearance: Normal appearance.   HENT:      Head: Normocephalic and atraumatic.   Eyes:      Pupils: Pupils are equal, round, and reactive to light.   Cardiovascular:      Rate and Rhythm: Regular rhythm.   Pulmonary:      Effort: Pulmonary effort is normal.   Abdominal:      Palpations: Abdomen is soft.   Musculoskeletal:      Cervical back: Neck supple.      Right lower leg: Edema present.      Left lower leg: Edema present.   Skin:     General: Skin is warm.   Neurological:      Mental Status: She is alert.   Psychiatric:         Mood and Affect: Mood normal.          Significant Labs:  BMP:   Recent Labs   Lab 08/27/24  0351   GLU 95      K 4.6   *   CO2 21   BUN 97*   CREATININE 4.14*   CALCIUM 8.7     CBC:   Recent Labs   Lab 08/27/24  0351   WBC 4.25*   RBC 3.29*   HGB 8.4*   HCT 26.7*   *   MCV 81.2   MCH 25.5*   MCHC 31.5*        Significant Imaging:  Labs: Reviewed

## 2024-08-27 NOTE — CONSULTS
[facility]  Adult Nutrition  Consult Note         Reason for Assessment  Reason For Assessment: consult (poor PO Intakes)   Nutrition Risk Screen: reduced oral intake over the last month    Assessment and Plan  Consult received and appreciated. Consult for poor PO intakes over the past two months. Patient was also identified at risk by screening criteria with MST2-she endorsed a 2-13 pound with loss and poor PO intakes due to poor appetite.     Patient is a 75yo female admitted 8/26 for acute renal failure with acute tubular necrosis superimposed on CKD4. Chart review reveals no significant weight changes, however patient is noted to have anasarca, which could weight loss due to fluid status. Fluid overload may be contributing to poor PO intakes as well. Patient receiving IV diuresis. Will monitor weight status and assess as appropriate.     Please document all meals to monitor intakes.     Patient is ordered a Renal diet. Recommend continue current diet as tolerated. Also recommend addition of Suplena with meals to improve kcal intakes to better meet estimated nutritional needs. Encourage good PO intakes. Patient is noted to have been ordered megestrol daily.     Last BM 8/25 per flowsheet.    Medications/labs reviewed. RD following.      Learning Needs/Social Determinants of Health  Learning Assessment       08/26/2024 1447 [facility] (8/26/2024 - Present)   Created by Isis Burnette, RN - RN (Nurse) Status: Complete                 PRIMARY LEARNER     Primary Learner Name:  Nasrin Grant  - 08/26/2024 1447    Relationship:  Patient  - 08/26/2024 1447    Does the primary learner have any barriers to learning?:  No Barriers  - 08/26/2024 1447    What is the preferred language of the primary learner?:  English  - 08/26/2024 1447    Is an  required?:  No  - 08/26/2024 1447    How does the primary learner prefer to learn new  concepts?:  Listening  - 08/26/2024 1447    How often do you need to have someone help you read instructions, pamphlets, or written material from your doctor or pharmacy?:  Never  - 08/26/2024 1447        CO-LEARNER #1     No question answered        CO-LEARNER #2     No question answered        SPECIAL TOPICS     No question answered        ANSWERED BY:     No question answered        Comments         Edit History       Isis Burnette, RN - RN (Nurse)   08/26/2024 1447                           Social Determinants of Health     Tobacco Use: Low Risk  (8/13/2024)    Patient History     Smoking Tobacco Use: Never     Smokeless Tobacco Use: Never     Passive Exposure: Never   Alcohol Use: Not At Risk (8/6/2024)    AUDIT-C     Frequency of Alcohol Consumption: Never     Average Number of Drinks: Patient does not drink     Frequency of Binge Drinking: Never   Financial Resource Strain: Low Risk  (8/15/2024)    Overall Financial Resource Strain (CARDIA)     Difficulty of Paying Living Expenses: Not hard at all   Recent Concern: Financial Resource Strain - Medium Risk (8/6/2024)    Overall Financial Resource Strain (CARDIA)     Difficulty of Paying Living Expenses: Somewhat hard   Food Insecurity: No Food Insecurity (8/15/2024)    Hunger Vital Sign     Worried About Running Out of Food in the Last Year: Never true     Ran Out of Food in the Last Year: Never true   Recent Concern: Food Insecurity - Food Insecurity Present (8/6/2024)    Hunger Vital Sign     Worried About Running Out of Food in the Last Year: Sometimes true     Ran Out of Food in the Last Year: Sometimes true   Transportation Needs: No Transportation Needs (8/15/2024)    TRANSPORTATION NEEDS     Transportation : No   Recent Concern: Transportation Needs - Unmet Transportation Needs (8/6/2024)    PRAPARE - Transportation     Lack of Transportation (Medical): Yes     Lack of Transportation (Non-Medical): Yes   Physical Activity: Insufficiently Active  (8/6/2024)    Exercise Vital Sign     Days of Exercise per Week: 7 days     Minutes of Exercise per Session: 20 min   Stress: No Stress Concern Present (8/15/2024)    [employer] - Occupational Stress Questionnaire     Feeling of Stress : Not at all   Recent Concern: Stress - Stress Concern Present (8/6/2024)    [employer] - Occupational Stress Questionnaire     Feeling of Stress : Rather much   Housing Stability: Low Risk  (8/15/2024)    Housing Stability Vital Sign     Unable to Pay for Housing in the Last Year: No     Homeless in the Last Year: No   Depression: Low Risk  (8/13/2024)    Depression     Last PHQ-4: Flowsheet Data: 0   Recent Concern: Depression - Medium Risk (8/6/2024)    Depression     Last PHQ-4: Flowsheet Data: 7   Utilities: Not At Risk (8/15/2024)    [facility] Utilities     Threatened with loss of utilities: No   Health Literacy: Adequate Health Literacy (8/15/2024)     Health Literacy     Frequency of need for help with medical instructions: Rarely   Recent Concern: Health Literacy - Inadequate Health Literacy (8/6/2024)     Health Literacy     Frequency of need for help with medical instructions: Sometimes   Social Isolation: Not on file            Malnutrition  Is Patient Malnourished: No    Nutrition Diagnosis  Inadequate energy intake related to Appetite loss as evidenced by reported poor PO Intakes  Comments: add Suplena with meals    Recent Labs   Lab 08/27/24  0351 08/27/24  0713   GLU 95  --    POCGLU  --  83         Nutrition Prescription / Recommendations  Recommendation/Intervention: Recommend continue current diet with addition of Suplena with meals to improve kcal intakes to better meet estimated nutritional needs. Encourage good PO intakes.  Goals: Weight maitnenance during admission, intake 50-75% of meals during admission  Nutrition Goal Status: new  Current Diet Order: Renal  Chewing or Swallowing Difficulty?: No  Chewing or swallowing difficulty  Recommended Diet: Renal (Low Protein)  Recommended Oral Supplement:  Suplena with meals  Is Nutrition Support Recommended: [facility] Support: No  Is Nutrition Education Recommended: No    Monitor and Evaluation  % current Intake: Unknown/ No P.O. intake documented  % intake to meet estimated needs: P.O. + Supplements  Food and Nutrient Intake: food and beverage intake  Food and Nutrient Adminstration: diet order  Anthropometric Measurements: weight, weight change  Biochemical Data, Medical Tests and Procedures: electrolyte and renal panel, gastrointestinal profile, glucose/endocrine profile, inflammatory profile, lipid profile       Current Medical Diagnosis and Past Medical History     Past Medical History:   Diagnosis Date    Amputation of one or more toes     2 TOES RT FOOT, 3 TOES LFT FOOT    Atherosclerotic heart disease of native coronary artery with angina pectoris 01/20/2020    CVA (cerebral vascular accident)     Depression     Diabetes mellitus, type 2     Disorder of kidney and ureter     History of carpal tunnel surgery of left wrist     History of carpal tunnel surgery of right wrist     History of repair of left rotator cuff     History of repair of right rotator cuff     Hyperlipidemia     Hypertension     Hypokalemia     Kidney transplant status 07/13/2020    Osteoporosis 07/22/2020    Proliferative diabetic retinopathy of both eyes 09/07/2023    Stroke        Nutrition/Diet History       Lab/Procedures/Meds  Recent Labs   Lab 08/26/24  0927 08/27/24  0351    140   K 4.6 4.6   BUN 97* 97*   CREATININE 4.48* 4.14*   CALCIUM 9.6 8.7   ALBUMIN 3.8  --    * 111*   ALT 38  --    AST 33  --    Note: BUN/Cr, Cl- elevated. PMH renal failure    Last A1c:   Lab Results   Component Value Date    HGBA1C 6.9 (H) 05/14/2024    HGBA1C 6.5 (H) 12/03/2023     Lab Results   Component Value Date    RBC 3.29 (L) 08/27/2024    HGB 8.4 (L) 08/27/2024    HCT 26.7  "(L) 08/27/2024    MCV 81.2 08/27/2024    MCH 25.5 (L) 08/27/2024    MCHC 31.5 (L) 08/27/2024   Note: H&H low    Pertinent Labs Reviewed: reviewed  Pertinent Medications Reviewed: reviewed  Scheduled Meds:   albuterol sulfate  2.5 mg Nebulization Q6H    aspirin  81 mg Oral Daily    bumetanide  2 mg Intravenous BID    carvediloL  25 mg Oral BID    cholestyramine-aspartame  4 grams of anhydrous cholestyramine Oral BID    cloNIDine 0.1 mg/24 hr td ptwk  1 patch Transdermal Weekly    ferrous sulfate  1 tablet Oral Daily    heparin (porcine)  5,000 Units Subcutaneous Q8H    hydrALAZINE  100 mg Oral TID    insulin glargine U-100  5 Units Subcutaneous QHS    isosorbide mononitrate  30 mg Oral Daily    Lactobacillus acidophilus  1 capsule Oral TID WM    megestroL  20 mg Oral Daily    minoxidiL  2.5 mg Oral Daily    multivitamin  1 tablet Oral Daily    mycophenolate sodium  180 mg Oral BID    NIFEdipine  90 mg Oral Daily    predniSONE  5 mg Oral Daily    sodium bicarbonate  650 mg Oral BID    tacrolimus  1.5 mg Oral BID     Continuous Infusions:  PRN Meds:.  Current Facility-Administered Medications:     acetaminophen, 650 mg, Oral, Q4H PRN    docusate sodium, 100 mg, Oral, BID PRN    naloxone, 0.02 mg, Intravenous, PRN    nitroGLYCERIN, 0.4 mg, Sublingual, Q5 Min PRN    ondansetron, 4 mg, Intravenous, Q8H PRN    sodium chloride 0.9%, 10 mL, Intravenous, Q12H PRN    Anthropometrics  Temp: 98.7 °F (37.1 °C)  Height Method: Stated  Height: 5' 4" (162.6 cm)  Height (inches): 64 in  Weight Method: Bed Scale  Weight: 66.5 kg (146 lb 9.7 oz)  Weight (lb): 146.61 lb  Ideal Body Weight (IBW), Female: 120 lb  % Ideal Body Weight, Female (lb): 121.67 %  BMI (Calculated): 25.2       Estimated/Assessed Needs  RMR (Medina-St. Jeor Equation): 1155     Temp: 98.7 °F (37.1 °C)Oral  Weight Used For Calorie Calculations: 66.5 kg (146 lb 9.7 oz)     Energy Calorie Requirements (kcal): 1663-1995kcal (25-30kcal/kg)  Weight Used For Protein " Calculations: 66.5 kg (146 lb 9.7 oz)  Protein Requirements: 40-53g (0.6-0.8g/kg)       RDA Method (mL): 1663       Nutrition by Nursing              Last Bowel Movement: 08/25/24                Nutrition Follow-Up  RD Follow-up?: Yes      Nutrition Discharge Planning: Unsure of discharge plans at this time. Will benefit from continued renal diet on discharge          Aminata Woodard, MS, RD, LD  Available via Secure Chat

## 2024-08-27 NOTE — SUBJECTIVE & OBJECTIVE
Interval History: Patient seen and examined at the bedside, reports slightly improved shortness of breath and legs.     Review of Systems   Cardiovascular:  Positive for leg swelling.     Objective:     Vital Signs (Most Recent):  Temp: 98.7 °F (37.1 °C) (08/27/24 0714)  Pulse: 66 (08/27/24 0737)  Resp: 16 (08/27/24 0737)  BP: (!) 153/86 (08/27/24 0907)  SpO2: 96 % (08/27/24 0737) Vital Signs (24h Range):  Temp:  [97.5 °F (36.4 °C)-99.2 °F (37.3 °C)] 98.7 °F (37.1 °C)  Pulse:  [64-78] 66  Resp:  [16-25] 16  SpO2:  [96 %-100 %] 96 %  BP: (112-196)/(43-86) 153/86     Weight: 66.5 kg (146 lb 9.7 oz)  Body mass index is 25.16 kg/m².    Intake/Output Summary (Last 24 hours) at 8/27/2024 1038  Last data filed at 8/27/2024 0826  Gross per 24 hour   Intake 60 ml   Output 1500 ml   Net -1440 ml         Physical Exam  Vitals and nursing note reviewed.   Constitutional:       Appearance: She is ill-appearing.   HENT:      Head: Normocephalic and atraumatic.      Mouth/Throat:      Mouth: Mucous membranes are moist.   Eyes:      Extraocular Movements: Extraocular movements intact.      Pupils: Pupils are equal, round, and reactive to light.   Cardiovascular:      Rate and Rhythm: Normal rate and regular rhythm.   Pulmonary:      Effort: Pulmonary effort is normal.      Breath sounds: Wheezing present.   Abdominal:      General: Bowel sounds are normal.      Palpations: Abdomen is soft.   Musculoskeletal:         General: Normal range of motion.      Cervical back: Normal range of motion and neck supple.      Right lower leg: Edema present.      Left lower leg: Edema present.   Neurological:      General: No focal deficit present.      Mental Status: She is alert and oriented to person, place, and time. Mental status is at baseline.   Psychiatric:         Mood and Affect: Mood normal.         Behavior: Behavior normal.             Significant Labs: All pertinent labs within the past 24 hours have been reviewed.    Significant  Imaging: I have reviewed all pertinent imaging results/findings within the past 24 hours.

## 2024-08-27 NOTE — ASSESSMENT & PLAN NOTE
Patient with known CAD s/p stent placement, which is controlled Will continue  beta blocker and ASA and monitor for S/Sx of angina/ACS. Continue to monitor on telemetry.

## 2024-08-27 NOTE — ASSESSMENT & PLAN NOTE
Sec to diabetic nephropathy and HTN.   Required dialysis from 8346-6272.  Received transplant in 2016 but unfortunately the allograft has been failing.  Resume immunosuppressives.   Follows at [facility] and Dr. Blount.

## 2024-08-27 NOTE — PROGRESS NOTES
"[facility]  Nephrology  Progress Note    Patient Name: Nasrin Grant  MRN: [id number]  Admission Date: 8/26/2024  Hospital Length of Stay: 1 days  Attending Provider: Jose Atwood MD   Primary Care Physician: Laury Enrique FNP  Principal Problem:Acute renal failure with acute tubular necrosis superimposed on stage 4 chronic kidney disease    Subjective:     HPI: This patient with history of ESRD who has a had a renal transplant since 2016.  The patient has continued to have increase in serum creatinine.  She has had increased lower leg swelling and increased shortness of breath.  The patient has had several hospitalizations for increased shortness of breath.  She mentions that over the past several days the swelling has continued to worsen despite taking diuretics.  Serum creatinine is 4.48.    Nephrology is consulted for renal issues.        Interval History: The patient is sitting up in bed.  She mentions that her breathing is better and the lower leg swelling.    Review of patient's allergies indicates:   Allergen Reactions    Hydrocodone-acetaminophen Rash    Gabapentin Other (See Comments)     Made her disoriented  "out of my head"      Morphine Itching    Opioids - morphine analogues      Current Facility-Administered Medications   Medication Frequency    acetaminophen tablet 650 mg Q4H PRN    albuterol nebulizer solution 2.5 mg Q6H    aspirin EC tablet 81 mg Daily    carvediloL tablet 25 mg BID    cholestyramine-aspartame 4 grams of anhydrous packet 4 grams of anhydrous cholestyramine BID    cloNIDine 0.1 mg/24 hr td ptwk 1 patch Weekly    docusate sodium capsule 100 mg BID PRN    ferrous sulfate tablet 1 each Daily    furosemide injection 100 mg Q8H    heparin (porcine) injection 5,000 Units Q8H    hydrALAZINE tablet 100 mg TID    insulin glargine U-100 (Lantus) injection 5 Units QHS    isosorbide mononitrate 24 hr tablet 30 mg Daily    Lactobacillus acidophilus " capsule 1 capsule TID WM    megestroL tablet 20 mg Daily    minoxidiL tablet 2.5 mg Daily    multivitamin tablet Daily    mycophenolate sodium EC tablet 180 mg BID    naloxone 0.4 mg/mL injection 0.02 mg PRN    NIFEdipine 24 hr tablet 90 mg Daily    nitroGLYCERIN SL tablet 0.4 mg Q5 Min PRN    ondansetron injection 4 mg Q8H PRN    predniSONE tablet 5 mg Daily    sodium bicarbonate tablet 650 mg BID    sodium chloride 0.9% flush 10 mL Q12H PRN    tacrolimus capsule 1.5 mg BID       Objective:     Vital Signs (Most Recent):  Temp: 99.1 °F (37.3 °C) (08/27/24 1635)  Pulse: 75 (08/27/24 1636)  Resp: 18 (08/27/24 1635)  BP: 130/85 (08/27/24 1636)  SpO2: 98 % (08/27/24 1635) Vital Signs (24h Range):  Temp:  [98.5 °F (36.9 °C)-99.2 °F (37.3 °C)] 99.1 °F (37.3 °C)  Pulse:  [] 75  Resp:  [16-20] 18  SpO2:  [95 %-100 %] 98 %  BP: ()/(40-86) 130/85     Weight: 66.5 kg (146 lb 9.7 oz) (08/27/24 0600)  Body mass index is 25.16 kg/m².  Body surface area is 1.73 meters squared.    I/O last 3 completed shifts:  In: 60 [P.O.:60]  Out: 1300 [Urine:1300]     Physical Exam  Vitals reviewed.   Constitutional:       Appearance: Normal appearance.   HENT:      Head: Normocephalic and atraumatic.   Eyes:      Pupils: Pupils are equal, round, and reactive to light.   Cardiovascular:      Rate and Rhythm: Regular rhythm.   Pulmonary:      Effort: Pulmonary effort is normal.   Abdominal:      Palpations: Abdomen is soft.   Musculoskeletal:      Cervical back: Neck supple.      Right lower leg: Edema present.      Left lower leg: Edema present.   Skin:     General: Skin is warm.   Neurological:      Mental Status: She is alert.   Psychiatric:         Mood and Affect: Mood normal.          Significant Labs:  BMP:   Recent Labs   Lab 08/27/24  0351   GLU 95      K 4.6   *   CO2 21   BUN 97*   CREATININE 4.14*   CALCIUM 8.7     CBC:   Recent Labs   Lab 08/27/24  0351   WBC 4.25*   RBC 3.29*   HGB 8.4*   HCT 26.7*   PLT  126*   MCV 81.2   MCH 25.5*   MCHC 31.5*        Significant Imaging:  Labs: Reviewed  Assessment/Plan:     Renal/  Anasarca associated with disorder of kidney  Increased swelling.    Renal function appears to be failing.      History of kidney transplant  Serum creatinine is 4.48.  Continued decline in renal function  BMP and UA in AM.  Will discuss with [facility] transplant center      Serum creatinine is stable.  Continue to monitor for the next 24 hours.  Thank you for your consult. I will follow-up with patient. Please contact us if you have any additional questions.    Oswald Blount Jr, MD  Nephrology  [facility] - [address] [place] [address]

## 2024-08-27 NOTE — ASSESSMENT & PLAN NOTE
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 Baseline creatinine is  ~ 3 . Most recent creatinine and eGFR are listed below.  Recent Labs     08/26/24  0927 08/27/24  0351   CREATININE 4.48* 4.14*   EGFRNORACEVR 10* 11*        Plan  - Cr down slightly.  - Nephrology consulted (Dr. Blount), he will discuss with [facility] transplant team.    - Continue IV diuresis- changed to Lasix 100mg IV Q8H per nephrology recs.   - Avoid nephrotoxins and renally dose meds for GFR listed above  - Monitor urine output, serial BMP, and adjust therapy as needed

## 2024-08-27 NOTE — HOSPITAL COURSE
8/27- Cr slightly down, appreciate nephrology input. Continue IV diuresis (nephrology recommended to change from Bumex to Lasix).   8/28- Cr remain stable with diuresis, discussed with Dr. Blount and ok to discharge home on PO diuretics (dose increased), follow up with him and [facility] transplant team within 2 weeks.

## 2024-08-27 NOTE — PROGRESS NOTES
08/27/24 1537        Wound 08/26/24 1538 Diabetic Ulcer Right anterior Toe, second   Date First Assessed/Time First Assessed: 08/26/24 1538   Present on Original Admission: Yes  Primary Wound Type: Diabetic Ulcer  Side: Right  Orientation: anterior  Location: Toe, second   Wound Image Images linked   Dressing Appearance Dry;Intact;Clean   Drainage Amount Moderate   Drainage Characteristics/Odor Yellow   Appearance Pink;Red;Moist;Granulating;Adipose   Black (%), Wound Tissue Color 0 %   Red (%), Wound Tissue Color 70 %   Yellow (%), Wound Tissue Color 30 %   Periwound Area Intact   Wound Edges Callused   Child Classification (diabetic foot ulcers only) Grade 1   Wound Length (cm) 0.3 cm   Wound Width (cm) 0.4 cm   Wound Depth (cm) 0.2 cm   Wound Volume (cm^3) 0.024 cm^3   Wound Surface Area (cm^2) 0.12 cm^2   Care Cleansed with:;Antimicrobial agent   Dressing Applied;Gauze, wet to dry  (Vashe)   Periwound Care Moisture barrier applied      Wound Care Recommendations:  Clean with Vashe  Apply Vashe wet to dry, cover with dry 4x4s, and wrap lightly with carlene.  Change daily  Elevate foot on pillow or offloading device  Continue preventative foam dressings  Patient stated she is currently being cared for by a [facility] in [place], [place]

## 2024-08-27 NOTE — PROGRESS NOTES
[facility] [address] [place] [address] Medicine  Progress Note    Patient Name: Nasrin Grant  MRN: [id number]  Patient Class: IP- Inpatient   Admission Date: 8/26/2024  Length of Stay: 1 days  Attending Physician: Jose Atwood MD  Primary Care Provider: Laury Enrique FNP        Subjective:     Principal Problem:Acute renal failure with acute tubular necrosis superimposed on stage 4 chronic kidney disease        HPI:  Ms. Grant is a 74 Y O female with PMH of ESRD s/p renal transplant in 2016, CAD s/p PCI, HFpEF who presented to ED with worsening shortness of breath and leg swelling along with weight gain. 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 She has underlying CKD stage 4, follows with Dr. Blount. She was just hospitalized at [facility] and discharged a week ago with similar issue. She required a few days of IV diuresis which resulted in improved volume status. She was also hospitalized at [facility] last month for similar issue. She reports compliance with her home dose of Bumex and compliance with dietary intake. She denies chest pain, fever, chills, cough, nausea or vomiting. She denies hematuria.       Overview/Hospital Course:  8/27- Cr slightly down, appreciate nephrology input. Continue IV diuresis (nephrology recommended to change from Bumex to Lasix).     Interval History: Patient seen and examined at the bedside, reports slightly improved shortness of breath and legs.     Review of Systems   Cardiovascular:  Positive for leg swelling.     Objective:     Vital Signs (Most Recent):  Temp: 98.7 °F (37.1 °C) (08/27/24 0714)  Pulse: 66 (08/27/24 0737)  Resp: 16 (08/27/24 0737)  BP: (!) 153/86 (08/27/24 0907)  SpO2: 96 % (08/27/24 0737) Vital Signs (24h Range):  Temp:  [97.5 °F (36.4 °C)-99.2 °F (37.3 °C)] 98.7 °F (37.1 °C)  Pulse:  [64-78] 66  Resp:  [16-25] 16  SpO2:  [96 %-100 %] 96 %  BP: (112-196)/(43-86) 153/86      Weight: 66.5 kg (146 lb 9.7 oz)  Body mass index is 25.16 kg/m².    Intake/Output Summary (Last 24 hours) at 8/27/2024 1038  Last data filed at 8/27/2024 0826  Gross per 24 hour   Intake 60 ml   Output 1500 ml   Net -1440 ml         Physical Exam  Vitals and nursing note reviewed.   Constitutional:       Appearance: She is ill-appearing.   HENT:      Head: Normocephalic and atraumatic.      Mouth/Throat:      Mouth: Mucous membranes are moist.   Eyes:      Extraocular Movements: Extraocular movements intact.      Pupils: Pupils are equal, round, and reactive to light.   Cardiovascular:      Rate and Rhythm: Normal rate and regular rhythm.   Pulmonary:      Effort: Pulmonary effort is normal.      Breath sounds: Wheezing present.   Abdominal:      General: Bowel sounds are normal.      Palpations: Abdomen is soft.   Musculoskeletal:         General: Normal range of motion.      Cervical back: Normal range of motion and neck supple.      Right lower leg: Edema present.      Left lower leg: Edema present.   Neurological:      General: No focal deficit present.      Mental Status: She is alert and oriented to person, place, and time. Mental status is at baseline.   Psychiatric:         Mood and Affect: Mood normal.         Behavior: Behavior normal.             Significant Labs: All pertinent labs within the past 24 hours have been reviewed.    Significant Imaging: I have reviewed all pertinent imaging results/findings within the past 24 hours.    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 Baseline creatinine is  ~ 3 . Most recent creatinine and eGFR are listed below.  Recent Labs     08/26/24  0927 08/27/24  0351   CREATININE 4.48* 4.14*   EGFRNORACEVR 10* 11*        Plan  - Cr down slightly.  - Nephrology consulted (Dr. Blount),  "he will discuss with [facility] transplant team.    - Continue IV diuresis- changed to Lasix 100mg IV Q8H per nephrology recs.   - Avoid nephrotoxins and renally dose meds for GFR listed above  - Monitor urine output, serial BMP, and adjust therapy as needed    Anasarca associated with disorder of kidney  IV diuresis started.   Strict I and O, daily weight.       Acute on chronic heart failure with preserved ejection fraction  Patient is identified as having Diastolic (HFpEF) heart failure that is Acute on chronic. CHF is currently uncontrolled due to Continued edema of extremities and Weight gain of 5 pounds. Latest ECHO performed and demonstrates- Results for orders placed during the hospital encounter of 08/13/24    Echo    Interpretation Summary    Left Ventricle: The left ventricle is normal in size. Mildly increased wall thickness. There is mild concentric hypertrophy. There is hyperdynamic systolic function. Ejection fraction by visual approximation is 65%.    Right Ventricle: Normal right ventricular cavity size. Systolic function is normal.    Right Atrium: Right atrium is mildly dilated.    Aortic Valve: The aortic valve is a trileaflet valve. Mildly calcified cusps.    Mitral Valve: There is mild bileaflet sclerosis. Mildly thickened leaflets.    Tricuspid Valve: There is mild regurgitation with an eccentrically directed jet.    IVC/SVC: Normal venous pressure at 3 mmHg.  . Continue diuretics, and monitor clinical status closely. Monitor on telemetry. Patient is off CHF pathway.  Monitor strict Is&Os and daily weights.  Place on fluid restriction of 1.5 L. Cardiology has not been consulted. Continue to stress to patient importance of self efficacy and  on diet for CHF. Last BNP reviewed- and noted below No results for input(s): "BNP", "BNPTRIAGEBLO" in the last 168 hours.    End-stage renal failure with renal transplant  Sec to diabetic nephropathy and HTN.   Required dialysis from 9371-7457.  Received " "transplant in 2016 but unfortunately the allograft has been failing.  Resume immunosuppressives.   Follows at [facility] and Dr. Blount.       Essential hypertension  Chronic, uncontrolled. Latest blood pressure and vitals reviewed-     Temp:  [97.7 °F (36.5 °C)]   Pulse:  [71-74]   Resp:  [15-25]   BP: (152-181)/(50-70)   SpO2:  [99 %-100 %] .   Home meds for hypertension were reviewed and noted below.   Hypertension Medications               bumetanide (BUMEX) 2 MG tablet Take 1 tablet (2 mg total) by mouth once daily.    carvediloL (COREG) 25 MG tablet Take 1 tablet (25 mg total) by mouth 2 (two) times daily.    cloNIDine 0.1 mg/24 hr td ptwk (CATAPRES) 0.1 mg/24 hr Place 1 patch onto the skin once a week.    hydrALAZINE (APRESOLINE) 100 MG tablet Take 100 mg by mouth 3 (three) times daily.    isosorbide mononitrate (IMDUR) 30 MG 24 hr tablet Take 30 mg by mouth once daily.    minoxidiL (LONITEN) 2.5 MG tablet Take 2.5 mg by mouth once daily.    NIFEdipine (PROCARDIA-XL) 90 MG (OSM) 24 hr tablet Take 1 tablet (90 mg total) by mouth once daily.    nitroGLYCERIN (NITROSTAT) 0.4 MG SL tablet DISSOLVE 1 TABLET UNDER THE TONGUE EVERY 5 MINUTES AS NEEDED FOR CHEST PAIN. DO NOT EXCEED A TOTAL OF 3 DOSES IN 15 MINUTES.            While in the hospital, will manage blood pressure as follows; Continue home antihypertensive regimen    Will utilize p.r.n. blood pressure medication only if patient's blood pressure greater than 180/110 and she develops symptoms such as worsening chest pain or shortness of breath.    Diabetes mellitus type II, controlled  Patient's FSGs are controlled on current medication regimen.  Last A1c reviewed-   Lab Results   Component Value Date    HGBA1C 6.9 (H) 05/14/2024     Most recent fingerstick glucose reviewed- No results for input(s): "POCTGLUCOSE" in the last 24 hours.  Current correctional scale  Low  Maintain anti-hyperglycemic dose as follows-   Antihyperglycemics (From admission, onward)      " Start     Stop Route Frequency Ordered    08/26/24 2100  insulin glargine U-100 (Lantus) injection 5 Units         -- SubQ Nightly 08/26/24 1308          Hold Oral hypoglycemics while patient is in the hospital.    Coronary artery disease involving native coronary artery of native heart without angina pectoris  Patient with known CAD s/p stent placement, which is controlled Will continue  beta blocker and ASA and monitor for S/Sx of angina/ACS. Continue to monitor on telemetry.     Gastroesophageal reflux disease without esophagitis  Resume home oral PPI.         VTE Risk Mitigation (From admission, onward)           Ordered     heparin (porcine) injection 5,000 Units  Every 8 hours         08/26/24 1235     IP VTE HIGH RISK PATIENT  Once         08/26/24 1235     Place sequential compression device  Until discontinued         08/26/24 1235                    Discharge Planning   ARIEL: 8/29/2024     Code Status: Full Code   Is the patient medically ready for discharge?:     Reason for patient still in hospital (select all that apply): Patient trending condition and Consult recommendations  Discharge Plan A: Home with family, Home Health                  LORETTA ESCALERA MD  Department of Hospital Medicine   [facility] - [facility] Tele[facility]

## 2024-08-28 ENCOUNTER — DOCUMENT SCAN (OUTPATIENT)
Dept: HOME HEALTH SERVICES | Facility: HOSPITAL | Age: 74
End: 2024-08-28
Payer: MEDICARE

## 2024-08-28 VITALS
RESPIRATION RATE: 20 BRPM | HEART RATE: 66 BPM | BODY MASS INDEX: 24.1 KG/M2 | TEMPERATURE: 99 F | DIASTOLIC BLOOD PRESSURE: 56 MMHG | SYSTOLIC BLOOD PRESSURE: 136 MMHG | HEIGHT: 64 IN | OXYGEN SATURATION: 97 % | WEIGHT: 141.13 LBS

## 2024-08-28 LAB
ANION GAP SERPL CALCULATED.3IONS-SCNC: 9 MMOL/L (ref 7–16)
BASOPHILS # BLD AUTO: 0.02 K/UL (ref 0–0.2)
BASOPHILS NFR BLD AUTO: 0.4 % (ref 0–1)
BUN SERPL-MCNC: 97 MG/DL (ref 7–18)
BUN/CREAT SERPL: 24 (ref 6–20)
CALCIUM SERPL-MCNC: 9.2 MG/DL (ref 8.5–10.1)
CHLORIDE SERPL-SCNC: 108 MMOL/L (ref 98–107)
CO2 SERPL-SCNC: 24 MMOL/L (ref 21–32)
CREAT SERPL-MCNC: 4.11 MG/DL (ref 0.55–1.02)
DIFFERENTIAL METHOD BLD: ABNORMAL
EGFR (NO RACE VARIABLE) (RUSH/TITUS): 11 ML/MIN/1.73M2
EOSINOPHIL # BLD AUTO: 0.04 K/UL (ref 0–0.5)
EOSINOPHIL NFR BLD AUTO: 0.8 % (ref 1–4)
ERYTHROCYTE [DISTWIDTH] IN BLOOD BY AUTOMATED COUNT: 15.6 % (ref 11.5–14.5)
GLUCOSE SERPL-MCNC: 215 MG/DL (ref 74–106)
GLUCOSE SERPL-MCNC: 253 MG/DL (ref 70–105)
HCT VFR BLD AUTO: 25.2 % (ref 38–47)
HGB BLD-MCNC: 8.2 G/DL (ref 12–16)
IMM GRANULOCYTES # BLD AUTO: 0.03 K/UL (ref 0–0.04)
IMM GRANULOCYTES NFR BLD: 0.6 % (ref 0–0.4)
LYMPHOCYTES # BLD AUTO: 0.65 K/UL (ref 1–4.8)
LYMPHOCYTES NFR BLD AUTO: 12.6 % (ref 27–41)
MCH RBC QN AUTO: 26.3 PG (ref 27–31)
MCHC RBC AUTO-ENTMCNC: 32.5 G/DL (ref 32–36)
MCV RBC AUTO: 80.8 FL (ref 80–96)
MONOCYTES # BLD AUTO: 0.77 K/UL (ref 0–0.8)
MONOCYTES NFR BLD AUTO: 14.9 % (ref 2–6)
MPC BLD CALC-MCNC: 12.7 FL (ref 9.4–12.4)
NEUTROPHILS # BLD AUTO: 3.66 K/UL (ref 1.8–7.7)
NEUTROPHILS NFR BLD AUTO: 70.7 % (ref 53–65)
NRBC # BLD AUTO: 0 X10E3/UL
NRBC, AUTO (.00): 0 %
PLATELET # BLD AUTO: 124 K/UL (ref 150–400)
POTASSIUM SERPL-SCNC: 4 MMOL/L (ref 3.5–5.1)
RBC # BLD AUTO: 3.12 M/UL (ref 4.2–5.4)
SODIUM SERPL-SCNC: 137 MMOL/L (ref 136–145)
WBC # BLD AUTO: 5.17 K/UL (ref 4.5–11)

## 2024-08-28 PROCEDURE — 94640 AIRWAY INHALATION TREATMENT: CPT

## 2024-08-28 PROCEDURE — 1111F DSCHRG MED/CURRENT MED MERGE: CPT | Mod: CPTII,,, | Performed by: INTERNAL MEDICINE

## 2024-08-28 PROCEDURE — 99900035 HC TECH TIME PER 15 MIN (STAT)

## 2024-08-28 PROCEDURE — 63600175 PHARM REV CODE 636 W HCPCS: Performed by: INTERNAL MEDICINE

## 2024-08-28 PROCEDURE — 82962 GLUCOSE BLOOD TEST: CPT

## 2024-08-28 PROCEDURE — 99239 HOSP IP/OBS DSCHRG MGMT >30: CPT | Mod: ,,, | Performed by: INTERNAL MEDICINE

## 2024-08-28 PROCEDURE — 36415 COLL VENOUS BLD VENIPUNCTURE: CPT | Performed by: INTERNAL MEDICINE

## 2024-08-28 PROCEDURE — 25000242 PHARM REV CODE 250 ALT 637 W/ HCPCS: Performed by: INTERNAL MEDICINE

## 2024-08-28 PROCEDURE — 80048 BASIC METABOLIC PNL TOTAL CA: CPT | Performed by: INTERNAL MEDICINE

## 2024-08-28 PROCEDURE — 25000003 PHARM REV CODE 250: Performed by: INTERNAL MEDICINE

## 2024-08-28 PROCEDURE — 94761 N-INVAS EAR/PLS OXIMETRY MLT: CPT

## 2024-08-28 PROCEDURE — 85025 COMPLETE CBC W/AUTO DIFF WBC: CPT | Performed by: INTERNAL MEDICINE

## 2024-08-28 RX ORDER — BUMETANIDE 2 MG/1
2 TABLET ORAL 2 TIMES DAILY
Qty: 90 TABLET | Refills: 1 | Status: SHIPPED | OUTPATIENT
Start: 2024-08-28

## 2024-08-28 RX ADMIN — THERA TABS 1 TABLET: TAB at 09:08

## 2024-08-28 RX ADMIN — CHOLESTYRAMINE LIGHT 4 GRAMS OF ANHYDROUS CHOLESTYRAMINE: 4 POWDER, FOR SUSPENSION ORAL at 09:08

## 2024-08-28 RX ADMIN — FERROUS SULFATE TAB 325 MG (65 MG ELEMENTAL FE) 1 EACH: 325 (65 FE) TAB at 09:08

## 2024-08-28 RX ADMIN — CARVEDILOL 25 MG: 25 TABLET, FILM COATED ORAL at 09:08

## 2024-08-28 RX ADMIN — HEPARIN SODIUM 5000 UNITS: 5000 INJECTION, SOLUTION INTRAVENOUS; SUBCUTANEOUS at 05:08

## 2024-08-28 RX ADMIN — Medication 1 CAPSULE: at 12:08

## 2024-08-28 RX ADMIN — ASPIRIN 81 MG: 81 TABLET, COATED ORAL at 09:08

## 2024-08-28 RX ADMIN — Medication 1 CAPSULE: at 08:08

## 2024-08-28 RX ADMIN — MEGESTROL ACETATE 20 MG: 20 TABLET ORAL at 09:08

## 2024-08-28 RX ADMIN — MYCOPHENOLIC ACID 180 MG: 180 TABLET, DELAYED RELEASE ORAL at 08:08

## 2024-08-28 RX ADMIN — TACROLIMUS 1.5 MG: 1 CAPSULE ORAL at 08:08

## 2024-08-28 RX ADMIN — SODIUM BICARBONATE 650 MG: 650 TABLET ORAL at 09:08

## 2024-08-28 RX ADMIN — PREDNISONE 5 MG: 5 TABLET ORAL at 09:08

## 2024-08-28 RX ADMIN — INSULIN ASPART 3 UNITS: 100 INJECTION, SOLUTION INTRAVENOUS; SUBCUTANEOUS at 09:08

## 2024-08-28 RX ADMIN — MINOXIDIL 2.5 MG: 2.5 TABLET ORAL at 09:08

## 2024-08-28 RX ADMIN — FUROSEMIDE 100 MG: 10 INJECTION, SOLUTION INTRAMUSCULAR; INTRAVENOUS at 05:08

## 2024-08-28 RX ADMIN — ALBUTEROL SULFATE 2.5 MG: 2.5 SOLUTION RESPIRATORY (INHALATION) at 07:08

## 2024-08-28 RX ADMIN — ALBUTEROL SULFATE 2.5 MG: 2.5 SOLUTION RESPIRATORY (INHALATION) at 12:08

## 2024-08-28 RX ADMIN — ISOSORBIDE MONONITRATE 30 MG: 30 TABLET, EXTENDED RELEASE ORAL at 09:08

## 2024-08-28 RX ADMIN — NIFEDIPINE 90 MG: 60 TABLET, FILM COATED, EXTENDED RELEASE ORAL at 09:08

## 2024-08-28 RX ADMIN — HYDRALAZINE HYDROCHLORIDE 100 MG: 100 TABLET ORAL at 09:08

## 2024-08-28 NOTE — DISCHARGE SUMMARY
[facility] - [facility] Medicine  Discharge Summary      Patient Name: Nasrin Grant  MRN: [id number]  RAYMOND: [id number]  Patient Class: IP- Inpatient  Admission Date: 8/26/2024  Hospital Length of Stay: 2 days  Discharge Date and Time:  08/28/2024 11:06 AM  Attending Physician: Jose Atwood MD   Discharging Provider: JOSE ATWOOD MD  Primary Care Provider: Laury Enrique FNP    Primary Care Team: Networked reference to record PCT     HPI:   Ms. Grant is a 74 Y O female with PMH of ESRD s/p renal transplant in 2016, CAD s/p PCI, HFpEF who presented to ED with worsening shortness of breath and leg swelling along with weight gain. 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 She has underlying CKD stage 4, follows with Dr. Blount. She was just hospitalized at [facility] and discharged a week ago with similar issue. She required a few days of IV diuresis which resulted in improved volume status. She was also hospitalized at [facility] last month for similar issue. She reports compliance with her home dose of Bumex and compliance with dietary intake. She denies chest pain, fever, chills, cough, nausea or vomiting. She denies hematuria.       * No surgery found *      Hospital Course:   8/27- Cr slightly down, appreciate nephrology input. Continue IV diuresis (nephrology recommended to change from Bumex to Lasix).   8/28- Cr remain stable with diuresis, discussed with Dr. Blount and ok to discharge home on PO diuretics (dose increased), follow up with him and [facility] transplant team within 2 weeks.      Goals of Care Treatment Preferences:  Code Status: Full Code         Consults:   Consults (From admission, onward)          Status Ordering Provider     Inpatient consult to Registered Dietitian/Nutritionist  Once        Provider:  (Not yet assigned)    Completed JOSE ATWOOD     Inpatient consult to Nephrology  Once         Provider:  Oswald Blount Jr., MD    Completed LORETTA ESCALERA            Cardiac/Vascular  Essential hypertension  Chronic, uncontrolled. Latest blood pressure and vitals reviewed-     Temp:  [98.5 °F (36.9 °C)-99.3 °F (37.4 °C)]   Pulse:  []   Resp:  [13-20]   BP: ()/(40-85)   SpO2:  [95 %-98 %] .   Home meds for hypertension were reviewed and noted below.   Hypertension Medications               bumetanide (BUMEX) 2 MG tablet Take 1 tablet (2 mg total) by mouth once daily.    carvediloL (COREG) 25 MG tablet Take 1 tablet (25 mg total) by mouth 2 (two) times daily.    cloNIDine 0.1 mg/24 hr td ptwk (CATAPRES) 0.1 mg/24 hr Place 1 patch onto the skin once a week.    hydrALAZINE (APRESOLINE) 100 MG tablet Take 100 mg by mouth 3 (three) times daily.    isosorbide mononitrate (IMDUR) 30 MG 24 hr tablet Take 30 mg by mouth once daily.    minoxidiL (LONITEN) 2.5 MG tablet Take 2.5 mg by mouth once daily.    NIFEdipine (PROCARDIA-XL) 90 MG (OSM) 24 hr tablet Take 1 tablet (90 mg total) by mouth once daily.    nitroGLYCERIN (NITROSTAT) 0.4 MG SL tablet DISSOLVE 1 TABLET UNDER THE TONGUE EVERY 5 MINUTES AS NEEDED FOR CHEST PAIN. DO NOT EXCEED A TOTAL OF 3 DOSES IN 15 MINUTES.            While in the hospital, will manage blood pressure as follows; Continue home antihypertensive regimen    Will utilize p.r.n. blood pressure medication only if patient's blood pressure greater than 180/110 and she develops symptoms such as worsening chest pain or shortness of breath.    Coronary artery disease involving native coronary artery of native heart without angina pectoris  Patient with known CAD s/p stent placement, which is controlled Will continue  beta blocker and ASA and monitor for S/Sx of angina/ACS. Continue to monitor on telemetry.     Acute on chronic heart failure with preserved ejection fraction  Patient is identified as having Diastolic (HFpEF) heart failure that is Acute on chronic. CHF is currently  "uncontrolled due to Continued edema of extremities and Weight gain of 5 pounds. Latest ECHO performed and demonstrates- Results for orders placed during the hospital encounter of 08/13/24    Echo    Interpretation Summary    Left Ventricle: The left ventricle is normal in size. Mildly increased wall thickness. There is mild concentric hypertrophy. There is hyperdynamic systolic function. Ejection fraction by visual approximation is 65%.    Right Ventricle: Normal right ventricular cavity size. Systolic function is normal.    Right Atrium: Right atrium is mildly dilated.    Aortic Valve: The aortic valve is a trileaflet valve. Mildly calcified cusps.    Mitral Valve: There is mild bileaflet sclerosis. Mildly thickened leaflets.    Tricuspid Valve: There is mild regurgitation with an eccentrically directed jet.    IVC/SVC: Normal venous pressure at 3 mmHg.  . Continue diuretics, and monitor clinical status closely. Monitor on telemetry. Patient is off CHF pathway.  Monitor strict Is&Os and daily weights.  Place on fluid restriction of 1.5 L. Cardiology has not been consulted. Continue to stress to patient importance of self efficacy and  on diet for CHF. Last BNP reviewed- and noted below No results for input(s): "BNP", "BNPTRIAGEBLO" in the last 168 hours.    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 Baseline creatinine is  ~ 3 . Most recent creatinine and eGFR are listed below.  Recent Labs     08/26/24  0927 08/27/24  0351 08/28/24  0504   CREATININE 4.48* 4.14* 4.11*   EGFRNORACEVR 10* 11* 11*        Plan  - Cr down slightly.  - Nephrology consulted (Dr. Blount), he is in discussion with [facility] transplant team.    - s/p IV diuresis- changed to Lasix 100mg IV Q8H per nephrology recs, transition to Bumex 2mg BID on discharge for home.  " "  - Follow up with Dr. Blount and [facility] transplant team within 2 weeks.   - Avoid nephrotoxins and renally dose meds for GFR listed above  - Monitor urine output, serial BMP, and adjust therapy as needed    End-stage renal failure with renal transplant  Sec to diabetic nephropathy and HTN.   Required dialysis from 7318-9121.  Received transplant in 2016 but unfortunately the allograft has been failing.  Resume immunosuppressives.   Follows at [facility] and Dr. Blount.       Anasarca associated with disorder of kidney  Improved with diuresis.   Strict I and O, daily weight.       Endocrine  Diabetes mellitus type II, controlled  Patient's FSGs are controlled on current medication regimen.  Last A1c reviewed-   Lab Results   Component Value Date    HGBA1C 6.9 (H) 05/14/2024     Most recent fingerstick glucose reviewed- No results for input(s): "POCTGLUCOSE" in the last 24 hours.  Current correctional scale  Low  Maintain anti-hyperglycemic dose as follows-   Antihyperglycemics (From admission, onward)      Start     Stop Route Frequency Ordered    08/27/24 1843  insulin aspart U-100 injection 0-5 Units         -- SubQ Before meals & nightly PRN 08/27/24 1744    08/26/24 2100  insulin glargine U-100 (Lantus) injection 5 Units         -- SubQ Nightly 08/26/24 1308          Hold Oral hypoglycemics while patient is in the hospital.    GI  Gastroesophageal reflux disease without esophagitis  Resume home oral PPI.         Final Active Diagnoses:    Diagnosis Date Noted POA    PRINCIPAL PROBLEM:  Acute renal failure with acute tubular necrosis superimposed on stage 4 chronic kidney disease [N17.0, N18.4] 11/02/2022 Yes    Anasarca associated with disorder of kidney [N04.9] 08/13/2024 Yes    Acute on chronic heart failure with preserved ejection fraction [I50.33] 11/11/2022 Yes    End-stage renal failure with renal transplant [T86.19, N18.6] 08/14/2024 Yes    Coronary artery disease involving native coronary artery of native heart " without angina pectoris [I25.10] 04/17/2017 Yes    Gastroesophageal reflux disease without esophagitis [K21.9] 03/29/2016 Yes    Diabetes mellitus type II, controlled [E11.9] 03/24/2016 Yes    Essential hypertension [I10] 03/24/2016 Yes      Problems Resolved During this Admission:       Discharged Condition: fair    Disposition: Home or Self Care    Follow Up:   Contact information for follow-up providers       Oswald Blount Jr., MD. Go on 9/9/2024.    Specialty: Internal Medicine  Why: Hospital f/u with Dr. Blount, on 9/9/24, at 9:15am. Office #[phone].  Contact information:  [address] [place] [address]  [facility] [address]  [phone]                       Contact information for after-discharge care       Home Medical Care       [facility] .    Service: Home Nursing  Contact information:  [address] [facility] [address]  [phone]                                 Patient Instructions:      Basic Metabolic Panel   Standing Status: Future Standing Exp. Date: 11/26/25       Significant Diagnostic Studies: Labs: BMP:   Recent Labs   Lab 08/27/24  0351 08/28/24  0504   GLU 95 215*    137   K 4.6 4.0   * 108*   CO2 21 24   BUN 97* 97*   CREATININE 4.14* 4.11*   CALCIUM 8.7 9.2    and CBC   Recent Labs   Lab 08/27/24  0351 08/28/24  0504   WBC 4.25* 5.17   HGB 8.4* 8.2*   HCT 26.7* 25.2*   * 124*       Pending Diagnostic Studies:       None           Medications:  Reconciled Home Medications:      Medication List        CHANGE how you take these medications      bumetanide 2 MG tablet  Commonly known as: BUMEX  Take 1 tablet (2 mg total) by mouth 2 (two) times a day.  What changed: when to take this     insulin degludec 100 unit/mL (3 mL) insulin pen  Commonly known as: TRESIBA FLEXTOUCH U-100  Inject 10 Units into the skin once daily.  What changed:   how much to take  additional instructions            CONTINUE taking these medications       albuterol 90 mcg/actuation inhaler  Commonly known as: VENTOLIN HFA  Inhale 2 puffs into the lungs every 6 (six) hours as needed for Wheezing. Rescue     aspirin 81 MG EC tablet  Commonly known as: ECOTRIN  Take 81 mg by mouth once daily.     carvediloL 25 MG tablet  Commonly known as: COREG  Take 1 tablet (25 mg total) by mouth 2 (two) times daily.     cholestyramine 4 gram packet  Commonly known as: QUESTRAN  Take 1 packet by mouth once daily.     cloNIDine 0.1 mg/24 hr td ptwk 0.1 mg/24 hr  Commonly known as: CATAPRES  Place 1 patch onto the skin once a week.     docusate sodium 100 MG capsule  Commonly known as: COLACE  Take 100 mg by mouth 2 (two) times daily as needed for Constipation.     ferrous sulfate 325 mg (65 mg iron) Tab tablet  Commonly known as: FEOSOL  Take 1 tablet (325 mg total) by mouth 2 (two) times daily.     fluticasone propionate 50 mcg/actuation nasal spray  Commonly known as: FLONASE  1 spray by Nasal route once daily.     FREESTYLE MARY 10 DAY SENSOR MISC  1 Device by Percutaneous route.     hydrALAZINE 100 MG tablet  Commonly known as: APRESOLINE  Take 100 mg by mouth 3 (three) times daily.     insulin aspart U-100 100 unit/mL injection  Commonly known as: NovoLOG  Inject 3 Units into the skin 3 (three) times daily before meals. Per Dr Kayleigh Morris at [facility]     isosorbide mononitrate 30 MG 24 hr tablet  Commonly known as: IMDUR  Take 30 mg by mouth once daily.     lancets 33 gauge Misc  Commonly known as: TRUEPLUS LANCETS  Inject 1 lancet  into the skin 3 (three) times daily before meals.     megestroL 20 MG Tab  Commonly known as: MEGACE  TAKE ONE TABLET BY MOUTH EVERY DAY     minoxidiL 2.5 MG tablet  Commonly known as: LONITEN  Take 2.5 mg by mouth once daily.     multivitamin per tablet  Commonly known as: THERAGRAN  Take 1 tablet by mouth once daily.     mycophenolate sodium 180 MG Tbec  Take 1 tablet by mouth 2 (two) times daily.     NIFEdipine 90 MG (OSM) 24 hr  "tablet  Commonly known as: PROCARDIA-XL  Take 1 tablet (90 mg total) by mouth once daily.     nitroGLYCERIN 0.4 MG SL tablet  Commonly known as: NITROSTAT  DISSOLVE 1 TABLET UNDER THE TONGUE EVERY 5 MINUTES AS NEEDED FOR CHEST PAIN. DO NOT EXCEED A TOTAL OF 3 DOSES IN 15 MINUTES.     pantoprazole 40 MG tablet  Commonly known as: PROTONIX  TAKE ONE TABLET BY MOUTH EVERY MORNING     pen needle, diabetic 31 gauge x 3/16" Ndle  1 each by NOT APPLICABLE route.     potassium chloride 10 MEQ Cpsr  Commonly known as: MICRO-K  TAKE ONE CAPSULE BY MOUTH ONCE DAILY     predniSONE 5 MG tablet  Commonly known as: DELTASONE  Take 1 tablet by mouth once daily.     PROBIOTIC ACIDOPHILUS ORAL  Take 4 Billion Cells by mouth.     sodium bicarbonate 650 MG tablet  Take 1 tablet by mouth 2 (two) times daily.     tacrolimus 0.5 MG Cap  Commonly known as: PROGRAF  Take 1.5 mg by mouth 2 (two) times a day.              Indwelling Lines/Drains at time of discharge:   Lines/Drains/Airways       Drain  Duration                  Hemodialysis AV Fistula Left upper arm -- days                    Time spent on the discharge of patient: 37 minutes         LORETTA ESCALERA MD  Department of Hospital Medicine  [facility] - [facility] Tele[facility]  "

## 2024-08-28 NOTE — PHYSICIAN QUERY
Please provide the integumentary diagnosis related to the documentation of the Right anterior toe, second:    Please hit the Enter button after selecting your response      Diabetic ulcer, skin breakdown only

## 2024-08-28 NOTE — ASSESSMENT & PLAN NOTE
"Patient's FSGs are controlled on current medication regimen.  Last A1c reviewed-   Lab Results   Component Value Date    HGBA1C 6.9 (H) 05/14/2024     Most recent fingerstick glucose reviewed- No results for input(s): "POCTGLUCOSE" in the last 24 hours.  Current correctional scale  Low  Maintain anti-hyperglycemic dose as follows-   Antihyperglycemics (From admission, onward)    Start     Stop Route Frequency Ordered    08/27/24 1843  insulin aspart U-100 injection 0-5 Units         -- SubQ Before meals & nightly PRN 08/27/24 1744    08/26/24 2100  insulin glargine U-100 (Lantus) injection 5 Units         -- SubQ Nightly 08/26/24 1308        Hold Oral hypoglycemics while patient is in the hospital.  "

## 2024-08-28 NOTE — SUBJECTIVE & OBJECTIVE
"Interval History: The patient is sitting up in the bed.  She denies any shortness of breath or chest pain.  Renal function is stable.    Review of patient's allergies indicates:   Allergen Reactions    Hydrocodone-acetaminophen Rash    Gabapentin Other (See Comments)     Made her disoriented  "out of my head"      Morphine Itching    Opioids - morphine analogues      Current Facility-Administered Medications   Medication Frequency    acetaminophen tablet 650 mg Q4H PRN    albuterol nebulizer solution 2.5 mg Q6H    aspirin EC tablet 81 mg Daily    carvediloL tablet 25 mg BID    cholestyramine-aspartame 4 grams of anhydrous packet 4 grams of anhydrous cholestyramine BID    cloNIDine 0.1 mg/24 hr td ptwk 1 patch Weekly    docusate sodium capsule 100 mg BID PRN    ferrous sulfate tablet 1 each Daily    furosemide injection 100 mg Q8H    heparin (porcine) injection 5,000 Units Q8H    hydrALAZINE tablet 100 mg TID    insulin aspart U-100 injection 0-5 Units QID (AC + HS) PRN    insulin glargine U-100 (Lantus) injection 5 Units QHS    isosorbide mononitrate 24 hr tablet 30 mg Daily    Lactobacillus acidophilus capsule 1 capsule TID WM    megestroL tablet 20 mg Daily    minoxidiL tablet 2.5 mg Daily    multivitamin tablet Daily    mycophenolate sodium EC tablet 180 mg BID    naloxone 0.4 mg/mL injection 0.02 mg PRN    NIFEdipine 24 hr tablet 90 mg Daily    nitroGLYCERIN SL tablet 0.4 mg Q5 Min PRN    ondansetron injection 4 mg Q8H PRN    predniSONE tablet 5 mg Daily    sodium bicarbonate tablet 650 mg BID    sodium chloride 0.9% flush 10 mL Q12H PRN    tacrolimus capsule 1.5 mg BID       Objective:     Vital Signs (Most Recent):  Temp: 99.3 °F (37.4 °C) (08/28/24 0734)  Pulse: 66 (08/28/24 1208)  Resp: 20 (08/28/24 0750)  BP: (!) 136/56 (08/28/24 1208)  SpO2: 97 % (08/28/24 1208) Vital Signs (24h Range):  Temp:  [98.9 °F (37.2 °C)-99.3 °F (37.4 °C)] 99.3 °F (37.4 °C)  Pulse:  [] 66  Resp:  [13-20] 20  SpO2:  [95 %-98 " %] 97 %  BP: ()/(40-85) 136/56     Weight: 64 kg (141 lb 1.5 oz) (08/28/24 0600)  Body mass index is 24.22 kg/m².  Body surface area is 1.7 meters squared.    I/O last 3 completed shifts:  In: 60 [P.O.:60]  Out: 2000 [Urine:2000]     Physical Exam  Vitals reviewed.   Constitutional:       Appearance: Normal appearance.   HENT:      Head: Normocephalic and atraumatic.   Eyes:      Pupils: Pupils are equal, round, and reactive to light.   Cardiovascular:      Rate and Rhythm: Regular rhythm.   Pulmonary:      Effort: Pulmonary effort is normal.   Abdominal:      Palpations: Abdomen is soft.   Musculoskeletal:         General: Normal range of motion.      Cervical back: Neck supple.   Neurological:      General: No focal deficit present.      Mental Status: She is alert and oriented to person, place, and time.   Psychiatric:         Mood and Affect: Mood normal.          Significant Labs:  BMP:   Recent Labs   Lab 08/28/24  0504   *      K 4.0   *   CO2 24   BUN 97*   CREATININE 4.11*   CALCIUM 9.2     CBC:   Recent Labs   Lab 08/28/24  0504   WBC 5.17   RBC 3.12*   HGB 8.2*   HCT 25.2*   *   MCV 80.8   MCH 26.3*   MCHC 32.5        Significant Imaging:  Labs: Reviewed

## 2024-08-28 NOTE — ASSESSMENT & PLAN NOTE
Increased swelling.    Renal function appears to be failing.  8/28/2024.  Lower leg swelling has improved.  Renal function is stable.     no

## 2024-08-28 NOTE — ASSESSMENT & PLAN NOTE
Chronic, uncontrolled. Latest blood pressure and vitals reviewed-     Temp:  [98.5 °F (36.9 °C)-99.3 °F (37.4 °C)]   Pulse:  []   Resp:  [13-20]   BP: ()/(40-85)   SpO2:  [95 %-98 %] .   Home meds for hypertension were reviewed and noted below.   Hypertension Medications               bumetanide (BUMEX) 2 MG tablet Take 1 tablet (2 mg total) by mouth once daily.    carvediloL (COREG) 25 MG tablet Take 1 tablet (25 mg total) by mouth 2 (two) times daily.    cloNIDine 0.1 mg/24 hr td ptwk (CATAPRES) 0.1 mg/24 hr Place 1 patch onto the skin once a week.    hydrALAZINE (APRESOLINE) 100 MG tablet Take 100 mg by mouth 3 (three) times daily.    isosorbide mononitrate (IMDUR) 30 MG 24 hr tablet Take 30 mg by mouth once daily.    minoxidiL (LONITEN) 2.5 MG tablet Take 2.5 mg by mouth once daily.    NIFEdipine (PROCARDIA-XL) 90 MG (OSM) 24 hr tablet Take 1 tablet (90 mg total) by mouth once daily.    nitroGLYCERIN (NITROSTAT) 0.4 MG SL tablet DISSOLVE 1 TABLET UNDER THE TONGUE EVERY 5 MINUTES AS NEEDED FOR CHEST PAIN. DO NOT EXCEED A TOTAL OF 3 DOSES IN 15 MINUTES.            While in the hospital, will manage blood pressure as follows; Continue home antihypertensive regimen    Will utilize p.r.n. blood pressure medication only if patient's blood pressure greater than 180/110 and she develops symptoms such as worsening chest pain or shortness of breath.

## 2024-08-28 NOTE — PROGRESS NOTES
[facility]  Wound Care    Patient Name:  Nasrin Grant   MRN:  [id number]  Date: 8/28/2024  Diagnosis: Acute renal failure with acute tubular necrosis superimposed on stage 4 chronic kidney disease    History:     Past Medical History:   Diagnosis Date    Amputation of one or more toes     2 TOES RT FOOT, 3 TOES LFT FOOT    Atherosclerotic heart disease of native coronary artery with angina pectoris 01/20/2020    CVA (cerebral vascular accident)     Depression     Diabetes mellitus, type 2     Disorder of kidney and ureter     History of carpal tunnel surgery of left wrist     History of carpal tunnel surgery of right wrist     History of repair of left rotator cuff     History of repair of right rotator cuff     Hyperlipidemia     Hypertension     Hypokalemia     Kidney transplant status 07/13/2020    Osteoporosis 07/22/2020    Proliferative diabetic retinopathy of both eyes 09/07/2023    Stroke        Social History     Socioeconomic History    Marital status:    Occupational History     Comment: Disabled   Tobacco Use    Smoking status: Never     Passive exposure: Never    Smokeless tobacco: Never   Substance and Sexual Activity    Alcohol use: Never    Drug use: Never    Sexual activity: Not Currently     Partners: Male     Social Determinants of Health     Financial Resource Strain: Low Risk  (8/15/2024)    Overall Financial Resource Strain (CARDIA)     Difficulty of Paying Living Expenses: Not hard at all   Recent Concern: Financial Resource Strain - Medium Risk (8/6/2024)    Overall Financial Resource Strain (CARDIA)     Difficulty of Paying Living Expenses: Somewhat hard   Food Insecurity: No Food Insecurity (8/15/2024)    Hunger Vital Sign     Worried About Running Out of Food in the Last Year: Never true     Ran Out of Food in the Last Year: Never true   Recent Concern: Food Insecurity - Food Insecurity Present (8/6/2024)    Hunger Vital Sign     Worried About  Running Out of Food in the Last Year: Sometimes true     Ran Out of Food in the Last Year: Sometimes true   Transportation Needs: No Transportation Needs (8/15/2024)    TRANSPORTATION NEEDS     Transportation : No   Recent Concern: Transportation Needs - Unmet Transportation Needs (8/6/2024)    PRAPARE - Transportation     Lack of Transportation (Medical): Yes     Lack of Transportation (Non-Medical): Yes   Physical Activity: Insufficiently Active (8/6/2024)    Exercise Vital Sign     Days of Exercise per Week: 7 days     Minutes of Exercise per Session: 20 min   Stress: No Stress Concern Present (8/15/2024)    [employer] [place] [employer] - Occupational Stress Questionnaire     Feeling of Stress : Not at all   Recent Concern: Stress - Stress Concern Present (8/6/2024)    [employer] [place] [employer] - Occupational Stress Questionnaire     Feeling of Stress : Rather much   Housing Stability: Low Risk  (8/15/2024)    Housing Stability Vital Sign     Unable to Pay for Housing in the Last Year: No     Homeless in the Last Year: No       Precautions:     Allergies as of 08/26/2024 - Reviewed 08/26/2024   Allergen Reaction Noted    Hydrocodone-acetaminophen Rash 04/03/2023    Gabapentin Other (See Comments) 12/11/2012    Morphine Itching 04/05/2016    Opioids - morphine analogues  06/22/2021       WO Assessment Details/Treatment     Narrative:Seen patient for initiation of preventative skin care measures    Patient in bed, Alert. Sacral and Bilateral heels with out pressure injury.    Right 3rd toe tip with odor and slough noted. Has Mepliex Foam border to area. Wound care consulted for toes.   Has Overlay to bed. Has Mepliex Foam borders to all areas.  Daniel score 20    Consult skin care for any skin issues            08/28/2024

## 2024-08-28 NOTE — PLAN OF CARE
[facility] Telemetry  Discharge Final Note    Primary Care Provider: Laury Enrique FNP    Expected Discharge Date: 8/28/2024    Final Discharge Note (most recent)       Final Note - 08/28/24 1242          Final Note    Assessment Type Final Discharge Note     Anticipated Discharge Disposition [place] [facility]     What phone number can be called within the next 1-3 days to see how you are doing after discharge? [phone]        Post-Acute Status    Post-Acute Authorization Home Health     Home Health Status Set-up Complete/Auth obtained     Coverage [employer]     Patient choice form signed by patient/caregiver List with quality metrics by geographic area provided;List from CMS Compare;List from System Post-Acute Care     Discharge Delays None known at this time                     Important Message from Medicare  Important Message from Medicare regarding Discharge Appeal Rights: Given to patient/caregiver, Explained to patient/caregiver, Signed/date by patient/caregiver     Date IMM was signed: 08/27/24  Time IMM was signed: 0945     Follow-up providers       Oswald Blount Jr., MD   Specialty: Internal Medicine    [address] [place] [address]  [facility] [address]   Phone: [phone]       Next Steps: Go on 9/9/2024    Instructions: Hospital f/u with Dr. Blount, on 9/9/24, at 9:15am. Office #[phone].              After-discharge care                Home Medical Care       [facility]   Service: Home Nursing    [address] [facility] [address]   Phone: [phone]                             Pt to [facility] home with hh through [facility]. SS faxed dc paperwork and notified Emmy of pt's dc for today. IM updated. No further needs

## 2024-08-28 NOTE — PLAN OF CARE
Problem: Diabetes Comorbidity  Goal: Blood Glucose Level Within Targeted Range  Outcome: Progressing  Intervention: Monitor and Manage Glycemia  Flowsheets (Taken 8/27/2024 1908)  Glycemic Management: blood glucose monitored

## 2024-08-28 NOTE — ASSESSMENT & PLAN NOTE
Sec to diabetic nephropathy and HTN.   Required dialysis from 9082-6465.  Received transplant in 2016 but unfortunately the allograft has been failing.  Resume immunosuppressives.   Follows at [facility] and Dr. Blount.

## 2024-08-28 NOTE — PROGRESS NOTES
"[facility]  Nephrology  Progress Note    Patient Name: Nasrin Grant  MRN: [id number]  Admission Date: 8/26/2024  Hospital Length of Stay: 2 days  Attending Provider: Jose Atwood MD   Primary Care Physician: Laury Enrique FNP  Principal Problem:Acute renal failure with acute tubular necrosis superimposed on stage 4 chronic kidney disease    Subjective:     HPI: This patient with history of ESRD who has a had a renal transplant since 2016.  The patient has continued to have increase in serum creatinine.  She has had increased lower leg swelling and increased shortness of breath.  The patient has had several hospitalizations for increased shortness of breath.  She mentions that over the past several days the swelling has continued to worsen despite taking diuretics.  Serum creatinine is 4.48.    Nephrology is consulted for renal issues.        Interval History: The patient is sitting up in the bed.  She denies any shortness of breath or chest pain.  Renal function is stable.    Review of patient's allergies indicates:   Allergen Reactions    Hydrocodone-acetaminophen Rash    Gabapentin Other (See Comments)     Made her disoriented  "out of my head"      Morphine Itching    Opioids - morphine analogues      Current Facility-Administered Medications   Medication Frequency    acetaminophen tablet 650 mg Q4H PRN    albuterol nebulizer solution 2.5 mg Q6H    aspirin EC tablet 81 mg Daily    carvediloL tablet 25 mg BID    cholestyramine-aspartame 4 grams of anhydrous packet 4 grams of anhydrous cholestyramine BID    cloNIDine 0.1 mg/24 hr td ptwk 1 patch Weekly    docusate sodium capsule 100 mg BID PRN    ferrous sulfate tablet 1 each Daily    furosemide injection 100 mg Q8H    heparin (porcine) injection 5,000 Units Q8H    hydrALAZINE tablet 100 mg TID    insulin aspart U-100 injection 0-5 Units QID (AC + HS) PRN    insulin glargine U-100 (Lantus) injection 5 Units QHS    " isosorbide mononitrate 24 hr tablet 30 mg Daily    Lactobacillus acidophilus capsule 1 capsule TID WM    megestroL tablet 20 mg Daily    minoxidiL tablet 2.5 mg Daily    multivitamin tablet Daily    mycophenolate sodium EC tablet 180 mg BID    naloxone 0.4 mg/mL injection 0.02 mg PRN    NIFEdipine 24 hr tablet 90 mg Daily    nitroGLYCERIN SL tablet 0.4 mg Q5 Min PRN    ondansetron injection 4 mg Q8H PRN    predniSONE tablet 5 mg Daily    sodium bicarbonate tablet 650 mg BID    sodium chloride 0.9% flush 10 mL Q12H PRN    tacrolimus capsule 1.5 mg BID       Objective:     Vital Signs (Most Recent):  Temp: 99.3 °F (37.4 °C) (08/28/24 0734)  Pulse: 66 (08/28/24 1208)  Resp: 20 (08/28/24 0750)  BP: (!) 136/56 (08/28/24 1208)  SpO2: 97 % (08/28/24 1208) Vital Signs (24h Range):  Temp:  [98.9 °F (37.2 °C)-99.3 °F (37.4 °C)] 99.3 °F (37.4 °C)  Pulse:  [] 66  Resp:  [13-20] 20  SpO2:  [95 %-98 %] 97 %  BP: ()/(40-85) 136/56     Weight: 64 kg (141 lb 1.5 oz) (08/28/24 0600)  Body mass index is 24.22 kg/m².  Body surface area is 1.7 meters squared.    I/O last 3 completed shifts:  In: 60 [P.O.:60]  Out: 2000 [Urine:2000]     Physical Exam  Vitals reviewed.   Constitutional:       Appearance: Normal appearance.   HENT:      Head: Normocephalic and atraumatic.   Eyes:      Pupils: Pupils are equal, round, and reactive to light.   Cardiovascular:      Rate and Rhythm: Regular rhythm.   Pulmonary:      Effort: Pulmonary effort is normal.   Abdominal:      Palpations: Abdomen is soft.   Musculoskeletal:         General: Normal range of motion.      Cervical back: Neck supple.   Neurological:      General: No focal deficit present.      Mental Status: She is alert and oriented to person, place, and time.   Psychiatric:         Mood and Affect: Mood normal.          Significant Labs:  BMP:   Recent Labs   Lab 08/28/24  0504   *      K 4.0   *   CO2 24   BUN 97*   CREATININE 4.11*   CALCIUM 9.2     CBC:    Recent Labs   Lab 08/28/24  0504   WBC 5.17   RBC 3.12*   HGB 8.2*   HCT 25.2*   *   MCV 80.8   MCH 26.3*   MCHC 32.5        Significant Imaging:  Labs: Reviewed  Assessment/Plan:     Cardiac/Vascular  Essential hypertension  Blood pressure is stable.    Renal/  Anasarca associated with disorder of kidney  Increased swelling.    Renal function appears to be failing.  8/28/2024.  Lower leg swelling has improved.  Renal function is stable.      Endocrine  Diabetes  Chronic condition    Follow up with [facility] transplant center.    Thank you for your consult. I will follow-up with patient. Please contact us if you have any additional questions.    Oswald Blount Jr, MD  Nephrology  [facility] - [address] [place] [address]

## 2024-08-28 NOTE — PLAN OF CARE
Problem: Adult Inpatient Plan of Care  Goal: Plan of Care Review  Outcome: Progressing  Goal: Patient-Specific Goal (Individualized)  Outcome: Progressing  Goal: Absence of Hospital-Acquired Illness or Injury  Outcome: Progressing  Goal: Optimal Comfort and Wellbeing  Outcome: Progressing  Goal: Readiness for Transition of Care  Outcome: Progressing     Problem: ARDS (Acute Respiratory Distress Syndrome)  Goal: Effective Oxygenation  Outcome: Progressing     Problem: Diabetes Comorbidity  Goal: Blood Glucose Level Within Targeted Range  Outcome: Progressing

## 2024-08-28 NOTE — ASSESSMENT & PLAN NOTE
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 Baseline creatinine is  ~ 3 . Most recent creatinine and eGFR are listed below.  Recent Labs     08/26/24  0927 08/27/24  0351 08/28/24  0504   CREATININE 4.48* 4.14* 4.11*   EGFRNORACEVR 10* 11* 11*        Plan  - Cr down slightly.  - Nephrology consulted (Dr. Blount), he is in discussion with [facility] transplant team.    - s/p IV diuresis- changed to Lasix 100mg IV Q8H per nephrology recs, transition to Bumex 2mg BID on discharge for home.    - Follow up with Dr. Blount and [facility] transplant team within 2 weeks.   - Avoid nephrotoxins and renally dose meds for GFR listed above  - Monitor urine output, serial BMP, and adjust therapy as needed

## 2024-08-28 NOTE — PLAN OF CARE
Problem: Wound  Goal: Optimal Coping  Outcome: Adequate for Care Transition  Goal: Optimal Functional Ability  Outcome: Adequate for Care Transition  Goal: Absence of Infection Signs and Symptoms  Outcome: Adequate for Care Transition  Goal: Improved Oral Intake  Outcome: Adequate for Care Transition  Goal: Optimal Pain Control and Function  Outcome: Adequate for Care Transition  Goal: Skin Health and Integrity  Outcome: Adequate for Care Transition  Goal: Optimal Wound Healing  Outcome: Adequate for Care Transition

## 2024-08-29 ENCOUNTER — PATIENT OUTREACH (OUTPATIENT)
Dept: ADMINISTRATIVE | Facility: CLINIC | Age: 74
End: 2024-08-29

## 2024-08-29 NOTE — PROGRESS NOTES
C3 nurse spoke with Nasrin Grant  for a TCC post hospital discharge follow up call. The patient has a scheduled HOSFU appointment with Laury Enrique FNP  on 9/4/24 @ 1020.

## 2024-09-04 ENCOUNTER — OFFICE VISIT (OUTPATIENT)
Dept: FAMILY MEDICINE | Facility: CLINIC | Age: 74
End: 2024-09-04
Payer: MEDICARE

## 2024-09-04 VITALS
HEIGHT: 64 IN | BODY MASS INDEX: 26.29 KG/M2 | TEMPERATURE: 98 F | OXYGEN SATURATION: 99 % | RESPIRATION RATE: 18 BRPM | HEART RATE: 85 BPM | DIASTOLIC BLOOD PRESSURE: 78 MMHG | SYSTOLIC BLOOD PRESSURE: 124 MMHG | WEIGHT: 154 LBS

## 2024-09-04 DIAGNOSIS — N18.4 ACUTE RENAL FAILURE WITH ACUTE TUBULAR NECROSIS SUPERIMPOSED ON STAGE 4 CHRONIC KIDNEY DISEASE: ICD-10-CM

## 2024-09-04 DIAGNOSIS — Z94.0 HISTORY OF KIDNEY TRANSPLANT: ICD-10-CM

## 2024-09-04 DIAGNOSIS — Z09 HOSPITAL DISCHARGE FOLLOW-UP: Primary | ICD-10-CM

## 2024-09-04 DIAGNOSIS — N17.0 ACUTE RENAL FAILURE WITH ACUTE TUBULAR NECROSIS SUPERIMPOSED ON STAGE 4 CHRONIC KIDNEY DISEASE: ICD-10-CM

## 2024-09-04 DIAGNOSIS — L97.512 SKIN ULCER OF THIRD TOE OF RIGHT FOOT WITH FAT LAYER EXPOSED: ICD-10-CM

## 2024-09-04 LAB
ANION GAP SERPL CALCULATED.3IONS-SCNC: 17 MMOL/L (ref 7–16)
BUN SERPL-MCNC: 90 MG/DL (ref 7–18)
BUN/CREAT SERPL: 21 (ref 6–20)
CALCIUM SERPL-MCNC: 9.3 MG/DL (ref 8.5–10.1)
CHLORIDE SERPL-SCNC: 105 MMOL/L (ref 98–107)
CO2 SERPL-SCNC: 20 MMOL/L (ref 21–32)
CREAT SERPL-MCNC: 4.22 MG/DL (ref 0.55–1.02)
EGFR (NO RACE VARIABLE) (RUSH/TITUS): 11 ML/MIN/1.73M2
GLUCOSE SERPL-MCNC: 274 MG/DL (ref 74–106)
POTASSIUM SERPL-SCNC: 4.6 MMOL/L (ref 3.5–5.1)
SODIUM SERPL-SCNC: 137 MMOL/L (ref 136–145)

## 2024-09-04 PROCEDURE — 80048 BASIC METABOLIC PNL TOTAL CA: CPT | Mod: ,,, | Performed by: CLINICAL MEDICAL LABORATORY

## 2024-09-04 PROCEDURE — 99213 OFFICE O/P EST LOW 20 MIN: CPT | Mod: ,,, | Performed by: NURSE PRACTITIONER

## 2024-09-04 PROCEDURE — 3074F SYST BP LT 130 MM HG: CPT | Mod: ,,, | Performed by: NURSE PRACTITIONER

## 2024-09-04 PROCEDURE — 3078F DIAST BP <80 MM HG: CPT | Mod: ,,, | Performed by: NURSE PRACTITIONER

## 2024-09-04 PROCEDURE — 3044F HG A1C LEVEL LT 7.0%: CPT | Mod: ,,, | Performed by: NURSE PRACTITIONER

## 2024-09-04 PROCEDURE — 3288F FALL RISK ASSESSMENT DOCD: CPT | Mod: ,,, | Performed by: NURSE PRACTITIONER

## 2024-09-04 PROCEDURE — 3008F BODY MASS INDEX DOCD: CPT | Mod: ,,, | Performed by: NURSE PRACTITIONER

## 2024-09-04 PROCEDURE — 1111F DSCHRG MED/CURRENT MED MERGE: CPT | Mod: ,,, | Performed by: NURSE PRACTITIONER

## 2024-09-04 PROCEDURE — 3066F NEPHROPATHY DOC TX: CPT | Mod: ,,, | Performed by: NURSE PRACTITIONER

## 2024-09-04 PROCEDURE — 1101F PT FALLS ASSESS-DOCD LE1/YR: CPT | Mod: ,,, | Performed by: NURSE PRACTITIONER

## 2024-09-04 PROCEDURE — 3060F POS MICROALBUMINURIA REV: CPT | Mod: ,,, | Performed by: NURSE PRACTITIONER

## 2024-09-04 RX ORDER — LACTOSE-REDUCED FOOD 0.05 G-1.5
273 LIQUID (ML) ORAL 3 TIMES DAILY
Qty: 90 EACH | Refills: 2 | Status: SHIPPED | OUTPATIENT
Start: 2024-09-04

## 2024-09-04 NOTE — PROGRESS NOTES
Health Maintenance Due   Topic Date Due    Shingles Vaccine (1 of 2) Never done    RSV Vaccine (Age 60+ and Pregnant patients) (1 - 1-dose 60+ series) Never done    Influenza Vaccine (1) 09/01/2024    COVID-19 Vaccine (5 - 2023-24 season) 09/01/2024    DEXA Scan  09/09/2024    Mammogram  10/13/2024    Eye Exam  11/09/2024     Discussed are gaps with pt   Pt is not interested in any vaccines today   DEXA and mammo are already scheduled per pt

## 2024-09-06 DIAGNOSIS — K25.9 GASTRIC ULCER, UNSPECIFIED CHRONICITY, UNSPECIFIED WHETHER GASTRIC ULCER HEMORRHAGE OR PERFORATION PRESENT: ICD-10-CM

## 2024-09-06 RX ORDER — PANTOPRAZOLE SODIUM 40 MG/1
40 TABLET, DELAYED RELEASE ORAL EVERY MORNING
Qty: 30 TABLET | Refills: 0 | Status: SHIPPED | OUTPATIENT
Start: 2024-09-06

## 2024-09-13 ENCOUNTER — OFFICE VISIT (OUTPATIENT)
Dept: PULMONOLOGY | Facility: CLINIC | Age: 74
End: 2024-09-13
Payer: MEDICARE

## 2024-09-13 ENCOUNTER — HOSPITAL ENCOUNTER (OUTPATIENT)
Dept: RADIOLOGY | Facility: HOSPITAL | Age: 74
Discharge: HOME | End: 2024-09-13
Attending: INTERNAL MEDICINE
Payer: MEDICARE

## 2024-09-13 VITALS
HEART RATE: 77 BPM | WEIGHT: 154.13 LBS | HEIGHT: 64 IN | DIASTOLIC BLOOD PRESSURE: 62 MMHG | BODY MASS INDEX: 26.31 KG/M2 | RESPIRATION RATE: 16 BRPM | OXYGEN SATURATION: 97 % | SYSTOLIC BLOOD PRESSURE: 98 MMHG

## 2024-09-13 DIAGNOSIS — R91.8 MULTIPLE PULMONARY NODULES: Primary | ICD-10-CM

## 2024-09-13 DIAGNOSIS — R22.2 LOCALIZED SWELLING, MASS AND LUMP, TRUNK: ICD-10-CM

## 2024-09-13 DIAGNOSIS — R06.02 SOB (SHORTNESS OF BREATH): ICD-10-CM

## 2024-09-13 PROBLEM — R93.89 ABNORMAL CT OF THE CHEST: Status: RESOLVED | Noted: 2023-06-15 | Resolved: 2024-09-13

## 2024-09-13 PROCEDURE — 99215 OFFICE O/P EST HI 40 MIN: CPT | Mod: PBBFAC,25 | Performed by: INTERNAL MEDICINE

## 2024-09-13 PROCEDURE — 71250 CT THORAX DX C-: CPT | Mod: TC

## 2024-09-13 PROCEDURE — 99214 OFFICE O/P EST MOD 30 MIN: CPT | Mod: S$PBB,,, | Performed by: INTERNAL MEDICINE

## 2024-09-13 PROCEDURE — 71250 CT THORAX DX C-: CPT | Mod: 26,,, | Performed by: RADIOLOGY

## 2024-09-13 PROCEDURE — 99999 PR PBB SHADOW E&M-EST. PATIENT-LVL V: CPT | Mod: PBBFAC,,, | Performed by: INTERNAL MEDICINE

## 2024-09-13 RX ORDER — FUROSEMIDE 40 MG/1
40 TABLET ORAL 2 TIMES DAILY
COMMUNITY
Start: 2024-08-28

## 2024-09-13 RX ORDER — ATORVASTATIN CALCIUM 40 MG/1
40 TABLET, FILM COATED ORAL DAILY
COMMUNITY
Start: 2024-09-10

## 2024-09-13 NOTE — ASSESSMENT & PLAN NOTE
Patient had small airways disease but no other abnormalities she has been multiple admissions for volume overload she wants to start dialysis I will defer that is the kidney doctors she will use p.r.n. Ventolin no other recommendations I will see her p.r.n.

## 2024-09-13 NOTE — PROGRESS NOTES
"Subjective:       Patient ID: Nasrin Grant is a 74 y.o. female.    Chief Complaint: Follow-up (1yr follow up/CT. No specfic questions or concerns today.)    Follow-up  This is a chronic problem. The current episode started more than 1 month ago. The problem has been unchanged. Pertinent negatives include no abdominal pain, arthralgias, chest pain, chills, congestion, headaches or rash.     Past Medical History:   Diagnosis Date    Amputation of one or more toes     2 TOES RT FOOT, 3 TOES LFT FOOT    Atherosclerotic heart disease of native coronary artery with angina pectoris 01/20/2020    CVA (cerebral vascular accident)     Depression     Diabetes mellitus, type 2     Disorder of kidney and ureter     History of carpal tunnel surgery of left wrist     History of carpal tunnel surgery of right wrist     History of repair of left rotator cuff     History of repair of right rotator cuff     Hyperlipidemia     Hypertension     Hypokalemia     Kidney transplant status 07/13/2020    Osteoporosis 07/22/2020    Proliferative diabetic retinopathy of both eyes 09/07/2023    Stroke      Past Surgical History:   Procedure Laterality Date    Appendix      EXTRACTION OF TOOTH Left 08/11/2021    HYSTERECTOMY      kidney transplant 2016      Have had issues since transplant, kidney had a virus per patient    OOPHORECTOMY      TOE AMPUTATION       Family History   Problem Relation Name Age of Onset    Diabetes Mother      Hyperlipidemia Mother      Heart disease Mother      Hearing loss Mother      Diabetes Father      Hearing loss Father      Heart disease Father      Hyperlipidemia Father       Review of patient's allergies indicates:   Allergen Reactions    Hydrocodone-acetaminophen Rash    Gabapentin Other (See Comments)     Made her disoriented  "out of my head"      Morphine Itching    Opioids - morphine analogues       Social History     Tobacco Use    Smoking status: Never     Passive exposure: Never    Smokeless tobacco: " Never   Substance Use Topics    Alcohol use: Never    Drug use: Never      Review of Systems   Constitutional:  Negative for chills, activity change and night sweats.   HENT:  Negative for congestion and ear pain.    Eyes:  Negative for redness and itching.   Cardiovascular:  Negative for chest pain and palpitations.   Musculoskeletal:  Negative for arthralgias and back pain.   Skin:  Negative for rash.   Gastrointestinal:  Negative for abdominal pain and abdominal distention.   Neurological:  Negative for dizziness and headaches.   Hematological:  Negative for adenopathy. Does not bruise/bleed easily.   Psychiatric/Behavioral:  Negative for confusion. The patient is not nervous/anxious.        Objective:      Physical Exam   Constitutional: She is oriented to person, place, and time. She appears well-developed and well-nourished.   HENT:   Head: Normocephalic.   Nose: Nose normal.   Mouth/Throat: Oropharynx is clear and moist.   Neck: No JVD present. No thyromegaly present.   Cardiovascular: Normal rate, regular rhythm, normal heart sounds and intact distal pulses.   Pulmonary/Chest: Normal expansion, hyperinflation, symmetric chest wall expansion, effort normal and breath sounds normal.   Abdominal: Soft. Bowel sounds are normal.   Musculoskeletal:         General: Normal range of motion.      Cervical back: Normal range of motion and neck supple.   Lymphadenopathy: No supraclavicular adenopathy is present.     She has no cervical adenopathy.   Neurological: She is alert and oriented to person, place, and time. She has normal reflexes.   Skin: Skin is warm and dry.   Psychiatric: She has a normal mood and affect. Her behavior is normal.     Personal Diagnostic Review  none pertinent        9/13/2024    10:16 AM 9/4/2024    10:43 AM 8/28/2024    12:08 PM 8/28/2024     7:50 AM 8/28/2024     7:34 AM 8/28/2024     6:00 AM 8/28/2024     4:18 AM   Pulmonary Function Tests   SpO2 97 % 99 % 97 % 96 % 98 %  95 %   Height  "5' 4" (1.626 m) 5' 4" (1.626 m)        Weight 69.9 kg (154 lb 1.6 oz) 69.9 kg (154 lb)    64 kg (141 lb 1.5 oz)    BMI (Calculated) 26.4 26.4    24.2          Assessment:       1. Multiple pulmonary nodules    2. SOB (shortness of breath)        Outpatient Encounter Medications as of 9/13/2024   Medication Sig Dispense Refill    albuterol (VENTOLIN HFA) 90 mcg/actuation inhaler Inhale 2 puffs into the lungs every 6 (six) hours as needed for Wheezing. Rescue 18 g 5    aspirin (ECOTRIN) 81 MG EC tablet Take 81 mg by mouth once daily.      atorvastatin (LIPITOR) 40 MG tablet Take 40 mg by mouth once daily.      BACILLUS COAGULANS ORAL Take 1 capsule every day by oral route in the morning.      bumetanide (BUMEX) 2 MG tablet Take 1 tablet (2 mg total) by mouth 2 (two) times a day. 90 tablet 1    carvediloL (COREG) 25 MG tablet Take 1 tablet (25 mg total) by mouth 2 (two) times daily. 180 tablet 1    cholestyramine (QUESTRAN) 4 gram packet Take 1 packet by mouth once daily.      cloNIDine 0.1 mg/24 hr td ptwk (CATAPRES) 0.1 mg/24 hr Place 1 patch onto the skin once a week.      docusate sodium (COLACE) 100 MG capsule Take 100 mg by mouth 2 (two) times daily as needed for Constipation.      ferrous sulfate (FEOSOL) 325 mg (65 mg iron) Tab tablet Take 1 tablet (325 mg total) by mouth 2 (two) times daily. 600 tablet 2    flash glucose sensor (FREESTYLE MARY 10 DAY SENSOR MISC) 1 Device by Percutaneous route.      fluticasone propionate (FLONASE) 50 mcg/actuation nasal spray 1 spray by Nasal route once daily.      furosemide (LASIX) 40 MG tablet Take 40 mg by mouth 2 (two) times daily.      hydrALAZINE (APRESOLINE) 100 MG tablet Take 100 mg by mouth 3 (three) times daily.      insulin aspart U-100 (NOVOLOG) 100 unit/mL injection Inject 3 Units into the skin 3 (three) times daily before meals. Per Dr Kayleigh [facility]      insulin degludec (TRESIBA FLEXTOUCH U-100) 100 unit/mL (3 mL) insulin pen Inject 10 Units " "into the skin once daily. 9 mL 2    isosorbide mononitrate (IMDUR) 30 MG 24 hr tablet Take 30 mg by mouth once daily.      Lactobacillus acidophilus (PROBIOTIC ACIDOPHILUS ORAL) Take 4 Billion Cells by mouth.      megestroL (MEGACE) 20 MG Tab TAKE ONE TABLET BY MOUTH EVERY DAY 30 tablet 3    minoxidiL (LONITEN) 2.5 MG tablet Take 2.5 mg by mouth once daily.      multivitamin (THERAGRAN) per tablet Take 1 tablet by mouth once daily.      mycophenolate sodium 180 MG TbEC Take 1 tablet by mouth 2 (two) times daily.      NIFEdipine (PROCARDIA-XL) 90 MG (OSM) 24 hr tablet Take 1 tablet (90 mg total) by mouth once daily. 30 tablet 11    nitroGLYCERIN (NITROSTAT) 0.4 MG SL tablet DISSOLVE 1 TABLET UNDER THE TONGUE EVERY 5 MINUTES AS NEEDED FOR CHEST PAIN. DO NOT EXCEED A TOTAL OF 3 DOSES IN 15 MINUTES.      nut.tx.imp.renal fxn,lac-reduc (SUPLENA CARB STEADY) 0.04 gram-1.8 kcal/mL Liqd Take 273 mLs by mouth 3 (three) times daily. 90 each 2    pantoprazole (PROTONIX) 40 MG tablet TAKE ONE TABLET BY MOUTH EVERY MORNING 30 tablet 0    pen needle, diabetic 31 gauge x 3/16" Ndle 1 each by NOT APPLICABLE route.      potassium chloride (MICRO-K) 10 MEQ CpSR TAKE ONE CAPSULE BY MOUTH ONCE DAILY 90 capsule 1    predniSONE (DELTASONE) 5 MG tablet Take 1 tablet by mouth once daily.      sodium bicarbonate 650 MG tablet Take 1 tablet by mouth 2 (two) times daily.      tacrolimus (PROGRAF) 0.5 MG Cap Take 1.5 mg by mouth 2 (two) times a day.      lancets (TRUEPLUS LANCETS) 33 gauge Misc Inject 1 lancet  into the skin 3 (three) times daily before meals. (Patient not taking: Reported on 9/13/2024) 400 each 2    [DISCONTINUED] bumetanide (BUMEX) 2 MG tablet Take 1 tablet (2 mg total) by mouth once daily. 90 tablet 1    [DISCONTINUED] carvediloL (COREG) 25 MG tablet TAKE ONE TABLET BY MOUTH TWICE DAILY (Patient taking differently: Take 50 mg by mouth 2 (two) times daily.) 180 tablet 1    [DISCONTINUED] lancets (TRUEPLUS LANCETS) 33 gauge " Misc Inject 1 lancet into the skin 3 (three) times daily before meals. 400 each 2    [DISCONTINUED] pantoprazole (PROTONIX) 40 MG tablet TAKE ONE TABLET BY MOUTH EVERY MORNING 30 tablet 0    [DISCONTINUED] acetaminophen tablet 650 mg       [DISCONTINUED] acetaminophen tablet 650 mg       [DISCONTINUED] acetaminophen tablet 650 mg       [DISCONTINUED] albuterol nebulizer solution 2.5 mg       [DISCONTINUED] albuterol nebulizer solution 2.5 mg       [DISCONTINUED] aspirin EC tablet 81 mg       [DISCONTINUED] aspirin EC tablet 81 mg       [DISCONTINUED] bumetanide injection 2 mg       [DISCONTINUED] bumetanide tablet 2 mg       [DISCONTINUED] carvediloL tablet 25 mg       [DISCONTINUED] carvediloL tablet 25 mg       [DISCONTINUED] cholestyramine-aspartame 4 grams of anhydrous packet 4 grams of anhydrous cholestyramine       [DISCONTINUED] cholestyramine-aspartame 4 grams of anhydrous packet 4 grams of anhydrous cholestyramine       [DISCONTINUED] cloNIDine 0.1 mg/24 hr td ptwk 1 patch       [DISCONTINUED] cloNIDine 0.1 mg/24 hr td ptwk 1 patch       [DISCONTINUED] dextrose 10% bolus 125 mL 125 mL       [DISCONTINUED] dextrose 10% bolus 250 mL 250 mL       [DISCONTINUED] docusate sodium capsule 100 mg       [DISCONTINUED] docusate sodium capsule 100 mg       [DISCONTINUED] ferrous sulfate tablet 1 each       [DISCONTINUED] ferrous sulfate tablet 1 each       [DISCONTINUED] fluticasone propionate 50 mcg/actuation nasal spray 100 mcg       [DISCONTINUED] furosemide injection 100 mg       [DISCONTINUED] furosemide injection 60 mg       [DISCONTINUED] glucagon (human recombinant) injection 1 mg       [DISCONTINUED] glucose chewable tablet 16 g       [DISCONTINUED] glucose chewable tablet 24 g       [DISCONTINUED] guaiFENesin 100 mg/5 ml syrup 200 mg       [DISCONTINUED] heparin (porcine) injection 5,000 Units       [DISCONTINUED] heparin (porcine) injection 5,000 Units       [DISCONTINUED] hydrALAZINE injection 10 mg        [DISCONTINUED] hydrALAZINE tablet 100 mg       [DISCONTINUED] hydrALAZINE tablet 100 mg       [DISCONTINUED] insulin aspart U-100 injection 0-10 Units       [DISCONTINUED] insulin aspart U-100 injection 0-5 Units       [DISCONTINUED] insulin glargine U-100 (Lantus) injection 5 Units       [DISCONTINUED] isosorbide mononitrate 24 hr tablet 30 mg       [DISCONTINUED] isosorbide mononitrate 24 hr tablet 30 mg       [DISCONTINUED] Lactobacillus acidophilus capsule 1 capsule       [DISCONTINUED] Lactobacillus acidophilus capsule 1 capsule       [DISCONTINUED] megestroL tablet 20 mg       [DISCONTINUED] megestroL tablet 20 mg       [DISCONTINUED] melatonin tablet 6 mg       [DISCONTINUED] minoxidiL tablet 2.5 mg       [DISCONTINUED] minoxidiL tablet 2.5 mg       [DISCONTINUED] multivitamin tablet       [DISCONTINUED] multivitamin tablet       [DISCONTINUED] mycophenolate capsule 250 mg       [DISCONTINUED] mycophenolate sodium EC tablet 180 mg       [DISCONTINUED] naloxone 0.4 mg/mL injection 0.02 mg       [DISCONTINUED] naloxone 0.4 mg/mL injection 0.02 mg       [DISCONTINUED] NIFEdipine 24 hr tablet 60 mg       [DISCONTINUED] NIFEdipine 24 hr tablet 90 mg       [DISCONTINUED] NIFEdipine 24 hr tablet 90 mg       [DISCONTINUED] nitroGLYCERIN SL tablet 0.4 mg       [DISCONTINUED] ondansetron injection 4 mg       [DISCONTINUED] ondansetron injection 4 mg       [DISCONTINUED] pantoprazole EC tablet 40 mg       [DISCONTINUED] polyethylene glycol packet 17 g       [DISCONTINUED] predniSONE tablet 5 mg       [DISCONTINUED] predniSONE tablet 5 mg       [DISCONTINUED] sodium bicarbonate tablet 650 mg       [DISCONTINUED] sodium bicarbonate tablet 650 mg       [DISCONTINUED] sodium chloride 0.9% flush 10 mL       [DISCONTINUED] sodium chloride 0.9% flush 10 mL       [DISCONTINUED] tacrolimus capsule 1.5 mg       [DISCONTINUED] tacrolimus capsule 1.5 mg       [DISCONTINUED] tacrolimus capsule 1.5 mg        No facility-administered  encounter medications on file as of 9/13/2024.     No orders of the defined types were placed in this encounter.      Plan:       Problem List Items Addressed This Visit          Pulmonary    Multiple pulmonary nodules - Primary     Patient has multiple pulmonary nodules now unchanged going 5 years no further workup needed         SOB (shortness of breath)     Patient had small airways disease but no other abnormalities she has been multiple admissions for volume overload she wants to start dialysis I will defer that is the kidney doctors she will use p.r.n. Ventolin no other recommendations I will see her p.r.n.

## 2024-09-17 PROBLEM — L97.512: Status: ACTIVE | Noted: 2024-09-17

## 2024-09-17 PROBLEM — M79.672 LEFT FOOT PAIN: Status: RESOLVED | Noted: 2024-03-04 | Resolved: 2024-09-17

## 2024-09-17 PROBLEM — S91.302A WOUND OF LEFT FOOT: Status: RESOLVED | Noted: 2022-08-01 | Resolved: 2024-09-17

## 2024-09-17 PROBLEM — M65.311 TRIGGER THUMB OF RIGHT HAND: Status: RESOLVED | Noted: 2018-11-15 | Resolved: 2024-09-17

## 2024-09-17 NOTE — ASSESSMENT & PLAN NOTE
Recently discharged from [facility] related to fluid overload, SOB.   Reports breathing has improved. Continues to have lower ext edema.

## 2024-09-17 NOTE — ASSESSMENT & PLAN NOTE
Ulcer to left third toe with callus to periwound and granulation to woundbed. Wound approx 0.4x0.4 cm. Start mepilex ag to wound. Change every other day. Keep area clean and dry.

## 2024-09-17 NOTE — PROGRESS NOTES
CHAPARRITA Bella   [facility]  [address] [place] [address]  [phone]      PATIENT NAME: Nasrin Grant  : 1950  DATE: 24  MRN: [id number]      Billing Provider: CHAPARRITA Bella  Level of Service:   Patient PCP Information       Provider PCP Type    CHAPARRITA Bella General            Reason for Visit / Chief Complaint: Hospital Follow Up (Hospital follow up /Admitted to [facility] in [place] on 04 and was D/C 2024/DX fluid overload and kidney failure /) and Sore (C/O sore on right foot third toe /Had the sore since July )       Update PCP  Update Chief Complaint         History of Present Illness / Problem Focused Workflow     74 year old female presents for hospital follow up. Recently admitted to [facility] with fluid overload, CHF, kidney failure. Reports breathing has improved since being discharged. Complaints of ulcer to right third toe that she has had since 2024.      Review of Systems     Review of Systems   Constitutional:  Positive for fatigue. Negative for fever.   HENT:  Negative for congestion.    Respiratory:  Negative for cough and shortness of breath.    Cardiovascular:  Positive for chest pain and leg swelling. Negative for palpitations.   Gastrointestinal:  Positive for constipation and nausea. Negative for abdominal pain and diarrhea.   Endocrine: Negative for polydipsia and polyuria.   Musculoskeletal:  Positive for arthralgias and myalgias. Negative for gait problem.   Skin:  Positive for wound (right third toe).   Neurological:  Negative for dizziness, weakness and headaches.   Psychiatric/Behavioral:  Negative for agitation and dysphoric mood.        Medical / Social / Family History     Past Medical History:   Diagnosis Date    Amputation of one or more toes     2 TOES RT FOOT, 3 TOES LFT FOOT    Atherosclerotic heart disease of native coronary artery with angina pectoris 2020    CVA (cerebral  vascular accident)     Depression     Diabetes mellitus, type 2     Disorder of kidney and ureter     History of carpal tunnel surgery of left wrist     History of carpal tunnel surgery of right wrist     History of repair of left rotator cuff     History of repair of right rotator cuff     Hyperlipidemia     Hypertension     Hypokalemia     Kidney transplant status 07/13/2020    Osteoporosis 07/22/2020    Proliferative diabetic retinopathy of both eyes 09/07/2023    Stroke        Past Surgical History:   Procedure Laterality Date    Appendix      EXTRACTION OF TOOTH Left 08/11/2021    HYSTERECTOMY      kidney transplant 2016      Have had issues since transplant, 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  reports that she has never smoked. She has never been exposed to tobacco smoke. She has never used smokeless tobacco. She reports that she does not drink alcohol and does not use drugs.    Family History  Ms.'s family history includes Diabetes in her father and mother; Hearing loss in her father and mother; Heart disease in her father and mother; Hyperlipidemia in her father and mother.    Medications and Allergies     Medications  Outpatient Medications Marked as Taking for the 9/4/24 encounter (Office Visit) with Laury Enrique FNP   Medication Sig Dispense Refill    albuterol (VENTOLIN HFA) 90 mcg/actuation inhaler Inhale 2 puffs into the lungs every 6 (six) hours as needed for Wheezing. Rescue 18 g 5    aspirin (ECOTRIN) 81 MG EC tablet Take 81 mg by mouth once daily.      bumetanide (BUMEX) 2 MG tablet Take 1 tablet (2 mg total) by mouth 2 (two) times a day. 90 tablet 1    carvediloL (COREG) 25 MG tablet Take 1 tablet (25 mg total) by mouth 2 (two) times daily. 180 tablet 1    cholestyramine (QUESTRAN) 4 gram packet Take 1 packet by mouth once daily.      cloNIDine 0.1 mg/24 hr td ptwk (CATAPRES) 0.1 mg/24 hr Place 1 patch onto the skin once a week.       "docusate sodium (COLACE) 100 MG capsule Take 100 mg by mouth 2 (two) times daily as needed for Constipation.      ferrous sulfate (FEOSOL) 325 mg (65 mg iron) Tab tablet Take 1 tablet (325 mg total) by mouth 2 (two) times daily. 600 tablet 2    flash glucose sensor (FREESTYLE MARY 10 DAY SENSOR MISC) 1 Device by Percutaneous route.      fluticasone propionate (FLONASE) 50 mcg/actuation nasal spray 1 spray by Nasal route once daily.      hydrALAZINE (APRESOLINE) 100 MG tablet Take 100 mg by mouth 3 (three) times daily.      insulin aspart U-100 (NOVOLOG) 100 unit/mL injection Inject 3 Units into the skin 3 (three) times daily before meals. Per Dr Kayleigh Morris at [facility]      insulin degludec (TRESIBA FLEXTOUCH U-100) 100 unit/mL (3 mL) insulin pen Inject 10 Units into the skin once daily. 9 mL 2    isosorbide mononitrate (IMDUR) 30 MG 24 hr tablet Take 30 mg by mouth once daily.      minoxidiL (LONITEN) 2.5 MG tablet Take 2.5 mg by mouth once daily.      multivitamin (THERAGRAN) per tablet Take 1 tablet by mouth once daily.      mycophenolate sodium 180 MG TbEC Take 1 tablet by mouth 2 (two) times daily.      NIFEdipine (PROCARDIA-XL) 90 MG (OSM) 24 hr tablet Take 1 tablet (90 mg total) by mouth once daily. 30 tablet 11    nitroGLYCERIN (NITROSTAT) 0.4 MG SL tablet DISSOLVE 1 TABLET UNDER THE TONGUE EVERY 5 MINUTES AS NEEDED FOR CHEST PAIN. DO NOT EXCEED A TOTAL OF 3 DOSES IN 15 MINUTES.      pen needle, diabetic 31 gauge x 3/16" Ndle 1 each by NOT APPLICABLE route.      potassium chloride (MICRO-K) 10 MEQ CpSR TAKE ONE CAPSULE BY MOUTH ONCE DAILY 90 capsule 1    predniSONE (DELTASONE) 5 MG tablet Take 1 tablet by mouth once daily.      sodium bicarbonate 650 MG tablet Take 1 tablet by mouth 2 (two) times daily.      tacrolimus (PROGRAF) 0.5 MG Cap Take 1.5 mg by mouth 2 (two) times a day.      [DISCONTINUED] pantoprazole (PROTONIX) 40 MG tablet TAKE ONE TABLET BY MOUTH EVERY MORNING 30 tablet 0 " "      Allergies  Review of patient's allergies indicates:   Allergen Reactions    Hydrocodone-acetaminophen Rash    Gabapentin Other (See Comments)     Made her disoriented  "out of my head"      Morphine Itching    Opioids - morphine analogues        Physical Examination     Vitals:    09/04/24 1043   BP: 124/78   Pulse: 85   Resp: 18   Temp: 97.6 °F (36.4 °C)     Physical Exam  Constitutional:       General: She is not in acute distress.  HENT:      Head: Normocephalic.      Nose: Nose normal.      Mouth/Throat:      Mouth: Mucous membranes are moist.   Eyes:      Extraocular Movements: Extraocular movements intact.   Cardiovascular:      Rate and Rhythm: Normal rate.   Pulmonary:      Effort: Pulmonary effort is normal. No respiratory distress.      Breath sounds: No wheezing.   Abdominal:      General: Bowel sounds are normal.      Palpations: Abdomen is soft.      Tenderness: There is no abdominal tenderness.   Musculoskeletal:         General: Normal range of motion.      Cervical back: Normal range of motion.   Skin:     General: Skin is warm.      Comments: Ulcer with callus and open area to right third toe   Neurological:      Mental Status: She is alert.   Psychiatric:         Behavior: Behavior normal.           Imaging / Labs     Office Visit on 09/04/2024   Component Date Value Ref Range Status    Sodium 09/04/2024 137  136 - 145 mmol/L Final    Potassium 09/04/2024 4.6  3.5 - 5.1 mmol/L Final    Chloride 09/04/2024 105  98 - 107 mmol/L Final    CO2 09/04/2024 20 (L)  21 - 32 mmol/L Final    Anion Gap 09/04/2024 17 (H)  7 - 16 mmol/L Final    Glucose 09/04/2024 274 (H)  74 - 106 mg/dL Final    BUN 09/04/2024 90 (H)  7 - 18 mg/dL Final    Creatinine 09/04/2024 4.22 (H)  0.55 - 1.02 mg/dL Final    BUN/Creatinine Ratio 09/04/2024 21 (H)  6 - 20 Final    Calcium 09/04/2024 9.3  8.5 - 10.1 mg/dL Final    eGFR 09/04/2024 11 (L)  >=60 mL/min/1.73m2 Final     CT Chest Without Contrast  Narrative: " EXAMINATION:  CT CHEST WITHOUT CONTRAST    CLINICAL HISTORY:  Soft tissue mass, chest, US/xray nondiagnostic; Localized swelling, mass and lump, trunk    TECHNIQUE:  Low dose axial images, sagittal and coronal reformations were obtained from the thoracic inlet to the lung bases. Contrast was not administered.    COMPARISON:  09/13/2023    FINDINGS:  The heart size is normal without pericardial effusion.  There is no hilar or mediastinal lymphadenopathy.  There is calcification of the coronary arteries and thoracic aorta.    Several solid pulmonary nodules are again identified bilaterally.  Largest on the right is a lower lobe solid nodule measuring 9.5 mm, without significant change (series 602, image 53).  Largest on the left is present within the lingula and measures 9 mm, without significant change (series 602, image 41).  No distinctly new or enlarging nodules are demonstrated.  No significant emphysematous changes.    There is no pleural effusion.  Moderate atrophy of both kidneys is present.  There has been a cholecystectomy.  A small hiatal hernia is present.  Degenerative change of the spine typical for age is present.  Impression: Unchanged pulmonary nodules.  Follow-up to 2 years of stability is recommended.    Electronically signed by: Matti Leigh MD  Date:    09/13/2024  Time:    12:41      Assessment and Plan (including Health Maintenance)      Problem List  Smart Sets  Document Outside HM   :    Health Maintenance Due   Topic Date Due    Shingles Vaccine (1 of 2) Never done    RSV Vaccine (Age 60+ and Pregnant patients) (1 - 1-dose 60+ series) Never done    Influenza Vaccine (1) 09/01/2024    COVID-19 Vaccine (5 - 2023-24 season) 09/01/2024    DEXA Scan  09/09/2024    Mammogram  10/13/2024    Eye Exam  11/09/2024       Problem List Items Addressed This Visit          Renal/    History of kidney transplant    Relevant Medications    nut.tx.imp.renal fxn,lac-reduc (SUPLENA CARB STEADY) 0.04  gram-1.8 kcal/mL Liqd    Acute renal failure with acute tubular necrosis superimposed on stage 4 chronic kidney disease       Orthopedic    Skin ulcer of third toe of right foot with fat layer exposed    Current Assessment & Plan     Ulcer to left third toe with callus to periwound and granulation to woundbed. Wound approx 0.4x0.4 cm. Start mepilex ag to wound. Change every other day. Keep area clean and dry.             Other    Hospital discharge follow-up - Primary    Current Assessment & Plan     Recently discharged from [facility] related to fluid overload, SOB.   Reports breathing has improved. Continues to have lower ext edema.             Health Maintenance Topics with due status: Not Due       Topic Last Completion Date    TETANUS VACCINE 10/16/2017    Colorectal Cancer Screening 08/22/2022    Lipid Panel 02/15/2024    Hemoglobin A1c 05/14/2024    Foot Exam 08/06/2024       Future Appointments   Date Time Provider Department Center   9/18/2024 10:00 AM Iva, Amalia, FNP [facility]   10/18/2024 10:20 AM [facility]   10/18/2024 10:40 AM [facility] DEXA[facility]   11/14/2024  9:40 AM Iva, Amalia, FNP [facility]   8/7/2025  9:00 AM [facility] NURSE, [facility] MEGAN Fern[facility]          Signature:  CHAPARRITA Bella  [facility] [address] [address]  [phone]    Date of encounter: 9/4/24

## 2024-09-18 ENCOUNTER — OFFICE VISIT (OUTPATIENT)
Dept: FAMILY MEDICINE | Facility: CLINIC | Age: 74
End: 2024-09-18
Payer: MEDICARE

## 2024-09-18 ENCOUNTER — EXTERNAL HOME HEALTH (OUTPATIENT)
Dept: HOME HEALTH SERVICES | Facility: HOSPITAL | Age: 74
End: 2024-09-18
Payer: MEDICARE

## 2024-09-18 VITALS
BODY MASS INDEX: 24.96 KG/M2 | RESPIRATION RATE: 18 BRPM | OXYGEN SATURATION: 99 % | SYSTOLIC BLOOD PRESSURE: 124 MMHG | HEIGHT: 64 IN | DIASTOLIC BLOOD PRESSURE: 72 MMHG | TEMPERATURE: 98 F | WEIGHT: 146.19 LBS | HEART RATE: 76 BPM

## 2024-09-18 DIAGNOSIS — L97.512 SKIN ULCER OF THIRD TOE OF RIGHT FOOT WITH FAT LAYER EXPOSED: Primary | ICD-10-CM

## 2024-09-18 DIAGNOSIS — L03.031 CELLULITIS OF TOE OF RIGHT FOOT: ICD-10-CM

## 2024-09-18 PROCEDURE — 3288F FALL RISK ASSESSMENT DOCD: CPT | Mod: ,,, | Performed by: NURSE PRACTITIONER

## 2024-09-18 PROCEDURE — 1101F PT FALLS ASSESS-DOCD LE1/YR: CPT | Mod: ,,, | Performed by: NURSE PRACTITIONER

## 2024-09-18 PROCEDURE — 1159F MED LIST DOCD IN RCRD: CPT | Mod: ,,, | Performed by: NURSE PRACTITIONER

## 2024-09-18 PROCEDURE — 1111F DSCHRG MED/CURRENT MED MERGE: CPT | Mod: ,,, | Performed by: NURSE PRACTITIONER

## 2024-09-18 PROCEDURE — 3066F NEPHROPATHY DOC TX: CPT | Mod: ,,, | Performed by: NURSE PRACTITIONER

## 2024-09-18 PROCEDURE — 3044F HG A1C LEVEL LT 7.0%: CPT | Mod: ,,, | Performed by: NURSE PRACTITIONER

## 2024-09-18 PROCEDURE — 99213 OFFICE O/P EST LOW 20 MIN: CPT | Mod: ,,, | Performed by: NURSE PRACTITIONER

## 2024-09-18 PROCEDURE — 3008F BODY MASS INDEX DOCD: CPT | Mod: ,,, | Performed by: NURSE PRACTITIONER

## 2024-09-18 PROCEDURE — 1160F RVW MEDS BY RX/DR IN RCRD: CPT | Mod: ,,, | Performed by: NURSE PRACTITIONER

## 2024-09-18 PROCEDURE — 3060F POS MICROALBUMINURIA REV: CPT | Mod: ,,, | Performed by: NURSE PRACTITIONER

## 2024-09-18 PROCEDURE — 3078F DIAST BP <80 MM HG: CPT | Mod: ,,, | Performed by: NURSE PRACTITIONER

## 2024-09-18 PROCEDURE — 3074F SYST BP LT 130 MM HG: CPT | Mod: ,,, | Performed by: NURSE PRACTITIONER

## 2024-09-18 RX ORDER — DOXYCYCLINE 100 MG/1
100 CAPSULE ORAL EVERY 12 HOURS
Qty: 20 CAPSULE | Refills: 0 | Status: SHIPPED | OUTPATIENT
Start: 2024-09-18

## 2024-09-18 NOTE — PROGRESS NOTES
Health Maintenance Due   Topic Date Due    Shingles Vaccine (1 of 2) Never done    RSV Vaccine (Age 60+ and Pregnant patients) (1 - 1-dose 60+ series) Never done    Influenza Vaccine (1) 09/01/2024    COVID-19 Vaccine (5 - 2023-24 season) 09/01/2024    DEXA Scan  09/09/2024    Mammogram  10/13/2024    Eye Exam  11/09/2024     Discussed care gaps with pt   Pt is not interested in any vaccines or screenings today

## 2024-09-26 NOTE — ASSESSMENT & PLAN NOTE
Ulcer to right third toe with granulation tissue woundbed. She has some warmth and redness to toe. Will start doxycycline po.  Wound continues to be about the same size. Will continue mepilex ag to wound. Change every other day. Keep area clean and dry.  Also will get darco shoe for offloading today.

## 2024-09-26 NOTE — PROGRESS NOTES
CHAPARRITA Bella   [facility]  [address] [address]  [phone]      PATIENT NAME: Nasrin Grant  : 1950  DATE: 24  MRN: [id number]      Billing Provider: CHAPARRITA Bella  Level of Service:   Patient PCP Information       Provider PCP Type    CHAPARRITA Bella General            Reason for Visit / Chief Complaint: Wound Check (Two week follow up on right foot third toe ulcer/No drainage/Very painful unable to sleep or stay asleep due to pain )       Update PCP  Update Chief Complaint         History of Present Illness / Problem Focused Workflow     74 year old female presents for hospital follow up on ulcer on right toe. Reports having increased pain to area over the last few days.     Review of Systems     Review of Systems   Constitutional:  Positive for fatigue. Negative for fever.   HENT:  Negative for congestion.    Respiratory:  Negative for cough and shortness of breath.    Cardiovascular:  Positive for chest pain and leg swelling. Negative for palpitations.   Gastrointestinal:  Positive for constipation and nausea. Negative for abdominal pain and diarrhea.   Endocrine: Negative for polydipsia and polyuria.   Musculoskeletal:  Positive for arthralgias and myalgias. Negative for gait problem.   Skin:  Positive for wound (right third toe).   Neurological:  Negative for dizziness, weakness and headaches.   Psychiatric/Behavioral:  Negative for agitation and dysphoric mood.        Medical / Social / Family History     Past Medical History:   Diagnosis Date    Amputation of one or more toes     2 TOES RT FOOT, 3 TOES LFT FOOT    Atherosclerotic heart disease of native coronary artery with angina pectoris 2020    CVA (cerebral vascular accident)     Depression     Diabetes mellitus, type 2     Disorder of kidney and ureter     History of carpal tunnel surgery of left wrist     History of carpal tunnel surgery of right  wrist     History of repair of left rotator cuff     History of repair of right rotator cuff     Hyperlipidemia     Hypertension     Hypokalemia     Kidney transplant status 07/13/2020    Osteoporosis 07/22/2020    Proliferative diabetic retinopathy of both eyes 09/07/2023    Stroke        Past Surgical History:   Procedure Laterality Date    Appendix      EXTRACTION OF TOOTH Left 08/11/2021    HYSTERECTOMY      kidney transplant 2016      Have had issues since transplant, 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  reports that she has never smoked. She has never been exposed to tobacco smoke. She has never used smokeless tobacco. She reports that she does not drink alcohol and does not use drugs.    Family History  Ms.'s family history includes Diabetes in her father and mother; Hearing loss in her father and mother; Heart disease in her father and mother; Hyperlipidemia in her father and mother.    Medications and Allergies     Medications  Outpatient Medications Marked as Taking for the 9/18/24 encounter (Office Visit) with Laury Enrique FNP   Medication Sig Dispense Refill    albuterol (VENTOLIN HFA) 90 mcg/actuation inhaler Inhale 2 puffs into the lungs every 6 (six) hours as needed for Wheezing. Rescue 18 g 5    aspirin (ECOTRIN) 81 MG EC tablet Take 81 mg by mouth once daily.      atorvastatin (LIPITOR) 40 MG tablet Take 40 mg by mouth once daily.      BACILLUS COAGULANS ORAL Take 1 capsule every day by oral route in the morning.      bumetanide (BUMEX) 2 MG tablet Take 1 tablet (2 mg total) by mouth 2 (two) times a day. 90 tablet 1    carvediloL (COREG) 25 MG tablet Take 1 tablet (25 mg total) by mouth 2 (two) times daily. 180 tablet 1    cholestyramine (QUESTRAN) 4 gram packet Take 1 packet by mouth once daily.      cloNIDine 0.1 mg/24 hr td ptwk (CATAPRES) 0.1 mg/24 hr Place 1 patch onto the skin once a week.      docusate sodium (COLACE) 100 MG  capsule Take 100 mg by mouth 2 (two) times daily as needed for Constipation.      ferrous sulfate (FEOSOL) 325 mg (65 mg iron) Tab tablet Take 1 tablet (325 mg total) by mouth 2 (two) times daily. 600 tablet 2    flash glucose sensor (FREESTYLE MARY 10 DAY SENSOR MISC) 1 Device by Percutaneous route.      fluticasone propionate (FLONASE) 50 mcg/actuation nasal spray 1 spray by Nasal route once daily.      furosemide (LASIX) 40 MG tablet Take 40 mg by mouth 2 (two) times daily.      hydrALAZINE (APRESOLINE) 100 MG tablet Take 100 mg by mouth 3 (three) times daily.      insulin aspart U-100 (NOVOLOG) 100 unit/mL injection Inject 3 Units into the skin 3 (three) times daily before meals. Per Dr Kayleigh Morris at [facility]      insulin degludec (TRESIBA FLEXTOUCH U-100) 100 unit/mL (3 mL) insulin pen Inject 10 Units into the skin once daily. 9 mL 2    isosorbide mononitrate (IMDUR) 30 MG 24 hr tablet Take 30 mg by mouth once daily.      Lactobacillus acidophilus (PROBIOTIC ACIDOPHILUS ORAL) Take 4 Billion Cells by mouth.      lancets (TRUEPLUS LANCETS) 33 gauge Misc Inject 1 lancet  into the skin 3 (three) times daily before meals. 400 each 2    megestroL (MEGACE) 20 MG Tab TAKE ONE TABLET BY MOUTH EVERY DAY 30 tablet 3    minoxidiL (LONITEN) 2.5 MG tablet Take 2.5 mg by mouth once daily.      multivitamin (THERAGRAN) per tablet Take 1 tablet by mouth once daily.      mycophenolate sodium 180 MG TbEC Take 1 tablet by mouth 2 (two) times daily.      NIFEdipine (PROCARDIA-XL) 90 MG (OSM) 24 hr tablet Take 1 tablet (90 mg total) by mouth once daily. 30 tablet 11    nitroGLYCERIN (NITROSTAT) 0.4 MG SL tablet DISSOLVE 1 TABLET UNDER THE TONGUE EVERY 5 MINUTES AS NEEDED FOR CHEST PAIN. DO NOT EXCEED A TOTAL OF 3 DOSES IN 15 MINUTES.      nut.tx.imp.renal fxn,lac-reduc (SUPLENA CARB STEADY) 0.04 gram-1.8 kcal/mL Liqd Take 273 mLs by mouth 3 (three) times daily. 90 each 2    pantoprazole (PROTONIX) 40 MG tablet TAKE ONE  "TABLET BY MOUTH EVERY MORNING 30 tablet 0    pen needle, diabetic 31 gauge x 3/16" Ndle 1 each by NOT APPLICABLE route.      potassium chloride (MICRO-K) 10 MEQ CpSR TAKE ONE CAPSULE BY MOUTH ONCE DAILY 90 capsule 1    predniSONE (DELTASONE) 5 MG tablet Take 1 tablet by mouth once daily.      sodium bicarbonate 650 MG tablet Take 1 tablet by mouth 2 (two) times daily.      tacrolimus (PROGRAF) 0.5 MG Cap Take 1.5 mg by mouth 2 (two) times a day.         Allergies  Review of patient's allergies indicates:   Allergen Reactions    Hydrocodone-acetaminophen Rash    Gabapentin Other (See Comments)     Made her disoriented  "out of my head"      Morphine Itching    Opioids - morphine analogues        Physical Examination     Vitals:    09/18/24 1013   BP: 124/72   Pulse: 76   Resp: 18   Temp: 98.2 °F (36.8 °C)     Physical Exam  Constitutional:       General: She is not in acute distress.  HENT:      Head: Normocephalic.      Nose: Nose normal.      Mouth/Throat:      Mouth: Mucous membranes are moist.   Eyes:      Extraocular Movements: Extraocular movements intact.   Cardiovascular:      Rate and Rhythm: Normal rate.   Pulmonary:      Effort: Pulmonary effort is normal. No respiratory distress.      Breath sounds: No wheezing.   Abdominal:      General: Bowel sounds are normal.      Palpations: Abdomen is soft.      Tenderness: There is no abdominal tenderness.   Musculoskeletal:         General: Normal range of motion.      Cervical back: Normal range of motion.   Skin:     General: Skin is warm.      Comments: Ulcer with granulation tissue to woundbed    Neurological:      Mental Status: She is alert.   Psychiatric:         Behavior: Behavior normal.           Imaging / Labs     No visits with results within 1 Day(s) from this visit.   Latest known visit with results is:   Office Visit on 09/04/2024   Component Date Value Ref Range Status    Sodium 09/04/2024 137  136 - 145 mmol/L Final    Potassium 09/04/2024 4.6  " 3.5 - 5.1 mmol/L Final    Chloride 09/04/2024 105  98 - 107 mmol/L Final    CO2 09/04/2024 20 (L)  21 - 32 mmol/L Final    Anion Gap 09/04/2024 17 (H)  7 - 16 mmol/L Final    Glucose 09/04/2024 274 (H)  74 - 106 mg/dL Final    BUN 09/04/2024 90 (H)  7 - 18 mg/dL Final    Creatinine 09/04/2024 4.22 (H)  0.55 - 1.02 mg/dL Final    BUN/Creatinine Ratio 09/04/2024 21 (H)  6 - 20 Final    Calcium 09/04/2024 9.3  8.5 - 10.1 mg/dL Final    eGFR 09/04/2024 11 (L)  >=60 mL/min/1.73m2 Final     CT Chest Without Contrast  Narrative: EXAMINATION:  CT CHEST WITHOUT CONTRAST    CLINICAL HISTORY:  Soft tissue mass, chest, US/xray nondiagnostic; Localized swelling, mass and lump, trunk    TECHNIQUE:  Low dose axial images, sagittal and coronal reformations were obtained from the thoracic inlet to the lung bases. Contrast was not administered.    COMPARISON:  09/13/2023    FINDINGS:  The heart size is normal without pericardial effusion.  There is no hilar or mediastinal lymphadenopathy.  There is calcification of the coronary arteries and thoracic aorta.    Several solid pulmonary nodules are again identified bilaterally.  Largest on the right is a lower lobe solid nodule measuring 9.5 mm, without significant change (series 602, image 53).  Largest on the left is present within the lingula and measures 9 mm, without significant change (series 602, image 41).  No distinctly new or enlarging nodules are demonstrated.  No significant emphysematous changes.    There is no pleural effusion.  Moderate atrophy of both kidneys is present.  There has been a cholecystectomy.  A small hiatal hernia is present.  Degenerative change of the spine typical for age is present.  Impression: Unchanged pulmonary nodules.  Follow-up to 2 years of stability is recommended.    Electronically signed by: Matti Leigh MD  Date:    09/13/2024  Time:    12:41      Assessment and Plan (including Health Maintenance)      Problem List  Smart Sets  Document  Outside HM   :    Health Maintenance Due   Topic Date Due    Shingles Vaccine (1 of 2) Never done    RSV Vaccine (Age 60+ and Pregnant patients) (1 - 1-dose 60+ series) Never done    Influenza Vaccine (1) 09/01/2024    COVID-19 Vaccine (5 - 2023-24 season) 09/01/2024    DEXA Scan  09/09/2024    Mammogram  10/13/2024    Eye Exam  11/09/2024       Problem List Items Addressed This Visit          Orthopedic    Skin ulcer of third toe of right foot with fat layer exposed - Primary    Current Assessment & Plan     Ulcer to right third toe with granulation tissue woundbed. She has some warmth and redness to toe. Will start doxycycline po.  Wound continues to be about the same size. Will continue mepilex ag to wound. Change every other day. Keep area clean and dry.  Also will get darco shoe for offloading today.          Relevant Medications    doxycycline (VIBRAMYCIN) 100 MG Cap    Other Relevant Orders    POST-SURGICAL BOOT/SHOE FOR HOME USE     Other Visit Diagnoses       Cellulitis of toe of right foot        Relevant Medications    doxycycline (VIBRAMYCIN) 100 MG Cap    Other Relevant Orders    POST-SURGICAL BOOT/SHOE FOR HOME USE            Health Maintenance Topics with due status: Not Due       Topic Last Completion Date    TETANUS VACCINE 10/16/2017    Colorectal Cancer Screening 08/22/2022    Lipid Panel 02/15/2024    Foot Exam 08/06/2024    Hemoglobin A1c 09/11/2024       Future Appointments   Date Time Provider Department Center   10/9/2024  8:20 AM Iva, Amalia, FNP [facility]   10/18/2024 10:20 AM [facility]   10/18/2024 10:40 AM [facility]   11/14/2024  9:40 AM Iva, Amalia, FNP [facility]   8/7/2025  9:00 AM AWV NURSE, [facility] MEGAN Fernandez          Signature:  CHAPARRITA Bella  [facility] [address]  [address]  [phone]    Date of encounter: 9/18/24

## 2024-09-27 ENCOUNTER — HOSPITAL ENCOUNTER (EMERGENCY)
Facility: HOSPITAL | Age: 74
Discharge: HOME | End: 2024-09-27
Payer: MEDICARE

## 2024-09-27 VITALS
RESPIRATION RATE: 16 BRPM | DIASTOLIC BLOOD PRESSURE: 70 MMHG | SYSTOLIC BLOOD PRESSURE: 146 MMHG | WEIGHT: 132 LBS | OXYGEN SATURATION: 100 % | BODY MASS INDEX: 22.53 KG/M2 | HEART RATE: 65 BPM | TEMPERATURE: 98 F | HEIGHT: 64 IN

## 2024-09-27 DIAGNOSIS — I10 HYPERTENSION, UNSPECIFIED TYPE: Primary | ICD-10-CM

## 2024-09-27 PROCEDURE — 99281 EMR DPT VST MAYX REQ PHY/QHP: CPT

## 2024-09-27 NOTE — ED PROVIDER NOTES
"Encounter Date: 9/27/2024       History     Chief Complaint   Patient presents with    Hypertension     Home Health came to see her checked her BP and it was high.  Pt had not taken her BP medication at that time.  Pt has since taken her medication      74-year-old female presents to the emergency department to be evaluated for elevated blood pressure reading.  She reports her home health nurse came by this morning, checked her blood pressure and it was high.  She reports that she had not taken her blood pressure medications at that time.  Denies headache, chest pain, shortness of breath, dizziness.  She said that she feels well    The history is provided by the patient.     Review of patient's allergies indicates:   Allergen Reactions    Hydrocodone-acetaminophen Rash    Gabapentin Other (See Comments)     Made her disoriented  "out of my head"      Morphine Itching    Opioids - morphine analogues      Past Medical History:   Diagnosis Date    Amputation of one or more toes     2 TOES RT FOOT, 3 TOES LFT FOOT    Atherosclerotic heart disease of native coronary artery with angina pectoris 01/20/2020    CVA (cerebral vascular accident)     Depression     Diabetes mellitus, type 2     Dialysis patient     T/T/S    Disorder of kidney and ureter     History of carpal tunnel surgery of left wrist     History of carpal tunnel surgery of right wrist     History of repair of left rotator cuff     History of repair of right rotator cuff     Hyperlipidemia     Hypertension     Hypokalemia     Kidney transplant status 07/13/2020    Osteoporosis 07/22/2020    Proliferative diabetic retinopathy of both eyes 09/07/2023    Stroke      Past Surgical History:   Procedure Laterality Date    Appendix      DIALYSIS FISTULA CREATION Left     EXTRACTION OF TOOTH Left 08/11/2021    HYSTERECTOMY      kidney transplant 2016      Have had issues since transplant, kidney had a virus per patient    OOPHORECTOMY      TOE AMPUTATION       Family " History   Problem Relation Name Age of Onset    Diabetes Mother      Hyperlipidemia Mother      Heart disease Mother      Hearing loss Mother      Diabetes Father      Hearing loss Father      Heart disease Father      Hyperlipidemia Father       Social History     Tobacco Use    Smoking status: Never     Passive exposure: Never    Smokeless tobacco: Never   Substance Use Topics    Alcohol use: Never    Drug use: Never     Review of Systems   Constitutional:  Negative for chills and fever.   Respiratory:  Negative for chest tightness and shortness of breath.    Cardiovascular:  Negative for chest pain and leg swelling.   All other systems reviewed and are negative.      Physical Exam     Initial Vitals [09/27/24 1040]   BP Pulse Resp Temp SpO2   (!) 146/70 65 16 97.9 °F (36.6 °C) 100 %      MAP       --         Physical Exam    Vitals reviewed.  Constitutional: She appears well-developed and well-nourished.   Cardiovascular:  Normal rate and regular rhythm.           Pulmonary/Chest: Breath sounds normal.   Musculoskeletal:         General: Normal range of motion.     Neurological: She is alert and oriented to person, place, and time. She has normal strength.   Skin: Skin is warm and dry. Capillary refill takes less than 2 seconds.   Psychiatric: She has a normal mood and affect.         Medical Screening Exam   See Full Note    ED Course   Procedures  Labs Reviewed - No data to display       Imaging Results    None          Medications - No data to display  Medical Decision Making  74-year-old female presents to the emergency department to be evaluated for elevated blood pressure reading.  She reports her home health nurse came by this morning, checked her blood pressure and it was high.  She reports that she had not taken her blood pressure medications at that time.  Denies headache, chest pain, shortness of breath, dizziness.  She said that she feels well  Diagnosis: Hypertension                                       Clinical Impression:   Final diagnoses:  [I10] Hypertension, unspecified type (Primary)        ED Disposition Condition    Discharge Stable          ED Prescriptions    None       Follow-up Information    None          Alyssa Willoughby, [facility]  09/27/24 1050

## 2024-10-07 DIAGNOSIS — K25.9 GASTRIC ULCER, UNSPECIFIED CHRONICITY, UNSPECIFIED WHETHER GASTRIC ULCER HEMORRHAGE OR PERFORATION PRESENT: ICD-10-CM

## 2024-10-07 RX ORDER — PANTOPRAZOLE SODIUM 40 MG/1
40 TABLET, DELAYED RELEASE ORAL EVERY MORNING
Qty: 30 TABLET | Refills: 0 | Status: SHIPPED | OUTPATIENT
Start: 2024-10-07

## 2024-10-08 ENCOUNTER — DOCUMENT SCAN (OUTPATIENT)
Dept: HOME HEALTH SERVICES | Facility: HOSPITAL | Age: 74
End: 2024-10-08
Payer: MEDICARE

## 2024-10-10 ENCOUNTER — OFFICE VISIT (OUTPATIENT)
Dept: FAMILY MEDICINE | Facility: CLINIC | Age: 74
End: 2024-10-10
Payer: MEDICARE

## 2024-10-10 VITALS
TEMPERATURE: 98 F | RESPIRATION RATE: 18 BRPM | SYSTOLIC BLOOD PRESSURE: 118 MMHG | HEART RATE: 68 BPM | DIASTOLIC BLOOD PRESSURE: 78 MMHG | BODY MASS INDEX: 23.9 KG/M2 | OXYGEN SATURATION: 98 % | WEIGHT: 140 LBS | HEIGHT: 64 IN

## 2024-10-10 DIAGNOSIS — L97.512 SKIN ULCER OF THIRD TOE OF RIGHT FOOT WITH FAT LAYER EXPOSED: Primary | ICD-10-CM

## 2024-10-10 DIAGNOSIS — M19.071 ARTHRITIS OF RIGHT FOOT: ICD-10-CM

## 2024-10-10 PROCEDURE — 1101F PT FALLS ASSESS-DOCD LE1/YR: CPT | Mod: ,,, | Performed by: NURSE PRACTITIONER

## 2024-10-10 PROCEDURE — 3066F NEPHROPATHY DOC TX: CPT | Mod: ,,, | Performed by: NURSE PRACTITIONER

## 2024-10-10 PROCEDURE — 1159F MED LIST DOCD IN RCRD: CPT | Mod: ,,, | Performed by: NURSE PRACTITIONER

## 2024-10-10 PROCEDURE — 3078F DIAST BP <80 MM HG: CPT | Mod: ,,, | Performed by: NURSE PRACTITIONER

## 2024-10-10 PROCEDURE — 1125F AMNT PAIN NOTED PAIN PRSNT: CPT | Mod: ,,, | Performed by: NURSE PRACTITIONER

## 2024-10-10 PROCEDURE — 3074F SYST BP LT 130 MM HG: CPT | Mod: ,,, | Performed by: NURSE PRACTITIONER

## 2024-10-10 PROCEDURE — 3060F POS MICROALBUMINURIA REV: CPT | Mod: ,,, | Performed by: NURSE PRACTITIONER

## 2024-10-10 PROCEDURE — 99213 OFFICE O/P EST LOW 20 MIN: CPT | Mod: ,,, | Performed by: NURSE PRACTITIONER

## 2024-10-10 PROCEDURE — 1160F RVW MEDS BY RX/DR IN RCRD: CPT | Mod: ,,, | Performed by: NURSE PRACTITIONER

## 2024-10-10 PROCEDURE — 3288F FALL RISK ASSESSMENT DOCD: CPT | Mod: ,,, | Performed by: NURSE PRACTITIONER

## 2024-10-10 PROCEDURE — 3008F BODY MASS INDEX DOCD: CPT | Mod: ,,, | Performed by: NURSE PRACTITIONER

## 2024-10-10 PROCEDURE — 3044F HG A1C LEVEL LT 7.0%: CPT | Mod: ,,, | Performed by: NURSE PRACTITIONER

## 2024-10-10 RX ORDER — DICLOFENAC SODIUM 50 MG/1
50 TABLET, DELAYED RELEASE ORAL 2 TIMES DAILY PRN
Qty: 30 TABLET | Refills: 1 | Status: SHIPPED | OUTPATIENT
Start: 2024-10-10

## 2024-10-10 NOTE — PROGRESS NOTES
CHAPARRITA Bella   [facility]  [address] [address]  [phone]      PATIENT NAME: Nasrin Grant  : 1950  DATE: 10/10/24  MRN: [id number]      Billing Provider: CHAPARRITA Bella  Level of Service:   Patient PCP Information       Provider PCP Type    CHAPARRITA Blela General            Reason for Visit / Chief Complaint: Foot Injury (Follow up )       Update PCP  Update Chief Complaint         History of Present Illness / Problem Focused Workflow     74 year old female presents for hospital follow up on ulcer on right toe. Ulcer is healed today.   States she recently has started back on dialysis Monday, Wednesday, Friday. She has decreased swelling and reports breathing has improved.   She is followed by Dr Blount for dialysis.     Review of Systems     Review of Systems   Constitutional:  Positive for fatigue. Negative for fever.   HENT:  Negative for congestion.    Respiratory:  Negative for cough and shortness of breath.    Cardiovascular:  Negative for chest pain and palpitations.   Gastrointestinal:  Positive for constipation and nausea. Negative for abdominal pain and diarrhea.   Endocrine: Negative for polydipsia and polyuria.   Musculoskeletal:  Positive for arthralgias and myalgias. Negative for gait problem.   Skin:  Positive for wound (right third toe).   Neurological:  Negative for dizziness, weakness and headaches.   Psychiatric/Behavioral:  Negative for agitation and dysphoric mood.        Medical / Social / Family History     Past Medical History:   Diagnosis Date    Amputation of one or more toes     2 TOES RT FOOT, 3 TOES LFT FOOT    Atherosclerotic heart disease of native coronary artery with angina pectoris 2020    CVA (cerebral vascular accident)     Depression     Diabetes mellitus, type 2     Dialysis patient     T/T/S    Disorder of kidney and ureter     History of carpal tunnel surgery of left wrist      History of carpal tunnel surgery of right wrist     History of repair of left rotator cuff     History of repair of right rotator cuff     Hyperlipidemia     Hypertension     Hypokalemia     Kidney transplant status 07/13/2020    Osteoporosis 07/22/2020    Proliferative diabetic retinopathy of both eyes 09/07/2023    Stroke        Past Surgical History:   Procedure Laterality Date    Appendix      DIALYSIS FISTULA CREATION Left     EXTRACTION OF TOOTH Left 08/11/2021    HYSTERECTOMY      kidney transplant 2016      Have had issues since transplant, 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  reports that she has never smoked. She has never been exposed to tobacco smoke. She has never used smokeless tobacco. She reports that she does not drink alcohol and does not use drugs.    Family History  Ms.'s family history includes Diabetes in her father and mother; Hearing loss in her father and mother; Heart disease in her father and mother; Hyperlipidemia in her father and mother.    Medications and Allergies     Medications  Outpatient Medications Marked as Taking for the 10/10/24 encounter (Office Visit) with Laury Enrique FNP   Medication Sig Dispense Refill    albuterol (VENTOLIN HFA) 90 mcg/actuation inhaler Inhale 2 puffs into the lungs every 6 (six) hours as needed for Wheezing. Rescue 18 g 5    aspirin (ECOTRIN) 81 MG EC tablet Take 81 mg by mouth once daily.      atorvastatin (LIPITOR) 40 MG tablet Take 40 mg by mouth once daily.      BACILLUS COAGULANS ORAL Take 1 capsule every day by oral route in the morning.      bumetanide (BUMEX) 2 MG tablet Take 1 tablet (2 mg total) by mouth 2 (two) times a day. 90 tablet 1    carvediloL (COREG) 25 MG tablet Take 1 tablet (25 mg total) by mouth 2 (two) times daily. 180 tablet 1    cholestyramine (QUESTRAN) 4 gram packet Take 1 packet by mouth once daily.      cloNIDine 0.1 mg/24 hr td ptwk (CATAPRES) 0.1 mg/24 hr Place  "1 patch onto the skin once a week.      docusate sodium (COLACE) 100 MG capsule Take 100 mg by mouth 2 (two) times daily as needed for Constipation.      ferrous sulfate (FEOSOL) 325 mg (65 mg iron) Tab tablet Take 1 tablet (325 mg total) by mouth 2 (two) times daily. 600 tablet 2    flash glucose sensor (FREESTYLE MARY 10 DAY SENSOR MISC) 1 Device by Percutaneous route.      fluticasone propionate (FLONASE) 50 mcg/actuation nasal spray 1 spray by Nasal route once daily.      hydrALAZINE (APRESOLINE) 100 MG tablet Take 100 mg by mouth 3 (three) times daily.      insulin aspart U-100 (NOVOLOG) 100 unit/mL injection Inject 3 Units into the skin 3 (three) times daily before meals. Per Dr Kayleigh Morris at [facility]      insulin degludec (TRESIBA FLEXTOUCH U-100) 100 unit/mL (3 mL) insulin pen Inject 10 Units into the skin once daily. 9 mL 2    isosorbide mononitrate (IMDUR) 30 MG 24 hr tablet Take 30 mg by mouth once daily.      Lactobacillus acidophilus (PROBIOTIC ACIDOPHILUS ORAL) Take 4 Billion Cells by mouth.      lancets (TRUEPLUS LANCETS) 33 gauge Misc Inject 1 lancet  into the skin 3 (three) times daily before meals. 400 each 2    multivitamin (THERAGRAN) per tablet Take 1 tablet by mouth once daily.      mycophenolate sodium 180 MG TbEC Take 1 tablet by mouth 2 (two) times daily.      NIFEdipine (PROCARDIA-XL) 90 MG (OSM) 24 hr tablet Take 1 tablet (90 mg total) by mouth once daily. 30 tablet 11    nitroGLYCERIN (NITROSTAT) 0.4 MG SL tablet DISSOLVE 1 TABLET UNDER THE TONGUE EVERY 5 MINUTES AS NEEDED FOR CHEST PAIN. DO NOT EXCEED A TOTAL OF 3 DOSES IN 15 MINUTES.      pantoprazole (PROTONIX) 40 MG tablet TAKE ONE TABLET BY MOUTH EVERY MORNING 30 tablet 0    pen needle, diabetic 31 gauge x 3/16" Ndle 1 each by NOT APPLICABLE route.      potassium chloride (MICRO-K) 10 MEQ CpSR TAKE ONE CAPSULE BY MOUTH ONCE DAILY 90 capsule 1    predniSONE (DELTASONE) 5 MG tablet Take 1 tablet by mouth once daily.      sodium " "bicarbonate 650 MG tablet Take 1 tablet by mouth 2 (two) times daily.      tacrolimus (PROGRAF) 0.5 MG Cap Take 1.5 mg by mouth 2 (two) times a day.         Allergies  Review of patient's allergies indicates:   Allergen Reactions    Hydrocodone-acetaminophen Rash    Gabapentin Other (See Comments)     Made her disoriented  "out of my head"      Morphine Itching    Opioids - morphine analogues        Physical Examination     Vitals:    10/10/24 1029   BP: 118/78   Pulse: 68   Resp: 18   Temp: 97.6 °F (36.4 °C)     Physical Exam  Constitutional:       General: She is not in acute distress.  HENT:      Head: Normocephalic.      Nose: Nose normal.      Mouth/Throat:      Mouth: Mucous membranes are moist.   Eyes:      Extraocular Movements: Extraocular movements intact.   Cardiovascular:      Rate and Rhythm: Normal rate.   Pulmonary:      Effort: Pulmonary effort is normal. No respiratory distress.   Abdominal:      General: Bowel sounds are normal.      Palpations: Abdomen is soft.   Musculoskeletal:         General: Normal range of motion.      Cervical back: Normal range of motion.   Skin:     General: Skin is warm.      Comments: Ulcer with granulation tissue to woundbed    Neurological:      Mental Status: She is alert.   Psychiatric:         Behavior: Behavior normal.           Imaging / Labs     No visits with results within 1 Day(s) from this visit.   Latest known visit with results is:   Office Visit on 09/04/2024   Component Date Value Ref Range Status    Sodium 09/04/2024 137  136 - 145 mmol/L Final    Potassium 09/04/2024 4.6  3.5 - 5.1 mmol/L Final    Chloride 09/04/2024 105  98 - 107 mmol/L Final    CO2 09/04/2024 20 (L)  21 - 32 mmol/L Final    Anion Gap 09/04/2024 17 (H)  7 - 16 mmol/L Final    Glucose 09/04/2024 274 (H)  74 - 106 mg/dL Final    BUN 09/04/2024 90 (H)  7 - 18 mg/dL Final    Creatinine 09/04/2024 4.22 (H)  0.55 - 1.02 mg/dL Final    BUN/Creatinine Ratio 09/04/2024 21 (H)  6 - 20 Final    " Calcium 09/04/2024 9.3  8.5 - 10.1 mg/dL Final    eGFR 09/04/2024 11 (L)  >=60 mL/min/1.73m2 Final     CT Chest Without Contrast  Narrative: EXAMINATION:  CT CHEST WITHOUT CONTRAST    CLINICAL HISTORY:  Soft tissue mass, chest, US/xray nondiagnostic; Localized swelling, mass and lump, trunk    TECHNIQUE:  Low dose axial images, sagittal and coronal reformations were obtained from the thoracic inlet to the lung bases. Contrast was not administered.    COMPARISON:  09/13/2023    FINDINGS:  The heart size is normal without pericardial effusion.  There is no hilar or mediastinal lymphadenopathy.  There is calcification of the coronary arteries and thoracic aorta.    Several solid pulmonary nodules are again identified bilaterally.  Largest on the right is a lower lobe solid nodule measuring 9.5 mm, without significant change (series 602, image 53).  Largest on the left is present within the lingula and measures 9 mm, without significant change (series 602, image 41).  No distinctly new or enlarging nodules are demonstrated.  No significant emphysematous changes.    There is no pleural effusion.  Moderate atrophy of both kidneys is present.  There has been a cholecystectomy.  A small hiatal hernia is present.  Degenerative change of the spine typical for age is present.  Impression: Unchanged pulmonary nodules.  Follow-up to 2 years of stability is recommended.    Electronically signed by: Matti Leigh MD  Date:    09/13/2024  Time:    12:41      Assessment and Plan (including Health Maintenance)      Problem List  Smart Sets  Document Outside HM   :    Health Maintenance Due   Topic Date Due    Shingles Vaccine (1 of 2) Never done    RSV Vaccine (Age 60+ and Pregnant patients) (1 - Risk 60-74 years 1-dose series) Never done    Influenza Vaccine (1) 09/01/2024    COVID-19 Vaccine (5 - 2024-25 season) 09/01/2024    DEXA Scan  09/09/2024    Mammogram  10/13/2024    Eye Exam  11/09/2024       Problem List Items  Addressed This Visit          Orthopedic    Skin ulcer of third toe of right foot with fat layer exposed - Primary    Current Assessment & Plan     Ulcer is healed with some callus to area. She is currently using darco shoe with ambulation. She may resume wearing padded shoes for daily use and continue to monitor area.          Arthritis of right foot    Current Assessment & Plan     Ongoing pain to right foot that extends to leg at times. Discussed treatment options/risks/benefits with patient; patient voices understanding. Will start voltaren to only be taken prn to help with ambulation. Rest when possible and elevate lower ext.         Relevant Medications    diclofenac (VOLTAREN) 50 MG EC tablet       Health Maintenance Topics with due status: Not Due       Topic Last Completion Date    TETANUS VACCINE 10/16/2017    Colorectal Cancer Screening 08/22/2022    Lipid Panel 02/15/2024    Foot Exam 08/06/2024    Hemoglobin A1c 09/11/2024       Future Appointments   Date Time Provider Department Center   10/18/2024 10:20 AM [facility] MAMMO1 [facility]   10/18/2024 10:40 AM [facility] DEXA1 [facility]   1/7/2025 10:20 AM Iva, Amalia, FNP [facility]   8/7/2025  9:00 AM [facility] NURSE, [facility]          Signature:  CHAPARRITA Bella  [facility]  [id number] [place] [id number]  [phone]    Date of encounter: 10/10/24

## 2024-10-10 NOTE — ASSESSMENT & PLAN NOTE
Ongoing pain to right foot that extends to leg at times. Discussed treatment options/risks/benefits with patient; patient voices understanding. Will start voltaren to only be taken prn to help with ambulation. Rest when possible and elevate lower ext.

## 2024-10-10 NOTE — ASSESSMENT & PLAN NOTE
Ulcer is healed with some callus to area. She is currently using darco shoe with ambulation. She may resume wearing padded shoes for daily use and continue to monitor area.

## 2024-10-15 LAB
LEFT EYE DM RETINOPATHY: POSITIVE
RIGHT EYE DM RETINOPATHY: POSITIVE

## 2024-10-31 ENCOUNTER — PATIENT OUTREACH (OUTPATIENT)
Facility: HOSPITAL | Age: 74
End: 2024-10-31
Payer: MEDICARE

## 2024-10-31 DIAGNOSIS — E08.319 DIABETIC RETINOPATHY OF BOTH EYES ASSOCIATED WITH DIABETES MELLITUS DUE TO UNDERLYING CONDITION, MACULAR EDEMA PRESENCE UNSPECIFIED, UNSPECIFIED RETINOPATHY SEVERITY: Primary | ICD-10-CM

## 2024-11-15 DIAGNOSIS — K25.9 GASTRIC ULCER, UNSPECIFIED CHRONICITY, UNSPECIFIED WHETHER GASTRIC ULCER HEMORRHAGE OR PERFORATION PRESENT: ICD-10-CM

## 2024-11-18 RX ORDER — PANTOPRAZOLE SODIUM 40 MG/1
40 TABLET, DELAYED RELEASE ORAL EVERY MORNING
Qty: 90 TABLET | Refills: 1 | Status: SHIPPED | OUTPATIENT
Start: 2024-11-18

## 2024-11-25 ENCOUNTER — TELEPHONE (OUTPATIENT)
Dept: FAMILY MEDICINE | Facility: CLINIC | Age: 74
End: 2024-11-25
Payer: MEDICARE

## 2024-11-25 ENCOUNTER — EXTERNAL HOME HEALTH (OUTPATIENT)
Dept: HOME HEALTH SERVICES | Facility: HOSPITAL | Age: 74
End: 2024-11-25
Payer: MEDICARE

## 2024-11-25 DIAGNOSIS — M19.071 ARTHRITIS OF RIGHT FOOT: ICD-10-CM

## 2024-11-25 DIAGNOSIS — R60.1 GENERALIZED EDEMA: Primary | ICD-10-CM

## 2024-11-25 RX ORDER — HYDRALAZINE HYDROCHLORIDE 100 MG/1
100 TABLET, FILM COATED ORAL 3 TIMES DAILY
Status: CANCELLED | OUTPATIENT
Start: 2024-11-25

## 2024-11-25 RX ORDER — FUROSEMIDE 40 MG/1
40 TABLET ORAL 2 TIMES DAILY
COMMUNITY
End: 2024-11-25 | Stop reason: SDUPTHER

## 2024-11-25 RX ORDER — DICLOFENAC SODIUM 50 MG/1
50 TABLET, DELAYED RELEASE ORAL 2 TIMES DAILY PRN
Qty: 30 TABLET | Refills: 1 | Status: CANCELLED | OUTPATIENT
Start: 2024-11-25

## 2024-11-25 NOTE — TELEPHONE ENCOUNTER
----- Message from Felisa sent at 11/25/2024 12:19 PM CST -----  Regarding: refill  [facility] called for 3 refills but I dont see one of them     diclofenac (VOLTAREN) 50 MG EC tablet  hydrALAZINE (APRESOLINE) 100 MG tablet    furosemide (LASIX) 40 MG tablet I dont see recently maybe she dont even take this anymore

## 2024-11-26 RX ORDER — DICLOFENAC SODIUM 50 MG/1
50 TABLET, DELAYED RELEASE ORAL 2 TIMES DAILY PRN
Qty: 60 TABLET | Refills: 1 | Status: SHIPPED | OUTPATIENT
Start: 2024-11-26

## 2024-11-26 RX ORDER — FUROSEMIDE 40 MG/1
40 TABLET ORAL 2 TIMES DAILY
Qty: 60 TABLET | Refills: 2 | Status: SHIPPED | OUTPATIENT
Start: 2024-11-26 | End: 2024-11-26 | Stop reason: CLARIF

## 2024-12-03 ENCOUNTER — OFFICE VISIT (OUTPATIENT)
Dept: FAMILY MEDICINE | Facility: CLINIC | Age: 74
End: 2024-12-03
Payer: MEDICARE

## 2024-12-03 VITALS
HEART RATE: 69 BPM | DIASTOLIC BLOOD PRESSURE: 62 MMHG | OXYGEN SATURATION: 98 % | HEIGHT: 64 IN | WEIGHT: 130.38 LBS | RESPIRATION RATE: 18 BRPM | BODY MASS INDEX: 22.26 KG/M2 | SYSTOLIC BLOOD PRESSURE: 116 MMHG | TEMPERATURE: 98 F

## 2024-12-03 DIAGNOSIS — R53.81 MALAISE AND FATIGUE: Primary | ICD-10-CM

## 2024-12-03 DIAGNOSIS — R19.5 DARK STOOLS: ICD-10-CM

## 2024-12-03 DIAGNOSIS — R53.83 MALAISE AND FATIGUE: Primary | ICD-10-CM

## 2024-12-03 DIAGNOSIS — I50.9 CONGESTIVE HEART FAILURE, UNSPECIFIED HF CHRONICITY, UNSPECIFIED HEART FAILURE TYPE: ICD-10-CM

## 2024-12-03 LAB
ALBUMIN SERPL BCP-MCNC: 3.4 G/DL (ref 3.4–4.8)
ALBUMIN/GLOB SERPL: 0.8 {RATIO}
ALP SERPL-CCNC: 72 U/L (ref 40–150)
ALT SERPL W P-5'-P-CCNC: <7 U/L
ANION GAP SERPL CALCULATED.3IONS-SCNC: 14 MMOL/L (ref 7–16)
AST SERPL W P-5'-P-CCNC: 27 U/L (ref 5–34)
BASOPHILS # BLD AUTO: 0.07 K/UL (ref 0–0.2)
BASOPHILS NFR BLD AUTO: 1.1 % (ref 0–1)
BILIRUB SERPL-MCNC: 0.4 MG/DL
BUN SERPL-MCNC: 23 MG/DL (ref 10–20)
BUN/CREAT SERPL: 5 (ref 6–20)
CALCIUM SERPL-MCNC: 9.8 MG/DL (ref 8.4–10.2)
CHLORIDE SERPL-SCNC: 94 MMOL/L (ref 98–107)
CO2 SERPL-SCNC: 30 MMOL/L (ref 23–31)
CREAT SERPL-MCNC: 4.79 MG/DL (ref 0.55–1.02)
DIFFERENTIAL METHOD BLD: ABNORMAL
EGFR (NO RACE VARIABLE) (RUSH/TITUS): 9 ML/MIN/1.73M2
EOSINOPHIL # BLD AUTO: 0.18 K/UL (ref 0–0.5)
EOSINOPHIL NFR BLD AUTO: 2.9 % (ref 1–4)
ERYTHROCYTE [DISTWIDTH] IN BLOOD BY AUTOMATED COUNT: 14.7 % (ref 11.5–14.5)
GLOBULIN SER-MCNC: 4.2 G/DL (ref 2–4)
GLUCOSE SERPL-MCNC: 240 MG/DL (ref 82–115)
HCT VFR BLD AUTO: 36.7 % (ref 38–47)
HGB BLD-MCNC: 11.6 G/DL (ref 12–16)
IMM GRANULOCYTES # BLD AUTO: 0.02 K/UL (ref 0–0.04)
IMM GRANULOCYTES NFR BLD: 0.3 % (ref 0–0.4)
LYMPHOCYTES # BLD AUTO: 0.62 K/UL (ref 1–4.8)
LYMPHOCYTES NFR BLD AUTO: 10.1 % (ref 27–41)
MCH RBC QN AUTO: 26.9 PG (ref 27–31)
MCHC RBC AUTO-ENTMCNC: 31.6 G/DL (ref 32–36)
MCV RBC AUTO: 85 FL (ref 80–96)
MONOCYTES # BLD AUTO: 0.5 K/UL (ref 0–0.8)
MONOCYTES NFR BLD AUTO: 8.1 % (ref 2–6)
MPC BLD CALC-MCNC: 10.7 FL (ref 9.4–12.4)
NEUTROPHILS # BLD AUTO: 4.77 K/UL (ref 1.8–7.7)
NEUTROPHILS NFR BLD AUTO: 77.5 % (ref 53–65)
NRBC # BLD AUTO: 0 X10E3/UL
NRBC, AUTO (.00): 0 %
NT-PROBNP SERPL-MCNC: ABNORMAL PG/ML (ref 1–125)
OCCULT BLOOD: POSITIVE
PLATELET # BLD AUTO: 256 K/UL (ref 150–400)
POTASSIUM SERPL-SCNC: 3.4 MMOL/L (ref 3.5–5.1)
PROT SERPL-MCNC: 7.6 G/DL (ref 5.8–7.6)
RBC # BLD AUTO: 4.32 M/UL (ref 4.2–5.4)
SODIUM SERPL-SCNC: 135 MMOL/L (ref 136–145)
WBC # BLD AUTO: 6.16 K/UL (ref 4.5–11)

## 2024-12-03 PROCEDURE — 85025 COMPLETE CBC W/AUTO DIFF WBC: CPT | Mod: ,,, | Performed by: CLINICAL MEDICAL LABORATORY

## 2024-12-03 PROCEDURE — 83880 ASSAY OF NATRIURETIC PEPTIDE: CPT | Mod: ,,, | Performed by: CLINICAL MEDICAL LABORATORY

## 2024-12-03 PROCEDURE — 80053 COMPREHEN METABOLIC PANEL: CPT | Mod: ,,, | Performed by: CLINICAL MEDICAL LABORATORY

## 2024-12-03 PROCEDURE — 82272 OCCULT BLD FECES 1-3 TESTS: CPT | Mod: QW,,, | Performed by: CLINICAL MEDICAL LABORATORY

## 2024-12-03 RX ORDER — MINOXIDIL 2.5 MG/1
2.5 TABLET ORAL
COMMUNITY
Start: 2024-11-26

## 2024-12-03 RX ORDER — FUROSEMIDE 40 MG/1
40 TABLET ORAL 2 TIMES DAILY
COMMUNITY
Start: 2024-11-26 | End: 2024-12-03

## 2024-12-04 ENCOUNTER — HOSPITAL ENCOUNTER (EMERGENCY)
Facility: HOSPITAL | Age: 74
Discharge: HOME | End: 2024-12-04
Attending: EMERGENCY MEDICINE
Payer: MEDICARE

## 2024-12-04 VITALS
HEART RATE: 64 BPM | RESPIRATION RATE: 13 BRPM | TEMPERATURE: 98 F | BODY MASS INDEX: 22.2 KG/M2 | SYSTOLIC BLOOD PRESSURE: 117 MMHG | HEIGHT: 64 IN | DIASTOLIC BLOOD PRESSURE: 33 MMHG | WEIGHT: 130 LBS | OXYGEN SATURATION: 100 %

## 2024-12-04 DIAGNOSIS — K92.1 BLOOD IN STOOL: Primary | ICD-10-CM

## 2024-12-04 LAB
ABORH RETYPE: NORMAL
ALBUMIN SERPL BCP-MCNC: 3.4 G/DL (ref 3.4–4.8)
ALBUMIN/GLOB SERPL: 0.9 {RATIO}
ALP SERPL-CCNC: 77 U/L (ref 40–150)
ALT SERPL W P-5'-P-CCNC: <7 U/L
ANION GAP SERPL CALCULATED.3IONS-SCNC: 15 MMOL/L (ref 7–16)
AST SERPL W P-5'-P-CCNC: 24 U/L (ref 5–34)
BASOPHILS # BLD AUTO: 0.07 K/UL (ref 0–0.2)
BASOPHILS NFR BLD AUTO: 0.8 % (ref 0–1)
BILIRUB SERPL-MCNC: 0.4 MG/DL
BUN SERPL-MCNC: 32 MG/DL (ref 10–20)
BUN/CREAT SERPL: 6 (ref 6–20)
CALCIUM SERPL-MCNC: 9.7 MG/DL (ref 8.4–10.2)
CHLORIDE SERPL-SCNC: 95 MMOL/L (ref 98–107)
CO2 SERPL-SCNC: 29 MMOL/L (ref 23–31)
CREAT SERPL-MCNC: 5.81 MG/DL (ref 0.55–1.02)
DIFFERENTIAL METHOD BLD: ABNORMAL
EGFR (NO RACE VARIABLE) (RUSH/TITUS): 7 ML/MIN/1.73M2
EOSINOPHIL # BLD AUTO: 0.74 K/UL (ref 0–0.5)
EOSINOPHIL NFR BLD AUTO: 8.1 % (ref 1–4)
ERYTHROCYTE [DISTWIDTH] IN BLOOD BY AUTOMATED COUNT: 14.4 % (ref 11.5–14.5)
GLOBULIN SER-MCNC: 3.7 G/DL (ref 2–4)
GLUCOSE SERPL-MCNC: 229 MG/DL (ref 82–115)
HCT VFR BLD AUTO: 35.4 % (ref 38–47)
HGB BLD-MCNC: 11.4 G/DL (ref 12–16)
IMM GRANULOCYTES # BLD AUTO: 0.04 K/UL (ref 0–0.04)
IMM GRANULOCYTES NFR BLD: 0.4 % (ref 0–0.4)
INDIRECT COOMBS: NORMAL
LYMPHOCYTES # BLD AUTO: 0.95 K/UL (ref 1–4.8)
LYMPHOCYTES NFR BLD AUTO: 10.4 % (ref 27–41)
MCH RBC QN AUTO: 27.2 PG (ref 27–31)
MCHC RBC AUTO-ENTMCNC: 32.2 G/DL (ref 32–36)
MCV RBC AUTO: 84.5 FL (ref 80–96)
MONOCYTES # BLD AUTO: 1.02 K/UL (ref 0–0.8)
MONOCYTES NFR BLD AUTO: 11.2 % (ref 2–6)
MPC BLD CALC-MCNC: 10.4 FL (ref 9.4–12.4)
NEUTROPHILS # BLD AUTO: 6.29 K/UL (ref 1.8–7.7)
NEUTROPHILS NFR BLD AUTO: 69.1 % (ref 53–65)
NRBC # BLD AUTO: 0 X10E3/UL
NRBC, AUTO (.00): 0 %
OHS QRS DURATION: 118 MS
OHS QTC CALCULATION: 432 MS
PLATELET # BLD AUTO: 254 K/UL (ref 150–400)
POC OCCULT BLOOD: NEGATIVE
POTASSIUM SERPL-SCNC: 3.3 MMOL/L (ref 3.5–5.1)
PROT SERPL-MCNC: 7.1 G/DL (ref 5.8–7.6)
RBC # BLD AUTO: 4.19 M/UL (ref 4.2–5.4)
RH BLD: NORMAL
SODIUM SERPL-SCNC: 136 MMOL/L (ref 136–145)
SPECIMEN OUTDATE: NORMAL
WBC # BLD AUTO: 9.11 K/UL (ref 4.5–11)

## 2024-12-04 PROCEDURE — 99284 EMERGENCY DEPT VISIT MOD MDM: CPT | Mod: 25

## 2024-12-04 PROCEDURE — 82272 OCCULT BLD FECES 1-3 TESTS: CPT

## 2024-12-04 PROCEDURE — 80053 COMPREHEN METABOLIC PANEL: CPT | Performed by: EMERGENCY MEDICINE

## 2024-12-04 PROCEDURE — 85025 COMPLETE CBC W/AUTO DIFF WBC: CPT | Performed by: EMERGENCY MEDICINE

## 2024-12-04 PROCEDURE — 86900 BLOOD TYPING SEROLOGIC ABO: CPT | Performed by: EMERGENCY MEDICINE

## 2024-12-04 PROCEDURE — 93010 ELECTROCARDIOGRAM REPORT: CPT | Mod: ,,, | Performed by: INTERNAL MEDICINE

## 2024-12-04 PROCEDURE — 36415 COLL VENOUS BLD VENIPUNCTURE: CPT | Performed by: EMERGENCY MEDICINE

## 2024-12-04 PROCEDURE — 93005 ELECTROCARDIOGRAM TRACING: CPT

## 2024-12-04 NOTE — ASSESSMENT & PLAN NOTE
Patient brought stool sample with her to clinic today. States she is fatigue and not feeling well. Reports started having diarrhea and passing clots of blood.  Has been ongoing for about a week. Denies any chest pain, SOB, or body aches. She has had weight loss since last visit to the clinic. She is going to dialysis 3 times weekly. Will get labs today and treat as indicated.

## 2024-12-04 NOTE — ED PROVIDER NOTES
"Encounter Date: 12/4/2024    SCRIBE #1 NOTE: I, Ricardo Suarez, am scribing for, and in the presence of,  Timbo Parmar MD. I have scribed the entire note.       History     Chief Complaint   Patient presents with    Rectal Bleeding     Pt c/o weakness secondary to rectal bleeding that started 2-3 days ago. Pt also dialyzes MWF and dialyzed Monday.      74 y.o.female presented to the ED for rectal bleeding that started Saturday. She stated she went to her PCP yesterday and had lab done. She was advised this morning that she has blood in her  stool. She denies any pain around rectal area, chest pains. She stated she does have pain in her back and weakness. She denies having any issues like this in the past and has never had a blood transfusion. She does take Aspirin daily, but was advised to not take it anymore. PT has no HX of smoking and has past medical HX of   Proliferative diabetic retinopathy of both eyes, Osteoporosis, Kidney transplant status, Atherosclerotic heart disease of native coronary artery with angina pectoris, Amputation of one or more toes, CVA, Depression, Diabetes mellitus, type 2, Dialysis patient, disorder of kidney and ureter, History of carpal tunnel surgery of left wrist, History of carpal tunnel surgery of right wrist, History of repair of left rotator cuff, History of repair of right rotator cuff, Hyperlipidemia, Hypertension, Hypokalemia, and Stroke.      The history is provided by the patient. No  was used.     Review of patient's allergies indicates:   Allergen Reactions    Hydrocodone-acetaminophen Rash    Gabapentin Other (See Comments)     Made her disoriented  "out of my head"      Morphine Itching    Opioids - morphine analogues      Past Medical History:   Diagnosis Date    Amputation of one or more toes     2 TOES RT FOOT, 3 TOES LFT FOOT    Atherosclerotic heart disease of native coronary artery with angina pectoris 01/20/2020    CVA (cerebral vascular " accident)     Depression     Diabetes mellitus, type 2     Dialysis patient     T/T/S    Disorder of kidney and ureter     History of carpal tunnel surgery of left wrist     History of carpal tunnel surgery of right wrist     History of repair of left rotator cuff     History of repair of right rotator cuff     Hyperlipidemia     Hypertension     Hypokalemia     Kidney transplant status 07/13/2020    Osteoporosis 07/22/2020    Proliferative diabetic retinopathy of both eyes 09/07/2023    Stroke      Past Surgical History:   Procedure Laterality Date    Appendix      DIALYSIS FISTULA CREATION Left     EXTRACTION OF TOOTH Left 08/11/2021    HYSTERECTOMY      kidney transplant 2016      Have had issues since transplant, kidney had a virus per patient    OOPHORECTOMY      TOE AMPUTATION       Family History   Problem Relation Name Age of Onset    Diabetes Mother      Hyperlipidemia Mother      Heart disease Mother      Hearing loss Mother      Diabetes Father      Hearing loss Father      Heart disease Father      Hyperlipidemia Father       Social History     Tobacco Use    Smoking status: Never     Passive exposure: Never    Smokeless tobacco: Never   Substance Use Topics    Alcohol use: Never    Drug use: Never     Review of Systems   Gastrointestinal:  Positive for anal bleeding and blood in stool. Negative for rectal pain.   All other systems reviewed and are negative.      Physical Exam     Initial Vitals   BP Pulse Resp Temp SpO2   12/04/24 1218 12/04/24 1218 12/04/24 1218 12/04/24 1221 12/04/24 1218   (!) 118/39 77 14 97.9 °F (36.6 °C) 100 %      MAP       --                Physical Exam    Nursing note and vitals reviewed.  HENT:   Head: Normocephalic and atraumatic. Mouth/Throat: Oropharynx is clear and moist.   Eyes: Pupils are equal, round, and reactive to light.   Neck: Neck supple.   Normal range of motion.  Cardiovascular:  Normal rate and regular rhythm.           Pulmonary/Chest: Effort normal and  breath sounds normal.   Abdominal: Abdomen is soft. She exhibits no distension.   Musculoskeletal:         General: Normal range of motion.      Cervical back: Normal range of motion and neck supple.     Neurological: She is alert.   Skin: Skin is warm. Capillary refill takes less than 2 seconds.   Psychiatric: She has a normal mood and affect.         ED Course   Procedures  Labs Reviewed   COMPREHENSIVE METABOLIC PANEL - Abnormal       Result Value    Sodium 136      Potassium 3.3 (*)     Chloride 95 (*)     CO2 29      Anion Gap 15      Glucose 229 (*)     BUN 32 (*)     Creatinine 5.81 (*)     BUN/Creatinine Ratio 6      Calcium 9.7      Total Protein 7.1      Albumin 3.4      Globulin 3.7      A/G Ratio 0.9      Bilirubin, Total 0.4      Alk Phos 77      ALT <7      AST 24      eGFR 7 (*)    CBC WITH DIFFERENTIAL - Abnormal    WBC 9.11      RBC 4.19 (*)     Hemoglobin 11.4 (*)     Hematocrit 35.4 (*)     MCV 84.5      MCH 27.2      MCHC 32.2      RDW 14.4      Platelet Count 254      MPV 10.4      Neutrophils % 69.1 (*)     Lymphocytes % 10.4 (*)     Monocytes % 11.2 (*)     Eosinophils % 8.1 (*)     Basophils % 0.8      Immature Granulocytes % 0.4      nRBC, Auto 0.0      Neutrophils, Abs 6.29      Lymphocytes, Absolute 0.95 (*)     Monocytes, Absolute 1.02 (*)     Eosinophils, Absolute 0.74 (*)     Basophils, Absolute 0.07      Immature Granulocytes, Absolute 0.04      nRBC, Absolute 0.00      Diff Type Auto     CBC W/ AUTO DIFFERENTIAL    Narrative:     The following orders were created for panel order CBC auto differential.  Procedure                               Abnormality         Status                     ---------                               -----------         ------                     CBC with Differential[[id number]]       Abnormal            Final result                 Please view results for these tests on the individual orders.   TYPE & SCREEN    Specimen Outdate 12/07/2024 23:59      Group  & Rh O POS      Indirect Denis NEG     ABORH RETYPE   POCT OCCULT BLOOD (STOOL)    POC Occult Blood Negative            Imaging Results    None          Medications - No data to display  Medical Decision Making            Attending Attestation:           Physician Attestation for Scribe:  Physician Attestation Statement for Scribe #1: I, Timbo Parmar MD, reviewed documentation, as scribed by Ricardo Suarez in my presence, and it is both accurate and complete.             ED Course as of 12/04/24 1359   Wed Dec 04, 2024   1241 Medical decision-making:  Differential diagnosis includes upper GI bleed, lower GI bleed, hemorrhoid, diverticulitis.  All testing ordered and interpreted by me. [BB]   1347 Stool is Hemoccult negative [BB]   1347 CBC shows hematocrit only 1 point lower than yesterday.  CMP shows elevated creatinine of 5.8 consistent with patient being on dialysis and chronic renal failure. [BB]      ED Course User Index  [BB] Timbo Parmar MD                             Clinical Impression:  Final diagnoses:  [K92.1] Blood in stool (Primary)          ED Disposition Condition    Discharge Stable          ED Prescriptions    None       Follow-up Information    None          Timbo Parmar MD  12/04/24 4741

## 2024-12-04 NOTE — DISCHARGE INSTRUCTIONS
Follow up in clinic with gastroenterology.  Return to emergency department for any worsening or further problems.

## 2024-12-04 NOTE — PROGRESS NOTES
"   CHAPARRITA Bella   [facility]  [id number] [place] [id number]  [phone]      PATIENT NAME: Nasrin Grant  : 1950  DATE: 12/3/24  MRN: [id number]      Billing Provider: CHAPARRITA Bella  Level of Service: MO OFFICE/OUTPT VISIT, EST, LEVL III, 20-29 MIN  Patient PCP Information       Provider PCP Type    CHAPARRITA Bella General            Reason for Visit / Chief Complaint: Rectal Bleeding (3X since last Wednesday./States she takes iron and her stools are dark, 'hard to tell if its blood but thinks it is'./States she's having loose stools w/ clumps of dark colored stool/blood in it./She has concerns that it may be blood.), Fatigue (In dialysis 3X/wk.), and Diarrhea (Can't hold it very long./Having incontinence episodes.)       Update PCP  Update Chief Complaint         History of Present Illness / Problem Focused Workflow     74 year old female presents with complaints of "passing blood" and "feeling bad". Complaints of having increased fatigue and having increased problems with diarrhea.  She has had about a 10 lb weight loss since last visit.  She missed cardiology appointment about 3 weeks ago according to their office.   Denies any shortness of breath or chest pain.     Review of Systems     Review of Systems   Constitutional:  Positive for fatigue. Negative for fever.   HENT:  Negative for congestion.    Respiratory:  Negative for cough and shortness of breath.    Cardiovascular:  Negative for chest pain and palpitations.   Gastrointestinal:  Positive for abdominal pain, diarrhea and nausea.   Endocrine: Negative for polydipsia and polyuria.   Musculoskeletal:  Positive for arthralgias and myalgias. Negative for gait problem.   Skin:  Positive for wound (right third toe).   Neurological:  Negative for dizziness, weakness and headaches.   Psychiatric/Behavioral:  Negative for agitation and dysphoric mood.        Medical / Social / Family " History     Past Medical History:   Diagnosis Date    Amputation of one or more toes     2 TOES RT FOOT, 3 TOES LFT FOOT    Atherosclerotic heart disease of native coronary artery with angina pectoris 01/20/2020    CVA (cerebral vascular accident)     Depression     Diabetes mellitus, type 2     Dialysis patient     T/T/S    Disorder of kidney and ureter     History of carpal tunnel surgery of left wrist     History of carpal tunnel surgery of right wrist     History of repair of left rotator cuff     History of repair of right rotator cuff     Hyperlipidemia     Hypertension     Hypokalemia     Kidney transplant status 07/13/2020    Osteoporosis 07/22/2020    Proliferative diabetic retinopathy of both eyes 09/07/2023    Stroke        Past Surgical History:   Procedure Laterality Date    Appendix      DIALYSIS FISTULA CREATION Left     EXTRACTION OF TOOTH Left 08/11/2021    HYSTERECTOMY      kidney transplant 2016      Have had issues since transplant, 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  reports that she has never smoked. She has never been exposed to tobacco smoke. She has never used smokeless tobacco. She reports that she does not drink alcohol and does not use drugs.    Family History  Ms.'s family history includes Diabetes in her father and mother; Hearing loss in her father and mother; Heart disease in her father and mother; Hyperlipidemia in her father and mother.    Medications and Allergies     Medications  Outpatient Medications Marked as Taking for the 12/3/24 encounter (Office Visit) with Laury Enrique FNP   Medication Sig Dispense Refill    albuterol (VENTOLIN HFA) 90 mcg/actuation inhaler Inhale 2 puffs into the lungs every 6 (six) hours as needed for Wheezing. Rescue 18 g 5    aspirin (ECOTRIN) 81 MG EC tablet Take 81 mg by mouth once daily.      atorvastatin (LIPITOR) 40 MG tablet Take 40 mg by mouth once daily.      BACILLUS COAGULANS ORAL  Take 1 capsule every day by oral route in the morning.      bumetanide (BUMEX) 2 MG tablet Take 1 tablet (2 mg total) by mouth 2 (two) times a day. 90 tablet 1    carvediloL (COREG) 25 MG tablet Take 1 tablet (25 mg total) by mouth 2 (two) times daily. 180 tablet 1    cholestyramine (QUESTRAN) 4 gram packet Take 1 packet by mouth once daily.      cloNIDine 0.1 mg/24 hr td ptwk (CATAPRES) 0.1 mg/24 hr Place 1 patch onto the skin once a week.      diclofenac (VOLTAREN) 50 MG EC tablet Take 1 tablet (50 mg total) by mouth 2 (two) times daily as needed (pain). 60 tablet 1    docusate sodium (COLACE) 100 MG capsule Take 100 mg by mouth 2 (two) times daily as needed for Constipation.      ferrous sulfate (FEOSOL) 325 mg (65 mg iron) Tab tablet Take 1 tablet (325 mg total) by mouth 2 (two) times daily. 600 tablet 2    flash glucose sensor (FREESTYLE MARY 10 DAY SENSOR MISC) 1 Device by Percutaneous route.      fluticasone propionate (FLONASE) 50 mcg/actuation nasal spray 1 spray by Nasal route once daily.      hydrALAZINE (APRESOLINE) 100 MG tablet Take 100 mg by mouth 3 (three) times daily.      insulin aspart U-100 (NOVOLOG) 100 unit/mL injection Inject 3 Units into the skin 3 (three) times daily before meals. Per Dr Kayleigh Morris at [facility]      insulin degludec (TRESIBA FLEXTOUCH U-100) 100 unit/mL (3 mL) insulin pen Inject 10 Units into the skin once daily. 9 mL 2    isosorbide mononitrate (IMDUR) 30 MG 24 hr tablet Take 30 mg by mouth once daily.      Lactobacillus acidophilus (PROBIOTIC ACIDOPHILUS ORAL) Take 4 Billion Cells by mouth.      lancets (TRUEPLUS LANCETS) 33 gauge Misc Inject 1 lancet  into the skin 3 (three) times daily before meals. 400 each 2    minoxidiL (LONITEN) 2.5 MG tablet Take 2.5 mg by mouth.      multivitamin (THERAGRAN) per tablet Take 1 tablet by mouth once daily.      mycophenolate sodium 180 MG TbEC Take 1 tablet by mouth 2 (two) times daily.      NIFEdipine (PROCARDIA-XL) 90 MG  "(OSM) 24 hr tablet Take 1 tablet (90 mg total) by mouth once daily. 30 tablet 11    nitroGLYCERIN (NITROSTAT) 0.4 MG SL tablet DISSOLVE 1 TABLET UNDER THE TONGUE EVERY 5 MINUTES AS NEEDED FOR CHEST PAIN. DO NOT EXCEED A TOTAL OF 3 DOSES IN 15 MINUTES.      pantoprazole (PROTONIX) 40 MG tablet TAKE ONE TABLET BY MOUTH EVERY MORNING 90 tablet 1    pen needle, diabetic 31 gauge x 3/16" Ndle 1 each by NOT APPLICABLE route.      potassium chloride (MICRO-K) 10 MEQ CpSR TAKE ONE CAPSULE BY MOUTH ONCE DAILY 90 capsule 1    predniSONE (DELTASONE) 5 MG tablet Take 1 tablet by mouth once daily.      sodium bicarbonate 650 MG tablet Take 1 tablet by mouth 2 (two) times daily.      tacrolimus (PROGRAF) 0.5 MG Cap Take 1.5 mg by mouth 2 (two) times a day.         Allergies  Review of patient's allergies indicates:   Allergen Reactions    Hydrocodone-acetaminophen Rash    Gabapentin Other (See Comments)     Made her disoriented  "out of my head"      Morphine Itching    Opioids - morphine analogues        Physical Examination     Vitals:    12/03/24 1322   BP: 116/62   Pulse: 69   Resp: 18   Temp: 97.9 °F (36.6 °C)     Physical Exam  Constitutional:       General: She is not in acute distress.  HENT:      Head: Normocephalic.      Nose: Nose normal.      Mouth/Throat:      Mouth: Mucous membranes are moist.   Eyes:      Extraocular Movements: Extraocular movements intact.   Cardiovascular:      Rate and Rhythm: Normal rate.   Pulmonary:      Effort: Pulmonary effort is normal. No respiratory distress.   Abdominal:      General: Bowel sounds are normal.      Palpations: Abdomen is soft.      Tenderness: There is no abdominal tenderness. There is no guarding.   Musculoskeletal:         General: Normal range of motion.      Cervical back: Normal range of motion.   Skin:     General: Skin is warm.      Comments: Ulcer with granulation tissue to woundbed    Neurological:      Mental Status: She is alert and oriented to person, place, " and time.   Psychiatric:         Behavior: Behavior normal.           Imaging / Labs     Office Visit on 12/03/2024   Component Date Value Ref Range Status    Occult Blood 12/03/2024 Positive (A)  Negative, Invalid Final    Sodium 12/03/2024 135 (L)  136 - 145 mmol/L Final    Potassium 12/03/2024 3.4 (L)  3.5 - 5.1 mmol/L Final    Chloride 12/03/2024 94 (L)  98 - 107 mmol/L Final    CO2 12/03/2024 30  23 - 31 mmol/L Final    Anion Gap 12/03/2024 14  7 - 16 mmol/L Final    Glucose 12/03/2024 240 (H)  82 - 115 mg/dL Final    BUN 12/03/2024 23 (H)  10 - 20 mg/dL Final    Creatinine 12/03/2024 4.79 (H)  0.55 - 1.02 mg/dL Final    BUN/Creatinine Ratio 12/03/2024 5 (L)  6 - 20 Final    Calcium 12/03/2024 9.8  8.4 - 10.2 mg/dL Final    Total Protein 12/03/2024 7.6  5.8 - 7.6 g/dL Final    Albumin 12/03/2024 3.4  3.4 - 4.8 g/dL Final    Globulin 12/03/2024 4.2 (H)  2.0 - 4.0 g/dL Final    A/G Ratio 12/03/2024 0.8   Final    Bilirubin, Total 12/03/2024 0.4  <=1.5 mg/dL Final    Alk Phos 12/03/2024 72  40 - 150 U/L Final    ALT 12/03/2024 <7  <=55 U/L Final    AST 12/03/2024 27  5 - 34 U/L Final    eGFR 12/03/2024 9 (L)  >=60 mL/min/1.73m2 Final    ProBNP 12/03/2024 18,046 (H)  1 - 125 pg/mL Final    WBC 12/03/2024 6.16  4.50 - 11.00 K/uL Final    RBC 12/03/2024 4.32  4.20 - 5.40 M/uL Final    Hemoglobin 12/03/2024 11.6 (L)  12.0 - 16.0 g/dL Final    Hematocrit 12/03/2024 36.7 (L)  38.0 - 47.0 % Final    MCV 12/03/2024 85.0  80.0 - 96.0 fL Final    MCH 12/03/2024 26.9 (L)  27.0 - 31.0 pg Final    MCHC 12/03/2024 31.6 (L)  32.0 - 36.0 g/dL Final    RDW 12/03/2024 14.7 (H)  11.5 - 14.5 % Final    Platelet Count 12/03/2024 256  150 - 400 K/uL Final    MPV 12/03/2024 10.7  9.4 - 12.4 fL Final    Neutrophils % 12/03/2024 77.5 (H)  53.0 - 65.0 % Final    Lymphocytes % 12/03/2024 10.1 (L)  27.0 - 41.0 % Final    Monocytes % 12/03/2024 8.1 (H)  2.0 - 6.0 % Final    Eosinophils % 12/03/2024 2.9  1.0 - 4.0 % Final    Basophils %  12/03/2024 1.1 (H)  0.0 - 1.0 % Final    Immature Granulocytes % 12/03/2024 0.3  0.0 - 0.4 % Final    nRBC, Auto 12/03/2024 0.0  <=0.0 % Final    Neutrophils, Abs 12/03/2024 4.77  1.80 - 7.70 K/uL Final    Lymphocytes, Absolute 12/03/2024 0.62 (L)  1.00 - 4.80 K/uL Final    Monocytes, Absolute 12/03/2024 0.50  0.00 - 0.80 K/uL Final    Eosinophils, Absolute 12/03/2024 0.18  0.00 - 0.50 K/uL Final    Basophils, Absolute 12/03/2024 0.07  0.00 - 0.20 K/uL Final    Immature Granulocytes, Absolute 12/03/2024 0.02  0.00 - 0.04 K/uL Final    nRBC, Absolute 12/03/2024 0.00  <=0.00 x10e3/uL Final    Diff Type 12/03/2024 Auto   Final     CT Chest Without Contrast  Narrative: EXAMINATION:  CT CHEST WITHOUT CONTRAST    CLINICAL HISTORY:  Soft tissue mass, chest, US/xray nondiagnostic; Localized swelling, mass and lump, trunk    TECHNIQUE:  Low dose axial images, sagittal and coronal reformations were obtained from the thoracic inlet to the lung bases. Contrast was not administered.    COMPARISON:  09/13/2023    FINDINGS:  The heart size is normal without pericardial effusion.  There is no hilar or mediastinal lymphadenopathy.  There is calcification of the coronary arteries and thoracic aorta.    Several solid pulmonary nodules are again identified bilaterally.  Largest on the right is a lower lobe solid nodule measuring 9.5 mm, without significant change (series 602, image 53).  Largest on the left is present within the lingula and measures 9 mm, without significant change (series 602, image 41).  No distinctly new or enlarging nodules are demonstrated.  No significant emphysematous changes.    There is no pleural effusion.  Moderate atrophy of both kidneys is present.  There has been a cholecystectomy.  A small hiatal hernia is present.  Degenerative change of the spine typical for age is present.  Impression: Unchanged pulmonary nodules.  Follow-up to 2 years of stability is recommended.    Electronically signed by: Matti  MD Lu  Date:    09/13/2024  Time:    12:41      Assessment and Plan (including Health Maintenance)      Problem List  Smart Sets  Document Outside HM   :    Health Maintenance Due   Topic Date Due    Shingles Vaccine (1 of 2) Never done    RSV Vaccine (Age 60+ and Pregnant patients) (1 - Risk 60-74 years 1-dose series) Never done    COVID-19 Vaccine (5 - 2024-25 season) 09/01/2024    DEXA Scan  09/09/2024    Mammogram  10/13/2024       Problem List Items Addressed This Visit          Cardiac/Vascular    Congestive heart failure    Relevant Orders    NT-Pro Natriuretic Peptide (Completed)       Other    Malaise and fatigue - Primary    Current Assessment & Plan     Patient brought stool sample with her to clinic today. States she is fatigue and not feeling well. Reports started having diarrhea and passing clots of blood.  Has been ongoing for about a week. Denies any chest pain, SOB, or body aches. She has had weight loss since last visit to the clinic. She is going to dialysis 3 times weekly. Will get labs today and treat as indicated.          Relevant Orders    CBC Auto Differential (Completed)    Comprehensive Metabolic Panel (Completed)     Other Visit Diagnoses       Dark stools        Relevant Orders    Occult blood x 1, stool (Completed)    Occult blood x 1, stool    Occult blood x 1, stool    CBC Auto Differential (Completed)    Comprehensive Metabolic Panel (Completed)            Health Maintenance Topics with due status: Not Due       Topic Last Completion Date    TETANUS VACCINE 10/16/2017    Colorectal Cancer Screening 08/22/2022    Lipid Panel 02/15/2024    Foot Exam 08/06/2024    Hemoglobin A1c 09/11/2024    Eye Exam 10/15/2024       Future Appointments   Date Time Provider Department Center   12/10/2024 10:00 AM [facility] [id number] [facility] MAMMO [facility]   12/10/2024 10:30 AM [facility] XR4 DEXA [facility] XRAY [facility]   1/7/2025  8:40 AM Laury Enrique FNP [facility]   1/7/2025  10:20 AM Iva, Amalia, FNP [facility]   8/7/2025  9:00 AM AWV NURSE, [facility] [address] [facility]          Signature:  CHAPARRITA Bella  [facility] [address] [place] [address]  [phone]    Date of encounter: 12/3/24

## 2024-12-04 NOTE — PROGRESS NOTES
Discussed lab results and recommendations with pt via phone  No noted concerns  Voiced understanding   GI and Cardio appointments made   Pt is aware of appointments

## 2024-12-05 ENCOUNTER — OFFICE VISIT (OUTPATIENT)
Dept: GASTROENTEROLOGY | Facility: CLINIC | Age: 74
End: 2024-12-05
Payer: MEDICARE

## 2024-12-05 VITALS
SYSTOLIC BLOOD PRESSURE: 98 MMHG | BODY MASS INDEX: 20.66 KG/M2 | DIASTOLIC BLOOD PRESSURE: 50 MMHG | HEIGHT: 64 IN | OXYGEN SATURATION: 97 % | HEART RATE: 76 BPM | WEIGHT: 121 LBS

## 2024-12-05 DIAGNOSIS — R10.9 ABDOMINAL PAIN, UNSPECIFIED ABDOMINAL LOCATION: ICD-10-CM

## 2024-12-05 DIAGNOSIS — K92.1 BLOOD IN STOOL: Primary | ICD-10-CM

## 2024-12-05 DIAGNOSIS — R53.1 WEAKNESS: ICD-10-CM

## 2024-12-05 PROCEDURE — 99999 PR PBB SHADOW E&M-EST. PATIENT-LVL V: CPT | Mod: PBBFAC,,, | Performed by: NURSE PRACTITIONER

## 2024-12-05 PROCEDURE — 99215 OFFICE O/P EST HI 40 MIN: CPT | Mod: PBBFAC | Performed by: NURSE PRACTITIONER

## 2024-12-05 NOTE — PROGRESS NOTES
Nasrin Grant is a 74 y.o. female here for Establish Care        PCP: Laury Enrique  Referring Provider: Laury Enrique, [facility]  [address] [place] [address]     HPI:  Patient presents in referral due to rectal bleeding.  Patient was evaluated in the ER on yesterday 2.  Stool positive for occult blood.  Patient reports that prior to ER visit that she was having weakness and fatigue.  Reports that she has had 3 episodes of blood in the toilet.  The last episode of blood noted was Monday.  HGB 11.4 and HCT 35.4 in the ER.  Patient is a dialysis patient.  States that she started dialysis in September. She is also a renal transplant patient.  Renal transplant performed in 2016.  Creatinine 5.81 in the ER.  Bleeding not associated with a bowel movement.  Last colonoscopy performed at [facility] on 08/22/2022, report reviewed, moderate hemorrhoids, internal hemorrhoids.  Suboptimal prep.  Blood pressure 98/50 in the office.  Patient is reporting severe weakness.  Reports that she did become dizzy and fell this a.m. and failed.  Denies injury related to fall.  She has been advised to go to the ER.  The patient is taking hydralazine, Coreg, and nifedipine.  Last dose of hydralazine was 2 hours ago.  She has also been advised to contact Dr. Blount and also primary care.  Reports decreased appetite.  Also is reporting abdominal pain.  Abdominal pain is generalized and abdomen is tender on palpation.  Denies any constipation.  Reports nausea without vomiting.  Is currently taking oral iron therapy.  Is followed by Dr. Robledo cardiology in [place].          ROS:  Review of Systems   Constitutional:  Negative for appetite change, fatigue, fever and unexpected weight change.   HENT:  Negative for trouble swallowing.    Cardiovascular:  Negative for chest pain.   Gastrointestinal:  Negative for abdominal pain, blood in stool, change in bowel habit, constipation, diarrhea, nausea, vomiting and reflux.   Musculoskeletal:  Negative  "for gait problem.   Integumentary:  Negative for pallor.   Psychiatric/Behavioral:  The patient is not nervous/anxious.           PMHX:  has a past medical history of Amputation of one or more toes, Atherosclerotic heart disease of native coronary artery with angina pectoris (01/20/2020), CVA (cerebral vascular accident), Depression, Diabetes mellitus, type 2, Dialysis patient, Disorder of kidney and ureter, History of carpal tunnel surgery of left wrist, History of carpal tunnel surgery of right wrist, History of repair of left rotator cuff, History of repair of right rotator cuff, Hyperlipidemia, Hypertension, Hypokalemia, Kidney transplant status (07/13/2020), Osteoporosis (07/22/2020), Proliferative diabetic retinopathy of both eyes (09/07/2023), and Stroke.    PSHX:  has a past surgical history that includes Hysterectomy; kidney transplant 2016; Extraction of tooth (Left, 08/11/2021); Toe amputation; Appendix; Oophorectomy; and Dialysis fistula creation (Left).    PFHX: family history includes Diabetes in her father and mother; Hearing loss in her father and mother; Heart disease in her father and mother; Hyperlipidemia in her father and mother.    PSlHX:  reports that she has never smoked. She has never been exposed to tobacco smoke. She has never used smokeless tobacco. She reports that she does not drink alcohol and does not use drugs.        Review of patient's allergies indicates:   Allergen Reactions    Hydrocodone-acetaminophen Rash    Gabapentin Other (See Comments)     Made her disoriented  "out of my head"      Morphine Itching    Opioids - morphine analogues        Medication List with Changes/Refills   Current Medications    ALBUTEROL (VENTOLIN HFA) 90 MCG/ACTUATION INHALER    Inhale 2 puffs into the lungs every 6 (six) hours as needed for Wheezing. Rescue    ASPIRIN (ECOTRIN) 81 MG EC TABLET    Take 81 mg by mouth once daily.    ATORVASTATIN (LIPITOR) 40 MG TABLET    Take 40 mg by mouth once daily. "    BACILLUS COAGULANS ORAL    Take 1 capsule every day by oral route in the morning.    BUMETANIDE (BUMEX) 2 MG TABLET    Take 1 tablet (2 mg total) by mouth 2 (two) times a day.    CARVEDILOL (COREG) 25 MG TABLET    Take 1 tablet (25 mg total) by mouth 2 (two) times daily.    CHOLESTYRAMINE (QUESTRAN) 4 GRAM PACKET    Take 1 packet by mouth once daily.    CLONIDINE 0.1 MG/24 HR TD PTWK (CATAPRES) 0.1 MG/24 HR    Place 1 patch onto the skin once a week.    DICLOFENAC (VOLTAREN) 50 MG EC TABLET    Take 1 tablet (50 mg total) by mouth 2 (two) times daily as needed (pain).    DOCUSATE SODIUM (COLACE) 100 MG CAPSULE    Take 100 mg by mouth 2 (two) times daily as needed for Constipation.    FERROUS SULFATE (FEOSOL) 325 MG (65 MG IRON) TAB TABLET    Take 1 tablet (325 mg total) by mouth 2 (two) times daily.    FLASH GLUCOSE SENSOR (FREESTYLE MARY 10 DAY SENSOR MISC)    1 Device by Percutaneous route.    FLUTICASONE PROPIONATE (FLONASE) 50 MCG/ACTUATION NASAL SPRAY    1 spray by Nasal route once daily.    HYDRALAZINE (APRESOLINE) 100 MG TABLET    Take 100 mg by mouth 3 (three) times daily.    INSULIN ASPART U-100 (NOVOLOG) 100 UNIT/ML INJECTION    Inject 3 Units into the skin 3 (three) times daily before meals. Per Dr Kayleigh Morris at [facility]    INSULIN DEGLUDEC (TRESIBA FLEXTOUCH U-100) 100 UNIT/ML (3 ML) INSULIN PEN    Inject 10 Units into the skin once daily.    ISOSORBIDE MONONITRATE (IMDUR) 30 MG 24 HR TABLET    Take 30 mg by mouth once daily.    LACTOBACILLUS ACIDOPHILUS (PROBIOTIC ACIDOPHILUS ORAL)    Take 4 Billion Cells by mouth.    LANCETS (TRUEPLUS LANCETS) 33 GAUGE MISC    Inject 1 lancet  into the skin 3 (three) times daily before meals.    MINOXIDIL (LONITEN) 2.5 MG TABLET    Take 2.5 mg by mouth.    MULTIVITAMIN (THERAGRAN) PER TABLET    Take 1 tablet by mouth once daily.    MYCOPHENOLATE SODIUM 180 MG TBEC    Take 1 tablet by mouth 2 (two) times daily.    NIFEDIPINE (PROCARDIA-XL) 90 MG (OSM) 24 HR  "TABLET    Take 1 tablet (90 mg total) by mouth once daily.    NITROGLYCERIN (NITROSTAT) 0.4 MG SL TABLET    DISSOLVE 1 TABLET UNDER THE TONGUE EVERY 5 MINUTES AS NEEDED FOR CHEST PAIN. DO NOT EXCEED A TOTAL OF 3 DOSES IN 15 MINUTES.    PANTOPRAZOLE (PROTONIX) 40 MG TABLET    TAKE ONE TABLET BY MOUTH EVERY MORNING    PEN NEEDLE, DIABETIC 31 GAUGE X 3/16" NDLE    1 each by NOT APPLICABLE route.    POTASSIUM CHLORIDE (MICRO-K) 10 MEQ CPSR    TAKE ONE CAPSULE BY MOUTH ONCE DAILY    PREDNISONE (DELTASONE) 5 MG TABLET    Take 1 tablet by mouth once daily.    SODIUM BICARBONATE 650 MG TABLET    Take 1 tablet by mouth 2 (two) times daily.    TACROLIMUS (PROGRAF) 0.5 MG CAP    Take 1.5 mg by mouth 2 (two) times a day.        Objective Findings:  Vital Signs:  BP (!) 98/50 (BP Location: Right arm, Patient Position: Sitting)   Pulse 76   Ht 5' 4" (1.626 m)   Wt 54.9 kg (121 lb)   SpO2 97%   BMI 20.77 kg/m²  Body mass index is 20.77 kg/m².    Physical Exam:  Physical Exam  Vitals and nursing note reviewed.   Constitutional:       General: She is not in acute distress.     Appearance: Normal appearance.   HENT:      Mouth/Throat:      Mouth: Mucous membranes are moist.   Cardiovascular:      Rate and Rhythm: Normal rate.      Heart sounds: Murmur heard.   Abdominal:      General: Bowel sounds are normal. There is no distension.      Palpations: Abdomen is soft. There is no mass.      Tenderness: There is generalized abdominal tenderness.   Skin:     General: Skin is warm and dry.      Coloration: Skin is not jaundiced or pale.   Neurological:      Mental Status: She is alert and oriented to person, place, and time.      Gait: Gait abnormal.   Psychiatric:         Mood and Affect: Mood normal.          Labs:  Lab Results   Component Value Date    WBC 9.11 12/04/2024    HGB 11.2 (L) 12/05/2024    HCT 35.5 (L) 12/05/2024    MCV 84.5 12/04/2024    RDW 14.4 12/04/2024     12/04/2024    LYMPH 10.4 (L) 12/04/2024    LYMPH " "0.95 (L) 12/04/2024    MONO 11.2 (H) 12/04/2024    EOS 0.74 (H) 12/04/2024    BASO 0.07 12/04/2024     Lab Results   Component Value Date     12/04/2024    K 3.3 (L) 12/04/2024    CL 95 (L) 12/04/2024    CO2 29 12/04/2024     (H) 12/04/2024    BUN 32 (H) 12/04/2024    CREATININE 5.81 (H) 12/04/2024    CALCIUM 9.7 12/04/2024    PROT 7.1 12/04/2024    ALBUMIN 3.4 12/04/2024    BILITOT 0.4 12/04/2024    ALKPHOS 77 12/04/2024    AST 24 12/04/2024    ALT <7 12/04/2024         Imaging: No results found.      Assessment:  Nasrin Grant is a 74 y.o. female here with:  1. Blood in stool    2. Weakness    3. Abdominal pain, unspecified abdominal location          Recommendations:  1. Patient is advised to go to the ER for evaluation due to increased weakness and reported fall.  Also advised to contact Dr. Blount, cardiology, and primary care regarding hypotension and weakness.  Stressed importance to the patient and family member regarding evaluation.  Advised that hemoglobin 11 0.4 and 35.4 is not low enough to cause extreme weakness and hypotension.  2. Hgb and Hct  3. Ct abdomen and pelvis, abdominal pain, no contrast  4. Continue oral iron  5. Schedule colonoscopy    Diagnosis, risks, benefits, and side effects of any medications and treatment plan were discussed with the patient.  All questions were answered to the satisfaction of the patient, and patient verbalized understanding and agreement to the treatment plan.    Portions of this note may have been created with voice recognition software.  Occasional wrong word or "sound a like substitutions may have occurred due to inherent limitations of voice recognition software.  Please read the note carefully and recognize using contexts, where substitutions have occurred.      Follow up in about 4 weeks (around 1/2/2025).      Order summary:  Orders Placed This Encounter    CT Abdomen Pelvis  Without Contrast    Hemoglobin and Hematocrit    Colonoscopy "       Thank you for allowing me to participate in the care of Nasrin Grant.      YANA Morales

## 2024-12-06 ENCOUNTER — TELEPHONE (OUTPATIENT)
Dept: GASTROENTEROLOGY | Facility: CLINIC | Age: 74
End: 2024-12-06
Payer: MEDICARE

## 2024-12-06 NOTE — TELEPHONE ENCOUNTER
Attempted to call patient. Did not answer. Called and spoke with patients daughter. States she did not go to the ER as advised, but when home and ate and laid down, and she was feeling better this morning and has dialysis today at 12pm. Advised to have patient follow up with Dr. Blount, PCP, and Cardiology. Verbalized understanding.            ----- Message from CHAPARRITA Valdez sent at 12/5/2024  4:14 PM CST -----  H&H is stable.  Patient was advised to go to the ER for hypotension and weakness with fall this a.m..  She was also advised to contact Dr. Blount, PCP, and cardiology.  Please inquire as to have the patient is feeling?

## 2024-12-10 ENCOUNTER — HOSPITAL ENCOUNTER (OUTPATIENT)
Dept: RADIOLOGY | Facility: HOSPITAL | Age: 74
Discharge: HOME | End: 2024-12-10
Attending: NURSE PRACTITIONER
Payer: MEDICARE

## 2024-12-10 VITALS — BODY MASS INDEX: 20.49 KG/M2 | WEIGHT: 120 LBS | HEIGHT: 64 IN

## 2024-12-10 DIAGNOSIS — Z78.0 ENCOUNTER FOR OSTEOPOROSIS SCREENING IN ASYMPTOMATIC POSTMENOPAUSAL PATIENT: ICD-10-CM

## 2024-12-10 DIAGNOSIS — Z13.820 ENCOUNTER FOR OSTEOPOROSIS SCREENING IN ASYMPTOMATIC POSTMENOPAUSAL PATIENT: ICD-10-CM

## 2024-12-10 DIAGNOSIS — Z12.31 BREAST CANCER SCREENING BY MAMMOGRAM: ICD-10-CM

## 2024-12-10 PROCEDURE — 77063 BREAST TOMOSYNTHESIS BI: CPT | Mod: TC

## 2024-12-10 PROCEDURE — 77067 SCR MAMMO BI INCL CAD: CPT | Mod: 26,,, | Performed by: RADIOLOGY

## 2024-12-10 PROCEDURE — 77063 BREAST TOMOSYNTHESIS BI: CPT | Mod: 26,,, | Performed by: RADIOLOGY

## 2024-12-10 PROCEDURE — 77080 DXA BONE DENSITY AXIAL: CPT | Mod: 26,,, | Performed by: RADIOLOGY

## 2024-12-10 PROCEDURE — 77080 DXA BONE DENSITY AXIAL: CPT | Mod: TC

## 2024-12-16 ENCOUNTER — HOSPITAL ENCOUNTER (EMERGENCY)
Facility: HOSPITAL | Age: 74
Discharge: HOME | End: 2024-12-16
Payer: MEDICARE

## 2024-12-16 VITALS
RESPIRATION RATE: 16 BRPM | HEIGHT: 64 IN | OXYGEN SATURATION: 99 % | BODY MASS INDEX: 21.17 KG/M2 | WEIGHT: 124 LBS | HEART RATE: 69 BPM | TEMPERATURE: 98 F | DIASTOLIC BLOOD PRESSURE: 61 MMHG | SYSTOLIC BLOOD PRESSURE: 118 MMHG

## 2024-12-16 DIAGNOSIS — M54.50 CHRONIC BILATERAL LOW BACK PAIN WITHOUT SCIATICA: Primary | ICD-10-CM

## 2024-12-16 DIAGNOSIS — G89.29 CHRONIC BILATERAL LOW BACK PAIN WITHOUT SCIATICA: Primary | ICD-10-CM

## 2024-12-16 DIAGNOSIS — M81.0 OSTEOPOROSIS WITHOUT CURRENT PATHOLOGICAL FRACTURE, UNSPECIFIED OSTEOPOROSIS TYPE: Primary | ICD-10-CM

## 2024-12-16 LAB
ALBUMIN SERPL BCP-MCNC: 3.2 G/DL (ref 3.4–4.8)
ALBUMIN/GLOB SERPL: 0.7 {RATIO}
ALP SERPL-CCNC: 82 U/L (ref 40–150)
ALT SERPL W P-5'-P-CCNC: 7 U/L
ANION GAP SERPL CALCULATED.3IONS-SCNC: 14 MMOL/L (ref 7–16)
AST SERPL W P-5'-P-CCNC: 24 U/L (ref 5–34)
BASOPHILS # BLD AUTO: 0.08 K/UL (ref 0–0.2)
BASOPHILS NFR BLD AUTO: 0.8 % (ref 0–1)
BILIRUB SERPL-MCNC: 0.4 MG/DL
BUN SERPL-MCNC: 30 MG/DL (ref 10–20)
BUN/CREAT SERPL: 8 (ref 6–20)
CALCIUM SERPL-MCNC: 9.4 MG/DL (ref 8.4–10.2)
CHLORIDE SERPL-SCNC: 96 MMOL/L (ref 98–107)
CO2 SERPL-SCNC: 28 MMOL/L (ref 23–31)
CREAT SERPL-MCNC: 4 MG/DL (ref 0.55–1.02)
DIFFERENTIAL METHOD BLD: ABNORMAL
EGFR (NO RACE VARIABLE) (RUSH/TITUS): 11 ML/MIN/1.73M2
EOSINOPHIL # BLD AUTO: 0.12 K/UL (ref 0–0.5)
EOSINOPHIL NFR BLD AUTO: 1.2 % (ref 1–4)
ERYTHROCYTE [DISTWIDTH] IN BLOOD BY AUTOMATED COUNT: 14.8 % (ref 11.5–14.5)
GLOBULIN SER-MCNC: 4.3 G/DL (ref 2–4)
GLUCOSE SERPL-MCNC: 211 MG/DL (ref 82–115)
HCT VFR BLD AUTO: 33.1 % (ref 38–47)
HGB BLD-MCNC: 10.8 G/DL (ref 12–16)
IMM GRANULOCYTES # BLD AUTO: 0.07 K/UL (ref 0–0.04)
IMM GRANULOCYTES NFR BLD: 0.7 % (ref 0–0.4)
LYMPHOCYTES # BLD AUTO: 0.93 K/UL (ref 1–4.8)
LYMPHOCYTES NFR BLD AUTO: 9.2 % (ref 27–41)
MCH RBC QN AUTO: 27.5 PG (ref 27–31)
MCHC RBC AUTO-ENTMCNC: 32.6 G/DL (ref 32–36)
MCV RBC AUTO: 84.2 FL (ref 80–96)
MONOCYTES # BLD AUTO: 0.62 K/UL (ref 0–0.8)
MONOCYTES NFR BLD AUTO: 6.2 % (ref 2–6)
MPC BLD CALC-MCNC: 10.2 FL (ref 9.4–12.4)
NEUTROPHILS # BLD AUTO: 8.25 K/UL (ref 1.8–7.7)
NEUTROPHILS NFR BLD AUTO: 81.9 % (ref 53–65)
NRBC # BLD AUTO: 0 X10E3/UL
NRBC, AUTO (.00): 0 %
PLATELET # BLD AUTO: 283 K/UL (ref 150–400)
POTASSIUM SERPL-SCNC: 3.5 MMOL/L (ref 3.5–5.1)
PROT SERPL-MCNC: 7.5 G/DL (ref 5.8–7.6)
RBC # BLD AUTO: 3.93 M/UL (ref 4.2–5.4)
SODIUM SERPL-SCNC: 134 MMOL/L (ref 136–145)
WBC # BLD AUTO: 10.07 K/UL (ref 4.5–11)

## 2024-12-16 PROCEDURE — 25000003 PHARM REV CODE 250

## 2024-12-16 PROCEDURE — 36415 COLL VENOUS BLD VENIPUNCTURE: CPT

## 2024-12-16 PROCEDURE — 80053 COMPREHEN METABOLIC PANEL: CPT

## 2024-12-16 PROCEDURE — 99284 EMERGENCY DEPT VISIT MOD MDM: CPT | Mod: 25

## 2024-12-16 PROCEDURE — 85025 COMPLETE CBC W/AUTO DIFF WBC: CPT

## 2024-12-16 RX ORDER — ACETAMINOPHEN 500 MG
1000 TABLET ORAL
Status: COMPLETED | OUTPATIENT
Start: 2024-12-16 | End: 2024-12-16

## 2024-12-16 RX ORDER — ALENDRONATE SODIUM 70 MG/1
70 TABLET ORAL
Qty: 12 TABLET | Refills: 1 | Status: SHIPPED | OUTPATIENT
Start: 2024-12-16 | End: 2025-12-16

## 2024-12-16 RX ADMIN — ACETAMINOPHEN 1000 MG: 500 TABLET ORAL at 10:12

## 2024-12-17 ENCOUNTER — PATIENT OUTREACH (OUTPATIENT)
Dept: EMERGENCY MEDICINE | Facility: HOSPITAL | Age: 74
End: 2024-12-17
Payer: MEDICARE

## 2024-12-17 NOTE — ED NOTES
Attempted to cath pt for urine sample d/t her inability to urinate but no success;  pt reports that she does occasionally make urine but is unable to at present;  NP made aware

## 2024-12-17 NOTE — ED PROVIDER NOTES
"Encounter Date: 12/16/2024       History     Chief Complaint   Patient presents with    Back Pain     Pt presents to ed with c/o having lower back pain for several weeks with no improvement      74-year old female presents to the emergency department for evaluation of lower back pain that has been going on for the past two week. 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 Denies fever, chills, nausea, vomiting, chest pain, shortness of breath.    The history is provided by the patient. No  was used.   Back Pain   This is a new problem. The current episode started several weeks ago. The problem occurs intermittently. The problem has been unchanged. The pain is associated with no known injury. The pain is present in the lumbar spine. The quality of the pain is described as aching. Radiates to: right and left lower pelvic area. The pain is at a severity of 6/10. Associated symptoms include pelvic pain. Pertinent negatives include no chest pain, no fever, no numbness, no headaches, no abdominal pain, no abdominal swelling, no bowel incontinence, no perianal numbness, no dysuria, no paresthesias, no paresis, no tingling and no weakness. She has tried nothing for the symptoms.     Review of patient's allergies indicates:   Allergen Reactions    Hydrocodone-acetaminophen Rash    Gabapentin Other (See Comments)     Made her disoriented  "out of my head"      Morphine Itching    Opioids - morphine analogues      Past Medical History:   Diagnosis Date    Amputation of one or more toes     2 TOES RT FOOT, 3 TOES LFT FOOT    Atherosclerotic heart disease of native coronary artery with angina pectoris 01/20/2020    CVA (cerebral vascular accident)     Depression     Diabetes mellitus, type 2     Dialysis patient     T/T/S    Disorder of kidney and ureter     History of carpal tunnel surgery of left wrist     History of carpal " tunnel surgery of right wrist     History of repair of left rotator cuff     History of repair of right rotator cuff     Hyperlipidemia     Hypertension     Hypokalemia     Kidney transplant status 07/13/2020    Osteoporosis 07/22/2020    Proliferative diabetic retinopathy of both eyes 09/07/2023    Stroke      Past Surgical History:   Procedure Laterality Date    Appendix      DIALYSIS FISTULA CREATION Left     EXTRACTION OF TOOTH Left 08/11/2021    HYSTERECTOMY      kidney transplant 2016      Have had issues since transplant, kidney had a virus per patient    OOPHORECTOMY      TOE AMPUTATION       Family History   Problem Relation Name Age of Onset    Diabetes Mother      Hyperlipidemia Mother      Heart disease Mother      Hearing loss Mother      Diabetes Father      Hearing loss Father      Heart disease Father      Hyperlipidemia Father      Prostate cancer Brother       Social History     Tobacco Use    Smoking status: Never     Passive exposure: Never    Smokeless tobacco: Never   Substance Use Topics    Alcohol use: Never    Drug use: Never     Review of Systems   Constitutional:  Positive for fatigue. Negative for activity change, appetite change, chills and fever.   Eyes:  Negative for photophobia, pain and visual disturbance.   Respiratory:  Negative for chest tightness, shortness of breath, wheezing and stridor.    Cardiovascular:  Negative for chest pain.   Gastrointestinal:  Negative for abdominal pain, bowel incontinence and vomiting.   Genitourinary:  Positive for pelvic pain. Negative for difficulty urinating and dysuria.   Musculoskeletal:  Positive for back pain. Negative for neck pain and neck stiffness.   Neurological:  Negative for dizziness, tingling, tremors, weakness, light-headedness, numbness, headaches and paresthesias.   Psychiatric/Behavioral:  Negative for confusion.    All other systems reviewed and are negative.      Physical Exam     Initial Vitals [12/16/24 1700]   BP Pulse Resp  Temp SpO2   118/61 69 16 97.6 °F (36.4 °C) 99 %      MAP       --         Physical Exam    Vitals reviewed.  Constitutional: She appears well-nourished. No distress.   HENT:   Head: Normocephalic.   Eyes: Conjunctivae are normal. Right eye exhibits no discharge. Left eye exhibits no discharge.   Neck: Neck supple.   Normal range of motion.  Cardiovascular:  Normal rate, regular rhythm and normal heart sounds.           Pulmonary/Chest: Breath sounds normal. No respiratory distress. She has no wheezes. She has no rhonchi. She exhibits no tenderness.   Abdominal: Abdomen is soft. Bowel sounds are normal. She exhibits no distension and no mass. There is no abdominal tenderness. There is no guarding.   Musculoskeletal:         General: Normal range of motion.      Cervical back: Normal, normal range of motion and neck supple.      Thoracic back: Normal.      Lumbar back: Normal. No swelling, edema, spasms, tenderness or bony tenderness.     Lymphadenopathy:     She has no cervical adenopathy.   Neurological: She is alert and oriented to person, place, and time. She has normal strength. No sensory deficit. GCS score is 15. GCS eye subscore is 4. GCS verbal subscore is 5. GCS motor subscore is 6.   Skin: Skin is warm and dry. Capillary refill takes less than 2 seconds.   Psychiatric: She has a normal mood and affect. Her behavior is normal.         Medical Screening Exam   See Full Note    ED Course   Procedures  Labs Reviewed   COMPREHENSIVE METABOLIC PANEL - Abnormal       Result Value    Sodium 134 (*)     Potassium 3.5      Chloride 96 (*)     CO2 28      Anion Gap 14      Glucose 211 (*)     BUN 30 (*)     Creatinine 4.00 (*)     BUN/Creatinine Ratio 8      Calcium 9.4      Total Protein 7.5      Albumin 3.2 (*)     Globulin 4.3 (*)     A/G Ratio 0.7      Bilirubin, Total 0.4      Alk Phos 82      ALT 7      AST 24      eGFR 11 (*)    CBC WITH DIFFERENTIAL - Abnormal    WBC 10.07      RBC 3.93 (*)     Hemoglobin 10.8  (*)     Hematocrit 33.1 (*)     MCV 84.2      MCH 27.5      MCHC 32.6      RDW 14.8 (*)     Platelet Count 283      MPV 10.2      Neutrophils % 81.9 (*)     Lymphocytes % 9.2 (*)     Monocytes % 6.2 (*)     Eosinophils % 1.2      Basophils % 0.8      Immature Granulocytes % 0.7 (*)     nRBC, Auto 0.0      Neutrophils, Abs 8.25 (*)     Lymphocytes, Absolute 0.93 (*)     Monocytes, Absolute 0.62      Eosinophils, Absolute 0.12      Basophils, Absolute 0.08      Immature Granulocytes, Absolute 0.07 (*)     nRBC, Absolute 0.00      Diff Type Auto     CBC W/ AUTO DIFFERENTIAL    Narrative:     The following orders were created for panel order CBC auto differential.  Procedure                               Abnormality         Status                     ---------                               -----------         ------                     CBC with Differential[[id number]]       Abnormal            Final result                 Please view results for these tests on the individual orders.   URINALYSIS, REFLEX TO URINE CULTURE          Imaging Results              XR ABDOMEN, ACUTE 2 OR MORE VIEWS WITH CHEST (Final result)  Result time 12/16/24 21:47:37      Final result by Kash Alicia MD (12/16/24 21:47:37)                   Impression:      See above comments.      Electronically signed by: Kash Alicia  Date:    12/16/2024  Time:    21:47               Narrative:    EXAMINATION:  XR ABDOMEN ACUTE 2 OR MORE VIEWS WITH CHEST    CLINICAL HISTORY:  mid-epigastric abdominal pain;    TECHNIQUE:  Single-view chest and three views of the abdomen flat erect.    COMPARISON:  None    FINDINGS:  Heart is normal size.  No effusion or pneumothorax.    Minimal right basilar interstitial infiltrate.  No mass or consolidation.    No free air is detected.  No radiographic mass, organomegaly or pathologic calcification.  Surgical clips in the right pelvis and right upper quadrant.  Vascular calcifications on the left.    No evidence  of bowel obstruction.  No acute osseous abnormality.                                       Medications   acetaminophen tablet 1,000 mg (1,000 mg Oral Given 12/16/24 0425)     Medical Decision Making  74-year old female presents to the emergency department for evaluation of lower back pain that has been going on for the past two week. 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 Denies fever, chills, nausea, vomiting, chest pain, shortness of breath.  Ordered and reviewed labs  Attempted to order urinalysis, however patient reports that she only make urine once or twice daily and is unable to currently make urine  Follow-up and return precautions discussed with patient, who verbalized understanding  Tylenol po ordered in ED  Diagnosis: Low back pain    Amount and/or Complexity of Data Reviewed  Labs: ordered.  Radiology: ordered.    Risk  OTC drugs.                                      Clinical Impression:   Final diagnoses:  [M54.50, G89.29] Chronic bilateral low back pain without sciatica (Primary)        ED Disposition Condition    Discharge Stable          ED Prescriptions    None       Follow-up Information    None          Brian Hewitt, NP  12/16/24 [id number]

## 2024-12-17 NOTE — DISCHARGE INSTRUCTIONS
Follow up with primary care provider in three days for re-evaluation. Return to the emergency department for new or worsening symptoms.

## 2024-12-17 NOTE — PROGRESS NOTES
Karli Carson LPN  ED Navigator  Emergency Department    Project: [facility] ED Navigator  Role: Community Health Worker    Date: 12/17/2024  Patient Name: Nasrin Grant  MRN: [id number]  PCP: Laury Enrique FNP    Assessment:     Nasrin Grant is a 74 y.o. female who has presented to ED for back pain. Patient has visited the ED 3 times in the past 3 months. Patient did not contact PCP.     ED Navigator Initial Assessment    ED Navigator Enrollment Documentation  Consent to Services  Does patient consent to completing the assessment?: Yes  Contact  Method of Initial Contact: Phone  Transportation  Does the patient have issues with Transportation?: Yes  Does the patient have transportation to and from healthcare appointments?: Yes  Can family, friends, or others help?: Yes  Lack of transportation to appts, pharmacy, etc.?: No  What is available in their region?: [employer] transporation  Insurance Coverage  Do you have coverage/adequate coverage?: Yes  Type/kind of coverage: [employer]  Is patient able to afford co-pays/deductibles?: Yes  Is patient able to afford HME or supplies?: Yes  Does patient have an established [facility] PCP?: Yes  Able to access?: No  Does the patient have a lack of adequate coverage?: No  Specialist Appointment  Did the patient come to the ED to see a specialist?: No  Does the patient have a pending specialist referral?: No  Does the patient have a specialist appointment made?: No  PCP Follow Up Appointment  Has the patient had an appointment with a primary care provider in the past year?: Yes  Approximate date: 12/3/24  Provider: Laury Enrique FNP  Does the patient have a follow up appontment with a PCP?: No  When was the last time you saw your PCP?: 12/3/24  Why does the patient not have a follow up scheduled?: Other (see comments) (Comment: Pt is wanting to go as a walk in due to not able to urinate)  Medications  Is patient able to afford medication?: Yes  Is patient unable to get medication due to lack  of transportation?: No  Psychological  Does the patient have psycho-social concerns?: Yes  What concerns does the patient have?: Anxiety and/or Depression, Difficulty coping with illness  Food  Does the patient have concerns about food?: No  Communication/Education  Does the patient have limited English proficiency/English not primary language?: No  Does patient have low literacy and/or low health literacy?: No  Does patient have concerns with care?: No  Does patient have dissatisfaction with care?: No  Other Financial Concerns  Does the patient have immediate financial distress?: No  Does the patient have general financial concerns?: Yes  Other Social Barriers/Concerns  Does the patient have any additional barriers or concerns?: None  Primary Barrier  Barriers identified: Psychological barrier (mistrust, anxiety, etc.)  Root Cause of ED Utilization: Patient Knowledge/Low Health Literacy  Plan to address Patient Knowledge/Low Health Literacy: Provided information for [facility] 24/7 Nurse triage line [phone] or [facility] ([phone])  Next steps: Provided Education  Was education/educational materials provided surrounding PCP services/creating a medical home?: Yes Was education verbal or written?: Verbal     Was education/educational materials provided surrounding low cost, healthy foods?: No      Was education/educational materials provided surrounding other items? If so, use comment to explain.: No    Plan: Provided information for [facility] 24/7 Nurse triage line, [phone] or 1-866-[facility] ([phone])  Expected Date of Follow Up 1: 1/8/25         Social History     Socioeconomic History    Marital status:    Occupational History     Comment: Disabled   Tobacco Use    Smoking status: Never     Passive exposure: Never    Smokeless tobacco: Never   Substance and Sexual Activity    Alcohol use: Never    Drug use: Never    Sexual activity: Not Currently     Partners: Male      Social Drivers of Health     Financial Resource Strain: Low Risk  (12/17/2024)    Overall Financial Resource Strain (CARDIA)     Difficulty of Paying Living Expenses: Not very hard   Food Insecurity: No Food Insecurity (12/17/2024)    Hunger Vital Sign     Worried About Running Out of Food in the Last Year: Never true     Ran Out of Food in the Last Year: Never true   Transportation Needs: No Transportation Needs (12/17/2024)    PRAPARE - Transportation     Lack of Transportation (Medical): No     Lack of Transportation (Non-Medical): No   Physical Activity: Insufficiently Active (12/17/2024)    Exercise Vital Sign     Days of Exercise per Week: 1 day     Minutes of Exercise per Session: 30 min   Stress: Stress Concern Present (12/17/2024)    [employer] - Occupational Stress Questionnaire     Feeling of Stress : To some extent   Housing Stability: Low Risk  (12/17/2024)    Housing Stability Vital Sign     Unable to Pay for Housing in the Last Year: No     Homeless in the Last Year: No       Plan:   ED Navigator spoke with patient about ED visit. Patient states that her back is feeling better but her lower abdomen is hurting her now. Patient hasn't been able to urinate since she went to the ED due to being on dialysis. Patient hasn't made an appointment with PCP.  Patient declined assistance making a follow-up appointment with Laury Enrique NP at this time. Patient states that she wants to wait until she can pee before she goes to see PCP. ED Navigator gave patient [facility] Service number [phone] for resources to be able to help with gas. Patient was given [facility] 24/7 Nurse triage line, [phone] or [facility]-866-[facility] ([phone]) contact information.     Karli Carson ED Navigator   [phone]

## 2024-12-18 PROCEDURE — 81001 URINALYSIS AUTO W/SCOPE: CPT

## 2024-12-18 PROCEDURE — 87186 SC STD MICRODIL/AGAR DIL: CPT

## 2024-12-18 PROCEDURE — 87086 URINE CULTURE/COLONY COUNT: CPT

## 2024-12-18 PROCEDURE — 87077 CULTURE AEROBIC IDENTIFY: CPT

## 2024-12-19 DIAGNOSIS — M54.9 BACK PAIN, UNSPECIFIED BACK LOCATION, UNSPECIFIED BACK PAIN LATERALITY, UNSPECIFIED CHRONICITY: Primary | ICD-10-CM

## 2024-12-19 DIAGNOSIS — M54.9 BACK PAIN, UNSPECIFIED BACK LOCATION, UNSPECIFIED BACK PAIN LATERALITY, UNSPECIFIED CHRONICITY: ICD-10-CM

## 2024-12-19 LAB
BACTERIA #/AREA URNS HPF: ABNORMAL /HPF
BILIRUB UR QL STRIP: NEGATIVE
CLARITY UR: ABNORMAL
COLOR UR: ABNORMAL
GLUCOSE UR STRIP-MCNC: NORMAL MG/DL
HYALINE CASTS #/AREA URNS LPF: ABNORMAL /LPF
KETONES UR STRIP-SCNC: NEGATIVE MG/DL
LEUKOCYTE ESTERASE UR QL STRIP: ABNORMAL
MUCOUS, UA: ABNORMAL /LPF
NITRITE UR QL STRIP: NEGATIVE
PH UR STRIP: 5 PH UNITS
PROT UR QL STRIP: 30
RBC # UR STRIP: ABNORMAL /UL
RBC #/AREA URNS HPF: 10 /HPF
SP GR UR STRIP: 1.02
SQUAMOUS #/AREA URNS LPF: ABNORMAL /HPF
UROBILINOGEN UR STRIP-ACNC: NORMAL MG/DL
WBC #/AREA URNS HPF: 35 /HPF
YEAST #/AREA URNS HPF: ABNORMAL /HPF

## 2024-12-19 PROCEDURE — 99999 HC NO LEVEL OF SERVICE - ED ONLY: CPT

## 2024-12-21 LAB
UA COMPLETE W REFLEX CULTURE PNL UR: ABNORMAL
UA COMPLETE W REFLEX CULTURE PNL UR: ABNORMAL

## 2024-12-21 RX ORDER — DOXYCYCLINE 100 MG/1
100 CAPSULE ORAL 2 TIMES DAILY
Qty: 20 CAPSULE | Refills: 0 | Status: SHIPPED | OUTPATIENT
Start: 2024-12-21 | End: 2024-12-31

## 2025-01-02 ENCOUNTER — HOSPITAL ENCOUNTER (OUTPATIENT)
Dept: RADIOLOGY | Facility: HOSPITAL | Age: 75
Discharge: HOME | End: 2025-01-02
Attending: RADIOLOGY
Payer: MEDICARE

## 2025-01-02 DIAGNOSIS — R92.8 ABNORMAL MAMMOGRAM: ICD-10-CM

## 2025-01-02 PROCEDURE — 76641 ULTRASOUND BREAST COMPLETE: CPT | Mod: TC,LT

## 2025-01-02 PROCEDURE — 77065 DX MAMMO INCL CAD UNI: CPT | Mod: 26,LT,, | Performed by: RADIOLOGY

## 2025-01-02 PROCEDURE — 77061 BREAST TOMOSYNTHESIS UNI: CPT | Mod: TC,LT

## 2025-01-02 PROCEDURE — 77061 BREAST TOMOSYNTHESIS UNI: CPT | Mod: 26,LT,, | Performed by: RADIOLOGY

## 2025-01-07 ENCOUNTER — LAB VISIT (OUTPATIENT)
Dept: LAB | Facility: CLINIC | Age: 75
End: 2025-01-07
Payer: MEDICARE

## 2025-01-07 ENCOUNTER — OFFICE VISIT (OUTPATIENT)
Dept: GASTROENTEROLOGY | Facility: CLINIC | Age: 75
End: 2025-01-07
Payer: MEDICARE

## 2025-01-07 ENCOUNTER — OFFICE VISIT (OUTPATIENT)
Dept: FAMILY MEDICINE | Facility: CLINIC | Age: 75
End: 2025-01-07
Payer: MEDICARE

## 2025-01-07 VITALS
BODY MASS INDEX: 22.64 KG/M2 | HEART RATE: 77 BPM | HEIGHT: 64 IN | RESPIRATION RATE: 18 BRPM | OXYGEN SATURATION: 99 % | WEIGHT: 132.63 LBS

## 2025-01-07 VITALS
WEIGHT: 134 LBS | BODY MASS INDEX: 22.88 KG/M2 | SYSTOLIC BLOOD PRESSURE: 163 MMHG | HEIGHT: 64 IN | HEART RATE: 80 BPM | DIASTOLIC BLOOD PRESSURE: 29 MMHG | OXYGEN SATURATION: 100 %

## 2025-01-07 DIAGNOSIS — I10 ESSENTIAL HYPERTENSION: Primary | ICD-10-CM

## 2025-01-07 DIAGNOSIS — D50.9 IRON DEFICIENCY ANEMIA, UNSPECIFIED IRON DEFICIENCY ANEMIA TYPE: Primary | ICD-10-CM

## 2025-01-07 DIAGNOSIS — R19.5 OCCULT BLOOD POSITIVE STOOL: ICD-10-CM

## 2025-01-07 DIAGNOSIS — K21.9 GASTROESOPHAGEAL REFLUX DISEASE WITHOUT ESOPHAGITIS: ICD-10-CM

## 2025-01-07 DIAGNOSIS — M19.071 ARTHRITIS OF RIGHT FOOT: ICD-10-CM

## 2025-01-07 DIAGNOSIS — R31.9 HEMATURIA, UNSPECIFIED TYPE: ICD-10-CM

## 2025-01-07 DIAGNOSIS — I50.9 CONGESTIVE HEART FAILURE, UNSPECIFIED HF CHRONICITY, UNSPECIFIED HEART FAILURE TYPE: ICD-10-CM

## 2025-01-07 PROCEDURE — 3008F BODY MASS INDEX DOCD: CPT | Mod: ,,, | Performed by: NURSE PRACTITIONER

## 2025-01-07 PROCEDURE — 1101F PT FALLS ASSESS-DOCD LE1/YR: CPT | Mod: CPTII,,, | Performed by: NURSE PRACTITIONER

## 2025-01-07 PROCEDURE — 3288F FALL RISK ASSESSMENT DOCD: CPT | Mod: ,,, | Performed by: NURSE PRACTITIONER

## 2025-01-07 PROCEDURE — 1159F MED LIST DOCD IN RCRD: CPT | Mod: CPTII,,, | Performed by: NURSE PRACTITIONER

## 2025-01-07 PROCEDURE — 99213 OFFICE O/P EST LOW 20 MIN: CPT | Mod: ,,, | Performed by: NURSE PRACTITIONER

## 2025-01-07 PROCEDURE — 1100F PTFALLS ASSESS-DOCD GE2>/YR: CPT | Mod: ,,, | Performed by: NURSE PRACTITIONER

## 2025-01-07 PROCEDURE — 3077F SYST BP >= 140 MM HG: CPT | Mod: CPTII,,, | Performed by: NURSE PRACTITIONER

## 2025-01-07 PROCEDURE — 99214 OFFICE O/P EST MOD 30 MIN: CPT | Mod: S$PBB,,, | Performed by: NURSE PRACTITIONER

## 2025-01-07 PROCEDURE — 99999 PR PBB SHADOW E&M-EST. PATIENT-LVL V: CPT | Mod: PBBFAC,,, | Performed by: NURSE PRACTITIONER

## 2025-01-07 PROCEDURE — 3008F BODY MASS INDEX DOCD: CPT | Mod: CPTII,,, | Performed by: NURSE PRACTITIONER

## 2025-01-07 PROCEDURE — 3078F DIAST BP <80 MM HG: CPT | Mod: CPTII,,, | Performed by: NURSE PRACTITIONER

## 2025-01-07 PROCEDURE — 99215 OFFICE O/P EST HI 40 MIN: CPT | Mod: PBBFAC | Performed by: NURSE PRACTITIONER

## 2025-01-07 PROCEDURE — 1126F AMNT PAIN NOTED NONE PRSNT: CPT | Mod: ,,, | Performed by: NURSE PRACTITIONER

## 2025-01-07 PROCEDURE — 81001 URINALYSIS AUTO W/SCOPE: CPT | Mod: ,,, | Performed by: CLINICAL MEDICAL LABORATORY

## 2025-01-07 PROCEDURE — 3288F FALL RISK ASSESSMENT DOCD: CPT | Mod: CPTII,,, | Performed by: NURSE PRACTITIONER

## 2025-01-07 RX ORDER — FLUTICASONE PROPIONATE 50 MCG
1-2 SPRAY, SUSPENSION (ML) NASAL
COMMUNITY

## 2025-01-07 RX ORDER — HYDRALAZINE HYDROCHLORIDE 100 MG/1
100 TABLET, FILM COATED ORAL 3 TIMES DAILY
Qty: 90 TABLET | Refills: 1 | Status: SHIPPED | OUTPATIENT
Start: 2025-01-07

## 2025-01-07 RX ORDER — POLYETHYLENE GLYCOL 3350, SODIUM SULFATE ANHYDROUS, SODIUM BICARBONATE, SODIUM CHLORIDE, POTASSIUM CHLORIDE 236; 22.74; 6.74; 5.86; 2.97 G/4L; G/4L; G/4L; G/4L; G/4L
4 POWDER, FOR SOLUTION ORAL ONCE
Qty: 4000 ML | Refills: 0 | Status: SHIPPED | OUTPATIENT
Start: 2025-01-07 | End: 2025-01-07

## 2025-01-07 RX ORDER — DICLOFENAC SODIUM 50 MG/1
50 TABLET, DELAYED RELEASE ORAL 2 TIMES DAILY PRN
Qty: 60 TABLET | Refills: 1 | Status: SHIPPED | OUTPATIENT
Start: 2025-01-07

## 2025-01-07 NOTE — PROGRESS NOTES
CHAPARRITA Bella   [facility] [address] [place] [address]  [phone]      PATIENT NAME: Nasrin Grant  : 1950  DATE: 25  MRN: [id number]      Billing Provider: CHAPARRITA Bella  Level of Service:   Patient PCP Information       Provider PCP Type    CHAPARRITA Bella General                Medications and Allergies     Medications  Outpatient Medications Marked as Taking for the 25 encounter (Office Visit) with Laury Enrique FNP   Medication Sig Dispense Refill    albuterol (VENTOLIN HFA) 90 mcg/actuation inhaler Inhale 2 puffs into the lungs every 6 (six) hours as needed for Wheezing. Rescue 18 g 5    alendronate (FOSAMAX) 70 MG tablet Take 1 tablet (70 mg total) by mouth every 7 days. 12 tablet 1    aspirin (ECOTRIN) 81 MG EC tablet Take 81 mg by mouth once daily.      atorvastatin (LIPITOR) 40 MG tablet Take 40 mg by mouth once daily.      BACILLUS COAGULANS ORAL Take 1 capsule every day by oral route in the morning.      bumetanide (BUMEX) 2 MG tablet Take 1 tablet (2 mg total) by mouth 2 (two) times a day. 90 tablet 1    carvediloL (COREG) 25 MG tablet Take 1 tablet (25 mg total) by mouth 2 (two) times daily. 180 tablet 1    cholestyramine (QUESTRAN) 4 gram packet Take 1 packet by mouth once daily.      cloNIDine 0.1 mg/24 hr td ptwk (CATAPRES) 0.1 mg/24 hr Place 1 patch onto the skin once a week.      docusate sodium (COLACE) 100 MG capsule Take 100 mg by mouth 2 (two) times daily as needed for Constipation.      ferrous sulfate (FEOSOL) 325 mg (65 mg iron) Tab tablet Take 1 tablet (325 mg total) by mouth 2 (two) times daily. 600 tablet 2    flash glucose sensor (FREESTYLE MARY 10 DAY SENSOR MISC) 1 Device by Percutaneous route.      insulin aspart U-100 (NOVOLOG) 100 unit/mL injection Inject 3 Units into the skin 3 (three) times daily before meals. Per Dr Kayleigh [facility] at [facility]      insulin degludec (TRESIBA  "FLEXTOUCH U-100) 100 unit/mL (3 mL) insulin pen Inject 10 Units into the skin once daily. 9 mL 2    isosorbide mononitrate (IMDUR) 30 MG 24 hr tablet Take 30 mg by mouth once daily.      Lactobacillus acidophilus (PROBIOTIC ACIDOPHILUS ORAL) Take 4 Billion Cells by mouth.      lancets (TRUEPLUS LANCETS) 33 gauge Misc Inject 1 lancet  into the skin 3 (three) times daily before meals. 400 each 2    minoxidiL (LONITEN) 2.5 MG tablet Take 2.5 mg by mouth.      multivitamin (THERAGRAN) per tablet Take 1 tablet by mouth once daily.      mycophenolate sodium 180 MG TbEC Take 1 tablet by mouth 2 (two) times daily.      NIFEdipine (PROCARDIA-XL) 90 MG (OSM) 24 hr tablet Take 1 tablet (90 mg total) by mouth once daily. 30 tablet 11    nitroGLYCERIN (NITROSTAT) 0.4 MG SL tablet DISSOLVE 1 TABLET UNDER THE TONGUE EVERY 5 MINUTES AS NEEDED FOR CHEST PAIN. DO NOT EXCEED A TOTAL OF 3 DOSES IN 15 MINUTES.      pantoprazole (PROTONIX) 40 MG tablet TAKE ONE TABLET BY MOUTH EVERY MORNING 90 tablet 1    pen needle, diabetic 31 gauge x 3/16" Ndle 1 each by NOT APPLICABLE route.      predniSONE (DELTASONE) 5 MG tablet Take 1 tablet by mouth once daily.      sodium bicarbonate 650 MG tablet Take 1 tablet by mouth 2 (two) times daily.      tacrolimus (PROGRAF) 0.5 MG Cap Take 1.5 mg by mouth 2 (two) times a day.      [DISCONTINUED] diclofenac (VOLTAREN) 50 MG EC tablet Take 1 tablet (50 mg total) by mouth 2 (two) times daily as needed (pain). 60 tablet 1    [DISCONTINUED] hydrALAZINE (APRESOLINE) 100 MG tablet Take 100 mg by mouth 3 (three) times daily.         Allergies  Review of patient's allergies indicates:   Allergen Reactions    Hydrocodone-acetaminophen Rash    Gabapentin Other (See Comments)     Made her disoriented  "out of my head"      Morphine Itching    Opioids - morphine analogues        History of Present Illness    CHIEF COMPLAINT:  Patient presents today for a checkup.    CURRENT SYMPTOMS:  She experiences intermittent " blackout episodes with varying severity. She reports dizziness upon standing. She has blood in urine that varies from light to heavy, and also notes blood in stool, that has decreased. She has intermittent episodes of diarrhea, though symptoms are improving.    CARDIOVASCULAR:  Her blood pressure is high at the start of dialysis but decreases during the treatment. She continues hydralazine for management.    RENAL:  She undergoes dialysis on Monday, Wednesday, and Friday.    MUSCULOSKELETAL:  Her arthritis pain is effectively managed with diclofenac.    MEDICATIONS:  She continues Lipitor for cholesterol management, hydralazine for blood pressure, and Diclofenac for arthritis.    IMAGING:  Mammogram showed breast calcification, she is being followed by Dr Key.      ROS:  General: -fever, -chills, -fatigue, -weight gain, -weight loss  Eyes: +vision changes, -redness, -discharge  ENT: -ear pain, -nasal congestion, -sore throat  Cardiovascular: -chest pain, -palpitations, -lower extremity edema  Respiratory: -cough, -shortness of breath  Gastrointestinal: -abdominal pain, -nausea, -vomiting, +diarrhea, -constipation, -blood in stool  Genitourinary: -dysuria, -hematuria, -frequency  Musculoskeletal: -joint pain, -muscle pain, +altered gait, using walker for ambulation  Skin: -rash, -lesion  Neurological: -headache, +dizziness, -numbness, -tingling  Psychiatric: -anxiety, -depression, -sleep difficulty          Physical Exam    General: No acute distress. Well-developed. Well-nourished.  Eyes: EOMI. Sclerae anicteric.  HENT: Normocephalic. Atraumatic.   Cardiovascular: Regular rate. Regular rhythm.   Respiratory: Normal respiratory effort. Clear to auscultation bilaterally. No rales. No rhonchi. No wheezing.  Abdomen: Soft. Non-tender. Non-distended. Normoactive bowel sounds.  Musculoskeletal: No  obvious deformity.  Extremities: No lower extremity edema.  Neurological: Alert & oriented x3. No slurred speech. Normal  gait.  Psychiatric: Normal mood. Normal affect. Good insight. Good judgment.  Skin: Warm. Dry. No rash.          Assessment & Plan    IMPRESSION:  - Evaluated reported symptoms of intermittent blackouts and hematuria  - Considered recent lab results and nephrologist's recommendations  - Assessed breast cancer screening results, noting early-stage calcifications  - Reviewed recent hemoglobin and hematocrit levels, noting slight decline  - Considered potential cardiac etiology for dizziness upon standing  - Opted not to adjust current medications pending cardiology evaluation, she has appointment with Dr Robledo on 01/27  - Weighed risks versus benefits of continuing Diclofenac despite renal impairment, given patient is on dialysis    HYPERLIPIDEMIA:  - Continued Lipitor at current dose.  - Cholesterol check ordered for next visit (fasting).    HYPERTENSION:  - Refilled hydralazine.    ARTHRITIS:  - Continued Diclofenac for arthritis pain.      FOLLOW UP:  - Follow up in 6 months.  - Contact the office if any issues arise before scheduled follow-up.          Health Maintenance Due   Topic Date Due    Shingles Vaccine (1 of 2) Never done    RSV Vaccine (Age 60+ and Pregnant patients) (1 - Risk 60-74 years 1-dose series) Never done    COVID-19 Vaccine (5 - 2024-25 season) 09/01/2024       Problem List Items Addressed This Visit          Cardiac/Vascular    Essential hypertension - Primary    Relevant Medications    hydrALAZINE (APRESOLINE) 100 MG tablet    Congestive heart failure       Orthopedic    Arthritis of right foot    Relevant Medications    diclofenac (VOLTAREN) 50 MG EC tablet       Health Maintenance Topics with due status: Not Due       Topic Last Completion Date    TETANUS VACCINE 10/16/2017    Colorectal Cancer Screening 08/22/2022    Lipid Panel 02/15/2024    Foot Exam 08/06/2024    Hemoglobin A1c 09/11/2024    Eye Exam 10/15/2024    DEXA Scan 12/10/2024    Mammogram 01/02/2025       Future Appointments    Date Time Provider Department Center   3/27/2025 10:30 AM [facility] GI ROOM 03 [facility]   7/8/2025  7:40 AM Laury Enrique FNP [facility]   7/10/2025 10:[facility]   8/7/2025  9:00 AM [facility]        This note was generated with the assistance of ambient listening technology. Verbal consent was obtained by the patient and accompanying visitor(s) for the recording of patient appointment to facilitate this note. I attest to having reviewed and edited the generated note for accuracy, though some syntax or spelling errors may persist. Please contact the author of this note for any clarification.     Signature:  CHAPARRITA Bella  [facility]  [id number] [place] [id number]  [phone]    Date of encounter: 1/7/25

## 2025-01-07 NOTE — PROGRESS NOTES
Nasrin Grant is a 74 y.o. female here for Follow-up        PCP: Laury Enrique  Referring Provider: No referring provider defined for this encounter.     HPI:  Presents for follow-up due to stool positive for blood.  At the last office visit on 12/05/2024, patient had reported weakness, dizziness, and a fall at home.  Patient is currently on dialysis 3 days weekly.  Multiple blood pressure medications.  At the time of the office visit on 12/05, blood pressure was 98/50 which according to the patient is low for her.  Endorses increased weakness and fatigue.  It was recommended that the patient be evaluated in the ER.  Patient did not go to the ER at that time as recommended.  States that after going home that she felt much better. She was although evaluated in the ER on 12/4/24 due to rectal bleeding.  Labs from 12/16 reviewed, HGB 10.8 and HCT 33.1 this is a decrease from 11 and 35 on 12/05.  Is currently taking oral iron 325 mg daily.  Endorses generalized abdominal pain.  Appetite is decreased.  No dysphagia.  She is immunosuppressed on Prograf.  Renal transplant in 2016.  Is not currently taking any anticoagulation.  Patient is scheduled to see Cardiology at [facility] on 01/27/2024.  Recommend that she see Cardiology prior to EGD or colonoscopy.  Patient will need clearance for sedation. Patient is currently on a 32 ounces fluid restriction per nephrology. Discussed with Dr. Pandya. Will proceed with GoLYTELY prep.  Is also reporting hematuria.  Patient has decreased urine output due to renal failure.          ROS:  Review of Systems   Constitutional:  Positive for appetite change and fatigue. Negative for fever and unexpected weight change.   HENT:  Negative for trouble swallowing.    Respiratory:  Negative for shortness of breath.    Cardiovascular:  Negative for chest pain.   Gastrointestinal:  Positive for abdominal pain, blood in stool (positive stool for occult blood) and nausea. Negative for anal bleeding,  "change in bowel habit, constipation, diarrhea, rectal pain, vomiting and reflux.   Musculoskeletal:  Positive for gait problem. Negative for back pain and joint swelling.   Integumentary:  Negative for pallor.   Allergic/Immunologic: Immunocompromised state: Immunocompromise due to Prograf.   Neurological:  Positive for dizziness and light-headedness.   Psychiatric/Behavioral:  The patient is not nervous/anxious.           PMHX:  has a past medical history of Amputation of one or more toes, Atherosclerotic heart disease of native coronary artery with angina pectoris (01/20/2020), CVA (cerebral vascular accident), Depression, Diabetes mellitus, type 2, Dialysis patient, Disorder of kidney and ureter, History of carpal tunnel surgery of left wrist, History of carpal tunnel surgery of right wrist, History of repair of left rotator cuff, History of repair of right rotator cuff, Hyperlipidemia, Hypertension, Hypokalemia, Kidney transplant status (07/13/2020), Osteoporosis (07/22/2020), Proliferative diabetic retinopathy of both eyes (09/07/2023), and Stroke.    PSHX:  has a past surgical history that includes Hysterectomy; kidney transplant 2016; Extraction of tooth (Left, 08/11/2021); Toe amputation; Appendix; Oophorectomy; and Dialysis fistula creation (Left).    PFHX: family history includes Diabetes in her father and mother; Hearing loss in her father and mother; Heart disease in her father and mother; Hyperlipidemia in her father and mother; Prostate cancer in her brother.    PSlHX:  reports that she has never smoked. She has never been exposed to tobacco smoke. She has never used smokeless tobacco. She reports that she does not drink alcohol and does not use drugs.        Review of patient's allergies indicates:   Allergen Reactions    Hydrocodone-acetaminophen Rash    Gabapentin Other (See Comments)     Made her disoriented  "out of my head"      Morphine Itching    Opioids - morphine analogues        Medication " List with Changes/Refills   New Medications    POLYETHYLENE GLYCOL (GOLYTELY) 236-22.74-6.74 -5.86 GRAM SUSPENSION    Take 4,000 mLs (4 L total) by mouth once. for 1 dose   Current Medications    ALBUTEROL (VENTOLIN HFA) 90 MCG/ACTUATION INHALER    Inhale 2 puffs into the lungs every 6 (six) hours as needed for Wheezing. Rescue    ALENDRONATE (FOSAMAX) 70 MG TABLET    Take 1 tablet (70 mg total) by mouth every 7 days.    ASPIRIN (ECOTRIN) 81 MG EC TABLET    Take 81 mg by mouth once daily.    ATORVASTATIN (LIPITOR) 40 MG TABLET    Take 40 mg by mouth once daily.    BACILLUS COAGULANS ORAL    Take 1 capsule every day by oral route in the morning.    BUMETANIDE (BUMEX) 2 MG TABLET    Take 1 tablet (2 mg total) by mouth 2 (two) times a day.    CARVEDILOL (COREG) 25 MG TABLET    Take 1 tablet (25 mg total) by mouth 2 (two) times daily.    CHOLESTYRAMINE (QUESTRAN) 4 GRAM PACKET    Take 1 packet by mouth once daily.    CLONIDINE 0.1 MG/24 HR TD PTWK (CATAPRES) 0.1 MG/24 HR    Place 1 patch onto the skin once a week.    DICLOFENAC (VOLTAREN) 50 MG EC TABLET    Take 1 tablet (50 mg total) by mouth 2 (two) times daily as needed (pain).    DOCUSATE SODIUM (COLACE) 100 MG CAPSULE    Take 100 mg by mouth 2 (two) times daily as needed for Constipation.    FERROUS SULFATE (FEOSOL) 325 MG (65 MG IRON) TAB TABLET    Take 1 tablet (325 mg total) by mouth 2 (two) times daily.    FLASH GLUCOSE SENSOR (FREESTYLE MARY 10 DAY SENSOR MISC)    1 Device by Percutaneous route.    FLUTICASONE PROPIONATE (FLONASE ALLERGY RELIEF) 50 MCG/ACTUATION NASAL SPRAY    1-2 sprays by Each Nostril route as needed for Allergies.    HYDRALAZINE (APRESOLINE) 100 MG TABLET    Take 1 tablet (100 mg total) by mouth 3 (three) times daily.    INSULIN ASPART U-100 (NOVOLOG) 100 UNIT/ML INJECTION    Inject 3 Units into the skin 3 (three) times daily before meals. Per Dr Kayleigh Delgado -Doe at [facility]    INSULIN DEGLUDEC (TRESIBA FLEXTOUCH U-100) 100 UNIT/ML (3 ML)  "INSULIN PEN    Inject 10 Units into the skin once daily.    ISOSORBIDE MONONITRATE (IMDUR) 30 MG 24 HR TABLET    Take 30 mg by mouth once daily.    LACTOBACILLUS ACIDOPHILUS (PROBIOTIC ACIDOPHILUS ORAL)    Take 4 Billion Cells by mouth.    LANCETS (TRUEPLUS LANCETS) 33 GAUGE MISC    Inject 1 lancet  into the skin 3 (three) times daily before meals.    MINOXIDIL (LONITEN) 2.5 MG TABLET    Take 2.5 mg by mouth.    MULTIVITAMIN (THERAGRAN) PER TABLET    Take 1 tablet by mouth once daily.    MYCOPHENOLATE SODIUM 180 MG TBEC    Take 1 tablet by mouth 2 (two) times daily.    NIFEDIPINE (PROCARDIA-XL) 90 MG (OSM) 24 HR TABLET    Take 1 tablet (90 mg total) by mouth once daily.    NITROGLYCERIN (NITROSTAT) 0.4 MG SL TABLET    DISSOLVE 1 TABLET UNDER THE TONGUE EVERY 5 MINUTES AS NEEDED FOR CHEST PAIN. DO NOT EXCEED A TOTAL OF 3 DOSES IN 15 MINUTES.    PANTOPRAZOLE (PROTONIX) 40 MG TABLET    TAKE ONE TABLET BY MOUTH EVERY MORNING    PEN NEEDLE, DIABETIC 31 GAUGE X 3/16" NDLE    1 each by NOT APPLICABLE route.    POTASSIUM CHLORIDE (MICRO-K) 10 MEQ CPSR    TAKE ONE CAPSULE BY MOUTH ONCE DAILY    PREDNISONE (DELTASONE) 5 MG TABLET    Take 1 tablet by mouth once daily.    SODIUM BICARBONATE 650 MG TABLET    Take 1 tablet by mouth 2 (two) times daily.    TACROLIMUS (PROGRAF) 0.5 MG CAP    Take 1.5 mg by mouth 2 (two) times a day.        Objective Findings:  Vital Signs:  BP (!) 163/29   Pulse 80   Ht 5' 4" (1.626 m)   Wt 60.8 kg (134 lb)   SpO2 100%   BMI 23.00 kg/m²  Body mass index is 23 kg/m².    Physical Exam:  Physical Exam  Vitals and nursing note reviewed.   Constitutional:       General: She is not in acute distress.     Appearance: Normal appearance.   HENT:      Mouth/Throat:      Mouth: Mucous membranes are moist.   Cardiovascular:      Rate and Rhythm: Normal rate.   Pulmonary:      Effort: Pulmonary effort is normal.   Abdominal:      General: Bowel sounds are normal. There is no distension.      Palpations: " Abdomen is soft. There is no mass.      Tenderness: There is generalized abdominal tenderness.   Skin:     General: Skin is warm and dry.      Coloration: Skin is not jaundiced or pale.   Neurological:      Mental Status: She is alert and oriented to person, place, and time.   Psychiatric:         Mood and Affect: Mood normal.          Labs:  Lab Results   Component Value Date    WBC 10.07 12/16/2024    HGB 10.8 (L) 12/16/2024    HCT 33.1 (L) 12/16/2024    MCV 84.2 12/16/2024    RDW 14.8 (H) 12/16/2024     12/16/2024    LYMPH 9.2 (L) 12/16/2024    LYMPH 0.93 (L) 12/16/2024    MONO 6.2 (H) 12/16/2024    EOS 0.12 12/16/2024    BASO 0.08 12/16/2024     Lab Results   Component Value Date     (L) 12/16/2024    K 3.5 12/16/2024    CL 96 (L) 12/16/2024    CO2 28 12/16/2024     (H) 12/16/2024    BUN 30 (H) 12/16/2024    CREATININE 4.00 (H) 12/16/2024    CALCIUM 9.4 12/16/2024    PROT 7.5 12/16/2024    ALBUMIN 3.2 (L) 12/16/2024    BILITOT 0.4 12/16/2024    ALKPHOS 82 12/16/2024    AST 24 12/16/2024    ALT 7 12/16/2024         Imaging: Mammo Digital Diagnostic Left with Trent    Result Date: 1/2/2025  Facility: [address] [address] [phone] Name: Nasrin Grant MRN: [id number] Result: Mammo Digital Diagnostic Left with Trent US Breast Left Complete History: Patient is 74 y.o. and is seen for diagnostic imaging. Left breast calcifications seen on a recent screening mammogram History of a core biopsy for a left breast fibroid no on 03/14/2018 Films Compared: Compared to: 12/10/2024 Mammo Digital Screening Bilat w/ Trent and 10/13/2023 Mammo Digital Screening Bilat w/ Trent Findings: This procedure was performed using tomosynthesis. Computer-aided detection was utilized in the interpretation of this examination. The breasts are heterogeneously dense, which may obscure small masses. Additional magnified and lateral views were obtained for a 7 mm area of microcalcifications  present in the left upper outer quadrant approximately 8 cm radially from the nipple.  These have developed since the 2023 examination.  It is possible that they are related to a vascular structure but this is not entirely conclusive on the mammogram. A breast examination was performed in conjunction with left breast ultrasound.  All four breast quadrants and the retroareolar regions were scanned.  The background tissue is homogeneous - fibroglandular. No discrete masses were palpable.  Ultrasound of the left breast failed to reveal any abnormalities in the upper outer quadrant with a calcifications are present on the mammogram.  In the 12 o'clock position adjacent to the nipple, there is a oval circumscribed 9 x 5.3 x 6.2 mm nodule.  This corresponds to the previously biopsied fibroadenoma.  No concerning findings are seen elsewhere.  No abnormalities are seen present in the left axilla      There is a 7 mm area of microcalcifications in the left upper outer quadrant.  The patient has a history of renal failure and is currently on dialysis.  She stated that she has congestive heart failure as well.  Options for outpatient surgery versus conservative follow-up were discussed with the patient who desires conservative follow-up.  A six-month follow-up left breast mammogram has been scheduled. BI-RADS CATEGORY 3 - Probably benign finding, short interval follow up is suggested. Recommendation: Short interval follow-up is recommended in 6 months. Your estimated lifetime risk of breast cancer (to age 85) based on Tyrer-Cuzick risk assessment model is 1.56%.  According to the American Cancer Society, patients with a lifetime breast cancer risk of 20% or higher might benefit from supplemental screening tests, such as screening breast MRI. Hansa Key MD     US Breast Left Complete    Result Date: 1/2/2025  Facility: [address] [address] [phone] Name: Nasrin Grant MRN:  [id number] Result: Mammo Digital Diagnostic Left with Trent US Breast Left Complete History: Patient is 74 y.o. and is seen for diagnostic imaging. Left breast calcifications seen on a recent screening mammogram History of a core biopsy for a left breast fibroid no on 03/14/2018 Films Compared: Compared to: 12/10/2024 Mammo Digital Screening Bilat w/ Trent and 10/13/2023 Mammo Digital Screening Bilat w/ Trent Findings: This procedure was performed using tomosynthesis. Computer-aided detection was utilized in the interpretation of this examination. The breasts are heterogeneously dense, which may obscure small masses. Additional magnified and lateral views were obtained for a 7 mm area of microcalcifications present in the left upper outer quadrant approximately 8 cm radially from the nipple.  These have developed since the 2023 examination.  It is possible that they are related to a vascular structure but this is not entirely conclusive on the mammogram. A breast examination was performed in conjunction with left breast ultrasound.  All four breast quadrants and the retroareolar regions were scanned.  The background tissue is homogeneous - fibroglandular. No discrete masses were palpable.  Ultrasound of the left breast failed to reveal any abnormalities in the upper outer quadrant with a calcifications are present on the mammogram.  In the 12 o'clock position adjacent to the nipple, there is a oval circumscribed 9 x 5.3 x 6.2 mm nodule.  This corresponds to the previously biopsied fibroadenoma.  No concerning findings are seen elsewhere.  No abnormalities are seen present in the left axilla      There is a 7 mm area of microcalcifications in the left upper outer quadrant.  The patient has a history of renal failure and is currently on dialysis.  She stated that she has congestive heart failure as well.  Options for outpatient surgery versus conservative follow-up were discussed with the patient who desires conservative  follow-up.  A six-month follow-up left breast mammogram has been scheduled. BI-RADS CATEGORY 3 - Probably benign finding, short interval follow up is suggested. Recommendation: Short interval follow-up is recommended in 6 months. Your estimated lifetime risk of breast cancer (to age 85) based on Tyrer-Cuzick risk assessment model is 1.56%.  According to the American Cancer Society, patients with a lifetime breast cancer risk of 20% or higher might benefit from supplemental screening tests, such as screening breast MRI. Hansa Key MD     XR ABDOMEN, ACUTE 2 OR MORE VIEWS WITH CHEST    Result Date: 12/16/2024  EXAMINATION: XR ABDOMEN ACUTE 2 OR MORE VIEWS WITH CHEST CLINICAL HISTORY: mid-epigastric abdominal pain; TECHNIQUE: Single-view chest and three views of the abdomen flat erect. COMPARISON: None FINDINGS: Heart is normal size.  No effusion or pneumothorax. Minimal right basilar interstitial infiltrate.  No mass or consolidation. No free air is detected.  No radiographic mass, organomegaly or pathologic calcification.  Surgical clips in the right pelvis and right upper quadrant.  Vascular calcifications on the left. No evidence of bowel obstruction.  No acute osseous abnormality.     See above comments. Electronically signed by: Kash Omalley Date:    12/16/2024 Time:    21:47    Mammo Digital Screening Bilat w/ Trent    Result Date: 12/13/2024  Facility: [facility] [address] [place] [id number] [phone] Name: Nasrin Grant MRN: [id number] Result: Mammo Digital Screening Bilat w/ Trent  History: Patient is 74 y.o. and is seen for breast cancer screening by mammogram. Films Compared: Compared to: 10/13/2023 Mammo Digital Screening Bilat w/ Trent  Findings: This procedure was performed using tomosynthesis. Computer-aided detection was utilized in the interpretation of this examination. There are scattered areas of fibroglandular density. Left There are calcifications seen in the upper outer  quadrant of the left breast in the middle depth. Right There is no evidence of suspicious masses, calcifications, or other abnormal findings in the right breast.     Left Calcifications: Left breast calcifications at the upper outer position and middle depth. Special Views: Magnification View is recommended. Right There is no mammographic evidence of malignancy in the right breast. BI-RADS Category: Overall: 0 - Incomplete: Needs Additional Imaging Evaluation  Recommendation: Diagnostic mammogram including magnification views is recommended. Your estimated lifetime risk of breast cancer (to age 85) based on Tyrer-Cuzick risk assessment model is 1.56%.  According to the American Cancer Society, patients with a lifetime breast cancer risk of 20% or higher might benefit from supplemental screening tests, such as screening breast MRI. Juancarlos Nelson MD     DXA Bone Density Axial Skeleton 1 or more sites    Result Date: 12/12/2024  EXAMINATION: DXA BONE DENSITY AXIAL SKELETON 1 OR MORE SITES CLINICAL HISTORY: Encounter for screening for osteoporosis TECHNIQUE: DXA scanning was performed over the left hip and lumbar spine.  Review of the images confirms satisfactory positioning and technique. COMPARISON: 09/09/2021. FINDINGS: The L1 to L4 vertebral bone mineral density is equal to 0.747 g/cm squared with a T score of -3.7.  Prior BMD 0.807. The left femoral neck bone mineral density is equal to 0.407 g/cm squared with a T score of -3.8.  Prior BMD 0.555. The total hip bone mineral density is equal to 0.516 g/cm squared with a T score of -3.3.  Prior BMD 0.609.     Osteoporosis. Consider FDA approved medical therapies in postmenopausal women and men aged 50 years and older, based on the following: *A hip or vertebral (clinical or morphometric) fracture *T score less than or equal to -2.5 at the femoral neck or spine after appropriate evaluation to exclude secondary causes. *Low bone mass -- also known as osteopenia (T  score between -1.0 and -2.5 at the femoral neck or spine) and a 10 year probability of hip fracture greater than or equal to 3% or a 10 year probability of major osteoporosis-related fracture greater than or equal to 20% based on the [place]-adapted WHO algorithm. *Clinician's judgment and/or patient preference may indicate treatment for people with 10 year fracture probabilities is above or below these levels. Electronically signed by: Matti Tiwari Date:    12/12/2024 Time:    13:56        Assessment:  Nasrin Grant is a 74 y.o. female here with:  1. Iron deficiency anemia, unspecified iron deficiency anemia type    2. Gastroesophageal reflux disease without esophagitis    3. Hematuria, unspecified type    4. Occult blood positive stool          Recommendations:  1. Colonoscopy was previously ordered at the last office visit we will need to be schedule.  We will need to hold oral iron for 1 week prior to colonoscopy.  Patient is going to see Cardiology on 01/27/2024 in [place].  Patient needs clearance.  Discussed with Dr. Pandya.  GoLYTELY prep  2. Schedule EGD  3. Follow up in office after EGD and colonoscopy    No follow-ups on file.      Order summary:  Orders Placed This Encounter    Urinalysis, Reflex to Urine Culture    polyethylene glycol (GOLYTELY) 236-22.74-6.74 -5.86 gram suspension    EGD       Thank you for allowing me to participate in the care of Nasrin Grant.      YANA Morales

## 2025-01-08 ENCOUNTER — PATIENT OUTREACH (OUTPATIENT)
Dept: EMERGENCY MEDICINE | Facility: HOSPITAL | Age: 75
End: 2025-01-08
Payer: MEDICARE

## 2025-01-08 LAB
BILIRUB UR QL STRIP: NEGATIVE
CLARITY UR: ABNORMAL
COLOR UR: YELLOW
GLUCOSE UR STRIP-MCNC: NORMAL MG/DL
HYALINE CASTS #/AREA URNS LPF: ABNORMAL /LPF
KETONES UR STRIP-SCNC: NEGATIVE MG/DL
LEUKOCYTE ESTERASE UR QL STRIP: ABNORMAL
MUCOUS, UA: ABNORMAL /LPF
NITRITE UR QL STRIP: NEGATIVE
PH UR STRIP: 5 PH UNITS
PROT UR QL STRIP: 30
RBC # UR STRIP: NEGATIVE /UL
RBC #/AREA URNS HPF: 1 /HPF
SP GR UR STRIP: 1.02
SQUAMOUS #/AREA URNS LPF: ABNORMAL /HPF
UROBILINOGEN UR STRIP-ACNC: NORMAL MG/DL
WBC #/AREA URNS HPF: 6 /HPF

## 2025-01-08 NOTE — PROGRESS NOTES
ED navigator contacted patient for first follow up. Patient states that she went to see Laury Enrique NP and GI doctor yesterday. Patient states that she is having blood in her urine so she took a sample to the GI doctor and they are supposed to be calling her back with the results. Patient hasn't been able to get in touch with [facility] yet. ED navigator attempted to call [facility] for patient but no answer . Patient states that ED navigator doesn't have to call her back if I can't reach them and she will try to call. ED navigator ensured patient had no other needs at this time. ED navigator will follow-up with patient on/around 1-31-25.    Karli Carson ED Navigator   [phone]

## 2025-01-09 DIAGNOSIS — I10 ESSENTIAL HYPERTENSION: ICD-10-CM

## 2025-01-10 ENCOUNTER — DOCUMENT SCAN (OUTPATIENT)
Dept: HOME HEALTH SERVICES | Facility: HOSPITAL | Age: 75
End: 2025-01-10
Payer: MEDICARE

## 2025-01-10 ENCOUNTER — TELEPHONE (OUTPATIENT)
Dept: GASTROENTEROLOGY | Facility: CLINIC | Age: 75
End: 2025-01-10
Payer: MEDICARE

## 2025-01-10 NOTE — TELEPHONE ENCOUNTER
Left detailed message that urine pos for UTI, and to follow up with PCP or nephro to discuss tx. Report faxed to CHAPARRITA Mayberry's office for review.            ----- Message from CHAPARRITA Valdez sent at 1/9/2025  2:55 PM CST -----  UT noted on urinalysis.  Please send this result with the patient's primary provider.  She is so a dialysis patient, may send to Nephrology as well.  Dr. Pandya recommends that the patient discuss treatment with Nephrology or primary care.

## 2025-01-14 RX ORDER — CARVEDILOL 25 MG/1
25 TABLET ORAL 2 TIMES DAILY
Qty: 180 TABLET | Refills: 1 | Status: SHIPPED | OUTPATIENT
Start: 2025-01-14

## 2025-01-17 ENCOUNTER — EXTERNAL HOME HEALTH (OUTPATIENT)
Dept: HOME HEALTH SERVICES | Facility: HOSPITAL | Age: 75
End: 2025-01-17
Payer: MEDICARE

## 2025-01-28 ENCOUNTER — TELEPHONE (OUTPATIENT)
Dept: GASTROENTEROLOGY | Facility: CLINIC | Age: 75
End: 2025-01-28
Payer: MEDICARE

## 2025-01-28 NOTE — TELEPHONE ENCOUNTER
Returned call to patient. States she saw her cardiologist, and he said to let Lauren Frazier NP know that he would like her to have a test on her heart and the earliest it could be done was 3/6/25, and that the scopes that she has scheduled should be canceled until he can do the heart test. Patient states she does not know what kind of test exactly. Egd and Colonoscopy canceled. Instructed patient that we would reschedule after cardiology clears. Verbalized understanding.      ----- Message from Chaparrita sent at 1/28/2025  4:05 PM CST -----  Left vm very important nurse call her back about some tests?

## 2025-02-02 ENCOUNTER — HOSPITAL ENCOUNTER (EMERGENCY)
Facility: HOSPITAL | Age: 75
Discharge: HOME | End: 2025-02-02
Attending: EMERGENCY MEDICINE
Payer: MEDICARE

## 2025-02-02 VITALS
HEART RATE: 80 BPM | TEMPERATURE: 98 F | SYSTOLIC BLOOD PRESSURE: 158 MMHG | BODY MASS INDEX: 23.69 KG/M2 | RESPIRATION RATE: 16 BRPM | WEIGHT: 138 LBS | OXYGEN SATURATION: 97 % | DIASTOLIC BLOOD PRESSURE: 78 MMHG

## 2025-02-02 DIAGNOSIS — N18.6 ESRD (END STAGE RENAL DISEASE): ICD-10-CM

## 2025-02-02 DIAGNOSIS — M54.2 NECK PAIN: ICD-10-CM

## 2025-02-02 DIAGNOSIS — M62.838 CERVICAL PARASPINAL MUSCLE SPASM: ICD-10-CM

## 2025-02-02 DIAGNOSIS — M54.2 ARTHRALGIA, NECK: Primary | ICD-10-CM

## 2025-02-02 PROCEDURE — 96372 THER/PROPH/DIAG INJ SC/IM: CPT | Performed by: EMERGENCY MEDICINE

## 2025-02-02 PROCEDURE — 99284 EMERGENCY DEPT VISIT MOD MDM: CPT | Mod: 25

## 2025-02-02 PROCEDURE — 63600175 PHARM REV CODE 636 W HCPCS: Performed by: EMERGENCY MEDICINE

## 2025-02-02 PROCEDURE — 25000003 PHARM REV CODE 250: Performed by: EMERGENCY MEDICINE

## 2025-02-02 RX ORDER — ACETAMINOPHEN 325 MG/1
650 TABLET ORAL
Status: COMPLETED | OUTPATIENT
Start: 2025-02-02 | End: 2025-02-02

## 2025-02-02 RX ORDER — METHOCARBAMOL 500 MG/1
500 TABLET, FILM COATED ORAL 3 TIMES DAILY PRN
Qty: 30 TABLET | Refills: 0 | Status: SHIPPED | OUTPATIENT
Start: 2025-02-02

## 2025-02-02 RX ORDER — ORPHENADRINE CITRATE 30 MG/ML
30 INJECTION INTRAMUSCULAR; INTRAVENOUS
Status: COMPLETED | OUTPATIENT
Start: 2025-02-02 | End: 2025-02-02

## 2025-02-02 RX ADMIN — ACETAMINOPHEN 650 MG: 325 TABLET ORAL at 11:02

## 2025-02-02 RX ADMIN — ORPHENADRINE CITRATE 30 MG: 60 INJECTION INTRAMUSCULAR; INTRAVENOUS at 11:02

## 2025-02-02 NOTE — ED TRIAGE NOTES
Patient presents to ED with c/o back pain, neck pain and bilateral arm pain x 2-3 days.  Pt. Is dialysis pt. And last dialyzed Friday.  She goes MWF

## 2025-02-02 NOTE — ED PROVIDER NOTES
"Encounter Date: 2/2/2025    SCRIBE #1 NOTE: I, Diamond Todd, am scribing for, and in the presence of,  Shad Walker MD. I have scribed the entire note.       History     Chief Complaint   Patient presents with    Neck Pain    Arm Pain    Back Pain     This is a 75 y/o female,who presents to the ED with complaints of left sided neck pain for the past few months. She notes the pain worsened during the night. There is no hx of a fever or HA. She notes the pain radiates down into the left shoulder and into the left arm. There is no Hx of nausea or vomiting. She notes she has a dialysis catheter in the left arm. There are no other complaints/pain in the ED at this time. She has  known hx of CVA, diabetes, hyperlipidemia, and HTN. There is no smoking hx on file.     The history is provided by the patient. No  was used.     Review of patient's allergies indicates:   Allergen Reactions    Hydrocodone-acetaminophen Rash    Gabapentin Other (See Comments)     Made her disoriented  "out of my head"      Morphine Itching    Opioids - morphine analogues      Past Medical History:   Diagnosis Date    Amputation of one or more toes     2 TOES RT FOOT, 3 TOES LFT FOOT    Atherosclerotic heart disease of native coronary artery with angina pectoris 01/20/2020    CVA (cerebral vascular accident)     Depression     Diabetes mellitus, type 2     Dialysis patient     T/T/S    Disorder of kidney and ureter     History of carpal tunnel surgery of left wrist     History of carpal tunnel surgery of right wrist     History of repair of left rotator cuff     History of repair of right rotator cuff     Hyperlipidemia     Hypertension     Hypokalemia     Kidney transplant status 07/13/2020    Osteoporosis 07/22/2020    Proliferative diabetic retinopathy of both eyes 09/07/2023    Stroke      Past Surgical History:   Procedure Laterality Date    Appendix      DIALYSIS FISTULA CREATION Left     EXTRACTION OF TOOTH " Left 08/11/2021    HYSTERECTOMY      kidney transplant 2016      Have had issues since transplant, kidney had a virus per patient    OOPHORECTOMY      TOE AMPUTATION       Family History   Problem Relation Name Age of Onset    Diabetes Mother      Hyperlipidemia Mother      Heart disease Mother      Hearing loss Mother      Diabetes Father      Hearing loss Father      Heart disease Father      Hyperlipidemia Father      Prostate cancer Brother       Social History     Tobacco Use    Smoking status: Never     Passive exposure: Never    Smokeless tobacco: Never   Substance Use Topics    Alcohol use: Never    Drug use: Never     Review of Systems   Gastrointestinal:  Negative for nausea and vomiting.   Musculoskeletal:  Positive for neck pain (Left sided neck pain.).        Left shoulder pain.      All other systems reviewed and are negative.      Physical Exam     Initial Vitals [02/02/25 1003]   BP Pulse Resp Temp SpO2   (!) 158/78 80 16 98.3 °F (36.8 °C) 97 %      MAP       --         Physical Exam    Nursing note and vitals reviewed.  Constitutional: She appears well-developed and well-nourished.   HENT:   Head: Normocephalic and atraumatic.   Eyes: Conjunctivae and EOM are normal. Pupils are equal, round, and reactive to light.   Neck: Neck supple. No thyromegaly present.   Normal range of motion.  Cardiovascular:  Normal rate, regular rhythm, normal heart sounds and intact distal pulses.           No murmur heard.  Pulmonary/Chest: Breath sounds normal. No respiratory distress. She has no wheezes.   Abdominal: Abdomen is soft. Bowel sounds are normal. She exhibits no distension. There is no abdominal tenderness.   Musculoskeletal:         General: No tenderness (left trapezius tenderness.) or edema. Normal range of motion.      Cervical back: Normal range of motion and neck supple.     Lymphadenopathy:     She has no cervical adenopathy.   Neurological: She is alert and oriented to person, place, and time. She  has normal strength. No cranial nerve deficit or sensory deficit.   Skin: Skin is warm and dry. No rash noted.   Psychiatric: She has a normal mood and affect.         ED Course   Procedures  Labs Reviewed - No data to display       Imaging Results              X-Ray Cervical Spine AP And Lateral (In process)                      Medications   orphenadrine injection 30 mg (30 mg Intramuscular Given 2/2/25 1113)   acetaminophen tablet 650 mg (650 mg Oral Given 2/2/25 1113)     Medical Decision Making  Amount and/or Complexity of Data Reviewed  Radiology: ordered.    Risk  OTC drugs.  Prescription drug management.              Attending Attestation:           Physician Attestation for Scribe:  Physician Attestation Statement for Scribe #1: I, Shad Walker MD, reviewed documentation, as scribed by Diamond Todd in my presence, and it is both accurate and complete.             ED Course as of 02/02/25 1205   Sun Feb 02, 2025   1200 Chronic arthritic changes to x-ray neck no acute fractures or dislocations.  Will have her follow up with the pain treatment.  Will prescribe Robaxin and have her take Tylenol and warm heat [PK]   1205 As per after visit summary instructions  Will send prescription Robaxin to [facility] in [place] since they should be open today    Follow up with primary care    Also will refer to pain treatment doctor    Use warm heat as needed    Also use Tylenol as needed    Use care with a prescription Robaxin because it could make you sedated or at risk for fall.  Use with supervision of another adult and with caution [PK]      ED Course User Index  [PK] Shad Walker MD                           Clinical Impression:  Final diagnoses:  [M54.2] Arthralgia, neck (Primary)  [M54.2] Neck pain  [M62.838] Cervical paraspinal muscle spasm  [N18.6] ESRD (end stage renal disease)          ED Disposition Condition    Discharge Stable          ED Prescriptions       Medication Sig Dispense Start  Date End Date Auth. Provider    methocarbamoL (ROBAXIN) 500 MG Tab Take 1 tablet (500 mg total) by mouth 3 (three) times daily as needed (pain). 30 tablet 2/2/2025 -- Shad Walker MD          Follow-up Information       Follow up With Specialties Details Why Contact Info    Laury Enrique, FNP Family Medicine, Wound Care Schedule an appointment as soon as possible for a visit  As needed [address] [place] [address] [place] [address]  [phone]      Jeannie Sanabria MD Interventional Pain Medicine, Pain Medicine Schedule an appointment as soon as possible for a visit   [address] [place] [address]  [phone]      [facility] - Emergency Department Emergency Medicine Go to  As needed, If symptoms worsen [address] [place] [id number] [phone]             Shad Walker MD  02/02/25 3887

## 2025-02-02 NOTE — DISCHARGE INSTRUCTIONS
Will send prescription Robaxin to [facility] in [place] since they should be open today    Follow up with primary care    Also will refer to pain treatment doctor    Use warm heat as needed    Also use Tylenol as needed    Use care with a prescription Robaxin because it could make you sedated or at risk for fall.  Use with supervision of another adult and with caution

## 2025-02-07 ENCOUNTER — HOSPITAL ENCOUNTER (EMERGENCY)
Facility: HOSPITAL | Age: 75
Discharge: HOME | End: 2025-02-07
Attending: EMERGENCY MEDICINE
Payer: MEDICARE

## 2025-02-07 VITALS
WEIGHT: 138 LBS | RESPIRATION RATE: 15 BRPM | TEMPERATURE: 98 F | DIASTOLIC BLOOD PRESSURE: 55 MMHG | HEIGHT: 64 IN | OXYGEN SATURATION: 100 % | HEART RATE: 72 BPM | BODY MASS INDEX: 23.56 KG/M2 | SYSTOLIC BLOOD PRESSURE: 191 MMHG

## 2025-02-07 DIAGNOSIS — R07.9 CHEST PAIN: ICD-10-CM

## 2025-02-07 DIAGNOSIS — I50.9 CONGESTIVE HEART FAILURE, UNSPECIFIED HF CHRONICITY, UNSPECIFIED HEART FAILURE TYPE: ICD-10-CM

## 2025-02-07 DIAGNOSIS — R07.89 ATYPICAL CHEST PAIN: Primary | ICD-10-CM

## 2025-02-07 LAB
ALBUMIN SERPL BCP-MCNC: 3.2 G/DL (ref 3.4–4.8)
ALBUMIN/GLOB SERPL: 0.9 {RATIO}
ALP SERPL-CCNC: 82 U/L (ref 40–150)
ALT SERPL W P-5'-P-CCNC: <7 U/L
ANION GAP SERPL CALCULATED.3IONS-SCNC: 13 MMOL/L (ref 7–16)
APTT PPP: 38.1 SECONDS (ref 25.2–37.3)
AST SERPL W P-5'-P-CCNC: 29 U/L (ref 5–34)
BASOPHILS # BLD AUTO: 0.06 K/UL (ref 0–0.2)
BASOPHILS NFR BLD AUTO: 0.9 % (ref 0–1)
BILIRUB SERPL-MCNC: 0.4 MG/DL
BUN SERPL-MCNC: 18 MG/DL (ref 10–20)
BUN/CREAT SERPL: 5 (ref 6–20)
CALCIUM SERPL-MCNC: 10.1 MG/DL (ref 8.4–10.2)
CHLORIDE SERPL-SCNC: 101 MMOL/L (ref 98–107)
CO2 SERPL-SCNC: 26 MMOL/L (ref 23–31)
CREAT SERPL-MCNC: 3.43 MG/DL (ref 0.55–1.02)
DIFFERENTIAL METHOD BLD: ABNORMAL
EGFR (NO RACE VARIABLE) (RUSH/TITUS): 14 ML/MIN/1.73M2
EOSINOPHIL # BLD AUTO: 0.79 K/UL (ref 0–0.5)
EOSINOPHIL NFR BLD AUTO: 12.3 % (ref 1–4)
ERYTHROCYTE [DISTWIDTH] IN BLOOD BY AUTOMATED COUNT: 15.6 % (ref 11.5–14.5)
GLOBULIN SER-MCNC: 3.4 G/DL (ref 2–4)
GLUCOSE SERPL-MCNC: 80 MG/DL (ref 82–115)
HCT VFR BLD AUTO: 33.4 % (ref 38–47)
HGB BLD-MCNC: 10.5 G/DL (ref 12–16)
IMM GRANULOCYTES # BLD AUTO: 0.04 K/UL (ref 0–0.04)
IMM GRANULOCYTES NFR BLD: 0.6 % (ref 0–0.4)
INR BLD: 1.16
LYMPHOCYTES # BLD AUTO: 1.43 K/UL (ref 1–4.8)
LYMPHOCYTES NFR BLD AUTO: 22.3 % (ref 27–41)
MAGNESIUM SERPL-MCNC: 2.1 MG/DL (ref 1.6–2.6)
MCH RBC QN AUTO: 27.8 PG (ref 27–31)
MCHC RBC AUTO-ENTMCNC: 31.4 G/DL (ref 32–36)
MCV RBC AUTO: 88.4 FL (ref 80–96)
MONOCYTES # BLD AUTO: 0.82 K/UL (ref 0–0.8)
MONOCYTES NFR BLD AUTO: 12.8 % (ref 2–6)
MPC BLD CALC-MCNC: 10.1 FL (ref 9.4–12.4)
NEUTROPHILS # BLD AUTO: 3.26 K/UL (ref 1.8–7.7)
NEUTROPHILS NFR BLD AUTO: 51.1 % (ref 53–65)
NRBC # BLD AUTO: 0 X10E3/UL
NRBC, AUTO (.00): 0 %
NT-PROBNP SERPL-MCNC: ABNORMAL PG/ML (ref 1–125)
OHS QRS DURATION: 114 MS
OHS QTC CALCULATION: 416 MS
PLATELET # BLD AUTO: 210 K/UL (ref 150–400)
POTASSIUM SERPL-SCNC: 4 MMOL/L (ref 3.5–5.1)
PROT SERPL-MCNC: 6.6 G/DL (ref 5.8–7.6)
PROTHROMBIN TIME: 14.7 SECONDS (ref 11.7–14.7)
RBC # BLD AUTO: 3.78 M/UL (ref 4.2–5.4)
SODIUM SERPL-SCNC: 136 MMOL/L (ref 136–145)
TROPONIN I SERPL HS-MCNC: 30.4 NG/L
TROPONIN I SERPL HS-MCNC: 32 NG/L
WBC # BLD AUTO: 6.4 K/UL (ref 4.5–11)

## 2025-02-07 PROCEDURE — 93005 ELECTROCARDIOGRAM TRACING: CPT

## 2025-02-07 PROCEDURE — 85025 COMPLETE CBC W/AUTO DIFF WBC: CPT | Performed by: EMERGENCY MEDICINE

## 2025-02-07 PROCEDURE — 36415 COLL VENOUS BLD VENIPUNCTURE: CPT | Performed by: EMERGENCY MEDICINE

## 2025-02-07 PROCEDURE — 83735 ASSAY OF MAGNESIUM: CPT | Performed by: EMERGENCY MEDICINE

## 2025-02-07 PROCEDURE — 83880 ASSAY OF NATRIURETIC PEPTIDE: CPT | Performed by: EMERGENCY MEDICINE

## 2025-02-07 PROCEDURE — 63600175 PHARM REV CODE 636 W HCPCS: Performed by: EMERGENCY MEDICINE

## 2025-02-07 PROCEDURE — 85730 THROMBOPLASTIN TIME PARTIAL: CPT | Performed by: EMERGENCY MEDICINE

## 2025-02-07 PROCEDURE — 96374 THER/PROPH/DIAG INJ IV PUSH: CPT

## 2025-02-07 PROCEDURE — 96375 TX/PRO/DX INJ NEW DRUG ADDON: CPT

## 2025-02-07 PROCEDURE — 84484 ASSAY OF TROPONIN QUANT: CPT | Performed by: EMERGENCY MEDICINE

## 2025-02-07 PROCEDURE — 99285 EMERGENCY DEPT VISIT HI MDM: CPT | Mod: 25

## 2025-02-07 PROCEDURE — 93010 ELECTROCARDIOGRAM REPORT: CPT | Mod: ,,, | Performed by: HOSPITALIST

## 2025-02-07 PROCEDURE — 80053 COMPREHEN METABOLIC PANEL: CPT | Performed by: EMERGENCY MEDICINE

## 2025-02-07 PROCEDURE — 85610 PROTHROMBIN TIME: CPT | Performed by: EMERGENCY MEDICINE

## 2025-02-07 RX ORDER — ONDANSETRON HYDROCHLORIDE 2 MG/ML
4 INJECTION, SOLUTION INTRAVENOUS
Status: COMPLETED | OUTPATIENT
Start: 2025-02-07 | End: 2025-02-07

## 2025-02-07 RX ORDER — HYDROMORPHONE HYDROCHLORIDE 2 MG/ML
0.5 INJECTION, SOLUTION INTRAMUSCULAR; INTRAVENOUS; SUBCUTANEOUS
Status: COMPLETED | OUTPATIENT
Start: 2025-02-07 | End: 2025-02-07

## 2025-02-07 RX ADMIN — ONDANSETRON 4 MG: 2 INJECTION INTRAMUSCULAR; INTRAVENOUS at 07:02

## 2025-02-07 RX ADMIN — HYDROMORPHONE HYDROCHLORIDE 0.5 MG: 2 INJECTION, SOLUTION INTRAMUSCULAR; INTRAVENOUS; SUBCUTANEOUS at 07:02

## 2025-02-07 NOTE — ED PROVIDER NOTES
"Encounter Date: 2/7/2025       History     Chief Complaint   Patient presents with    Chest Pain    Back Pain     75 Y/O FEMALE WITH RIGHT CHEST PAIN THAT STARTED AROUND 1800 YESTERDAY.  SHE SAYS PAIN IS SEVERE.  SHE SAYS PAIN IS NOT WORSE WITH DEEP BREATH.  SHE ALSO COMPLAINS OF PAIN ACROSS HER LOWER BACK.  BACK PAIN IS MODERATE AND IS WORSE WITH MOVEMENT AND CERTAIN POSITIONS.          Review of patient's allergies indicates:   Allergen Reactions    Hydrocodone-acetaminophen Rash    Gabapentin Other (See Comments)     Made her disoriented  "out of my head"      Morphine Itching    Opioids - morphine analogues      Past Medical History:   Diagnosis Date    Amputation of one or more toes     2 TOES RT FOOT, 3 TOES LFT FOOT    Atherosclerotic heart disease of native coronary artery with angina pectoris 01/20/2020    CVA (cerebral vascular accident)     Depression     Diabetes mellitus, type 2     Dialysis patient     T/T/S    Disorder of kidney and ureter     History of carpal tunnel surgery of left wrist     History of carpal tunnel surgery of right wrist     History of repair of left rotator cuff     History of repair of right rotator cuff     Hyperlipidemia     Hypertension     Hypokalemia     Kidney transplant status 07/13/2020    Osteoporosis 07/22/2020    Proliferative diabetic retinopathy of both eyes 09/07/2023    Stroke      Past Surgical History:   Procedure Laterality Date    Appendix      DIALYSIS FISTULA CREATION Left     EXTRACTION OF TOOTH Left 08/11/2021    HYSTERECTOMY      kidney transplant 2016      Have had issues since transplant, kidney had a virus per patient    OOPHORECTOMY      TOE AMPUTATION       Family History   Problem Relation Name Age of Onset    Diabetes Mother      Hyperlipidemia Mother      Heart disease Mother      Hearing loss Mother      Diabetes Father      Hearing loss Father      Heart disease Father      Hyperlipidemia Father      Prostate cancer Brother       Social History "     Tobacco Use    Smoking status: Never     Passive exposure: Never    Smokeless tobacco: Never   Substance Use Topics    Alcohol use: Never    Drug use: Never     Review of Systems   All other systems reviewed and are negative.      Physical Exam     Initial Vitals   BP Pulse Resp Temp SpO2   02/07/25 0440 02/07/25 0440 02/07/25 0440 02/07/25 0536 02/07/25 0440   (!) 195/66 75 10 97.8 °F (36.6 °C) 100 %      MAP       --                Physical Exam    Nursing note and vitals reviewed.  Constitutional: She appears well-developed and well-nourished.   HENT:   Head: Normocephalic and atraumatic.   Nose: Nose normal. Mouth/Throat: Oropharynx is clear and moist.   Eyes: Conjunctivae and EOM are normal. Pupils are equal, round, and reactive to light.   Neck: Neck supple.   Normal range of motion.  Cardiovascular:  Normal rate, regular rhythm and normal heart sounds.           Pulmonary/Chest: Breath sounds normal.   Abdominal: Abdomen is soft. Bowel sounds are normal.   Musculoskeletal:         General: Tenderness present.      Cervical back: Normal range of motion and neck supple.      Comments: LOWER BACK TENDERNESS IN PARA-SPINOUS MUSCULATURE.       Neurological: She is alert and oriented to person, place, and time. She has normal strength. GCS score is 15. GCS eye subscore is 4. GCS verbal subscore is 5. GCS motor subscore is 6.   Skin: Skin is warm and dry.         Medical Screening Exam   See Full Note    ED Course   Procedures  Labs Reviewed   NT-PRO NATRIURETIC PEPTIDE - Abnormal       Result Value    ProBNP 18,440 (*)    COMPREHENSIVE METABOLIC PANEL - Abnormal    Sodium 136      Potassium 4.0      Chloride 101      CO2 26      Anion Gap 13      Glucose 80 (*)     BUN 18      Creatinine 3.43 (*)     BUN/Creatinine Ratio 5 (*)     Calcium 10.1      Total Protein 6.6      Albumin 3.2 (*)     Globulin 3.4      A/G Ratio 0.9      Bilirubin, Total 0.4      Alk Phos 82      ALT <7      AST 29      eGFR 14 (*)     APTT - Abnormal    PTT 38.1 (*)    TROPONIN I - Abnormal    Troponin I High Sensitivity 30.4 (*)    CBC WITH DIFFERENTIAL - Abnormal    WBC 6.40      RBC 3.78 (*)     Hemoglobin 10.5 (*)     Hematocrit 33.4 (*)     MCV 88.4      MCH 27.8      MCHC 31.4 (*)     RDW 15.6 (*)     Platelet Count 210      MPV 10.1      Neutrophils % 51.1 (*)     Lymphocytes % 22.3 (*)     Monocytes % 12.8 (*)     Eosinophils % 12.3 (*)     Basophils % 0.9      Immature Granulocytes % 0.6 (*)     nRBC, Auto 0.0      Neutrophils, Abs 3.26      Lymphocytes, Absolute 1.43      Monocytes, Absolute 0.82 (*)     Eosinophils, Absolute 0.79 (*)     Basophils, Absolute 0.06      Immature Granulocytes, Absolute 0.04      nRBC, Absolute 0.00      Diff Type Auto     TROPONIN I - Abnormal    Troponin I High Sensitivity 32.0 (*)    MAGNESIUM - Normal    Magnesium 2.1     PROTIME-INR - Normal    PT 14.7      INR 1.16     CBC W/ AUTO DIFFERENTIAL    Narrative:     The following orders were created for panel order CBC auto differential.  Procedure                               Abnormality         Status                     ---------                               -----------         ------                     CBC with Differential[[id number]]       Abnormal            Final result                 Please view results for these tests on the individual orders.        ECG Results              EKG 12-lead (Final result)        Collection Time Result Time QRS Duration OHS QTC Calculation    02/07/25 04:38:01 02/07/25 17:30:22 114 416                     Final result by Interface, [facility] (02/07/25 17:30:29)                   Narrative:    Test Reason : R07.9,    Vent. Rate :  77 BPM     Atrial Rate :    BPM     P-R Int : 200 ms          QRS Dur : 114 ms      QT Int : 386 ms       P-R-T Axes :  84  97 -45 degrees    QTcB Int : 416 ms    Sinus rhythm  Rightward axis  Lateral infarct - age undetermined  Possible anteroseptal infarct - age  undetermined  Left ventricular hypertrophy  Inferior T wave abnormality is nonspecific    Confirmed by Vianney Bhatt ([id number]) on 2/7/2025 5:30:17 PM    Referred By: AAAREFERRAL SELF           Confirmed By: Vianney Bhatt                                  Imaging Results              X-Ray Chest AP Portable (Final result)  Result time 02/07/25 08:54:48      Final result by Vikram Wilson MD (02/07/25 08:54:48)                   Impression:      Nonspecific patchy opacities in both lungs may relate to edema, infection, and/or noninfectious inflammatory process.      Electronically signed by: Vikram Wilson  Date:    02/07/2025  Time:    08:54               Narrative:    EXAMINATION:  XR CHEST AP PORTABLE    CLINICAL HISTORY:  Chest pain, unspecified    TECHNIQUE:  Single frontal view of the chest was performed.    COMPARISON:  Chest radiograph performed 08/26/2024, 09:40 hours.    FINDINGS:  Monitoring leads over the chest.  Grossly unchanged cardiac contours.  Patchy opacities are noted in both lungs.    No definite pneumothorax or large volume pleural effusion.    No acute findings in the visualized abdomen.    Question prior cholecystectomy    Osseous and soft tissue structures appear without definite acute change.                                       Medications   HYDROmorphone (PF) injection 0.5 mg (0.5 mg Intravenous Given 2/7/25 0755)   ondansetron injection 4 mg (4 mg Intravenous Given 2/7/25 0755)     Medical Decision Making  Amount and/or Complexity of Data Reviewed  Labs: ordered.  Radiology: ordered.    Risk  Prescription drug management.               ED Course as of 02/08/25 0624   Fri Feb 07, 2025   0559 Chest Pain, awaiting labs. [BB]   0735 Medical decision-making:  Differential diagnosis includes chest pain, chest wall pain, STEMI, NSTEMI, ACS, CHF.  All testing ordered and interpreted by me. [BB]   0735 Chest x-ray by my interpretation shows evidence of CHF.  CBC is normal.  CMP shows  elevated creatinine of 3.4.  This is baseline for patient. [BB]   0736 Pro BNP [place] at [address] which appears to be around baseline for patient.  Initial troponin is mildly elevated at 30.4. [BB]   0736 Awaiting repeat troponin. [BB]   0804 EKG by my interpretation shows sinus rhythm, rate of 77, nonspecific ST changes. [BB]   0844 Repeat troponin is mildly elevated, not significantly changed from initial troponin. [BB]   0847 On my exam patient states symptoms are currently improved.  Her main complaint is upper back pain.  She states this is been intermittent for some time now.  Patient reports she has chronic issues with back pain.  Review of outside medical record shows patient has chronically elevated troponin.  Patient is due for dialysis this afternoon. [BB]      ED Course User Index  [BB] Timbo Parmar MD                           Clinical Impression:   Final diagnoses:  [R07.9] Chest pain  [R07.89] Atypical chest pain (Primary)  [I50.9] Congestive heart failure, unspecified HF chronicity, unspecified heart failure type        ED Disposition Condition    Discharge Stable          ED Prescriptions    None       Follow-up Information    None          Timbo Parmar MD  02/08/25 0697

## 2025-02-07 NOTE — DISCHARGE INSTRUCTIONS
Go to dialysis today as scheduled.  Return to emergency department for any worsening or further problems.  Follow up in clinic with primary care provider in 2-3 days for recheck.  Follow up in clinic with pain management regarding ongoing back pain.

## 2025-02-14 ENCOUNTER — DOCUMENT SCAN (OUTPATIENT)
Dept: HOME HEALTH SERVICES | Facility: HOSPITAL | Age: 75
End: 2025-02-14
Payer: MEDICARE

## 2025-02-17 ENCOUNTER — EXTERNAL HOSPITAL ADMISSION (OUTPATIENT)
Dept: ADMINISTRATIVE | Facility: CLINIC | Age: 75
End: 2025-02-17
Payer: MEDICAID

## 2025-02-17 ENCOUNTER — PATIENT OUTREACH (OUTPATIENT)
Dept: ADMINISTRATIVE | Facility: CLINIC | Age: 75
End: 2025-02-17
Payer: MEDICAID

## 2025-02-17 NOTE — PROGRESS NOTES
C3 nurse attempted to contact Nasrin Grant  for a TCC post hospital discharge follow up call. No answer. Left voicemail with callback information. The patient does not have a scheduled HOSFU appointment. Message sent to PCP staff for assistance with scheduling visit with patient.

## 2025-02-18 NOTE — PROGRESS NOTES
C3 nurse spoke with Nasrin Grant  for a TCC post hospital discharge follow up call. The patient has a scheduled HOSFU appointment with Dr Lo Barrett on 2/20/25 @ 140. Patient requested appointment as soon as possible d/t nausea, due to dialysis schedule this was earliest clinic appt.   Patient was advised to bring all medications to hosp follow up visit  Medications from [facility] not listed in Epic include: vitamin D3 1000u daily, Crestor 40mg daily, Linzess 72mcg daily, Megce 20mg daily, Oxycodone 5mg q 4 hr prn, Miralax 17gm daily, tamsulosin 0.4mg bid

## 2025-02-27 ENCOUNTER — HOSPITAL ENCOUNTER (EMERGENCY)
Facility: HOSPITAL | Age: 75
Discharge: HOME | End: 2025-02-27
Payer: MEDICARE

## 2025-02-27 VITALS
HEART RATE: 99 BPM | BODY MASS INDEX: 23.05 KG/M2 | OXYGEN SATURATION: 100 % | HEIGHT: 64 IN | TEMPERATURE: 98 F | SYSTOLIC BLOOD PRESSURE: 110 MMHG | RESPIRATION RATE: 20 BRPM | DIASTOLIC BLOOD PRESSURE: 73 MMHG | WEIGHT: 135 LBS

## 2025-02-27 DIAGNOSIS — I10 HYPERTENSION, UNSPECIFIED TYPE: ICD-10-CM

## 2025-02-27 DIAGNOSIS — R11.2 NAUSEA AND VOMITING, UNSPECIFIED VOMITING TYPE: ICD-10-CM

## 2025-02-27 DIAGNOSIS — R10.84 GENERALIZED ABDOMINAL PAIN: ICD-10-CM

## 2025-02-27 DIAGNOSIS — N39.0 URINARY TRACT INFECTION WITHOUT HEMATURIA, SITE UNSPECIFIED: Primary | ICD-10-CM

## 2025-02-27 LAB
ALBUMIN SERPL BCP-MCNC: 3.3 G/DL (ref 3.4–4.8)
ALBUMIN/GLOB SERPL: 0.8 {RATIO}
ALP SERPL-CCNC: 83 U/L (ref 40–150)
ALT SERPL W P-5'-P-CCNC: <7 U/L
ANION GAP SERPL CALCULATED.3IONS-SCNC: 9 MMOL/L (ref 7–16)
AST SERPL W P-5'-P-CCNC: 34 U/L (ref 5–34)
BACTERIA #/AREA URNS HPF: ABNORMAL /HPF
BASOPHILS # BLD AUTO: 0.04 K/UL (ref 0–0.2)
BASOPHILS NFR BLD AUTO: 0.5 % (ref 0–1)
BILIRUB SERPL-MCNC: 0.5 MG/DL
BILIRUB UR QL STRIP: NEGATIVE
BUN SERPL-MCNC: 11 MG/DL (ref 10–20)
BUN/CREAT SERPL: 3 (ref 6–20)
CALCIUM SERPL-MCNC: 10.6 MG/DL (ref 8.4–10.2)
CHLORIDE SERPL-SCNC: 98 MMOL/L (ref 98–107)
CLARITY UR: ABNORMAL
CO2 SERPL-SCNC: 32 MMOL/L (ref 23–31)
COLOR UR: YELLOW
CREAT SERPL-MCNC: 3.65 MG/DL (ref 0.55–1.02)
DIFFERENTIAL METHOD BLD: ABNORMAL
EGFR (NO RACE VARIABLE) (RUSH/TITUS): 13 ML/MIN/1.73M2
EOSINOPHIL # BLD AUTO: 0.95 K/UL (ref 0–0.5)
EOSINOPHIL NFR BLD AUTO: 12.5 % (ref 1–4)
EOSINOPHIL NFR BLD MANUAL: 15 % (ref 1–4)
ERYTHROCYTE [DISTWIDTH] IN BLOOD BY AUTOMATED COUNT: 14.9 % (ref 11.5–14.5)
GLOBULIN SER-MCNC: 4 G/DL (ref 2–4)
GLUCOSE SERPL-MCNC: 75 MG/DL (ref 82–115)
GLUCOSE UR STRIP-MCNC: NEGATIVE MG/DL
HCT VFR BLD AUTO: 36.7 % (ref 38–47)
HGB BLD-MCNC: 11.6 G/DL (ref 12–16)
KETONES UR STRIP-SCNC: NEGATIVE MG/DL
LACTATE SERPL-SCNC: 1.2 MMOL/L (ref 0.5–2.2)
LEUKOCYTE ESTERASE UR QL STRIP: ABNORMAL
LIPASE SERPL-CCNC: 11 U/L
LYMPHOCYTES # BLD AUTO: 1.51 K/UL (ref 1–4.8)
LYMPHOCYTES NFR BLD AUTO: 19.9 % (ref 27–41)
LYMPHOCYTES NFR BLD MANUAL: 20 % (ref 27–41)
MCH RBC QN AUTO: 27.8 PG (ref 27–31)
MCHC RBC AUTO-ENTMCNC: 31.6 G/DL (ref 32–36)
MCV RBC AUTO: 87.8 FL (ref 80–96)
MONOCYTES # BLD AUTO: 1 K/UL (ref 0–0.8)
MONOCYTES NFR BLD AUTO: 13.2 % (ref 2–6)
MONOCYTES NFR BLD MANUAL: 5 % (ref 2–6)
MPC BLD CALC-MCNC: 10.8 FL (ref 9.4–12.4)
NEUTROPHILS # BLD AUTO: 4.07 K/UL (ref 1.8–7.7)
NEUTROPHILS NFR BLD AUTO: 53.9 % (ref 53–65)
NEUTS BAND NFR BLD MANUAL: 1 % (ref 1–5)
NEUTS SEG NFR BLD MANUAL: 59 % (ref 50–62)
NITRITE UR QL STRIP: NEGATIVE
NRBC BLD MANUAL-RTO: ABNORMAL %
PH UR STRIP: 6 PH UNITS
PLATELET # BLD AUTO: 160 K/UL (ref 150–400)
POTASSIUM SERPL-SCNC: 3.9 MMOL/L (ref 3.5–5.1)
PROT SERPL-MCNC: 7.3 G/DL (ref 5.8–7.6)
PROT UR QL STRIP: 100
RBC # BLD AUTO: 4.18 M/UL (ref 4.2–5.4)
RBC # UR STRIP: ABNORMAL /UL
RBC #/AREA URNS HPF: ABNORMAL /HPF
SODIUM SERPL-SCNC: 135 MMOL/L (ref 136–145)
SP GR UR STRIP: 1.02
SQUAMOUS #/AREA URNS LPF: ABNORMAL /LPF
UROBILINOGEN UR STRIP-ACNC: 0.2 MG/DL
WBC # BLD AUTO: 7.57 K/UL (ref 4.5–11)
WBC #/AREA URNS HPF: ABNORMAL /HPF

## 2025-02-27 PROCEDURE — 96374 THER/PROPH/DIAG INJ IV PUSH: CPT

## 2025-02-27 PROCEDURE — 87086 URINE CULTURE/COLONY COUNT: CPT | Performed by: NURSE PRACTITIONER

## 2025-02-27 PROCEDURE — 63600175 PHARM REV CODE 636 W HCPCS: Performed by: NURSE PRACTITIONER

## 2025-02-27 PROCEDURE — 85025 COMPLETE CBC W/AUTO DIFF WBC: CPT | Performed by: NURSE PRACTITIONER

## 2025-02-27 PROCEDURE — 99285 EMERGENCY DEPT VISIT HI MDM: CPT | Mod: ,,, | Performed by: NURSE PRACTITIONER

## 2025-02-27 PROCEDURE — 99284 EMERGENCY DEPT VISIT MOD MDM: CPT | Mod: 25

## 2025-02-27 PROCEDURE — 80053 COMPREHEN METABOLIC PANEL: CPT | Performed by: NURSE PRACTITIONER

## 2025-02-27 PROCEDURE — 81003 URINALYSIS AUTO W/O SCOPE: CPT | Performed by: NURSE PRACTITIONER

## 2025-02-27 PROCEDURE — 83605 ASSAY OF LACTIC ACID: CPT | Performed by: NURSE PRACTITIONER

## 2025-02-27 PROCEDURE — 83690 ASSAY OF LIPASE: CPT | Performed by: NURSE PRACTITIONER

## 2025-02-27 PROCEDURE — 96375 TX/PRO/DX INJ NEW DRUG ADDON: CPT

## 2025-02-27 PROCEDURE — 36415 COLL VENOUS BLD VENIPUNCTURE: CPT | Performed by: NURSE PRACTITIONER

## 2025-02-27 RX ORDER — TAMSULOSIN HYDROCHLORIDE 0.4 MG/1
0.4 CAPSULE ORAL 2 TIMES DAILY
COMMUNITY

## 2025-02-27 RX ORDER — CEPHALEXIN 500 MG/1
500 CAPSULE ORAL EVERY 12 HOURS
Qty: 14 CAPSULE | Refills: 0 | Status: SHIPPED | OUTPATIENT
Start: 2025-02-27 | End: 2025-02-27

## 2025-02-27 RX ORDER — HYDROMORPHONE HYDROCHLORIDE 2 MG/ML
1 INJECTION, SOLUTION INTRAMUSCULAR; INTRAVENOUS; SUBCUTANEOUS
Refills: 0 | Status: COMPLETED | OUTPATIENT
Start: 2025-02-27 | End: 2025-02-27

## 2025-02-27 RX ORDER — HYDRALAZINE HYDROCHLORIDE 20 MG/ML
20 INJECTION INTRAMUSCULAR; INTRAVENOUS
Status: COMPLETED | OUTPATIENT
Start: 2025-02-27 | End: 2025-02-27

## 2025-02-27 RX ORDER — ONDANSETRON HYDROCHLORIDE 2 MG/ML
4 INJECTION, SOLUTION INTRAVENOUS
Status: COMPLETED | OUTPATIENT
Start: 2025-02-27 | End: 2025-02-27

## 2025-02-27 RX ORDER — ONDANSETRON 4 MG/1
4 TABLET, ORALLY DISINTEGRATING ORAL EVERY 8 HOURS PRN
Qty: 20 TABLET | Refills: 0 | Status: SHIPPED | OUTPATIENT
Start: 2025-02-27

## 2025-02-27 RX ORDER — ROSUVASTATIN CALCIUM 40 MG/1
40 TABLET, COATED ORAL NIGHTLY
COMMUNITY

## 2025-02-27 RX ORDER — AMOXICILLIN 250 MG
1 CAPSULE ORAL DAILY
COMMUNITY

## 2025-02-27 RX ORDER — SODIUM BICARBONATE 650 MG/1
650 TABLET ORAL 4 TIMES DAILY
COMMUNITY

## 2025-02-27 RX ORDER — CHOLECALCIFEROL (VITAMIN D3) 25 MCG
1000 TABLET ORAL DAILY
COMMUNITY

## 2025-02-27 RX ORDER — CEFTRIAXONE 1 G/1
1 INJECTION, POWDER, FOR SOLUTION INTRAMUSCULAR; INTRAVENOUS
Status: COMPLETED | OUTPATIENT
Start: 2025-02-27 | End: 2025-02-27

## 2025-02-27 RX ORDER — FUROSEMIDE 40 MG/1
40 TABLET ORAL 2 TIMES DAILY
COMMUNITY

## 2025-02-27 RX ORDER — OXYCODONE HYDROCHLORIDE 5 MG/1
5 CAPSULE ORAL EVERY 4 HOURS PRN
COMMUNITY

## 2025-02-27 RX ORDER — CEPHALEXIN 500 MG/1
500 CAPSULE ORAL DAILY
Qty: 7 CAPSULE | Refills: 0 | Status: SHIPPED | OUTPATIENT
Start: 2025-02-27 | End: 2025-03-06

## 2025-02-27 RX ADMIN — CEFTRIAXONE SODIUM 1 G: 1 INJECTION, POWDER, FOR SOLUTION INTRAMUSCULAR; INTRAVENOUS at 01:02

## 2025-02-27 RX ADMIN — HYDRALAZINE HYDROCHLORIDE 20 MG: 20 INJECTION INTRAMUSCULAR; INTRAVENOUS at 12:02

## 2025-02-27 RX ADMIN — HYDROMORPHONE HYDROCHLORIDE 1 MG: 2 INJECTION INTRAMUSCULAR; INTRAVENOUS; SUBCUTANEOUS at 01:02

## 2025-02-27 RX ADMIN — ONDANSETRON 4 MG: 2 INJECTION INTRAMUSCULAR; INTRAVENOUS at 12:02

## 2025-02-27 NOTE — DISCHARGE INSTRUCTIONS
-Liquids x 12 hours, then advance to soft bland diet as tolerated  -Keflex 500 mg daily x 7 days. Take medication after dialysis on MWF.  -Zofran ODT 4 mg every 8 hours as needed for nausea/vomiting  -Avoid tub baths, shower only to help decrease risk of urinary tract infection.  -Take home medications as prescribed for hypertension

## 2025-02-27 NOTE — ED PROVIDER NOTES
"Encounter Date: 2/27/2025       History     Chief Complaint   Patient presents with    Abdominal Pain     75 y/o female with PMHx of ESRD (dialysis on MWF), HTN, CVA, DM2, CAD and HLD arrived to the ED via POV with complaint of generalized body aches, nausea with vomiting and "just feeling sick" that has been ongoing since before 02/10 when she was admitted to [facility] [place], operated by [facility]. Describes abdominal pain as cramping type pain, rates pain 6/10. Last vomited yesterday. Last meal was rice at supper last night and was able to keep it down. Continues to have nausea today, but no vomiting today. Had "normal" bm 02/26. Last dialysis was on Wednesday, 02/26.   Patient was discharged from [facility] in [place] on 02/15 was hospitalized for UTI and "pain". Urine culture was no growth at that time. She was started Oxycodone 5 mg. Patient had CT chest, abdomen and pelvis during her hospitalization. She is scheduled to follow up with her cardiology for clearance to have left breast biopsy.  PSH: hysterecomy with oophorectomy, Appendectomy, cholecystectomy.     The history is provided by the patient.     Review of patient's allergies indicates:   Allergen Reactions    Hydrocodone-acetaminophen Rash    Gabapentin Other (See Comments)     Made her disoriented  "out of my head"      Morphine Itching    Opioids - morphine analogues      Past Medical History:   Diagnosis Date    Amputation of one or more toes     2 TOES RT FOOT, 3 TOES LFT FOOT    Atherosclerotic heart disease of native coronary artery with angina pectoris 01/20/2020    CVA (cerebral vascular accident)     Depression     Diabetes mellitus, type 2     Dialysis patient     T/T/S    Disorder of kidney and ureter     History of carpal tunnel surgery of left wrist     History of carpal tunnel surgery of right wrist     History of repair of left rotator cuff     History of repair of right rotator cuff     Hyperlipidemia     Hypertension     Hypokalemia     " Kidney transplant status 07/13/2020    Osteoporosis 07/22/2020    Proliferative diabetic retinopathy of both eyes 09/07/2023    Stroke      Past Surgical History:   Procedure Laterality Date    Appendix      DIALYSIS FISTULA CREATION Left     EXTRACTION OF TOOTH Left 08/11/2021    HYSTERECTOMY      kidney transplant 2016      Have had issues since transplant, kidney had a virus per patient    OOPHORECTOMY      TOE AMPUTATION       Family History   Problem Relation Name Age of Onset    Diabetes Mother      Hyperlipidemia Mother      Heart disease Mother      Hearing loss Mother      Diabetes Father      Hearing loss Father      Heart disease Father      Hyperlipidemia Father      Prostate cancer Brother       Social History[1]  Review of Systems   Constitutional:  Positive for activity change (decreased) and appetite change (decreased). Negative for chills, fatigue and fever.   HENT:  Negative for congestion, dental problem, ear discharge, postnasal drip, rhinorrhea, sinus pain, sore throat and trouble swallowing.    Eyes:  Negative for pain, redness and visual disturbance.   Respiratory:  Negative for cough, shortness of breath and wheezing.    Cardiovascular:  Negative for chest pain, palpitations and leg swelling.   Gastrointestinal:  Positive for abdominal pain, nausea and vomiting. Negative for constipation and diarrhea.   Genitourinary:  Negative for dysuria, frequency, hematuria, pelvic pain and urgency.   Musculoskeletal:  Positive for back pain and myalgias (generalized body aches). Negative for arthralgias, gait problem, neck pain and neck stiffness.   Skin: Negative.    Neurological:  Negative for dizziness, speech difficulty, weakness, light-headedness and headaches.   Hematological: Negative.    Psychiatric/Behavioral: Negative.         Physical Exam     Initial Vitals [02/27/25 1129]   BP Pulse Resp Temp SpO2   (!) 150/63 76 20 97.9 °F (36.6 °C) 100 %      MAP       --         Physical Exam    Nursing  note and vitals reviewed.  Constitutional: She appears well-developed and well-nourished. She is cooperative.  Non-toxic appearance. She does not have a sickly appearance. She appears ill. No distress.   HENT:   Head: Normocephalic and atraumatic.   Right Ear: Tympanic membrane, external ear and ear canal normal.   Left Ear: Tympanic membrane, external ear and ear canal normal.   Nose: Nose normal. Right sinus exhibits no maxillary sinus tenderness and no frontal sinus tenderness. Left sinus exhibits no maxillary sinus tenderness and no frontal sinus tenderness. Mouth/Throat: Uvula is midline, oropharynx is clear and moist and mucous membranes are normal.   Eyes: Conjunctivae and EOM are normal. Pupils are equal, round, and reactive to light.   Neck: Neck supple.   Normal range of motion.   Full passive range of motion without pain.     Cardiovascular:  Normal rate, regular rhythm, normal heart sounds, intact distal pulses and normal pulses.           No edema to BLE  Fistula to LUE with good thrill and bruit noted.   Pulmonary/Chest: Effort normal and breath sounds normal. No respiratory distress.   Abdominal: Abdomen is soft. Bowel sounds are normal. There is generalized abdominal tenderness.   No right CVA tenderness.  No left CVA tenderness. There is no rebound and no guarding.   Musculoskeletal:         General: Normal range of motion.      Cervical back: Full passive range of motion without pain, normal range of motion and neck supple.     Neurological: She is alert and oriented to person, place, and time. She has normal strength. Gait normal. GCS eye subscore is 4. GCS verbal subscore is 5. GCS motor subscore is 6.   Skin: Skin is warm and dry. Capillary refill takes less than 2 seconds.   Psychiatric: She has a normal mood and affect. Her speech is normal and behavior is normal. Judgment and thought content normal.         Medical Screening Exam   See Full Note    ED Course   Procedures  Labs Reviewed    COMPREHENSIVE METABOLIC PANEL - Abnormal       Result Value    Sodium 135 (*)     Potassium 3.9      Chloride 98      CO2 32 (*)     Anion Gap 9      Glucose 75 (*)     BUN 11      Creatinine 3.65 (*)     BUN/Creatinine Ratio 3 (*)     Calcium 10.6 (*)     Total Protein 7.3      Albumin 3.3 (*)     Globulin 4.0      A/G Ratio 0.8      Bilirubin, Total 0.5      Alk Phos 83      ALT <7      AST 34      eGFR 13 (*)    URINALYSIS, REFLEX TO URINE CULTURE - Abnormal    Color, UA Yellow      Clarity, UA Turbid      pH, UA 6.0      Leukocytes, UA Large (*)     Nitrites, UA Negative      Protein,  (*)     Glucose, UA Negative      Ketones, UA Negative      Urobilinogen, UA 0.2      Bilirubin, UA Negative      Blood, UA Moderate (*)     Specific Gravity, UA 1.020     CBC WITH DIFFERENTIAL - Abnormal    WBC 7.57      RBC 4.18 (*)     Hemoglobin 11.6 (*)     Hematocrit 36.7 (*)     MCV 87.8      MCH 27.8      MCHC 31.6 (*)     RDW 14.9 (*)     Platelet Count 160      MPV 10.8      Neutrophils % 53.9      Lymphocytes % 19.9 (*)     Neutrophils, Abs 4.07      Lymphocytes, Absolute 1.51      Diff Type Manual      Monocytes % 13.2 (*)     Eosinophils % 12.5 (*)     Basophils % 0.5      Monocytes, Absolute 1.00 (*)     Eosinophils, Absolute 0.95 (*)     Basophils, Absolute 0.04     URINALYSIS, MICROSCOPIC - Abnormal    WBC, UA Too Numerous To Count (*)     RBC, UA Too Numerous To Count (*)     Bacteria, UA Few (*)     Squamous Epithelial Cells, UA Rare     MANUAL DIFFERENTIAL - Abnormal    Segmented Neutrophils, Man % 59      Bands, Man % 1      Lymphocytes, Man % 20 (*)     Monocytes, Man % 5      Eosinophils, Man % 15 (*)     nRBC, Manual       LACTIC ACID, PLASMA - Normal    Lactic Acid 1.2     LIPASE - Normal    Lipase 11     CULTURE, URINE   CBC W/ AUTO DIFFERENTIAL    Narrative:     The following orders were created for panel order CBC auto differential.  Procedure                               Abnormality          "Status                     ---------                               -----------         ------                     CBC with Differential[[id number]]       Abnormal            Final result               Manual Differential[[id number]]         Abnormal            Final result                 Please view results for these tests on the individual orders.          Imaging Results    None          Medications   ondansetron injection 4 mg (4 mg Intravenous Given 2/27/25 1201)   hydrALAZINE injection 20 mg (20 mg Intravenous Given 2/27/25 1255)   cefTRIAXone injection 1 g (1 g Intravenous Given 2/27/25 1328)   HYDROmorphone (PF) injection 1 mg (1 mg Intravenous Given 2/27/25 1328)     Medical Decision Making  73 y/o female with PMHx of ESRD (dialysis on MWF), HTN, CVA, DM2, CAD and HLD arrived to the ED via POV with complaint of generalized body aches, nausea with vomiting and "just feeling sick" that has been ongoing since before 02/10 when she was admitted to [facility]. Describes abdominal pain as cramping type pain, rates pain 6/10. Last vomited yesterday. Last meal was rice at supper last night and was able to keep it down. Continues to have nausea today, but no vomiting today. Had "normal" bm 02/26. Last dialysis was on Wednesday, 02/26.   Patient was discharged from [facility] in [place] on 02/15 was hospitalized for UTI and "pain". Urine culture was no growth at that time. She was started Oxycodone 5 mg. Patient had CT chest, abdomen and pelvis during her hospitalization. She is scheduled to follow up with her cardiology for clearance to have left breast biopsy.  PSH: hysterecomy with oophorectomy, Appendectomy, cholecystectomy.     Patient has not taken home am medications today.    Problems Addressed:  Generalized abdominal pain: acute illness or injury     Details: Dilaudid 1 mg IV given for pain. Pain improved. Had recent work up at [facility] with CT chest, abdomen and pelvis. Patient is aware " of those results. She is scheduled to see her cardiologist in the next 2 weeks for cardiac work up so she can proceed with biopsy.   Hypertension, unspecified type: chronic illness or injury     Details: Patient had not taken her home am medications. Hydralazine 20 mg IV given. BP down  to . Patient to take her home medications as as scheduled.  Nausea and vomiting, unspecified vomiting type: acute illness or injury     Details: Zofran 4 mg IV x 1. Nausea resolved. Had no vomiting while in the ED. RX for Zofran sent into pharmacy.   Urinary tract infection without hematuria, site unspecified:     Details: UA revealed UTI. Rocephin 1 gm IV x 1 while in the ED. RX for Keflex 500 mg given daily x 7 days, to be taken after dialysis. Will notify her pending urine culture results. Reviewed discharge instructions with patient. Detailed strict return precautions. Patient agreed to treatment plan and verbalized understanding.     Amount and/or Complexity of Data Reviewed  External Data Reviewed: notes.     Details: 02/11/25 CT abdomen/pelvis with contrast:   1.   No radiopaque foreign body identified in the urinary tract.   2.   No acute abnormality identified.   3.   Multiple pulmonary nodules.   4.   Additional findings as above (full report in chart)      02/13/205: CT chest: 1. Bilateral noncalcified lung base nodules are confirmed and   suspicious for a metastatic process.   2. Superimposed interstitial edema or infiltrates and atelectasis at   the bases. Diffuse body wall edema. Known ESRD.   3. Coronary and aortic calcified atherosclerosis     02/13: CT thoracic spine:   IMPRESSION: Lower thoracic spondylosis. No destructive or suspicious   bone lesion demonstrated.     Labs: ordered. Decision-making details documented in ED Course.    Risk  Prescription drug management.                                      Clinical Impression:   Final diagnoses:  [N39.0] Urinary tract infection without hematuria, site unspecified  (Primary)  [R10.84] Generalized abdominal pain  [R11.2] Nausea and vomiting, unspecified vomiting type  [I10] Hypertension, unspecified type        ED Disposition Condition    Discharge Stable          ED Prescriptions       Medication Sig Dispense Start Date End Date Auth. Provider    cephALEXin (KEFLEX) 500 MG capsule  (Status: Discontinued) Take 1 capsule (500 mg total) by mouth every 12 (twelve) hours. for 7 days 14 capsule 2/27/2025 2/27/2025 Mine Matias FNP    cephALEXin (KEFLEX) 500 MG capsule Take 1 capsule (500 mg total) by mouth once daily. Take after dialysis on MWF for 7 days 7 capsule 2/27/2025 3/6/2025 Mine Matias FNP    ondansetron (ZOFRAN-ODT) 4 MG TbDL Take 1 tablet (4 mg total) by mouth every 8 (eight) hours as needed (NAUSEA/VOMITING). 20 tablet 2/27/2025 -- Mine Matias FNP          Follow-up Information       Follow up With Specialties Details Why Contact Info    Laury Enrique FNP Family Medicine, Wound Care Call today schedule appointment to follow up from your ER visit [address] [place] [address] [phone]                 [1]   Social History  Tobacco Use    Smoking status: Never     Passive exposure: Never    Smokeless tobacco: Never   Substance Use Topics    Alcohol use: Never    Drug use: Never        Mine Matias FNP  02/27/25 6814

## 2025-02-27 NOTE — ED TRIAGE NOTES
Received patient with noted complaint. Patient voices abdominal pain radiates up right side to rib area. Generalized body aches. Last dialysis Wednesday 2/26/25

## 2025-02-28 ENCOUNTER — RESULTS FOLLOW-UP (OUTPATIENT)
Dept: EMERGENCY MEDICINE | Facility: HOSPITAL | Age: 75
End: 2025-02-28

## 2025-03-01 LAB — UA COMPLETE W REFLEX CULTURE PNL UR: ABNORMAL

## 2025-03-10 ENCOUNTER — HOSPITAL ENCOUNTER (EMERGENCY)
Facility: HOSPITAL | Age: 75
Discharge: STILL ADMITTED OR ADMINISTRATIVE | End: 2025-03-10
Payer: MEDICARE

## 2025-03-17 ENCOUNTER — EXTERNAL HOSPITAL ADMISSION (OUTPATIENT)
Dept: ADMINISTRATIVE | Facility: CLINIC | Age: 75
End: 2025-03-17
Payer: MEDICAID

## 2025-03-17 ENCOUNTER — PATIENT OUTREACH (OUTPATIENT)
Dept: ADMINISTRATIVE | Facility: CLINIC | Age: 75
End: 2025-03-17
Payer: MEDICAID

## 2025-03-17 NOTE — PROGRESS NOTES
C3 nurse attempted to contact Nasrin Juanita  for a TCC post hospital discharge follow up call. No answer. Left voicemail with callback information. The patient has a scheduled HOSFU appointment with Laury Enrique FNP  on 3/17/25 @ 2514.

## 2025-03-18 NOTE — PROGRESS NOTES
C3 nurse spoke with Nasrin Grant  for a TCC post hospital discharge follow up call. The patient has a scheduled [facility] appointment with Laury Enrique FNP  on 3/20/25 @ 9734.  Medication changes:  (N) aspirin 81mg take 1 tablet by mouth daily  (N) zithromax 250mg take 1 tablet by mouth daily x3 days  (N) ceftin 500mg take 1 tablet by mouth daily x5 days  (N) plavix 75mg take 1 tablet by mouth daily  (N) metoprolol tartrate 25mg take 1 tablet by mouth twice daily  (C) nifedipine 90mg take 1 tablet by mouth twice daily  (DC) bumex

## 2025-03-20 ENCOUNTER — OFFICE VISIT (OUTPATIENT)
Dept: FAMILY MEDICINE | Facility: CLINIC | Age: 75
End: 2025-03-20
Payer: MEDICARE

## 2025-03-20 VITALS
DIASTOLIC BLOOD PRESSURE: 68 MMHG | WEIGHT: 136 LBS | OXYGEN SATURATION: 96 % | BODY MASS INDEX: 23.22 KG/M2 | TEMPERATURE: 97 F | SYSTOLIC BLOOD PRESSURE: 126 MMHG | RESPIRATION RATE: 18 BRPM | HEIGHT: 64 IN | HEART RATE: 73 BPM

## 2025-03-20 DIAGNOSIS — Z23 ENCOUNTER FOR IMMUNIZATION: ICD-10-CM

## 2025-03-20 DIAGNOSIS — Z09 HOSPITAL DISCHARGE FOLLOW-UP: Primary | ICD-10-CM

## 2025-03-20 DIAGNOSIS — N18.6 END-STAGE RENAL FAILURE WITH RENAL TRANSPLANT: ICD-10-CM

## 2025-03-20 DIAGNOSIS — T86.19 END-STAGE RENAL FAILURE WITH RENAL TRANSPLANT: ICD-10-CM

## 2025-03-20 DIAGNOSIS — E11.69 TYPE 2 DIABETES MELLITUS WITH OTHER SPECIFIED COMPLICATION, UNSPECIFIED WHETHER LONG TERM INSULIN USE: ICD-10-CM

## 2025-03-20 RX ORDER — CLOPIDOGREL BISULFATE 75 MG/1
75 TABLET ORAL
COMMUNITY
Start: 2025-03-14 | End: 2025-04-13

## 2025-03-20 RX ORDER — METOPROLOL TARTRATE 25 MG/1
25 TABLET, FILM COATED ORAL
COMMUNITY
Start: 2025-03-14 | End: 2025-04-13

## 2025-03-20 RX ORDER — NIFEDIPINE 90 MG/1
90 TABLET, EXTENDED RELEASE ORAL
COMMUNITY
Start: 2025-03-14 | End: 2025-03-20

## 2025-03-20 RX ORDER — OXYCODONE HYDROCHLORIDE 5 MG/1
5 TABLET ORAL EVERY 4 HOURS PRN
COMMUNITY
Start: 2025-02-15

## 2025-03-20 RX ORDER — BLOOD-GLUCOSE SENSOR
EACH MISCELLANEOUS
Qty: 6 EACH | Refills: 1 | Status: SHIPPED | OUTPATIENT
Start: 2025-03-20

## 2025-03-20 RX ORDER — CLONIDINE 0.2 MG/24H
1 PATCH, EXTENDED RELEASE TRANSDERMAL
COMMUNITY

## 2025-03-20 RX ORDER — BLOOD-GLUCOSE SENSOR
EACH MISCELLANEOUS
COMMUNITY
Start: 2025-03-14 | End: 2025-03-20 | Stop reason: SDUPTHER

## 2025-03-20 NOTE — PROGRESS NOTES
Health Maintenance Due   Topic Date Due    Shingles Vaccine (1 of 2) Never done    RSV Vaccine (Age 60+ and Pregnant patients) (1 - Risk 60-74 years 1-dose series) Never done    COVID-19 Vaccine (5 - 2024-25 season) 09/01/2024     Discussed care gaps.  Declined all vaccs.

## 2025-03-20 NOTE — PROGRESS NOTES
CHAPARRITA Bella   [facility]  [address] [address] [address]  [phone]      PATIENT NAME: Nasrin Grant  : 1950  DATE: 3/20/25  MRN: [id number]      Billing Provider: CHAPARRITA Bella  Level of Service: TRANSITIONAL CARE MANAGE SERVICE 7 DAY DISCHARGE  Patient PCP Information       Provider PCP Type    CHAPARRITA Bella General            History of Present Illness    CHIEF COMPLAINT:  Patient presents today for hospital follow-up after admission for chest pain and pneumonia.    CARDIOVASCULAR:  She underwent left heart catheterization with stent placement during stay on . She denies recurrence of chest pain since the procedure and reports improvement in shortness of breath.    DIABETES:  She uses Soniya 3 for glucose monitoring. During her hospital stay, her A1c was 5.3 with good glucose control.    RENAL:  She receives dialysis 3 times weekly for chronic renal disease.      SOCIAL HISTORY:  Her anxiety has improved since her son moved out of her house, as his frequent entering and exiting of the home was causing her distress.      ROS:  General: -fever, -chills, -fatigue, -weight gain, -weight loss  Eyes: -vision changes, -redness, -discharge  ENT: -ear pain, -nasal congestion, -sore throat  Cardiovascular: -chest pain, -palpitations, -lower extremity edema  Respiratory: -cough, -shortness of breath  Gastrointestinal: -abdominal pain, -nausea, -vomiting, -diarrhea, -constipation, -blood in stool  Genitourinary: -dysuria, -hematuria, -frequency  Musculoskeletal: -joint pain, -muscle pain, +abnormal gait  Skin: -rash, -lesion  Neurological: -headache, -dizziness, -numbness, -tingling  Psychiatric: -anxiety, -depression, -sleep difficulty          Medications and Allergies     Medications  Outpatient Medications Marked as Taking for the 3/20/25 encounter (Office Visit) with Laury Enrique FNP   Medication Sig Dispense Refill    aspirin  (ECOTRIN) 81 MG EC tablet Take 81 mg by mouth once daily.      atorvastatin (LIPITOR) 40 MG tablet Take 40 mg by mouth once daily.      cloNIDine 0.2 mg/24 hr td ptwk (CATAPRES) 0.2 mg/24 hr 1 patch every 7 days.      clopidogreL (PLAVIX) 75 mg tablet Take 75 mg by mouth.      diclofenac (VOLTAREN) 50 MG EC tablet Take 1 tablet (50 mg total) by mouth 2 (two) times daily as needed (pain). 60 tablet 1    hydrALAZINE (APRESOLINE) 100 MG tablet Take 1 tablet (100 mg total) by mouth 3 (three) times daily. 90 tablet 1    insulin degludec (TRESIBA FLEXTOUCH U-100) 100 unit/mL (3 mL) insulin pen Inject 10 Units into the skin once daily. 9 mL 2    isosorbide mononitrate (IMDUR) 30 MG 24 hr tablet Take 30 mg by mouth once daily.      methocarbamoL (ROBAXIN) 500 MG Tab Take 1 tablet (500 mg total) by mouth 3 (three) times daily as needed (pain). 30 tablet 0    metoprolol tartrate (LOPRESSOR) 25 MG tablet Take 25 mg by mouth.      minoxidiL (LONITEN) 2.5 MG tablet Take 2.5 mg by mouth.      ondansetron (ZOFRAN-ODT) 4 MG TbDL Take 1 tablet (4 mg total) by mouth every 8 (eight) hours as needed (NAUSEA/VOMITING). 20 tablet 0    oxyCODONE (ROXICODONE) 5 MG immediate release tablet Take 5 mg by mouth every 4 (four) hours as needed.      pantoprazole (PROTONIX) 40 MG tablet TAKE ONE TABLET BY MOUTH EVERY MORNING 90 tablet 1    sodium bicarbonate 650 MG tablet Take 650 mg by mouth 4 (four) times daily.      tamsulosin (FLOMAX) 0.4 mg Cap Take 0.4 mg by mouth 2 (two) times a day.      [DISCONTINUED] flash glucose sensor (FREESTYLE MARY 10 DAY SENSOR MISC) 1 Device by Percutaneous route.      [DISCONTINUED] FREESTYLE MARY 3 SENSOR Jazz use as directed       Current Facility-Administered Medications for the 3/20/25 encounter (Office Visit) with Laury Enrique FNP   Medication Dose Route Frequency Provider Last Rate Last Admin    [COMPLETED] pneumoc 20-peña conj-dip cr(PF) (PREVNAR-20 (PF)) injection Syrg 0.5 mL  0.5 mL Intramuscular 1  "time in Clinic/HOD Laury Enrique, FNP   0.5 mL at 03/20/25 1100       Allergies  Review of patient's allergies indicates:   Allergen Reactions    Hydrocodone-acetaminophen Rash    Codeine Itching    Gabapentin Other (See Comments)     Made her disoriented    "out of my head"    Morphine Itching    Opioids - morphine analogues        Physical Exam    General: No acute distress. Well-developed. Well-nourished.  Eyes: EOMI. Sclerae anicteric.  HENT: Normocephalic. Atraumatic.   Cardiovascular: Regular rate. Regular rhythm.   Respiratory: Normal respiratory effort. Clear to auscultation bilaterally.  Abdomen: Soft. Non-tender. Non-distended. Normoactive bowel sounds.  Musculoskeletal: No  obvious deformity.  Extremities: No lower extremity edema.  Neurological: Alert & oriented x3. No slurred speech. Uses walker for ambulation.  Psychiatric: Normal mood. Normal affect.   Skin: Warm. Dry. No rash.          Assessment & Plan        IMPRESSION:  - Patient presents for hospital follow-up and transitional care after admission for chest pain and pneumonia.  - Left heart catheterization with stent placement during stay.  - Chest pain resolved and shortness of breath improved.  - Type 2 diabetes appears stable; last A1c 5.3 during hospital stay.  - Chronic renal disease managed with dialysis 3 times weekly.  - Overall improvement in condition noted.    CORONARY ARTERY DISEASE AND ANGINA:  - Patient was admitted for chest pain and underwent a left heart catheterization with stent placement.  - Reports resolution of chest pain and improvement in shortness of breath.  - Continue Plavix 75 mg daily for stent management.      END STAGE RENAL DISEASE AND DIALYSIS DEPENDENCE:  - Patient has chronic renal disease and is dependent on renal dialysis.  - Patient attends dialysis sessions 3 times weekly.  - Patient is dependent on renal dialysis due to chronic renal disease.      PNEUMONIA:  - Patient was admitted for pneumonia along with " chest pain.  - Reports improved shortness of breath and overall feeling better.    TYPE 2 DIABETES MELLITUS:  - Continue Tresiba 10 units daily for type 2 diabetes management.  - Patient's diabetes appears to be stable with good glucose control.  - Last HbA1c was 5.3 during hospital stay.  - Reports good glucose levels during hospital stay.  - Use Soniya 3 to monitor glucose levels.      FOLLOW-UP:  - Patient presents for hospital follow-up and transitional care.  - Overall, the patient reports feeling better with resolved symptoms.  - Patient has scheduled follow up here in 4 months.        Health Maintenance Due   Topic Date Due    Shingles Vaccine (1 of 2) Never done    RSV Vaccine (Age 60+ and Pregnant patients) (1 - Risk 60-74 years 1-dose series) Never done    COVID-19 Vaccine (5 - 2024-25 season) 09/01/2024       Problem List Items Addressed This Visit          Renal/    End-stage renal failure with renal transplant       Endocrine    Diabetes    Relevant Medications    FREESTYLE SONIYA 3 SENSOR Jazz       Other    Hospital discharge follow-up - Primary     Other Visit Diagnoses         Encounter for immunization        Relevant Medications    pneumoc 20-peña conj-dip cr(PF) (PREVNAR-20 (PF)) injection Syrg 0.5 mL (Completed)            Health Maintenance Topics with due status: Not Due       Topic Last Completion Date    TETANUS VACCINE 10/16/2017    Colorectal Cancer Screening 08/22/2022    Foot Exam 08/06/2024    Diabetic Eye Exam 10/15/2024    DEXA Scan 12/10/2024    Mammogram 01/02/2025    Hemoglobin A1c 02/12/2025    Lipid Panel 03/12/2025       Future Appointments   Date Time Provider Department Center   4/15/2025  1:00 PM [facility] [address] MAMMO1 [facility] MAMMO [facility]   4/25/2025  9:30 AM [facility]   7/8/2025  7:40 AM Laury Enrique FNP [facility] [place]   8/7/2025  9:00 AM AWV NURSE, [facility] [place]        This note was generated  with the assistance of ambient listening technology. Verbal consent was obtained by the patient and accompanying visitor(s) for the recording of patient appointment to facilitate this note. I attest to having reviewed and edited the generated note for accuracy, though some syntax or spelling errors may persist. Please contact the author of this note for any clarification.     Signature:  CHAPARRITA Bella  [facility] [address] [place] [address]  [phone]    Date of encounter: 3/20/25

## 2025-03-27 ENCOUNTER — DOCUMENT SCAN (OUTPATIENT)
Dept: HOME HEALTH SERVICES | Facility: HOSPITAL | Age: 75
End: 2025-03-27
Payer: MEDICARE

## 2025-04-09 ENCOUNTER — DOCUMENT SCAN (OUTPATIENT)
Dept: HOME HEALTH SERVICES | Facility: HOSPITAL | Age: 75
End: 2025-04-09
Payer: MEDICARE

## 2025-04-15 ENCOUNTER — HOSPITAL ENCOUNTER (OUTPATIENT)
Dept: RADIOLOGY | Facility: HOSPITAL | Age: 75
Discharge: HOME | End: 2025-04-15
Attending: RADIOLOGY
Payer: MEDICARE

## 2025-04-15 DIAGNOSIS — Z09 FOLLOW-UP EXAM, 3-6 MONTHS SINCE PREVIOUS EXAM: ICD-10-CM

## 2025-04-15 PROCEDURE — 77061 BREAST TOMOSYNTHESIS UNI: CPT | Mod: 26,LT,, | Performed by: RADIOLOGY

## 2025-04-15 PROCEDURE — 77065 DX MAMMO INCL CAD UNI: CPT | Mod: 26,LT,, | Performed by: RADIOLOGY

## 2025-04-15 PROCEDURE — 77061 BREAST TOMOSYNTHESIS UNI: CPT | Mod: TC,LT

## 2025-04-16 ENCOUNTER — RESULTS FOLLOW-UP (OUTPATIENT)
Dept: FAMILY MEDICINE | Facility: CLINIC | Age: 75
End: 2025-04-16

## 2025-04-16 NOTE — PROGRESS NOTES
Discussed mammo results and recommendations with pt via phone  No noted concerns  Voiced understanding

## 2025-04-22 ENCOUNTER — OFFICE VISIT (OUTPATIENT)
Dept: FAMILY MEDICINE | Facility: CLINIC | Age: 75
End: 2025-04-22
Payer: MEDICARE

## 2025-04-22 ENCOUNTER — APPOINTMENT (OUTPATIENT)
Dept: RADIOLOGY | Facility: CLINIC | Age: 75
End: 2025-04-22
Attending: NURSE PRACTITIONER
Payer: MEDICARE

## 2025-04-22 VITALS
DIASTOLIC BLOOD PRESSURE: 62 MMHG | HEIGHT: 64 IN | RESPIRATION RATE: 18 BRPM | BODY MASS INDEX: 23.59 KG/M2 | WEIGHT: 138.19 LBS | SYSTOLIC BLOOD PRESSURE: 118 MMHG | HEART RATE: 79 BPM | TEMPERATURE: 97 F | OXYGEN SATURATION: 95 %

## 2025-04-22 DIAGNOSIS — T86.19 END-STAGE RENAL FAILURE WITH RENAL TRANSPLANT: ICD-10-CM

## 2025-04-22 DIAGNOSIS — R10.30 LOWER ABDOMINAL PAIN: ICD-10-CM

## 2025-04-22 DIAGNOSIS — E87.6 HYPOKALEMIA: ICD-10-CM

## 2025-04-22 DIAGNOSIS — I50.9 CONGESTIVE HEART FAILURE, UNSPECIFIED HF CHRONICITY, UNSPECIFIED HEART FAILURE TYPE: ICD-10-CM

## 2025-04-22 DIAGNOSIS — Z09 HOSPITAL DISCHARGE FOLLOW-UP: Primary | ICD-10-CM

## 2025-04-22 DIAGNOSIS — I10 ESSENTIAL HYPERTENSION: ICD-10-CM

## 2025-04-22 DIAGNOSIS — E11.69 TYPE 2 DIABETES MELLITUS WITH OTHER SPECIFIED COMPLICATION, UNSPECIFIED WHETHER LONG TERM INSULIN USE: ICD-10-CM

## 2025-04-22 DIAGNOSIS — N18.6 END-STAGE RENAL FAILURE WITH RENAL TRANSPLANT: ICD-10-CM

## 2025-04-22 LAB — POTASSIUM SERPL-SCNC: 3.5 MMOL/L (ref 3.5–5.1)

## 2025-04-22 PROCEDURE — 1160F RVW MEDS BY RX/DR IN RCRD: CPT | Mod: ,,, | Performed by: NURSE PRACTITIONER

## 2025-04-22 PROCEDURE — 74018 RADEX ABDOMEN 1 VIEW: CPT | Mod: TC,RHCUB,FY | Performed by: NURSE PRACTITIONER

## 2025-04-22 PROCEDURE — 84132 ASSAY OF SERUM POTASSIUM: CPT | Mod: ,,, | Performed by: CLINICAL MEDICAL LABORATORY

## 2025-04-22 PROCEDURE — 99213 OFFICE O/P EST LOW 20 MIN: CPT | Mod: ,,, | Performed by: NURSE PRACTITIONER

## 2025-04-22 PROCEDURE — 74018 RADEX ABDOMEN 1 VIEW: CPT | Mod: 26,,, | Performed by: RADIOLOGY

## 2025-04-22 PROCEDURE — 1126F AMNT PAIN NOTED NONE PRSNT: CPT | Mod: ,,, | Performed by: NURSE PRACTITIONER

## 2025-04-22 PROCEDURE — 3078F DIAST BP <80 MM HG: CPT | Mod: ,,, | Performed by: NURSE PRACTITIONER

## 2025-04-22 PROCEDURE — 1101F PT FALLS ASSESS-DOCD LE1/YR: CPT | Mod: ,,, | Performed by: NURSE PRACTITIONER

## 2025-04-22 PROCEDURE — 3288F FALL RISK ASSESSMENT DOCD: CPT | Mod: ,,, | Performed by: NURSE PRACTITIONER

## 2025-04-22 PROCEDURE — 1159F MED LIST DOCD IN RCRD: CPT | Mod: ,,, | Performed by: NURSE PRACTITIONER

## 2025-04-22 PROCEDURE — 3008F BODY MASS INDEX DOCD: CPT | Mod: ,,, | Performed by: NURSE PRACTITIONER

## 2025-04-22 PROCEDURE — 3044F HG A1C LEVEL LT 7.0%: CPT | Mod: ,,, | Performed by: NURSE PRACTITIONER

## 2025-04-22 PROCEDURE — 3074F SYST BP LT 130 MM HG: CPT | Mod: ,,, | Performed by: NURSE PRACTITIONER

## 2025-04-22 RX ORDER — NIFEDIPINE 90 MG/1
90 TABLET, EXTENDED RELEASE ORAL DAILY
COMMUNITY
Start: 2025-04-09

## 2025-04-22 RX ORDER — ROSUVASTATIN CALCIUM 20 MG/1
20 TABLET, COATED ORAL DAILY
COMMUNITY
Start: 2025-03-17

## 2025-04-22 RX ORDER — CARVEDILOL 25 MG/1
25 TABLET ORAL 2 TIMES DAILY
COMMUNITY
Start: 2025-04-09

## 2025-04-22 RX ORDER — ALENDRONATE SODIUM 70 MG/1
70 TABLET ORAL
COMMUNITY
Start: 2025-04-09

## 2025-04-22 RX ORDER — CLOPIDOGREL BISULFATE 75 MG/1
75 TABLET ORAL DAILY
COMMUNITY
Start: 2025-04-16

## 2025-04-22 RX ORDER — AMOXICILLIN 250 MG
1 CAPSULE ORAL DAILY
COMMUNITY
Start: 2025-03-17 | End: 2025-04-22

## 2025-04-22 RX ORDER — HYDRALAZINE HYDROCHLORIDE 100 MG/1
50 TABLET, FILM COATED ORAL EVERY 8 HOURS
COMMUNITY
Start: 2025-04-02

## 2025-04-22 RX ORDER — METOPROLOL TARTRATE 25 MG/1
25 TABLET, FILM COATED ORAL 2 TIMES DAILY
COMMUNITY
Start: 2025-03-17 | End: 2025-04-22

## 2025-04-22 NOTE — PROGRESS NOTES
"   CHAPARRITA Bella   [facility]  [id number] [address] [address]  [phone]      PATIENT NAME: Nasrin Grant  : 1950  DATE: 25  MRN: [id number]      Billing Provider: CHAPARRITA Bella  Level of Service:   Patient PCP Information       Provider PCP Type    CHAPARRITA Bella General            History of Present Illness    CHIEF COMPLAINT:  Patient presents today for hospital follow-up after admission for hypokalemia and muscle cramps.    RECENT HOSPITALIZATION AND FOLLOW-UP:  She was recently hospitalized due to chest pain during dialysis caused by low potassium levels, requiring ambulance transport. She reports feeling better today. She saw Dr. Shields, her cardiologist, last Tuesday with a scheduled follow-up on .    END-STAGE RENAL DISEASE:  She receives dialysis 3 times weekly. She tolerates only the first three hours of dialysis sessions, after which she experiences cramping and significant weakness affecting her mobility. She reports blood in her urine since starting dialysis.    LOWER ABDOMINAL PAIN:  She reports constant pain and pressure in her lower abdomen, describing it as feeling "swollen internally" with continuous pressure. The pain affects her ambulation. She experienced severe pain in February requiring an overnight hospital stay. She feels these symptoms may be related to a kidney condition or catheter placement from over a year ago.    MEDICATIONS:  Current medications include hydralazine 50 mg 3 times daily, nifedipine 90 mg daily, and clonidine 0.2 mg patch weekly for hypertension. She uses Tresiba for diabetes management. Rivastatin was recently initiated during hospitalization, replacing atorvastatin. Metoprolol was discontinued.    DIABETES:  A1C was 5.3 in February, indicating well-controlled glucose levels.      ROS:  General: -fever, -chills, -fatigue, -weight gain, -weight loss, +difficulty walking, " +weakness  Eyes: -vision changes, -redness, -discharge, +loss of vision  ENT: -ear pain, -nasal congestion, -sore throat  Cardiovascular: -chest pain, -palpitations, -lower extremity edema  Respiratory: -cough, -shortness of breath  Gastrointestinal: +abdominal pain, -nausea, -vomiting, -diarrhea, -constipation, -blood in stool  Genitourinary: -dysuria, +hematuria, -frequency, +pelvic pressure  Musculoskeletal: -joint pain, -muscle pain, +muscle cramps, +muscle spasms  Skin: -rash, -lesion  Neurological: -headache, -dizziness, -numbness, -tingling  Psychiatric: -anxiety, -depression, -sleep difficulty          Medications and Allergies     Medications  Outpatient Medications Marked as Taking for the 4/22/25 encounter (Office Visit) with Laury Enrique FNP   Medication Sig Dispense Refill    alendronate (FOSAMAX) 70 MG tablet Take 70 mg by mouth every 7 days.      aspirin (ECOTRIN) 81 MG EC tablet Take 81 mg by mouth once daily.      carvediloL (COREG) 25 MG tablet Take 25 mg by mouth 2 (two) times daily.      cloNIDine 0.2 mg/24 hr td ptwk (CATAPRES) 0.2 mg/24 hr 1 patch every 7 days.      clopidogreL (PLAVIX) 75 mg tablet Take 75 mg by mouth once daily.      diclofenac (VOLTAREN) 50 MG EC tablet Take 1 tablet (50 mg total) by mouth 2 (two) times daily as needed (pain). 60 tablet 1    FREESTYLE MARY 3 SENSOR Jazz use as directed 6 each 1    hydrALAZINE (APRESOLINE) 100 MG tablet Take 50 mg by mouth every 8 (eight) hours. Take 50mg three times a day, one tablet with breakfast one tablet with lunch and one tablet at bedtime      insulin degludec (TRESIBA FLEXTOUCH U-100) 100 unit/mL (3 mL) insulin pen Inject 10 Units into the skin once daily. 9 mL 2    isosorbide mononitrate (IMDUR) 30 MG 24 hr tablet Take 30 mg by mouth once daily.      linaCLOtide (LINZESS) 72 mcg Cap capsule Take 72 mcg by mouth before breakfast.      methocarbamoL (ROBAXIN) 500 MG Tab Take 1 tablet (500 mg total) by mouth 3 (three) times daily  "as needed (pain). 30 tablet 0    minoxidiL (LONITEN) 2.5 MG tablet Take 2.5 mg by mouth.      NIFEdipine (PROCARDIA-XL) 90 MG (OSM) 24 hr tablet Take 90 mg by mouth once daily.      oxyCODONE (ROXICODONE) 5 MG immediate release tablet Take 5 mg by mouth every 4 (four) hours as needed.      pantoprazole (PROTONIX) 40 MG tablet TAKE ONE TABLET BY MOUTH EVERY MORNING 90 tablet 1    rosuvastatin (CRESTOR) 20 MG tablet Take 20 mg by mouth once daily.      sodium bicarbonate 650 MG tablet Take 650 mg by mouth 4 (four) times daily.      [DISCONTINUED] atorvastatin (LIPITOR) 40 MG tablet Take 40 mg by mouth once daily.      [DISCONTINUED] metoprolol tartrate (LOPRESSOR) 25 MG tablet Take 25 mg by mouth 2 (two) times daily.      [DISCONTINUED] ondansetron (ZOFRAN-ODT) 4 MG TbDL Take 1 tablet (4 mg total) by mouth every 8 (eight) hours as needed (NAUSEA/VOMITING). 20 tablet 0    [DISCONTINUED] senna-docusate (COLACE 2-IN-1) 8.6-50 mg per tablet Take 1 tablet by mouth once daily.      [DISCONTINUED] tamsulosin (FLOMAX) 0.4 mg Cap Take 0.4 mg by mouth 2 (two) times a day.         Allergies  Review of patient's allergies indicates:   Allergen Reactions    Hydrocodone-acetaminophen Rash    Codeine Itching    Gabapentin Other (See Comments)     Made her disoriented    "out of my head"    Morphine Itching    Opioids - morphine analogues        Physical Exam    General: No acute distress. Well-developed. Well-nourished.  Eyes: EOMI. Sclerae anicteric.  HENT: Normocephalic. Atraumatic.   Cardiovascular: Regular rate. Regular rhythm.   Respiratory: Normal respiratory effort. Clear to auscultation bilaterally.   Abdomen: Soft. Tenders reports with palpitations. Normoactive bowel sounds.  Musculoskeletal: No  obvious deformity.  Extremities: No lower extremity edema.  Neurological: Alert & oriented. No slurred speech. Altered gait.   Psychiatric: Normal mood. Normal affect.   Skin: Warm. Dry. No rash.          Assessment & Plan  "       IMPRESSION:  - Assessed recent hospital admission for hypokalemia and muscle cramps/spasms.  - Evaluated ongoing lower abdominal pain.  - Considered urology consult and possible renal/bladder US based on XR findings.  - Discontinued metoprolol for BP management, as she was holding it from hospital discharge.  - Discontinued atorvastatin due to initiation of rosuvastatin in hospital.  - Diabetes well-controlled with last A1C of 5.3 in February.    END STAGE RENAL DISEASE AND DIALYSIS:  - Continue dialysis treatment 3 times weekly.  - Reduced dialysis time to 3 hours per session as requested by the patient due to reported issues during dialysis.  - Monitor hematuria, which the patient reports occurring after starting dialysis.    HYPOKALEMIA:  - Recheck potassium levels during the next clinic visit.    HYPERTENSION:  - Continue home health visits for blood pressure monitoring.  - Continue hydralazine 50 mg 3 times daily, nifedipine 90 mg daily, clonidine patch every 7 days.   - Discontinued metoprolol and adjusted other blood pressure medications.  - Patient's blood pressure is reported as stable at this time.    TYPE 2 DIABETES MELLITUS:  - Continue Tresiba for diabetes management.  - Repeat A1C at next visit.  - Last A1C was 5.3 in February.      LOWER ABDOMINAL PAIN:  - Ordered XR Lower Abdomen to assess persistent pain.  - Review radiology report upon receipt.  - Consider urology consult based on x-ray results and patient's condition.  - Plan for potential further imaging if necessary.  - Patient reports ongoing lower abdominal pain and pressure.      FOLLOW-UP:  - Patient reports feeling better after recent hospital admission.   - Plan to follow up in July as scheduled.          Health Maintenance Due   Topic Date Due    Shingles Vaccine (1 of 2) Never done    RSV Vaccine (Age 60+ and Pregnant patients) (1 - Risk 60-74 years 1-dose series) Never done    COVID-19 Vaccine (5 - 2024-25 season) 09/01/2024        Problem List Items Addressed This Visit          Cardiac/Vascular    Essential hypertension    Congestive heart failure       Renal/    End-stage renal failure with renal transplant       Endocrine    Diabetes       Other    Hospital discharge follow-up - Primary     Other Visit Diagnoses         Hypokalemia        Relevant Orders    Potassium      Lower abdominal pain        Relevant Orders    X-Ray KUB            Health Maintenance Topics with due status: Not Due       Topic Last Completion Date    TETANUS VACCINE 10/16/2017    Colorectal Cancer Screening 08/22/2022    Foot Exam 08/06/2024    Diabetic Eye Exam 10/15/2024    DEXA Scan 12/10/2024    Hemoglobin A1c 02/12/2025    Lipid Panel 03/12/2025    Mammogram 04/15/2025       Future Appointments   Date Time Provider Department Center   4/25/2025  9:30 AM Jeannie Sanabria MD [facility]   7/8/2025  7:40 AM Laury Enrique FNP [facility]   7/22/2025  8:40 AM Laury Enrique FNP [facility]   8/7/2025  9:00 AM AWV NURSE, [facility]        This note was generated with the assistance of ambient listening technology. Verbal consent was obtained by the patient and accompanying visitor(s) for the recording of patient appointment to facilitate this note. I attest to having reviewed and edited the generated note for accuracy, though some syntax or spelling errors may persist. Please contact the author of this note for any clarification.     Signature:  CHAPARRITA Bella  [facility] [address] [address]  [phone]    Date of encounter: 4/22/25

## 2025-04-22 NOTE — PROGRESS NOTES
Health Maintenance Due   Topic Date Due    Shingles Vaccine (1 of 2) Never done    RSV Vaccine (Age 60+ and Pregnant patients) (1 - Risk 60-74 years 1-dose series) Never done    COVID-19 Vaccine (5 - 2024-25 season) 09/01/2024     Discussed care gaps with pt   Pt is not interested in any vaccines today

## 2025-04-23 ENCOUNTER — RESULTS FOLLOW-UP (OUTPATIENT)
Dept: FAMILY MEDICINE | Facility: CLINIC | Age: 75
End: 2025-04-23

## 2025-04-25 ENCOUNTER — EXTERNAL HOME HEALTH (OUTPATIENT)
Dept: HOME HEALTH SERVICES | Facility: HOSPITAL | Age: 75
End: 2025-04-25
Payer: MEDICARE

## 2025-04-29 NOTE — PROGRESS NOTES
Subjective:         Patient ID: Nasrin Grant is a 74 y.o. female.    Chief Complaint: Abdominal Pain and Headache (dizziness)      Pain  This is a chronic problem. The current episode started more than 1 year ago. The problem occurs daily. The problem has been waxing and waning. Associated symptoms include arthralgias. Pertinent negatives include no change in bowel habit, chest pain, chills, coughing, fever, sore throat, vertigo or vomiting.     Review of Systems   Constitutional:  Negative for activity change, chills, fever and unexpected weight change.   HENT:  Negative for drooling, ear discharge, ear pain, facial swelling, nosebleeds, sore throat, trouble swallowing, voice change and goiter.    Eyes:  Negative for photophobia, pain, discharge, redness and visual disturbance.   Respiratory:  Negative for apnea, cough, choking, chest tightness, shortness of breath, wheezing and stridor.    Cardiovascular:  Negative for chest pain, palpitations and leg swelling.   Gastrointestinal:  Negative for abdominal distention, change in bowel habit, diarrhea, vomiting and fecal incontinence.   Endocrine: Negative for cold intolerance, heat intolerance, polydipsia, polyphagia and polyuria.   Genitourinary:  Negative for bladder incontinence, dysuria, flank pain, frequency and hot flashes.   Musculoskeletal:  Positive for arthralgias, back pain and leg pain.   Integumentary:  Negative for color change and pallor.   Allergic/Immunologic: Negative for immunocompromised state.   Neurological:  Negative for dizziness, vertigo, seizures, syncope, facial asymmetry, speech difficulty, light-headedness, coordination difficulties and memory loss.   Hematological:  Negative for adenopathy. Does not bruise/bleed easily.   Psychiatric/Behavioral:  Negative for agitation, behavioral problems, confusion, decreased concentration, dysphoric mood, hallucinations and self-injury. The patient is not nervous/anxious and is not hyperactive.        "     Past Medical History:   Diagnosis Date    Amputation of one or more toes     2 TOES RT FOOT, 3 TOES LFT FOOT    Atherosclerotic heart disease of native coronary artery with angina pectoris 01/20/2020    CVA (cerebral vascular accident)     Depression     Diabetes mellitus, type 2     Dialysis patient     T/T/S    Disorder of kidney and ureter     History of carpal tunnel surgery of left wrist     History of carpal tunnel surgery of right wrist     History of repair of left rotator cuff     History of repair of right rotator cuff     Hyperlipidemia     Hypertension     Hypokalemia     Kidney transplant status 07/13/2020    Osteoporosis 07/22/2020    Proliferative diabetic retinopathy of both eyes 09/07/2023    Stroke      Past Surgical History:   Procedure Laterality Date    Appendix      DIALYSIS FISTULA CREATION Left     EXTRACTION OF TOOTH Left 08/11/2021    HYSTERECTOMY      kidney transplant 2016      Have had issues since transplant, kidney had a virus per patient    OOPHORECTOMY      TOE AMPUTATION       Social History[1]  Family History   Problem Relation Name Age of Onset    Diabetes Mother      Hyperlipidemia Mother      Heart disease Mother      Hearing loss Mother      Diabetes Father      Hearing loss Father      Heart disease Father      Hyperlipidemia Father      Prostate cancer Brother       Review of patient's allergies indicates:   Allergen Reactions    Hydrocodone-acetaminophen Rash    Codeine Itching    Gabapentin Other (See Comments)     Made her disoriented    "out of my head"    Morphine Itching    Opioids - morphine analogues         Objective:  Vitals:    05/06/25 1431 05/06/25 1433   BP: (!) 171/40    Pulse: 77    Resp: 20    Weight: 61.7 kg (136 lb)    Height: 5' 4" (1.626 m)    PainSc:   8   8         Physical Exam  Vitals and nursing note reviewed. Exam conducted with a chaperone present.   Constitutional:       General: She is awake. She is not in acute distress.     Appearance: " Normal appearance. She is not ill-appearing, toxic-appearing or diaphoretic.   HENT:      Head: Normocephalic and atraumatic.      Nose: Nose normal.      Mouth/Throat:      Mouth: Mucous membranes are moist.      Pharynx: Oropharynx is clear.   Eyes:      Conjunctiva/sclera: Conjunctivae normal.      Pupils: Pupils are equal, round, and reactive to light.   Cardiovascular:      Rate and Rhythm: Normal rate.   Pulmonary:      Effort: Pulmonary effort is normal. No respiratory distress.   Abdominal:      Palpations: Abdomen is soft.      Tenderness: There is no guarding.   Musculoskeletal:         General: Normal range of motion.      Cervical back: Normal range of motion and neck supple. No rigidity.   Skin:     General: Skin is warm and dry.      Coloration: Skin is not jaundiced or pale.   Neurological:      General: No focal deficit present.      Mental Status: She is alert and oriented to person, place, and time. Mental status is at baseline.      Cranial Nerves: No cranial nerve deficit (II-XII).   Psychiatric:         Mood and Affect: Mood normal.         Behavior: Behavior normal. Behavior is cooperative.         Thought Content: Thought content normal.           X-Ray KUB  Narrative: EXAMINATION:  XR KUB    CLINICAL HISTORY:  Lower abdominal pain, unspecified    TECHNIQUE:  KUB    COMPARISON:  12/16/2024    FINDINGS:  Surgical clips are present in the right upper quadrant.  There is a moderate amount of stool but no suggestion of a fecal impaction or obstruction.  Vascular calcifications are present in the abdomen and pelvis.  There are degenerative changes in the lower lumbar spine.  Impression: Moderate stool burden with a nonobstructive bowel gas pattern.    Place of service: [facility]    Electronically signed by: Hansa Key  Date:    04/22/2025  Time:    17:44       Office Visit on 04/22/2025   Component Date Value Ref Range Status    Potassium 04/22/2025 3.5  3.5 - 5.1 mmol/L Final    Admission on 02/27/2025, Discharged on 02/27/2025   Component Date Value Ref Range Status    Sodium 02/27/2025 135 (L)  136 - 145 mmol/L Final    Potassium 02/27/2025 3.9  3.5 - 5.1 mmol/L Final    Chloride 02/27/2025 98  98 - 107 mmol/L Final    CO2 02/27/2025 32 (H)  23 - 31 mmol/L Final    Anion Gap 02/27/2025 9  7 - 16 mmol/L Final    Glucose 02/27/2025 75 (L)  82 - 115 mg/dL Final    BUN 02/27/2025 11  10 - 20 mg/dL Final    Creatinine 02/27/2025 3.65 (H)  0.55 - 1.02 mg/dL Final    BUN/Creatinine Ratio 02/27/2025 3 (L)  6 - 20 Final    Calcium 02/27/2025 10.6 (H)  8.4 - 10.2 mg/dL Final    Total Protein 02/27/2025 7.3  5.8 - 7.6 g/dL Final    Albumin 02/27/2025 3.3 (L)  3.4 - 4.8 g/dL Final    Globulin 02/27/2025 4.0  2.0 - 4.0 g/dL Final    A/G Ratio 02/27/2025 0.8   Final    Bilirubin, Total 02/27/2025 0.5  <=1.5 mg/dL Final    Alk Phos 02/27/2025 83  40 - 150 U/L Final    ALT 02/27/2025 <7  <=55 U/L Final    AST 02/27/2025 34  5 - 34 U/L Final    eGFR 02/27/2025 13 (L)  >=60 mL/min/1.73m2 Final    Lactic Acid 02/27/2025 1.2  0.5 - 2.2 mmol/L Final    Color, UA 02/27/2025 Yellow  Colorless, Straw, Yellow, Dark Yellow, Light Yellow Final    Clarity, UA 02/27/2025 Turbid  Clear Final    pH, UA 02/27/2025 6.0  5.0 to 8.0 pH Units Final    Leukocytes, UA 02/27/2025 Large (A)  Negative Final    Nitrites, UA 02/27/2025 Negative  Negative Final    Protein, UA 02/27/2025 100 (A)  Negative Final    Glucose, UA 02/27/2025 Negative  Normal mg/dL Final    Ketones, UA 02/27/2025 Negative  Negative mg/dL Final    Urobilinogen, UA 02/27/2025 0.2  0.2, 1.0, Normal mg/dL Final    Bilirubin, UA 02/27/2025 Negative  Negative Final    Blood, UA 02/27/2025 Moderate (A)  Negative Final    Specific Gravity, UA 02/27/2025 1.020  <=1.005, 1.010, 1.015, 1.020, 1.025, 1.030 Final    Lipase 02/27/2025 11  <=60 U/L Final    WBC 02/27/2025 7.57  4.50 - 11.00 K/uL Final    RBC 02/27/2025 4.18 (L)  4.20 - 5.40 M/uL Final    Hemoglobin  02/27/2025 11.6 (L)  12.0 - 16.0 g/dL Final    Hematocrit 02/27/2025 36.7 (L)  38.0 - 47.0 % Final    MCV 02/27/2025 87.8  80.0 - 96.0 fL Final    MCH 02/27/2025 27.8  27.0 - 31.0 pg Final    MCHC 02/27/2025 31.6 (L)  32.0 - 36.0 g/dL Final    RDW 02/27/2025 14.9 (H)  11.5 - 14.5 % Final    Platelet Count 02/27/2025 160  150 - 400 K/uL Final    MPV 02/27/2025 10.8  9.4 - 12.4 fL Final    Neutrophils % 02/27/2025 53.9  53.0 - 65.0 % Final    Lymphocytes % 02/27/2025 19.9 (L)  27.0 - 41.0 % Final    Neutrophils, Abs 02/27/2025 4.07  1.80 - 7.70 K/uL Final    Lymphocytes, Absolute 02/27/2025 1.51  1.00 - 4.80 K/uL Final    Diff Type 02/27/2025 Manual   Final    Monocytes % 02/27/2025 13.2 (H)  2.0 - 6.0 % Final    Eosinophils % 02/27/2025 12.5 (H)  1.0 - 4.0 % Final    Basophils % 02/27/2025 0.5  0.0 - 1.0 % Final    Monocytes, Absolute 02/27/2025 1.00 (H)  0.00 - 0.80 K/uL Final    Eosinophils, Absolute 02/27/2025 0.95 (H)  0.00 - 0.50 K/uL Final    Basophils, Absolute 02/27/2025 0.04  0.00 - 0.20 K/uL Final    WBC, UA 02/27/2025 Too Numerous To Count (A)  None Seen, 0-5 /hpf Final    RBC, UA 02/27/2025 Too Numerous To Count (A)  None Seen, 0-3 /hpf Final    Bacteria, UA 02/27/2025 Few (A)  None Seen /hpf Final    Squamous Epithelial Cells, UA 02/27/2025 Rare  None Seen, Rare, None Seen To Occasional /lpf Final    Culture, Urine 02/27/2025 >100,000 Enterococcus faecalis (A)   Final    Segmented Neutrophils, Man % 02/27/2025 59  50 - 62 % Final    Bands, Man % 02/27/2025 1  1 - 5 % Final    Lymphocytes, Man % 02/27/2025 20 (L)  27 - 41 % Final    Monocytes, Man % 02/27/2025 5  2 - 6 % Final    Eosinophils, Man % 02/27/2025 15 (H)  1 - 4 % Final   Admission on 02/07/2025, Discharged on 02/07/2025   Component Date Value Ref Range Status    ProBNP 02/07/2025 18,440 (H)  1 - 125 pg/mL Final    Sodium 02/07/2025 136  136 - 145 mmol/L Final    Potassium 02/07/2025 4.0  3.5 - 5.1 mmol/L Final    Chloride 02/07/2025 101  98 -  107 mmol/L Final    CO2 02/07/2025 26  23 - 31 mmol/L Final    Anion Gap 02/07/2025 13  7 - 16 mmol/L Final    Glucose 02/07/2025 80 (L)  82 - 115 mg/dL Final    BUN 02/07/2025 18  10 - 20 mg/dL Final    Creatinine 02/07/2025 3.43 (H)  0.55 - 1.02 mg/dL Final    BUN/Creatinine Ratio 02/07/2025 5 (L)  6 - 20 Final    Calcium 02/07/2025 10.1  8.4 - 10.2 mg/dL Final    Total Protein 02/07/2025 6.6  5.8 - 7.6 g/dL Final    Albumin 02/07/2025 3.2 (L)  3.4 - 4.8 g/dL Final    Globulin 02/07/2025 3.4  2.0 - 4.0 g/dL Final    A/G Ratio 02/07/2025 0.9   Final    Bilirubin, Total 02/07/2025 0.4  <=1.5 mg/dL Final    Alk Phos 02/07/2025 82  40 - 150 U/L Final    ALT 02/07/2025 <7  <=55 U/L Final    AST 02/07/2025 29  5 - 34 U/L Final    eGFR 02/07/2025 14 (L)  >=60 mL/min/1.73m2 Final    PTT 02/07/2025 38.1 (H)  25.2 - 37.3 seconds Final    Magnesium 02/07/2025 2.1  1.6 - 2.6 mg/dL Final    PT 02/07/2025 14.7  11.7 - 14.7 seconds Final    INR 02/07/2025 1.16  <=3.30 Final    Troponin I High Sensitivity 02/07/2025 30.4 (HH)  <=14.0 ng/L Final    QRS Duration 02/07/2025 114  ms Final    [facility] QTC Calculation 02/07/2025 416  ms Final    WBC 02/07/2025 6.40  4.50 - 11.00 K/uL Final    RBC 02/07/2025 3.78 (L)  4.20 - 5.40 M/uL Final    Hemoglobin 02/07/2025 10.5 (L)  12.0 - 16.0 g/dL Final    Hematocrit 02/07/2025 33.4 (L)  38.0 - 47.0 % Final    MCV 02/07/2025 88.4  80.0 - 96.0 fL Final    MCH 02/07/2025 27.8  27.0 - 31.0 pg Final    MCHC 02/07/2025 31.4 (L)  32.0 - 36.0 g/dL Final    RDW 02/07/2025 15.6 (H)  11.5 - 14.5 % Final    Platelet Count 02/07/2025 210  150 - 400 K/uL Final    MPV 02/07/2025 10.1  9.4 - 12.4 fL Final    Neutrophils % 02/07/2025 51.1 (L)  53.0 - 65.0 % Final    Lymphocytes % 02/07/2025 22.3 (L)  27.0 - 41.0 % Final    Monocytes % 02/07/2025 12.8 (H)  2.0 - 6.0 % Final    Eosinophils % 02/07/2025 12.3 (H)  1.0 - 4.0 % Final    Basophils % 02/07/2025 0.9  0.0 - 1.0 % Final    Immature Granulocytes % 02/07/2025  0.6 (H)  0.0 - 0.4 % Final    nRBC, Auto 02/07/2025 0.0  <=0.0 % Final    Neutrophils, Abs 02/07/2025 3.26  1.80 - 7.70 K/uL Final    Lymphocytes, Absolute 02/07/2025 1.43  1.00 - 4.80 K/uL Final    Monocytes, Absolute 02/07/2025 0.82 (H)  0.00 - 0.80 K/uL Final    Eosinophils, Absolute 02/07/2025 0.79 (H)  0.00 - 0.50 K/uL Final    Basophils, Absolute 02/07/2025 0.06  0.00 - 0.20 K/uL Final    Immature Granulocytes, Absolute 02/07/2025 0.04  0.00 - 0.04 K/uL Final    nRBC, Absolute 02/07/2025 0.00  <=0.00 x10e3/uL Final    Diff Type 02/07/2025 Auto   Final    Troponin I High Sensitivity 02/07/2025 32.0 (HH)  <=14.0 ng/L Final   Lab Visit on 01/07/2025   Component Date Value Ref Range Status    Color, UA 01/07/2025 Yellow  Colorless, Straw, Yellow, Light Yellow, Dark Yellow Final    Clarity, UA 01/07/2025 Turbid  Clear Final    pH, UA 01/07/2025 5.0  5.0 to 8.0 pH Units Final    Leukocytes, UA 01/07/2025 Moderate (A)  Negative Final    Nitrites, UA 01/07/2025 Negative  Negative Final    Protein, UA 01/07/2025 30 (A)  Negative Final    Glucose, UA 01/07/2025 Normal  Normal mg/dL Final    Ketones, UA 01/07/2025 Negative  Negative mg/dL Final    Urobilinogen, UA 01/07/2025 Normal  0.2, 1.0, Normal mg/dL Final    Bilirubin, UA 01/07/2025 Negative  Negative Final    Blood, UA 01/07/2025 Negative  Negative Final    Specific Gravity, UA 01/07/2025 1.021  <=1.030 Final    WBC, UA 01/07/2025 6 (H)  <=5 /hpf Final    RBC, UA 01/07/2025 1  <=3 /hpf Final    Squamous Epithelial Cells, UA 01/07/2025 Occasional (A)  None Seen /HPF Final    Hyaline Casts, UA 01/07/2025 2-5 (A)  None Seen /lpf Final    Mucous 01/07/2025 Occasional (A)  None Seen /LPF Final   Orders Only on 12/19/2024   Component Date Value Ref Range Status    Color, UA 12/18/2024 Dark-Yellow  Colorless, Straw, Yellow, Light Yellow, Dark Yellow Final    Clarity, UA 12/18/2024 Turbid  Clear Final    pH, UA 12/18/2024 5.0  5.0 to 8.0 pH Units Final    Leukocytes, UA  12/18/2024 Large (A)  Negative Final    Nitrites, UA 12/18/2024 Negative  Negative Final    Protein, UA 12/18/2024 30 (A)  Negative Final    Glucose, UA 12/18/2024 Normal  Normal mg/dL Final    Ketones, UA 12/18/2024 Negative  Negative mg/dL Final    Urobilinogen, UA 12/18/2024 Normal  0.2, 1.0, Normal mg/dL Final    Bilirubin, UA 12/18/2024 Negative  Negative Final    Blood, UA 12/18/2024 Small (A)  Negative Final    Specific Gravity, UA 12/18/2024 1.024  <=1.030 Final    WBC, UA 12/18/2024 35 (H)  <=5 /hpf Final    RBC, UA 12/18/2024 10 (H)  <=3 /hpf Final    Bacteria, UA 12/18/2024 Few (A)  None Seen /hpf Final    Squamous Epithelial Cells, UA 12/18/2024 Occasional (A)  None Seen /HPF Final    Hyaline Casts, UA 12/18/2024 0-2 (A)  None Seen /lpf Final    Yeast, UA 12/18/2024 Few (A)  None Seen /hpf Final    Mucous 12/18/2024 Occasional (A)  None Seen /LPF Final    Culture, Urine 12/18/2024 >100,000 Klebsiella pneumoniae (A)   Final    Culture, Urine 12/18/2024 >100,000 Hafnia alvei (A)   Final   Admission on 12/16/2024, Discharged on 12/16/2024   Component Date Value Ref Range Status    Sodium 12/16/2024 134 (L)  136 - 145 mmol/L Final    Potassium 12/16/2024 3.5  3.5 - 5.1 mmol/L Final    Chloride 12/16/2024 96 (L)  98 - 107 mmol/L Final    CO2 12/16/2024 28  23 - 31 mmol/L Final    Anion Gap 12/16/2024 14  7 - 16 mmol/L Final    Glucose 12/16/2024 211 (H)  82 - 115 mg/dL Final    BUN 12/16/2024 30 (H)  10 - 20 mg/dL Final    Creatinine 12/16/2024 4.00 (H)  0.55 - 1.02 mg/dL Final    BUN/Creatinine Ratio 12/16/2024 8  6 - 20 Final    Calcium 12/16/2024 9.4  8.4 - 10.2 mg/dL Final    Total Protein 12/16/2024 7.5  5.8 - 7.6 g/dL Final    Albumin 12/16/2024 3.2 (L)  3.4 - 4.8 g/dL Final    Globulin 12/16/2024 4.3 (H)  2.0 - 4.0 g/dL Final    A/G Ratio 12/16/2024 0.7   Final    Bilirubin, Total 12/16/2024 0.4  <=1.5 mg/dL Final    Alk Phos 12/16/2024 82  40 - 150 U/L Final    ALT 12/16/2024 7  <=55 U/L Final    AST  12/16/2024 24  5 - 34 U/L Final    eGFR 12/16/2024 11 (L)  >=60 mL/min/1.73m2 Final    WBC 12/16/2024 10.07  4.50 - 11.00 K/uL Final    RBC 12/16/2024 3.93 (L)  4.20 - 5.40 M/uL Final    Hemoglobin 12/16/2024 10.8 (L)  12.0 - 16.0 g/dL Final    Hematocrit 12/16/2024 33.1 (L)  38.0 - 47.0 % Final    MCV 12/16/2024 84.2  80.0 - 96.0 fL Final    MCH 12/16/2024 27.5  27.0 - 31.0 pg Final    MCHC 12/16/2024 32.6  32.0 - 36.0 g/dL Final    RDW 12/16/2024 14.8 (H)  11.5 - 14.5 % Final    Platelet Count 12/16/2024 283  150 - 400 K/uL Final    MPV 12/16/2024 10.2  9.4 - 12.4 fL Final    Neutrophils % 12/16/2024 81.9 (H)  53.0 - 65.0 % Final    Lymphocytes % 12/16/2024 9.2 (L)  27.0 - 41.0 % Final    Monocytes % 12/16/2024 6.2 (H)  2.0 - 6.0 % Final    Eosinophils % 12/16/2024 1.2  1.0 - 4.0 % Final    Basophils % 12/16/2024 0.8  0.0 - 1.0 % Final    Immature Granulocytes % 12/16/2024 0.7 (H)  0.0 - 0.4 % Final    nRBC, Auto 12/16/2024 0.0  <=0.0 % Final    Neutrophils, Abs 12/16/2024 8.25 (H)  1.80 - 7.70 K/uL Final    Lymphocytes, Absolute 12/16/2024 0.93 (L)  1.00 - 4.80 K/uL Final    Monocytes, Absolute 12/16/2024 0.62  0.00 - 0.80 K/uL Final    Eosinophils, Absolute 12/16/2024 0.12  0.00 - 0.50 K/uL Final    Basophils, Absolute 12/16/2024 0.08  0.00 - 0.20 K/uL Final    Immature Granulocytes, Absolute 12/16/2024 0.07 (H)  0.00 - 0.04 K/uL Final    nRBC, Absolute 12/16/2024 0.00  <=0.00 x10e3/uL Final    Diff Type 12/16/2024 Auto   Final   Lab Visit on 12/05/2024   Component Date Value Ref Range Status    Hemoglobin 12/05/2024 11.2 (L)  12.0 - 16.0 g/dL Final    Hematocrit 12/05/2024 35.5 (L)  38.0 - 47.0 % Final   Admission on 12/04/2024, Discharged on 12/04/2024   Component Date Value Ref Range Status    Sodium 12/04/2024 136  136 - 145 mmol/L Final    Potassium 12/04/2024 3.3 (L)  3.5 - 5.1 mmol/L Final    Chloride 12/04/2024 95 (L)  98 - 107 mmol/L Final    CO2 12/04/2024 29  23 - 31 mmol/L Final    Anion Gap  12/04/2024 15  7 - 16 mmol/L Final    Glucose 12/04/2024 229 (H)  82 - 115 mg/dL Final    BUN 12/04/2024 32 (H)  10 - 20 mg/dL Final    Creatinine 12/04/2024 5.81 (H)  0.55 - 1.02 mg/dL Final    BUN/Creatinine Ratio 12/04/2024 6  6 - 20 Final    Calcium 12/04/2024 9.7  8.4 - 10.2 mg/dL Final    Total Protein 12/04/2024 7.1  5.8 - 7.6 g/dL Final    Albumin 12/04/2024 3.4  3.4 - 4.8 g/dL Final    Globulin 12/04/2024 3.7  2.0 - 4.0 g/dL Final    A/G Ratio 12/04/2024 0.9   Final    Bilirubin, Total 12/04/2024 0.4  <=1.5 mg/dL Final    Alk Phos 12/04/2024 77  40 - 150 U/L Final    ALT 12/04/2024 <7  <=55 U/L Final    AST 12/04/2024 24  5 - 34 U/L Final    eGFR 12/04/2024 7 (L)  >=60 mL/min/1.73m2 Final    Specimen Outdate 12/04/2024 12/07/2024 23:59   Final    Group & Rh 12/04/2024 O POS   Final    Indirect Denis 12/04/2024 NEG   Final    POC Occult Blood 12/04/2024 Negative   Final    WBC 12/04/2024 9.11  4.50 - 11.00 K/uL Final    RBC 12/04/2024 4.19 (L)  4.20 - 5.40 M/uL Final    Hemoglobin 12/04/2024 11.4 (L)  12.0 - 16.0 g/dL Final    Hematocrit 12/04/2024 35.4 (L)  38.0 - 47.0 % Final    MCV 12/04/2024 84.5  80.0 - 96.0 fL Final    MCH 12/04/2024 27.2  27.0 - 31.0 pg Final    MCHC 12/04/2024 32.2  32.0 - 36.0 g/dL Final    RDW 12/04/2024 14.4  11.5 - 14.5 % Final    Platelet Count 12/04/2024 254  150 - 400 K/uL Final    MPV 12/04/2024 10.4  9.4 - 12.4 fL Final    Neutrophils % 12/04/2024 69.1 (H)  53.0 - 65.0 % Final    Lymphocytes % 12/04/2024 10.4 (L)  27.0 - 41.0 % Final    Monocytes % 12/04/2024 11.2 (H)  2.0 - 6.0 % Final    Eosinophils % 12/04/2024 8.1 (H)  1.0 - 4.0 % Final    Basophils % 12/04/2024 0.8  0.0 - 1.0 % Final    Immature Granulocytes % 12/04/2024 0.4  0.0 - 0.4 % Final    nRBC, Auto 12/04/2024 0.0  <=0.0 % Final    Neutrophils, Abs 12/04/2024 6.29  1.80 - 7.70 K/uL Final    Lymphocytes, Absolute 12/04/2024 0.95 (L)  1.00 - 4.80 K/uL Final    Monocytes, Absolute 12/04/2024 1.02 (H)  0.00 - 0.80  K/uL Final    Eosinophils, Absolute 12/04/2024 0.74 (H)  0.00 - 0.50 K/uL Final    Basophils, Absolute 12/04/2024 0.07  0.00 - 0.20 K/uL Final    Immature Granulocytes, Absolute 12/04/2024 0.04  0.00 - 0.04 K/uL Final    nRBC, Absolute 12/04/2024 0.00  <=0.00 x10e3/uL Final    Diff Type 12/04/2024 Auto   Final    QRS Duration 12/04/2024 118  ms Final    [facility] QTC Calculation 12/04/2024 432  ms Final    ABORH Retype 12/04/2024 O POS   Final   Office Visit on 12/03/2024   Component Date Value Ref Range Status    Occult Blood 12/03/2024 Positive (A)  Negative, Invalid Final    Sodium 12/03/2024 135 (L)  136 - 145 mmol/L Final    Potassium 12/03/2024 3.4 (L)  3.5 - 5.1 mmol/L Final    Chloride 12/03/2024 94 (L)  98 - 107 mmol/L Final    CO2 12/03/2024 30  23 - 31 mmol/L Final    Anion Gap 12/03/2024 14  7 - 16 mmol/L Final    Glucose 12/03/2024 240 (H)  82 - 115 mg/dL Final    BUN 12/03/2024 23 (H)  10 - 20 mg/dL Final    Creatinine 12/03/2024 4.79 (H)  0.55 - 1.02 mg/dL Final    BUN/Creatinine Ratio 12/03/2024 5 (L)  6 - 20 Final    Calcium 12/03/2024 9.8  8.4 - 10.2 mg/dL Final    Total Protein 12/03/2024 7.6  5.8 - 7.6 g/dL Final    Albumin 12/03/2024 3.4  3.4 - 4.8 g/dL Final    Globulin 12/03/2024 4.2 (H)  2.0 - 4.0 g/dL Final    A/G Ratio 12/03/2024 0.8   Final    Bilirubin, Total 12/03/2024 0.4  <=1.5 mg/dL Final    Alk Phos 12/03/2024 72  40 - 150 U/L Final    ALT 12/03/2024 <7  <=55 U/L Final    AST 12/03/2024 27  5 - 34 U/L Final    eGFR 12/03/2024 9 (L)  >=60 mL/min/1.73m2 Final    ProBNP 12/03/2024 18,046 (H)  1 - 125 pg/mL Final    WBC 12/03/2024 6.16  4.50 - 11.00 K/uL Final    RBC 12/03/2024 4.32  4.20 - 5.40 M/uL Final    Hemoglobin 12/03/2024 11.6 (L)  12.0 - 16.0 g/dL Final    Hematocrit 12/03/2024 36.7 (L)  38.0 - 47.0 % Final    MCV 12/03/2024 85.0  80.0 - 96.0 fL Final    MCH 12/03/2024 26.9 (L)  27.0 - 31.0 pg Final    MCHC 12/03/2024 31.6 (L)  32.0 - 36.0 g/dL Final    RDW 12/03/2024 14.7 (H)  11.5  - 14.5 % Final    Platelet Count 12/03/2024 256  150 - 400 K/uL Final    MPV 12/03/2024 10.7  9.4 - 12.4 fL Final    Neutrophils % 12/03/2024 77.5 (H)  53.0 - 65.0 % Final    Lymphocytes % 12/03/2024 10.1 (L)  27.0 - 41.0 % Final    Monocytes % 12/03/2024 8.1 (H)  2.0 - 6.0 % Final    Eosinophils % 12/03/2024 2.9  1.0 - 4.0 % Final    Basophils % 12/03/2024 1.1 (H)  0.0 - 1.0 % Final    Immature Granulocytes % 12/03/2024 0.3  0.0 - 0.4 % Final    nRBC, Auto 12/03/2024 0.0  <=0.0 % Final    Neutrophils, Abs 12/03/2024 4.77  1.80 - 7.70 K/uL Final    Lymphocytes, Absolute 12/03/2024 0.62 (L)  1.00 - 4.80 K/uL Final    Monocytes, Absolute 12/03/2024 0.50  0.00 - 0.80 K/uL Final    Eosinophils, Absolute 12/03/2024 0.18  0.00 - 0.50 K/uL Final    Basophils, Absolute 12/03/2024 0.07  0.00 - 0.20 K/uL Final    Immature Granulocytes, Absolute 12/03/2024 0.02  0.00 - 0.04 K/uL Final    nRBC, Absolute 12/03/2024 0.00  <=0.00 x10e3/uL Final    Diff Type 12/03/2024 Auto   Final         Orders Placed This Encounter   Procedures    Ambulatory referral/consult to Neurology     Standing Status:   Future     Expected Date:   5/13/2025     Expiration Date:   6/6/2026     Referral Priority:   Routine     Referral Type:   Consultation     Referral Reason:   Specialty Services Required     Referred to Provider:   Tim Parson FNP     Requested Specialty:   Neurology     Number of Visits Requested:   1       Requested Prescriptions      No prescriptions requested or ordered in this encounter       Assessment:     1. Difficulty walking    2. Arthralgia, neck    3. Neck pain    4. Visual hallucination         A's of Opioid Risk Assessment  Activity:  Current medication helps patient perform ADL.   Analgesia:Patients pain is partially controlled by current medication. Patient has tried OTC medications such as Tylenol and Ibuprofen with out relief.   Adverse Effects: Patient denies constipation or sedation.  Aberrant Behavior:   reviewed with no aberrant drug seeking/taking behavior.  Overdose reversal drug naloxone discussed    Drug screen reviewed      Plan:    New patient    [facility] [place]    Dr. Shad Walker    Oxycodone IR 5 mg 20 tablets February 15, 2025    Home health follows patient is please see notes in epic April 25, 2025    Dialysis patient  End-stage renal disease  Diabetes    History kidney transplant complicated by CMV infection 2016    Labs were  reviewed    CT thoracic spine [facility] February 13, 2025  Lower thoracic spondylosis. No destructive or suspicious   bone lesion demonstrated.       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 she states she has requested further evaluation for this but has been unable to follow through with Neurology in [facility] chronic abdominal pain ongoing for many years follows gastroenterology [facility] was scheduled for scope March 2025 but due to cardiac issues her cardiologist requested postpone procedure    Chronic shoulder and joint back pain arthritic in nature     No history recent illness or injury    Patient does not want narcotics she states it will only make her visual disturbance in worse    After discussing options with the patient recommend we proceed with     Follow-up with gastroenterology soon as possible discuss abdominal pain    Will make referral to Neurology evaluation difficulty ambulating visual hallucinations    Continue following Cardiology    Continue activity as tolerated    Follow-up as needed patient request    Dr. Sanabria    Bring original prescription medication bottles/container/box with labels to each visit    Greater than 30 minutes spent on this encounter including 10 minutes reviewing imaging and notes, 15 minutes with the patient, 5 minutes documentation                 [1]   Social History  Socioeconomic History    Marital status:    Occupational History     Comment: Disabled   Tobacco Use    Smoking status: Never     Passive exposure: Never    Smokeless tobacco: Never   Substance and Sexual Activity    Alcohol use: Never    Drug use: Never    Sexual activity: Not Currently     Partners: Male     Social Drivers of Health     Financial Resource Strain: Not At Risk (4/1/2025)    Received from [facility]    BOC Financial Resource Needs     Food/Housing/Utility/Transport/Financial Concerns : Unrecognized value   Food Insecurity: Not At Risk (4/1/2025)    Received from [facility]    BOC Food Insecurity     Food/Housing/Utility/Transport/Financial Concerns : Unrecognized value   Transportation Needs: Not At Risk (4/1/2025)    Received from [facility]    BOC Transportation Needs     Food/Housing/Utility/Transport/Financial Concerns : Unrecognized value   Physical Activity: Insufficiently Active (12/17/2024)    Exercise Vital Sign     Days of Exercise per Week: 1 day     Minutes of Exercise per Session: 30 min   Stress: Stress Concern Present (12/17/2024)    [employer] [place] [employer] - Occupational Stress Questionnaire     Feeling of Stress : To some extent   Housing Stability: Not At Risk (4/1/2025)    Received from [facility]    BOC Housing Stability Source     Food/Housing/Utility/Transport/Financial Concerns : Unrecognized value

## 2025-05-06 ENCOUNTER — OFFICE VISIT (OUTPATIENT)
Dept: PAIN MEDICINE | Facility: CLINIC | Age: 75
End: 2025-05-06
Payer: MEDICARE

## 2025-05-06 VITALS
DIASTOLIC BLOOD PRESSURE: 40 MMHG | HEART RATE: 77 BPM | BODY MASS INDEX: 23.22 KG/M2 | SYSTOLIC BLOOD PRESSURE: 171 MMHG | RESPIRATION RATE: 20 BRPM | HEIGHT: 64 IN | WEIGHT: 136 LBS

## 2025-05-06 DIAGNOSIS — G89.29 ABDOMINAL PAIN, CHRONIC, GENERALIZED: Chronic | ICD-10-CM

## 2025-05-06 DIAGNOSIS — M54.2 NECK PAIN: ICD-10-CM

## 2025-05-06 DIAGNOSIS — M54.2 ARTHRALGIA, NECK: ICD-10-CM

## 2025-05-06 DIAGNOSIS — R10.84 ABDOMINAL PAIN, CHRONIC, GENERALIZED: Chronic | ICD-10-CM

## 2025-05-06 DIAGNOSIS — R44.1 VISUAL HALLUCINATION: Chronic | ICD-10-CM

## 2025-05-06 DIAGNOSIS — R26.2 DIFFICULTY WALKING: Primary | Chronic | ICD-10-CM

## 2025-05-06 PROCEDURE — 1125F AMNT PAIN NOTED PAIN PRSNT: CPT | Mod: CPTII,,, | Performed by: PHYSICIAN ASSISTANT

## 2025-05-06 PROCEDURE — 99203 OFFICE O/P NEW LOW 30 MIN: CPT | Mod: S$PBB,,, | Performed by: PHYSICIAN ASSISTANT

## 2025-05-06 PROCEDURE — 99215 OFFICE O/P EST HI 40 MIN: CPT | Mod: PBBFAC | Performed by: PHYSICIAN ASSISTANT

## 2025-05-06 PROCEDURE — 3008F BODY MASS INDEX DOCD: CPT | Mod: CPTII,,, | Performed by: PHYSICIAN ASSISTANT

## 2025-05-06 PROCEDURE — 99999 PR PBB SHADOW E&M-EST. PATIENT-LVL V: CPT | Mod: PBBFAC,,, | Performed by: PHYSICIAN ASSISTANT

## 2025-05-06 PROCEDURE — 1101F PT FALLS ASSESS-DOCD LE1/YR: CPT | Mod: CPTII,,, | Performed by: PHYSICIAN ASSISTANT

## 2025-05-06 PROCEDURE — 3288F FALL RISK ASSESSMENT DOCD: CPT | Mod: CPTII,,, | Performed by: PHYSICIAN ASSISTANT

## 2025-05-06 PROCEDURE — 3078F DIAST BP <80 MM HG: CPT | Mod: CPTII,,, | Performed by: PHYSICIAN ASSISTANT

## 2025-05-06 PROCEDURE — 1159F MED LIST DOCD IN RCRD: CPT | Mod: CPTII,,, | Performed by: PHYSICIAN ASSISTANT

## 2025-05-06 PROCEDURE — 3044F HG A1C LEVEL LT 7.0%: CPT | Mod: CPTII,,, | Performed by: PHYSICIAN ASSISTANT

## 2025-05-06 PROCEDURE — 3077F SYST BP >= 140 MM HG: CPT | Mod: CPTII,,, | Performed by: PHYSICIAN ASSISTANT

## 2025-05-07 DIAGNOSIS — K25.9 GASTRIC ULCER, UNSPECIFIED CHRONICITY, UNSPECIFIED WHETHER GASTRIC ULCER HEMORRHAGE OR PERFORATION PRESENT: ICD-10-CM

## 2025-05-07 DIAGNOSIS — I10 ESSENTIAL (PRIMARY) HYPERTENSION: ICD-10-CM

## 2025-05-07 RX ORDER — PANTOPRAZOLE SODIUM 40 MG/1
40 TABLET, DELAYED RELEASE ORAL EVERY MORNING
Qty: 90 TABLET | Refills: 1 | Status: SHIPPED | OUTPATIENT
Start: 2025-05-07

## 2025-05-07 RX ORDER — HYDRALAZINE HYDROCHLORIDE 100 MG/1
100 TABLET, FILM COATED ORAL 3 TIMES DAILY
Qty: 90 TABLET | Refills: 1 | Status: SHIPPED | OUTPATIENT
Start: 2025-05-07

## 2025-05-07 RX ORDER — ALENDRONATE SODIUM 70 MG/1
70 TABLET ORAL
Qty: 12 TABLET | Refills: 1 | Status: SHIPPED | OUTPATIENT
Start: 2025-05-07

## 2025-05-12 ENCOUNTER — DOCUMENT SCAN (OUTPATIENT)
Dept: HOME HEALTH SERVICES | Facility: HOSPITAL | Age: 75
End: 2025-05-12
Payer: MEDICARE

## 2025-06-03 ENCOUNTER — OFFICE VISIT (OUTPATIENT)
Dept: GASTROENTEROLOGY | Facility: CLINIC | Age: 75
End: 2025-06-03
Payer: MEDICARE

## 2025-06-03 ENCOUNTER — HOSPITAL ENCOUNTER (OUTPATIENT)
Dept: RADIOLOGY | Facility: HOSPITAL | Age: 75
Discharge: HOME | End: 2025-06-03
Attending: NURSE PRACTITIONER
Payer: MEDICARE

## 2025-06-03 ENCOUNTER — LAB VISIT (OUTPATIENT)
Dept: LAB | Facility: CLINIC | Age: 75
End: 2025-06-03
Payer: MEDICARE

## 2025-06-03 VITALS
BODY MASS INDEX: 23.36 KG/M2 | OXYGEN SATURATION: 98 % | HEIGHT: 64 IN | DIASTOLIC BLOOD PRESSURE: 31 MMHG | WEIGHT: 136.81 LBS | SYSTOLIC BLOOD PRESSURE: 135 MMHG | HEART RATE: 68 BPM

## 2025-06-03 DIAGNOSIS — D50.9 IRON DEFICIENCY ANEMIA, UNSPECIFIED IRON DEFICIENCY ANEMIA TYPE: ICD-10-CM

## 2025-06-03 DIAGNOSIS — R10.30 LOWER ABDOMINAL PAIN: ICD-10-CM

## 2025-06-03 DIAGNOSIS — R31.0 GROSS HEMATURIA: ICD-10-CM

## 2025-06-03 DIAGNOSIS — R10.30 LOWER ABDOMINAL PAIN: Primary | ICD-10-CM

## 2025-06-03 PROCEDURE — 1100F PTFALLS ASSESS-DOCD GE2>/YR: CPT | Mod: CPTII,,, | Performed by: NURSE PRACTITIONER

## 2025-06-03 PROCEDURE — 99999 PR PBB SHADOW E&M-EST. PATIENT-LVL V: CPT | Mod: PBBFAC,,, | Performed by: NURSE PRACTITIONER

## 2025-06-03 PROCEDURE — 3078F DIAST BP <80 MM HG: CPT | Mod: CPTII,,, | Performed by: NURSE PRACTITIONER

## 2025-06-03 PROCEDURE — 74018 RADEX ABDOMEN 1 VIEW: CPT | Mod: TC

## 2025-06-03 PROCEDURE — 3075F SYST BP GE 130 - 139MM HG: CPT | Mod: CPTII,,, | Performed by: NURSE PRACTITIONER

## 2025-06-03 PROCEDURE — 3008F BODY MASS INDEX DOCD: CPT | Mod: CPTII,,, | Performed by: NURSE PRACTITIONER

## 2025-06-03 PROCEDURE — 1160F RVW MEDS BY RX/DR IN RCRD: CPT | Mod: CPTII,,, | Performed by: NURSE PRACTITIONER

## 2025-06-03 PROCEDURE — 36415 COLL VENOUS BLD VENIPUNCTURE: CPT | Mod: ,,, | Performed by: CLINICAL MEDICAL LABORATORY

## 2025-06-03 PROCEDURE — 3288F FALL RISK ASSESSMENT DOCD: CPT | Mod: CPTII,,, | Performed by: NURSE PRACTITIONER

## 2025-06-03 PROCEDURE — 99214 OFFICE O/P EST MOD 30 MIN: CPT | Mod: S$PBB,,, | Performed by: NURSE PRACTITIONER

## 2025-06-03 PROCEDURE — 99215 OFFICE O/P EST HI 40 MIN: CPT | Mod: PBBFAC,25 | Performed by: NURSE PRACTITIONER

## 2025-06-03 PROCEDURE — 1159F MED LIST DOCD IN RCRD: CPT | Mod: CPTII,,, | Performed by: NURSE PRACTITIONER

## 2025-06-03 PROCEDURE — 3044F HG A1C LEVEL LT 7.0%: CPT | Mod: CPTII,,, | Performed by: NURSE PRACTITIONER

## 2025-06-06 ENCOUNTER — TELEPHONE (OUTPATIENT)
Dept: GASTROENTEROLOGY | Facility: CLINIC | Age: 75
End: 2025-06-06
Payer: MEDICARE

## 2025-06-06 ENCOUNTER — RESULTS FOLLOW-UP (OUTPATIENT)
Dept: GASTROENTEROLOGY | Facility: CLINIC | Age: 75
End: 2025-06-06

## 2025-06-16 ENCOUNTER — EXTERNAL HOME HEALTH (OUTPATIENT)
Dept: HOME HEALTH SERVICES | Facility: HOSPITAL | Age: 75
End: 2025-06-16
Payer: MEDICARE

## 2025-06-16 ENCOUNTER — HOSPITAL ENCOUNTER (OUTPATIENT)
Dept: RADIOLOGY | Facility: HOSPITAL | Age: 75
Discharge: HOME | End: 2025-06-16
Attending: NURSE PRACTITIONER
Payer: MEDICARE

## 2025-06-16 DIAGNOSIS — R10.30 LOWER ABDOMINAL PAIN: ICD-10-CM

## 2025-06-16 PROCEDURE — 74176 CT ABD & PELVIS W/O CONTRAST: CPT | Mod: TC

## 2025-06-16 PROCEDURE — 74176 CT ABD & PELVIS W/O CONTRAST: CPT | Mod: 26,,, | Performed by: INTERNAL MEDICINE

## 2025-06-23 ENCOUNTER — OFFICE VISIT (OUTPATIENT)
Dept: FAMILY MEDICINE | Facility: CLINIC | Age: 75
End: 2025-06-23
Payer: MEDICARE

## 2025-06-23 VITALS
HEART RATE: 65 BPM | HEIGHT: 64 IN | RESPIRATION RATE: 18 BRPM | TEMPERATURE: 98 F | OXYGEN SATURATION: 96 % | SYSTOLIC BLOOD PRESSURE: 122 MMHG | DIASTOLIC BLOOD PRESSURE: 78 MMHG | WEIGHT: 132.38 LBS | BODY MASS INDEX: 22.6 KG/M2

## 2025-06-23 DIAGNOSIS — H91.90 HEARING LOSS, UNSPECIFIED HEARING LOSS TYPE, UNSPECIFIED LATERALITY: ICD-10-CM

## 2025-06-23 DIAGNOSIS — Z99.2 DEPENDENCE ON HEMODIALYSIS: ICD-10-CM

## 2025-06-23 DIAGNOSIS — E11.69 TYPE 2 DIABETES MELLITUS WITH OTHER SPECIFIED COMPLICATION, UNSPECIFIED WHETHER LONG TERM INSULIN USE: ICD-10-CM

## 2025-06-23 DIAGNOSIS — R44.1 HALLUCINATION, VISUAL: ICD-10-CM

## 2025-06-23 DIAGNOSIS — I50.9 OTHER CONGESTIVE HEART FAILURE: ICD-10-CM

## 2025-06-23 DIAGNOSIS — H65.03 BILATERAL ACUTE SEROUS OTITIS MEDIA, RECURRENCE NOT SPECIFIED: ICD-10-CM

## 2025-06-23 DIAGNOSIS — I10 ESSENTIAL (PRIMARY) HYPERTENSION: Primary | ICD-10-CM

## 2025-06-23 PROCEDURE — 1159F MED LIST DOCD IN RCRD: CPT | Mod: ,,, | Performed by: NURSE PRACTITIONER

## 2025-06-23 PROCEDURE — 3288F FALL RISK ASSESSMENT DOCD: CPT | Mod: ,,, | Performed by: NURSE PRACTITIONER

## 2025-06-23 PROCEDURE — 1126F AMNT PAIN NOTED NONE PRSNT: CPT | Mod: ,,, | Performed by: NURSE PRACTITIONER

## 2025-06-23 PROCEDURE — 1160F RVW MEDS BY RX/DR IN RCRD: CPT | Mod: ,,, | Performed by: NURSE PRACTITIONER

## 2025-06-23 PROCEDURE — 3074F SYST BP LT 130 MM HG: CPT | Mod: ,,, | Performed by: NURSE PRACTITIONER

## 2025-06-23 PROCEDURE — 1101F PT FALLS ASSESS-DOCD LE1/YR: CPT | Mod: ,,, | Performed by: NURSE PRACTITIONER

## 2025-06-23 PROCEDURE — 99214 OFFICE O/P EST MOD 30 MIN: CPT | Mod: ,,, | Performed by: NURSE PRACTITIONER

## 2025-06-23 PROCEDURE — 3078F DIAST BP <80 MM HG: CPT | Mod: ,,, | Performed by: NURSE PRACTITIONER

## 2025-06-23 PROCEDURE — 3044F HG A1C LEVEL LT 7.0%: CPT | Mod: ,,, | Performed by: NURSE PRACTITIONER

## 2025-06-23 PROCEDURE — 3008F BODY MASS INDEX DOCD: CPT | Mod: ,,, | Performed by: NURSE PRACTITIONER

## 2025-06-23 RX ORDER — CEFDINIR 300 MG/1
300 CAPSULE ORAL 2 TIMES DAILY
Qty: 20 CAPSULE | Refills: 0 | Status: SHIPPED | OUTPATIENT
Start: 2025-06-23 | End: 2025-07-03

## 2025-06-23 RX ORDER — PREDNISONE 20 MG/1
20 TABLET ORAL 2 TIMES DAILY
Qty: 10 TABLET | Refills: 0 | Status: SHIPPED | OUTPATIENT
Start: 2025-06-23

## 2025-06-23 RX ORDER — CLONIDINE 0.1 MG/24H
1 PATCH, EXTENDED RELEASE TRANSDERMAL
Qty: 12 PATCH | Refills: 0 | Status: SHIPPED | OUTPATIENT
Start: 2025-06-23 | End: 2026-06-23

## 2025-06-23 RX ORDER — ATORVASTATIN CALCIUM 40 MG/1
40 TABLET, FILM COATED ORAL
COMMUNITY
Start: 2025-06-10

## 2025-06-23 NOTE — PROGRESS NOTES
Health Maintenance Due   Topic Date Due    Shingles Vaccine (1 of 2) Never done    RSV Vaccine (Age 60+ and Pregnant patients) (1 - Risk 60-74 years 1-dose series) Never done    COVID-19 Vaccine (5 - 2024-25 season) 09/01/2024    Foot Exam  08/06/2025     Discussed care gaps.  Declined vaccs.  Declined foot exam.

## 2025-06-24 PROBLEM — Z99.2 DEPENDENCE ON HEMODIALYSIS: Status: ACTIVE | Noted: 2025-06-24

## 2025-06-24 NOTE — PROGRESS NOTES
CHAPARRITA Bella   [facility] [address] [place] [address]  [phone]      PATIENT NAME: Nasrin Grant  : 1950  DATE: 25  MRN: [id number]      Billing Provider: CHAPARRITA Bella  Level of Service:   Patient PCP Information       Provider PCP Type    CHAPARRITA Bella General            History of Present Illness    HPI:  Patient reports visual and auditory hallucinations, specifically seeing people when she lies down and hearing voices of individuals who are not present. These hallucinations occur mostly at nighttime and in the morning. She has a history of similar hallucinations about a year ago, which resolved spontaneously without a known cause.    She is also having intermittent dizziness for about a month and ear congestion described as pressure with a dull pain.    She undergoes dialysis 3 times weekly. Her blood pressure has been low, and the dialysis nurses have been instructing her to remove her clonidine patch when this occurs. She is not currently wearing the patch. Her blood pressure drops at times, although her current reading is 122/78.    She has been unable to obtain Soniya patches or sensors for glucose monitoring due to coverage issues. As a result, she has been checking her glucose by finger stick.    MEDICAL HISTORY:  Patient has a history of visual and auditory hallucinations that occurred about a year ago and resolved spontaneously. She currently undergoes dialysis 3 times weekly.      ROS:  General: -fever, -chills, -fatigue, -weight gain, -weight loss  Eyes: -vision changes, -redness, -discharge  ENT: +ear pain, -nasal congestion, -sore throat, +ear pressure  Cardiovascular: -chest pain, -palpitations, -lower extremity edema, +orthostatic symptoms  Respiratory: -cough, +dyspnea on exertion  Gastrointestinal: -abdominal pain, -nausea, -vomiting, -diarrhea, -constipation, -blood in stool  Genitourinary: -dysuria,  -hematuria, -frequency  Musculoskeletal: -joint pain, -muscle pain  Skin: -rash, -lesion  Neurological: -headache, +dizziness, -numbness, -tingling  Psychiatric: -anxiety, -depression, -sleep difficulty, +hallucinations          Medications and Allergies     Medications  Outpatient Medications Marked as Taking for the 6/23/25 encounter (Office Visit) with Laury Enrique FNP   Medication Sig Dispense Refill    alendronate (FOSAMAX) 70 MG tablet TAKE ONE TABLET BY MOUTH EVERY 7 DAYS 12 tablet 1    aspirin (ECOTRIN) 81 MG EC tablet Take 81 mg by mouth once daily.      atorvastatin (LIPITOR) 40 MG tablet Take 40 mg by mouth.      carvediloL (COREG) 25 MG tablet Take 25 mg by mouth 2 (two) times daily.      clopidogreL (PLAVIX) 75 mg tablet Take 75 mg by mouth once daily.      diclofenac (VOLTAREN) 50 MG EC tablet Take 1 tablet (50 mg total) by mouth 2 (two) times daily as needed (pain). 60 tablet 1    FREESTYLE MARY 3 SENSOR Jazz use as directed 6 each 1    hydrALAZINE (APRESOLINE) 100 MG tablet TAKE ONE TABLET BY MOUTH THREE TIMES DAILY 90 tablet 1    insulin degludec (TRESIBA FLEXTOUCH U-100) 100 unit/mL (3 mL) insulin pen Inject 10 Units into the skin once daily. 9 mL 2    isosorbide mononitrate (IMDUR) 30 MG 24 hr tablet Take 30 mg by mouth once daily.      methocarbamoL (ROBAXIN) 500 MG Tab Take 1 tablet (500 mg total) by mouth 3 (three) times daily as needed (pain). 30 tablet 0    minoxidiL (LONITEN) 2.5 MG tablet Take 2.5 mg by mouth.      NIFEdipine (PROCARDIA-XL) 90 MG (OSM) 24 hr tablet Take 90 mg by mouth once daily.      oxyCODONE (ROXICODONE) 5 MG immediate release tablet Take 5 mg by mouth every 4 (four) hours as needed.      pantoprazole (PROTONIX) 40 MG tablet TAKE ONE TABLET BY MOUTH EVERY MORNING 90 tablet 1    rosuvastatin (CRESTOR) 20 MG tablet Take 20 mg by mouth once daily.      sodium bicarbonate 650 MG tablet Take 650 mg by mouth 4 (four) times daily.      [DISCONTINUED] cloNIDine 0.2 mg/24 hr  "td ptwk (CATAPRES) 0.2 mg/24 hr 1 patch every 7 days.         Allergies  Review of patient's allergies indicates:   Allergen Reactions    Hydrocodone-acetaminophen Rash    Codeine Itching    Gabapentin Other (See Comments)     Made her disoriented    "out of my head"    Morphine Itching    Opioids - morphine analogues        Physical Exam  Constitutional:       General: She is not in acute distress.  HENT:      Head: Normocephalic.      Ears:      Comments: Dull and redness to bilat TM     Nose: Congestion present.      Mouth/Throat:      Mouth: Mucous membranes are moist.   Eyes:      Extraocular Movements: Extraocular movements intact.   Cardiovascular:      Rate and Rhythm: Normal rate.   Pulmonary:      Effort: Pulmonary effort is normal. No respiratory distress.      Breath sounds: No wheezing.   Abdominal:      General: Bowel sounds are normal.      Palpations: Abdomen is soft.   Musculoskeletal:      Cervical back: Normal range of motion.   Skin:     General: Skin is warm.   Neurological:      Mental Status: She is alert. Mental status is at baseline.      Motor: Weakness present.      Gait: Gait abnormal (uses walker).          Assessment & Plan    IMPRESSION:  - Assessed visual and auditory hallucinations, noting recurrence after previous spontaneous resolution.  - Evaluated dizziness and ear symptoms, diagnosing bilateral otitis media based on exam and symptoms.  - Considered BP management in context of dialysis treatment and recent medication adjustments.    END STAGE RENAL DISEASE AND DIALYSIS DEPENDENCE:  - Patient continues on dialysis 3 times weekly.  - Ordered home health services to monitor blood pressure, collect labs including urinalysis, and monitor the patient's condition due to new onset of problems.    HYPOTENSION:  - Patient's blood pressure currently measures 122/78 but drops at times.  - Decreased clonidine patch from 0.2 mg to 0.1 mg due to low BP episodes.    BILATERAL OTITIS MEDIA:  - " Observed bilateral tympanic membrane redness and dullness.  - Patient reports ear congestion, pressure, and dull pain.  - Prescribed Omnicef 300 mg BID and prednisone 20 mg daily for treatment.    VISUAL AND AUDITORY HALLUCINATIONS:  - Patient experiences visual hallucinations (seeing people when lying down) and auditory hallucinations (hearing voices of people not there), mostly at night and in the morning.  - Referred to  or psychology for additional monitoring.  - Patient states voices are not telling her to harm herself, they are just present.     DIZZINESS:  - Patient has been experiencing intermittent dizziness for approximately one month.    FOLLOW-UP:  - Follow up in 1 month to reassess condition.  - Will notify home health of new onset problems.          Health Maintenance Due   Topic Date Due    Shingles Vaccine (1 of 2) Never done    RSV Vaccine (Age 60+ and Pregnant patients) (1 - Risk 60-74 years 1-dose series) Never done    COVID-19 Vaccine (5 - 2024-25 season) 09/01/2024    Foot Exam  08/06/2025       Problem List Items Addressed This Visit          Cardiac/Vascular    Congestive heart failure       Renal/    Dependence on hemodialysis       Endocrine    Diabetes     Other Visit Diagnoses         Essential (primary) hypertension    -  Primary    Relevant Medications    cloNIDine 0.1 mg/24 hr td ptwk (CATAPRES) 0.1 mg/24 hr      Hallucination, visual          Bilateral acute serous otitis media, recurrence not specified        Relevant Medications    cefdinir (OMNICEF) 300 MG capsule    predniSONE (DELTASONE) 20 MG tablet      Hearing loss, unspecified hearing loss type, unspecified laterality        Relevant Medications    predniSONE (DELTASONE) 20 MG tablet            Health Maintenance Topics with due status: Not Due       Topic Last Completion Date    TETANUS VACCINE 10/16/2017    Colorectal Cancer Screening 08/22/2022    Diabetic Eye Exam 10/15/2024    DEXA Scan 12/10/2024     Hemoglobin A1c 02/12/2025    Lipid Panel 03/12/2025    Mammogram 04/15/2025       Future Appointments   Date Time Provider Department Center   6/25/2025  1:40 PM Lauren Frazier, [facility] [place] [facility]   7/2/2025 11:00 AM Tim Parson FNP [facility] NEURO [facility]   7/8/2025  7:40 AM [facility]   7/22/2025  8[facility]40 [facility]   7/22/2025  9:40 AM [facility]   8/7/2025  9:00 AM AWV [facility]        This note was generated with the assistance of ambient listening technology. Verbal consent was obtained by the patient and accompanying visitor(s) for the recording of patient appointment to facilitate this note. I attest to having reviewed and edited the generated note for accuracy, though some syntax or spelling errors may persist. Please contact the author of this note for any clarification.     Signature:  Laury Enrique MESERET  [facility] [address] [address]  [phone]    Date of encounter: 6/23/25

## 2025-06-25 ENCOUNTER — OFFICE VISIT (OUTPATIENT)
Dept: GASTROENTEROLOGY | Facility: CLINIC | Age: 75
End: 2025-06-25
Payer: MEDICARE

## 2025-06-25 VITALS
BODY MASS INDEX: 22.53 KG/M2 | OXYGEN SATURATION: 98 % | DIASTOLIC BLOOD PRESSURE: 32 MMHG | WEIGHT: 132 LBS | HEIGHT: 64 IN | SYSTOLIC BLOOD PRESSURE: 125 MMHG | HEART RATE: 70 BPM

## 2025-06-25 DIAGNOSIS — D50.9 IRON DEFICIENCY ANEMIA, UNSPECIFIED IRON DEFICIENCY ANEMIA TYPE: Primary | ICD-10-CM

## 2025-06-25 PROCEDURE — 99999 PR PBB SHADOW E&M-EST. PATIENT-LVL V: CPT | Mod: PBBFAC,,, | Performed by: NURSE PRACTITIONER

## 2025-06-25 PROCEDURE — 1159F MED LIST DOCD IN RCRD: CPT | Mod: CPTII,,, | Performed by: NURSE PRACTITIONER

## 2025-06-25 PROCEDURE — 3044F HG A1C LEVEL LT 7.0%: CPT | Mod: CPTII,,, | Performed by: NURSE PRACTITIONER

## 2025-06-25 PROCEDURE — 3078F DIAST BP <80 MM HG: CPT | Mod: CPTII,,, | Performed by: NURSE PRACTITIONER

## 2025-06-25 PROCEDURE — 1100F PTFALLS ASSESS-DOCD GE2>/YR: CPT | Mod: CPTII,,, | Performed by: NURSE PRACTITIONER

## 2025-06-25 PROCEDURE — 3008F BODY MASS INDEX DOCD: CPT | Mod: CPTII,,, | Performed by: NURSE PRACTITIONER

## 2025-06-25 PROCEDURE — 3074F SYST BP LT 130 MM HG: CPT | Mod: CPTII,,, | Performed by: NURSE PRACTITIONER

## 2025-06-25 PROCEDURE — 3288F FALL RISK ASSESSMENT DOCD: CPT | Mod: CPTII,,, | Performed by: NURSE PRACTITIONER

## 2025-06-25 PROCEDURE — 99215 OFFICE O/P EST HI 40 MIN: CPT | Mod: PBBFAC | Performed by: NURSE PRACTITIONER

## 2025-06-25 PROCEDURE — 99214 OFFICE O/P EST MOD 30 MIN: CPT | Mod: S$PBB,,, | Performed by: NURSE PRACTITIONER

## 2025-06-25 PROCEDURE — 1160F RVW MEDS BY RX/DR IN RCRD: CPT | Mod: CPTII,,, | Performed by: NURSE PRACTITIONER

## 2025-06-25 NOTE — PROGRESS NOTES
Nasrin Grant is a 75 y.o. female here for Follow-up        PCP: Laury Enrique  Referring Provider: Lauren Frazier, Evelyn  [address]  [facility] [place] [id number]     HPI:  Presents for follow-up after CT abdomen and pelvis.  Patient had CT abdomen and pelvis on 06/16/2025,Partial bladder distension with some wall thickening, to be correlated for urinary tract infection.Small native kidneys with transplant kidney in the right lower quadrant.  Patient was referred to Dr. Longo at [facility] but states today that they did not take her insurance.  Reports that she has an appointment on July 3rd in [place] with the urologist.  The patient did also have a KUB that showed moderate stool retention throughout the colon.  She has reported lower abdominal discomfort.  Reports that she is taking a stool softener nightly.  Reports that she takes Dulcolax intermittently.  She did have a colonoscopy on 08/22/2022, per Dr. Spaulding at [facility], no colon polyps, recommendation to repeat in 5 years.  Patient does have dialysis 3 days weekly.  She is on a 32 oz per day fluid restriction.  She has a history of renal transplant.  During hospitalization she did require Rossi catheter for bladder decompression. Patient was previously prescribed Linzess but is no longer taking this medication.  States that this was discontinued during hospitalization and MiraLax was recommended. The patient was also admitted to the hospital in March with chest pain. Left heart catheterization with PCI to the proximal circumflex with placement of drug eluting stent, was performed per Dr. Shields. Patient was advised at that time that Plavix may not be interrupted.  EGD and colonoscopy were previously scheduled but the patient was not able to be cleared by Cardiology.  The patient denies any visible hematochezia or melena.  Endorses gross hematuria.  This patient's plan of care was discussed with Dr. Pandya who recommends that we  continue to defer EGD and colonoscopy at this time until patient is able to be cleared by Cardiology for sedation.         Follow-up  Associated symptoms include fatigue. Pertinent negatives include no abdominal pain, change in bowel habit, fever, nausea or vomiting.         ROS:  Review of Systems   Constitutional:  Positive for fatigue. Negative for appetite change, fever and unexpected weight change.   HENT:  Negative for trouble swallowing.    Gastrointestinal:  Positive for constipation. Negative for abdominal pain, anal bleeding, blood in stool, change in bowel habit, diarrhea, nausea, vomiting and reflux.   Genitourinary:  Positive for hematuria.   Musculoskeletal:  Positive for gait problem.   Integumentary:  Negative for pallor.   Hematological:  Bruises/bleeds easily.   Psychiatric/Behavioral:  The patient is not nervous/anxious.           PMHX:  has a past medical history of Amputation of one or more toes, Atherosclerotic heart disease of native coronary artery with angina pectoris (01/20/2020), CVA (cerebral vascular accident), Depression, Diabetes mellitus, type 2, Dialysis patient, Disorder of kidney and ureter, History of carpal tunnel surgery of left wrist, History of carpal tunnel surgery of right wrist, History of repair of left rotator cuff, History of repair of right rotator cuff, Hyperlipidemia, Hypertension, Hypokalemia, Kidney transplant status (07/13/2020), Osteoporosis (07/22/2020), Proliferative diabetic retinopathy of both eyes (09/07/2023), and Stroke.    PSHX:  has a past surgical history that includes Hysterectomy; kidney transplant 2016; Extraction of tooth (Left, 08/11/2021); Toe amputation; Appendix; Oophorectomy; and Dialysis fistula creation (Left).    PFHX: family history includes Diabetes in her father and mother; Hearing loss in her father and mother; Heart disease in her father and mother; Hyperlipidemia in her father and mother; Prostate cancer in her brother.    PSlHX:   "reports that she has never smoked. She has never been exposed to tobacco smoke. She has never used smokeless tobacco. She reports that she does not drink alcohol and does not use drugs.        Review of patient's allergies indicates:   Allergen Reactions    Hydrocodone-acetaminophen Rash    Codeine Itching    Gabapentin Other (See Comments)     Made her disoriented    "out of my head"    Morphine Itching    Opioids - morphine analogues        Medication List with Changes/Refills   Current Medications    ALENDRONATE (FOSAMAX) 70 MG TABLET    TAKE ONE TABLET BY MOUTH EVERY 7 DAYS    ASPIRIN (ECOTRIN) 81 MG EC TABLET    Take 81 mg by mouth once daily.    ATORVASTATIN (LIPITOR) 40 MG TABLET    Take 40 mg by mouth.    CARVEDILOL (COREG) 25 MG TABLET    Take 25 mg by mouth 2 (two) times daily.    CEFDINIR (OMNICEF) 300 MG CAPSULE    Take 1 capsule (300 mg total) by mouth 2 (two) times daily. for 10 days    CLONIDINE 0.1 MG/24 HR TD PTWK (CATAPRES) 0.1 MG/24 HR    Place 1 patch onto the skin every 7 days.    CLOPIDOGREL (PLAVIX) 75 MG TABLET    Take 75 mg by mouth once daily.    DICLOFENAC (VOLTAREN) 50 MG EC TABLET    Take 1 tablet (50 mg total) by mouth 2 (two) times daily as needed (pain).    FREESTYLE MARY 3 SENSOR CYNDIE    use as directed    HYDRALAZINE (APRESOLINE) 100 MG TABLET    TAKE ONE TABLET BY MOUTH THREE TIMES DAILY    INSULIN DEGLUDEC (TRESIBA FLEXTOUCH U-100) 100 UNIT/ML (3 ML) INSULIN PEN    Inject 10 Units into the skin once daily.    ISOSORBIDE MONONITRATE (IMDUR) 30 MG 24 HR TABLET    Take 30 mg by mouth once daily.    METHOCARBAMOL (ROBAXIN) 500 MG TAB    Take 1 tablet (500 mg total) by mouth 3 (three) times daily as needed (pain).    MINOXIDIL (LONITEN) 2.5 MG TABLET    Take 2.5 mg by mouth.    NIFEDIPINE (PROCARDIA-XL) 90 MG (OSM) 24 HR TABLET    Take 90 mg by mouth once daily.    OXYCODONE (ROXICODONE) 5 MG IMMEDIATE RELEASE TABLET    Take 5 mg by mouth every 4 (four) hours as needed.    " "PANTOPRAZOLE (PROTONIX) 40 MG TABLET    TAKE ONE TABLET BY MOUTH EVERY MORNING    PREDNISONE (DELTASONE) 20 MG TABLET    Take 1 tablet (20 mg total) by mouth 2 (two) times daily.    ROSUVASTATIN (CRESTOR) 20 MG TABLET    Take 20 mg by mouth once daily.    SODIUM BICARBONATE 650 MG TABLET    Take 650 mg by mouth 4 (four) times daily.        Objective Findings:  Vital Signs:  BP (!) 125/32   Pulse 70   Ht 5' 4" (1.626 m)   Wt 59.9 kg (132 lb)   SpO2 98%   BMI 22.66 kg/m²  Body mass index is 22.66 kg/m².    Physical Exam:  Physical Exam  Vitals and nursing note reviewed.   Constitutional:       General: She is not in acute distress.     Appearance: Normal appearance.   HENT:      Mouth/Throat:      Mouth: Mucous membranes are moist.   Cardiovascular:      Rate and Rhythm: Normal rate.   Pulmonary:      Effort: Pulmonary effort is normal.   Abdominal:      General: Bowel sounds are normal. There is no distension.      Palpations: Abdomen is soft. There is no mass.      Tenderness: There is no abdominal tenderness.   Musculoskeletal:      Right lower leg: Edema present.      Left lower leg: Edema present.   Skin:     General: Skin is warm and dry.      Coloration: Skin is not jaundiced or pale.   Neurological:      Mental Status: She is alert and oriented to person, place, and time.      Gait: Gait abnormal (in a wheelchair).   Psychiatric:         Mood and Affect: Mood normal.          Labs:  Lab Results   Component Value Date    WBC 6.57 06/25/2025    HGB 11.5 (L) 06/25/2025    HCT 35.4 (L) 06/25/2025    MCV 82.5 06/25/2025    RDW 15.0 (H) 06/25/2025     06/25/2025    LYMPH 10.0 (L) 06/25/2025    LYMPH 0.66 (L) 06/25/2025    MONO 3.0 06/25/2025    EOS 0.00 06/25/2025    BASO 0.01 06/25/2025     Lab Results   Component Value Date     (L) 02/27/2025    K 3.5 04/22/2025    CL 98 02/27/2025    CO2 32 (H) 02/27/2025    GLU 75 (L) 02/27/2025    BUN 11 02/27/2025    CREATININE 3.65 (H) 02/27/2025    CALCIUM " 10.6 (H) 02/27/2025    PROT 7.3 02/27/2025    ALBUMIN 3.3 (L) 02/27/2025    BILITOT 0.5 02/27/2025    ALKPHOS 83 02/27/2025    AST 34 02/27/2025    ALT <7 02/27/2025         Imaging: CT Abdomen Pelvis  Without Contrast  Result Date: 6/17/2025  EXAMINATION: CT ABDOMEN PELVIS WITHOUT CONTRAST CLINICAL HISTORY: Abdominal pain, acute, nonlocalized; Lower abdominal pain, unspecified TECHNIQUE: Low dose axial images, sagittal and coronal reformations were obtained from the lung bases to the pubic symphysis.  No oral contrast was administered COMPARISON: Chest CT September 2024 FINDINGS: There are stable nodules at the lung base, refer to chest CT for recommendations. There is edema along the body wall.  A ventral fat containing hernia noted at the umbilicus. There is decreased sensitivity for detecting solid organ abnormalities without the use of intravenous contrast.  Allowing for this, the liver demonstrates no focal abnormality.  The spleen is not enlarged. Small hiatal hernia.  Pancreas demonstrates no significant abnormality. Gallbladder has been removed.  There is no biliary ductal dilatation. No adrenal mass is noted. Native kidneys are small demonstrating calcifications which are favored to be vascular.  Tiny hypodensity at the lower pole of the left kidney is likely a cyst. The aorta and branches demonstrate extensive calcification.  There is no aortic aneurysm or significant periaortic lymphadenopathy.  No significant pelvic lymphadenopathy. Bladder is partially distended with some wall thickening.  Uterus is absent. Right lower quadrant transplant kidney. Bowel demonstrates colonic diverticulosis. Osseous structures show degenerative change.     Partial bladder distension with some wall thickening, to be correlated for urinary tract infection. Small native kidneys with transplant kidney in the right lower quadrant. Small hiatal hernia Lung base nodules similar to September 2024 chest CT, see that report for  "recommendations. Additional findings as above Electronically signed by: Stepahnie Hoover MD Date:    06/17/2025 Time:    07:52    X-Ray KUB  Result Date: 6/6/2025  HISTORY . Lower abdominal pain, unspecified COMPARISON No relevant prior imaging studies are available for comparison at time of dictation. TECHNIQUE XR KUB FINDINGS Medical support device(s): None. Bowel Gas Pattern: Non-obstructive bowel gas pattern.  Moderate fecal burden noted throughout the colon. Calcifications: No suspicious abdominal/pelvic calcifications.  Extensive vascular calcification involving the splenic artery.  Surgical clips noted within the right upper quadrant consistent with prior cholecystectomy.  Surgical clips noted within the right hemipelvis. Osseous: Slight dextrocurvature of the thoracolumbar spine.  Mild bilateral hip degenerative changes. Lung Bases:  The lung bases are clear.     IMPRESSION 1. Nonspecific, nonobstructive bowel gas pattern. 2. Moderate fecal burden noted throughout the colon.  Clinical correlation for constipation is advised. 3. Slight dextrocurvature of the thoracolumbar spine. 4. Mild bilateral hip degenerative changes. Electronically signed by: Adam Owens DO (June 06 2025 03:10 AM EST)        Assessment:  Nasrin Grant is a 75 y.o. female here with:  1. Iron deficiency anemia, unspecified iron deficiency anemia type          Recommendations:  1. CBC and iron studies previously ordered  2. Patient is advised to keep appointment with Urology that is scheduled for July 3rd  3. Will not proceed with EGD or Colonoscopy at this time due to current cardiac status unless patient is having active bleeding  4. May use MiraLax powder and stool softeners for constipation.  Patient is on a fluid restriction of 32 oz per day and should mixed MiraLax and something that she has already drinking.    Portions of this note may have been created with voice recognition software.  Occasional wrong word or "sound a like " substitutions may have occurred due to inherent limitations of voice recognition software.  Please read the note carefully and recognize using contexts, where substitutions have occurred.    Diagnosis, risks, benefits, and side effects of any medications and treatment plan were discussed with the patient.  All questions were answered to the satisfaction of the patient, and patient verbalized understanding and agreement to the treatment plan.      Follow up in about 2 months (around 8/25/2025).      Order summary:  Orders Placed This Encounter    CBC Auto Differential       Thank you for allowing me to participate in the care of Nasrin Grant.      YANA Morales

## 2025-06-26 ENCOUNTER — RESULTS FOLLOW-UP (OUTPATIENT)
Dept: FAMILY MEDICINE | Facility: CLINIC | Age: 75
End: 2025-06-26
Payer: MEDICARE

## 2025-06-26 ENCOUNTER — RESULTS FOLLOW-UP (OUTPATIENT)
Dept: GASTROENTEROLOGY | Facility: CLINIC | Age: 75
End: 2025-06-26

## 2025-06-26 DIAGNOSIS — R31.9 URINARY TRACT INFECTION WITH HEMATURIA, SITE UNSPECIFIED: Primary | ICD-10-CM

## 2025-06-26 DIAGNOSIS — N39.0 URINARY TRACT INFECTION WITH HEMATURIA, SITE UNSPECIFIED: Primary | ICD-10-CM

## 2025-06-27 RX ORDER — NITROFURANTOIN 25; 75 MG/1; MG/1
100 CAPSULE ORAL 2 TIMES DAILY
Qty: 20 CAPSULE | Refills: 0 | Status: SHIPPED | OUTPATIENT
Start: 2025-06-27

## 2025-07-01 ENCOUNTER — PATIENT MESSAGE (OUTPATIENT)
Dept: NEUROLOGY | Facility: CLINIC | Age: 75
End: 2025-07-01
Payer: MEDICARE

## 2025-07-02 ENCOUNTER — OFFICE VISIT (OUTPATIENT)
Dept: NEUROLOGY | Facility: CLINIC | Age: 75
End: 2025-07-02
Payer: MEDICARE

## 2025-07-02 VITALS
OXYGEN SATURATION: 99 % | BODY MASS INDEX: 23.45 KG/M2 | HEART RATE: 75 BPM | DIASTOLIC BLOOD PRESSURE: 48 MMHG | WEIGHT: 137.38 LBS | HEIGHT: 64 IN | SYSTOLIC BLOOD PRESSURE: 164 MMHG

## 2025-07-02 DIAGNOSIS — E10.42 DIABETIC PERIPHERAL NEUROPATHY ASSOCIATED WITH TYPE 1 DIABETES MELLITUS: Primary | ICD-10-CM

## 2025-07-02 DIAGNOSIS — R44.1 VISUAL HALLUCINATION: Chronic | ICD-10-CM

## 2025-07-02 DIAGNOSIS — E53.8 VITAMIN B12 DEFICIENCY: ICD-10-CM

## 2025-07-02 DIAGNOSIS — E55.9 VITAMIN D DEFICIENCY: ICD-10-CM

## 2025-07-02 DIAGNOSIS — R26.2 DIFFICULTY WALKING: Chronic | ICD-10-CM

## 2025-07-02 DIAGNOSIS — Z11.3 ENCOUNTER FOR SCREENING FOR INFECTIONS WITH A PREDOMINANTLY SEXUAL MODE OF TRANSMISSION: ICD-10-CM

## 2025-07-02 DIAGNOSIS — E03.9 HYPOTHYROIDISM, UNSPECIFIED TYPE: ICD-10-CM

## 2025-07-02 PROCEDURE — 1101F PT FALLS ASSESS-DOCD LE1/YR: CPT | Mod: CPTII,,, | Performed by: NURSE PRACTITIONER

## 2025-07-02 PROCEDURE — 99215 OFFICE O/P EST HI 40 MIN: CPT | Mod: PBBFAC | Performed by: NURSE PRACTITIONER

## 2025-07-02 PROCEDURE — 99204 OFFICE O/P NEW MOD 45 MIN: CPT | Mod: S$PBB,,, | Performed by: NURSE PRACTITIONER

## 2025-07-02 PROCEDURE — 3044F HG A1C LEVEL LT 7.0%: CPT | Mod: CPTII,,, | Performed by: NURSE PRACTITIONER

## 2025-07-02 PROCEDURE — 1160F RVW MEDS BY RX/DR IN RCRD: CPT | Mod: CPTII,,, | Performed by: NURSE PRACTITIONER

## 2025-07-02 PROCEDURE — 3288F FALL RISK ASSESSMENT DOCD: CPT | Mod: CPTII,,, | Performed by: NURSE PRACTITIONER

## 2025-07-02 PROCEDURE — 3077F SYST BP >= 140 MM HG: CPT | Mod: CPTII,,, | Performed by: NURSE PRACTITIONER

## 2025-07-02 PROCEDURE — 1125F AMNT PAIN NOTED PAIN PRSNT: CPT | Mod: CPTII,,, | Performed by: NURSE PRACTITIONER

## 2025-07-02 PROCEDURE — 99999 PR PBB SHADOW E&M-EST. PATIENT-LVL V: CPT | Mod: PBBFAC,,, | Performed by: NURSE PRACTITIONER

## 2025-07-02 PROCEDURE — 3078F DIAST BP <80 MM HG: CPT | Mod: CPTII,,, | Performed by: NURSE PRACTITIONER

## 2025-07-02 PROCEDURE — 3008F BODY MASS INDEX DOCD: CPT | Mod: CPTII,,, | Performed by: NURSE PRACTITIONER

## 2025-07-02 PROCEDURE — 1159F MED LIST DOCD IN RCRD: CPT | Mod: CPTII,,, | Performed by: NURSE PRACTITIONER

## 2025-07-02 NOTE — PATIENT INSTRUCTIONS
MRI brain  Dementia panel labs  Agree with plan for PT  No new meds currently pending further workup

## 2025-07-02 NOTE — PROGRESS NOTES
Subjective:       Patient ID: Nasrin Grant is a 75 y.o. female     Chief Complaint:  No chief complaint on file.       Allergies:  Hydrocodone-acetaminophen, Codeine, Gabapentin, Morphine, and Opioids - morphine analogues    Current Medications:  Encounter Medications[1]    History of Present Illness  74 y/o female new referral to neurology for reported gait difficulty    She is referred from pain management where she was referred for gait difficulty.  However had discussed with them about also having symptoms of visual hallucinations since 2016.  At that time had apparently had kidney transplant and developed CMV infection.  She is a dialysis patient, history of DM as well.  I reviewed her primary care note from 6/23/25, they too reported the symptoms visual and auditory hallucinations, specifically seeing people when she lies down and hearing voices of individuals who are not present. These hallucinations occur mostly at nighttime and in the morning. She has a history of similar hallucinations about a year ago, which resolved spontaneously without a known cause per the note.    She has not been taking her rx opiates in few weeks but reports symptoms do continue, but again, primarily only at night.  Family denies patient with significant cognitive impairment, no immediate history of parkinson's, sh ehas no clear PD type symptoms.  She ambulates with rollator walker and with it has slowed gait, but is stable.  She does require some mild assistance to stand.  She reports she is to start PT next week, this possibly through home health.    Her MMSE is 29/30    She has diminished sensation in the BLE, burning and stinging symptoms as well which is likely chronic peripheral neuropathy due to her DM which certainly could explain her worsening gait difficulty as well.      I suggest MRI imaging of the brain and dementia panel workup. Trial with cymbalta 30mg possibility, but with her dialysis will wait and see how her  workup looks prior to starting new meds.           Review of Systems  Review of Systems   Constitutional:  Negative for diaphoresis and fever.   HENT:  Negative for congestion, hearing loss and tinnitus.    Eyes:  Negative for blurred vision, double vision, photophobia, discharge and redness.   Respiratory:  Negative for cough and shortness of breath.    Cardiovascular:  Negative for chest pain.   Gastrointestinal:  Negative for abdominal pain, nausea and vomiting.   Musculoskeletal:  Negative for back pain, joint pain, myalgias and neck pain.   Skin:  Negative for itching and rash.   Neurological:  Positive for weakness. Negative for dizziness, tremors, sensory change, speech change, focal weakness, seizures, loss of consciousness and headaches.   Psychiatric/Behavioral:  Positive for hallucinations. Negative for depression and memory loss. The patient does not have insomnia.    All other systems reviewed and are negative.     Objective:     NEUROLOGICAL EXAMINATION:     MENTAL STATUS   Oriented to person, place, and time.   Attention: normal. Concentration: normal.   Speech: speech is normal   Level of consciousness: alert  Knowledge: good and consistent with education.   Normal comprehension.     CRANIAL NERVES     CN II   Visual fields full to confrontation.   Visual acuity: normal  Right visual field deficit: none  Left visual field deficit: none     CN III, IV, VI   Pupils are equal, round, and reactive to light.  Extraocular motions are normal.   Right pupil: Size: 3 mm. Shape: regular. Reactivity: brisk. Consensual response: intact. Accommodation: intact.   Left pupil: Size: 3 mm. Shape: regular. Reactivity: brisk. Consensual response: intact. Accommodation: intact.   CN III: no CN III palsy  CN VI: no CN VI palsy  Nystagmus: none   Diplopia: none  Upgaze: normal  Downgaze: normal  Conjugate gaze: present  Vestibulo-ocular reflex: present    CN V   Facial sensation intact.   Right facial sensation deficit:  none  Left facial sensation deficit: none  Right corneal reflex: normal  Left corneal reflex: normal    CN VII   Facial expression full, symmetric.   Right facial weakness: none  Left facial weakness: none  Right taste: normal  Left taste: normal    CN VIII   CN VIII normal.   Hearing: intact    CN IX, X   CN IX normal.   CN X normal.   Palate: symmetric    CN XI   CN XI normal.   Right sternocleidomastoid strength: normal  Left sternocleidomastoid strength: normal  Right trapezius strength: normal  Left trapezius strength: normal    CN XII   CN XII normal.   Tongue: not atrophic  Fasciculations: absent  Tongue deviation: none    MOTOR EXAM   Muscle bulk: normal  Overall muscle tone: normal  Right arm tone: normal  Left arm tone: normal  Right arm pronator drift: absent  Left arm pronator drift: absent  Right leg tone: normal  Left leg tone: normal    Strength   Right neck flexion: 5/5  Left neck flexion: 5/5  Right neck extension: 5/5  Left neck extension: 5/5  Right deltoid: 5/5  Left deltoid: 5/5  Right biceps: 5/5  Left biceps: 5/5  Right triceps: 5/5  Left triceps: 5/5  Right wrist flexion: 5/5  Left wrist flexion: 5/5  Right wrist extension: 5/5  Left wrist extension: 5/5  Right interossei: 5/5  Left interossei: 5/5  Right iliopsoas: 4/5  Left iliopsoas: 4/5  Right quadriceps: 4/5  Left quadriceps: 5/5  Right hamstrin/5  Left hamstrin/5  Right anterior tibial: 4/5  Left anterior tibial: 4/5  Right posterior tibial: 4/5  Left posterior tibial: 4/5  Right gastroc: 4/5  Left gastroc: 4/5    REFLEXES     Reflexes   Right brachioradialis: 2+  Left brachioradialis: 2+  Right biceps: 2+  Left biceps: 2+  Right triceps: 2+  Left triceps: 2+  Right patellar: 2+  Left patellar: 2+  Right achilles: 2+  Left achilles: 2+  Right plantar: normal  Left plantar: normal  Right Almaraz: absent  Left Almaraz: absent  Right ankle clonus: absent  Left ankle clonus: absent  Right pendular knee jerk: absent  Left pendular  knee jerk: absent    SENSORY EXAM   Right arm light touch: normal  Left arm light touch: normal  Right leg light touch: decreased from ankle  Left leg light touch: decreased from ankle  Right arm vibration: normal  Left arm vibration: normal  Right leg vibration: decreased from ankle  Left leg vibration: decreased from ankle  Right arm proprioception: normal  Left arm proprioception: normal  Right leg proprioception: decreased from ankle  Left leg proprioception: decreased from ankle  Right arm pinprick: normal  Left arm pinprick: normal  Right leg pinprick: decreased from ankle  Left leg pinprick: decreased from ankle    GAIT AND COORDINATION     Gait  Gait: wide-based     Coordination   Finger to nose coordination: normal  Heel to shin coordination: abnormal  Tandem walking coordination: abnormal    Tremor   Resting tremor: absent  Intention tremor: absent  Action tremor: absent       Using rollator walker in clinic        Physical Exam  Vitals and nursing note reviewed.   Constitutional:       Appearance: Normal appearance.   HENT:      Head: Normocephalic.   Eyes:      Extraocular Movements: Extraocular movements intact and EOM normal.      Pupils: Pupils are equal, round, and reactive to light.   Cardiovascular:      Rate and Rhythm: Normal rate and regular rhythm.   Pulmonary:      Effort: Pulmonary effort is normal.      Breath sounds: Normal breath sounds.   Musculoskeletal:         General: No swelling or tenderness. Normal range of motion.      Cervical back: Normal range of motion and neck supple.      Right lower leg: No edema.      Left lower leg: No edema.   Skin:     General: Skin is warm and dry.      Coloration: Skin is not jaundiced.      Findings: No rash.   Neurological:      General: No focal deficit present.      Mental Status: She is alert and oriented to person, place, and time.      GCS: GCS eye subscore is 4. GCS verbal subscore is 5. GCS motor subscore is 6.      Cranial Nerves: No cranial  nerve deficit.      Sensory: Sensory deficit present.      Motor: Weakness present.      Coordination: Coordination abnormal. Heel to Shin Test abnormal. Finger-Nose-Finger Test normal.      Gait: Gait abnormal and tandem walk abnormal.      Deep Tendon Reflexes: Reflexes normal.      Reflex Scores:       Tricep reflexes are 2+ on the right side and 2+ on the left side.       Bicep reflexes are 2+ on the right side and 2+ on the left side.       Brachioradialis reflexes are 2+ on the right side and 2+ on the left side.       Patellar reflexes are 2+ on the right side and 2+ on the left side.       Achilles reflexes are 2+ on the right side and 2+ on the left side.  Psychiatric:         Mood and Affect: Mood normal.         Speech: Speech normal.         Behavior: Behavior normal.          Assessment:     Problem List Items Addressed This Visit          Neuro    Diabetic peripheral neuropathy associated with type 1 diabetes mellitus - Primary       Psychiatric    Visual hallucination    Relevant Orders    MRI Brain Without Contrast    Syphilis Antibody with reflex to RPR       Other    Difficulty walking (Chronic)    Relevant Orders    Ammonia    CBC Auto Differential    Comprehensive Metabolic Panel    Syphilis Antibody with reflex to RPR     Other Visit Diagnoses         Vitamin B12 deficiency        Relevant Orders    Folate    Vitamin B12      Hypothyroidism, unspecified type        Relevant Orders    TSH      Vitamin D deficiency        Relevant Orders    Vitamin D      Encounter for screening for infections with a predominantly sexual mode of transmission        Relevant Orders    Syphilis Antibody with reflex to RPR             Primary Diagnosis and ICD10  Diabetic peripheral neuropathy associated with type 1 diabetes mellitus [E10.42]    Plan:     Patient Instructions   MRI brain  Dementia panel labs  Agree with plan for PT  No new meds currently pending further workup    There are no discontinued  medications.    Requested Prescriptions      No prescriptions requested or ordered in this encounter       Orders Placed This Encounter   Procedures    MRI Brain Without Contrast    Ammonia    CBC Auto Differential    Comprehensive Metabolic Panel    Folate    TSH    Vitamin B12    Vitamin D    Syphilis Antibody with reflex to RPR                [1]   Outpatient Encounter Medications as of 7/2/2025   Medication Sig Dispense Refill    alendronate (FOSAMAX) 70 MG tablet TAKE ONE TABLET BY MOUTH EVERY 7 DAYS 12 tablet 1    aspirin (ECOTRIN) 81 MG EC tablet Take 81 mg by mouth once daily.      atorvastatin (LIPITOR) 40 MG tablet Take 40 mg by mouth.      carvediloL (COREG) 25 MG tablet Take 25 mg by mouth 2 (two) times daily.      cefdinir (OMNICEF) 300 MG capsule Take 1 capsule (300 mg total) by mouth 2 (two) times daily. for 10 days 20 capsule 0    cloNIDine 0.1 mg/24 hr td ptwk (CATAPRES) 0.1 mg/24 hr Place 1 patch onto the skin every 7 days. 12 patch 0    clopidogreL (PLAVIX) 75 mg tablet Take 75 mg by mouth once daily.      diclofenac (VOLTAREN) 50 MG EC tablet Take 1 tablet (50 mg total) by mouth 2 (two) times daily as needed (pain). 60 tablet 1    FREESTYLE MARY 3 SENSOR Jazz use as directed 6 each 1    hydrALAZINE (APRESOLINE) 100 MG tablet TAKE ONE TABLET BY MOUTH THREE TIMES DAILY 90 tablet 1    insulin degludec (TRESIBA FLEXTOUCH U-100) 100 unit/mL (3 mL) insulin pen Inject 10 Units into the skin once daily. 9 mL 2    isosorbide mononitrate (IMDUR) 30 MG 24 hr tablet Take 30 mg by mouth once daily.      methocarbamoL (ROBAXIN) 500 MG Tab Take 1 tablet (500 mg total) by mouth 3 (three) times daily as needed (pain). 30 tablet 0    minoxidiL (LONITEN) 2.5 MG tablet Take 2.5 mg by mouth.      NIFEdipine (PROCARDIA-XL) 90 MG (OSM) 24 hr tablet Take 90 mg by mouth once daily.      nitrofurantoin, macrocrystal-monohydrate, (MACROBID) 100 MG capsule Take 1 capsule (100 mg total) by mouth 2 (two) times daily. 20  capsule 0    oxyCODONE (ROXICODONE) 5 MG immediate release tablet Take 5 mg by mouth every 4 (four) hours as needed.      pantoprazole (PROTONIX) 40 MG tablet TAKE ONE TABLET BY MOUTH EVERY MORNING 90 tablet 1    predniSONE (DELTASONE) 20 MG tablet Take 1 tablet (20 mg total) by mouth 2 (two) times daily. 10 tablet 0    rosuvastatin (CRESTOR) 20 MG tablet Take 20 mg by mouth once daily.      sodium bicarbonate 650 MG tablet Take 650 mg by mouth 4 (four) times daily.      [DISCONTINUED] cloNIDine 0.2 mg/24 hr td ptwk (CATAPRES) 0.2 mg/24 hr 1 patch every 7 days.       No facility-administered encounter medications on file as of 7/2/2025.

## 2025-07-14 ENCOUNTER — HOSPITAL ENCOUNTER (OUTPATIENT)
Dept: RADIOLOGY | Facility: HOSPITAL | Age: 75
Discharge: HOME | End: 2025-07-14
Attending: NURSE PRACTITIONER
Payer: MEDICARE

## 2025-07-14 DIAGNOSIS — R44.1 VISUAL HALLUCINATION: Chronic | ICD-10-CM

## 2025-07-14 PROCEDURE — 70551 MRI BRAIN STEM W/O DYE: CPT | Mod: 26,,, | Performed by: STUDENT IN AN ORGANIZED HEALTH CARE EDUCATION/TRAINING PROGRAM

## 2025-07-14 PROCEDURE — 70551 MRI BRAIN STEM W/O DYE: CPT | Mod: TC

## 2025-07-21 ENCOUNTER — RESULTS FOLLOW-UP (OUTPATIENT)
Dept: NEUROLOGY | Facility: CLINIC | Age: 75
End: 2025-07-21
Payer: MEDICARE

## 2025-07-21 ENCOUNTER — TELEPHONE (OUTPATIENT)
Dept: FAMILY MEDICINE | Facility: CLINIC | Age: 75
End: 2025-07-21
Payer: MEDICARE

## 2025-07-21 DIAGNOSIS — E10.42 DIABETIC PERIPHERAL NEUROPATHY ASSOCIATED WITH TYPE 1 DIABETES MELLITUS: Primary | ICD-10-CM

## 2025-07-21 RX ORDER — AMITRIPTYLINE HYDROCHLORIDE 25 MG/1
25 TABLET, FILM COATED ORAL NIGHTLY
Qty: 30 TABLET | Refills: 5 | Status: SHIPPED | OUTPATIENT
Start: 2025-07-21 | End: 2026-07-21

## 2025-07-21 NOTE — TELEPHONE ENCOUNTER
Pt VU.      ----- Message from CHAPARRITA Howard sent at 7/21/2025 12:52 PM CDT -----  Chronic microvascular changes on the MRI, no prior stroke, no tumor or mass.  I sent in Elavil 25mg once daily, for her to take to see if neuropathy symptoms can be improved which will hopefully help   with gait  ----- Message -----  From: Interface, Rad Results In  Sent: 7/14/2025   2:40 PM CDT  To: CHAPARRITA Broderick

## 2025-07-22 ENCOUNTER — OFFICE VISIT (OUTPATIENT)
Dept: FAMILY MEDICINE | Facility: CLINIC | Age: 75
End: 2025-07-22
Payer: MEDICARE

## 2025-07-22 VITALS
WEIGHT: 133.19 LBS | DIASTOLIC BLOOD PRESSURE: 76 MMHG | OXYGEN SATURATION: 95 % | RESPIRATION RATE: 18 BRPM | SYSTOLIC BLOOD PRESSURE: 124 MMHG | HEART RATE: 74 BPM | BODY MASS INDEX: 22.74 KG/M2 | HEIGHT: 64 IN | TEMPERATURE: 98 F

## 2025-07-22 DIAGNOSIS — I10 ESSENTIAL (PRIMARY) HYPERTENSION: Primary | ICD-10-CM

## 2025-07-22 DIAGNOSIS — H61.23 BILATERAL IMPACTED CERUMEN: ICD-10-CM

## 2025-07-22 DIAGNOSIS — H73.93: ICD-10-CM

## 2025-07-22 PROCEDURE — 3074F SYST BP LT 130 MM HG: CPT | Mod: ,,, | Performed by: NURSE PRACTITIONER

## 2025-07-22 PROCEDURE — 3288F FALL RISK ASSESSMENT DOCD: CPT | Mod: ,,, | Performed by: NURSE PRACTITIONER

## 2025-07-22 PROCEDURE — 1159F MED LIST DOCD IN RCRD: CPT | Mod: ,,, | Performed by: NURSE PRACTITIONER

## 2025-07-22 PROCEDURE — 99213 OFFICE O/P EST LOW 20 MIN: CPT | Mod: 25,,, | Performed by: NURSE PRACTITIONER

## 2025-07-22 PROCEDURE — 1160F RVW MEDS BY RX/DR IN RCRD: CPT | Mod: ,,, | Performed by: NURSE PRACTITIONER

## 2025-07-22 PROCEDURE — 1101F PT FALLS ASSESS-DOCD LE1/YR: CPT | Mod: ,,, | Performed by: NURSE PRACTITIONER

## 2025-07-22 PROCEDURE — 1126F AMNT PAIN NOTED NONE PRSNT: CPT | Mod: ,,, | Performed by: NURSE PRACTITIONER

## 2025-07-22 PROCEDURE — 3044F HG A1C LEVEL LT 7.0%: CPT | Mod: ,,, | Performed by: NURSE PRACTITIONER

## 2025-07-22 PROCEDURE — 3008F BODY MASS INDEX DOCD: CPT | Mod: ,,, | Performed by: NURSE PRACTITIONER

## 2025-07-22 PROCEDURE — 3078F DIAST BP <80 MM HG: CPT | Mod: ,,, | Performed by: NURSE PRACTITIONER

## 2025-07-22 PROCEDURE — 69209 REMOVE IMPACTED EAR WAX UNI: CPT | Mod: 50,,, | Performed by: NURSE PRACTITIONER

## 2025-07-22 RX ORDER — ESTRADIOL 0.1 MG/G
CREAM VAGINAL
COMMUNITY
Start: 2025-07-03

## 2025-07-22 RX ORDER — CIPROFLOXACIN AND DEXAMETHASONE 3; 1 MG/ML; MG/ML
4 SUSPENSION/ DROPS AURICULAR (OTIC) 2 TIMES DAILY
Qty: 7.5 ML | Refills: 1 | Status: SHIPPED | OUTPATIENT
Start: 2025-07-22

## 2025-07-22 NOTE — PROGRESS NOTES
CHAPARRITA Bella   [facility] [address] [place] [address]  [phone]      PATIENT NAME: Nasrin Grant  : 1950  DATE: 25  MRN: [id number]      Billing Provider: CHAPARRITA Bella  Level of Service:   Patient PCP Information       Provider PCP Type    CHAPARRITA Bella General            History of Present Illness    HPI:  Patient presents for follow up since decreasing her clonidine dosage due to decreased blood pressure and reports feeling better overall. Her blood pressure today was stable at 124/76. The clonidine patch was reduced to 0.1 mg every 7 days, and she has been doing well with this adjustment. States her blood pressure checks during dialysis have been normal.    She also complains of ear irritation and congestion. She had previously been on an antibiotic which seemed to help with her ear issues slightly.    She denies any problems with the decreased clonidine dosage.    MEDICAL HISTORY:  She has a history of hypertension, which is managed with Clonidine. Her dose was recently decreased due to lower blood pressure. Patient also has bilateral cerumen impaction and bilateral tympanic membrane irritation.      ROS:  General: -fever, -chills, -fatigue, -weight gain, -weight loss  Eyes: -vision changes, -redness, -discharge  ENT: -ear pain, -nasal congestion, -sore throat, +ear pruritus, +ear pressure  Cardiovascular: -chest pain, -palpitations, -lower extremity edema  Respiratory: -cough, -shortness of breath  Gastrointestinal: -abdominal pain, -nausea, -vomiting, -diarrhea, -constipation, -blood in stool  Genitourinary: -dysuria, -hematuria, -frequency  Musculoskeletal: -joint pain, -muscle pain  Skin: -rash, -lesion  Neurological: -headache, -dizziness, -numbness, -tingling  Psychiatric: -anxiety, -depression, -sleep difficulty          Medications and Allergies     Medications  Outpatient Medications Marked as Taking for the  7/22/25 encounter (Office Visit) with Laury Enrique FNP   Medication Sig Dispense Refill    alendronate (FOSAMAX) 70 MG tablet TAKE ONE TABLET BY MOUTH EVERY 7 DAYS 12 tablet 1    amitriptyline (ELAVIL) 25 MG tablet Take 1 tablet (25 mg total) by mouth every evening. 30 tablet 5    aspirin (ECOTRIN) 81 MG EC tablet Take 81 mg by mouth once daily.      atorvastatin (LIPITOR) 40 MG tablet Take 40 mg by mouth.      carvediloL (COREG) 25 MG tablet Take 25 mg by mouth 2 (two) times daily.      cloNIDine 0.1 mg/24 hr td ptwk (CATAPRES) 0.1 mg/24 hr Place 1 patch onto the skin every 7 days. 12 patch 0    clopidogreL (PLAVIX) 75 mg tablet Take 75 mg by mouth once daily.      diclofenac (VOLTAREN) 50 MG EC tablet Take 1 tablet (50 mg total) by mouth 2 (two) times daily as needed (pain). 60 tablet 1    estradioL (ESTRACE) 0.01 % (0.1 mg/gram) vaginal cream Apply a pea sized amount around the urethra 2 nights a week.      AtrentaE 3 SENSOR Jazz use as directed 6 each 1    hydrALAZINE (APRESOLINE) 100 MG tablet TAKE ONE TABLET BY MOUTH THREE TIMES DAILY 90 tablet 1    insulin degludec (TRESIBA FLEXTOUCH U-100) 100 unit/mL (3 mL) insulin pen Inject 10 Units into the skin once daily. 9 mL 2    isosorbide mononitrate (IMDUR) 30 MG 24 hr tablet Take 30 mg by mouth once daily.      methocarbamoL (ROBAXIN) 500 MG Tab Take 1 tablet (500 mg total) by mouth 3 (three) times daily as needed (pain). 30 tablet 0    methoxy peg-epoetin beta (MIRCERA INJ) 50 mcg.      minoxidiL (LONITEN) 2.5 MG tablet Take 2.5 mg by mouth.      NIFEdipine (PROCARDIA-XL) 90 MG (OSM) 24 hr tablet Take 90 mg by mouth once daily.      oxyCODONE (ROXICODONE) 5 MG immediate release tablet Take 5 mg by mouth every 4 (four) hours as needed.      pantoprazole (PROTONIX) 40 MG tablet TAKE ONE TABLET BY MOUTH EVERY MORNING 90 tablet 1    predniSONE (DELTASONE) 20 MG tablet Take 1 tablet (20 mg total) by mouth 2 (two) times daily. 10 tablet 0    rosuvastatin  "(CRESTOR) 20 MG tablet Take 20 mg by mouth once daily.      sodium bicarbonate 650 MG tablet Take 650 mg by mouth 4 (four) times daily.         Allergies  Review of patient's allergies indicates:   Allergen Reactions    Hydrocodone-acetaminophen Rash    Codeine Itching    Gabapentin Other (See Comments)     Made her disoriented    "out of my head"    Morphine Itching    Opioids - morphine analogues        Physical Exam  Constitutional:       General: She is not in acute distress.  HENT:      Head: Normocephalic.      Nose: Nose normal.      Mouth/Throat:      Mouth: Mucous membranes are moist.   Eyes:      Extraocular Movements: Extraocular movements intact.   Cardiovascular:      Rate and Rhythm: Normal rate.   Pulmonary:      Effort: Pulmonary effort is normal. No respiratory distress.   Abdominal:      General: Bowel sounds are normal.      Palpations: Abdomen is soft.   Musculoskeletal:         General: Normal range of motion.      Cervical back: Normal range of motion.      Comments: Uses walker for ambulation   Skin:     General: Skin is warm.   Neurological:      Mental Status: She is alert.   Psychiatric:         Behavior: Behavior normal.          Assessment & Plan    IMPRESSION:  - Assessed response to clonidine dose reduction, noting stable BP and improved condition.  - Evaluated bilateral ear complaints, identifying cerumen impaction and tympanic membrane irritation.  - Considered previous antibiotic treatment efficacy for ear issues.  - Determined need for topical treatment to address ongoing tympanic membrane inflammation.  - Start ciprodex gtts bilateral.   `  END STAGE RENAL DISEASE AND DIALYSIS:  - Continued dialysis treatment for the patient.  - Blood pressure checks during dialysis have been consistently normal.  - Patient reports feeling better today upon evaluation.    HYPERTENSION:  - Monitored blood pressure today which is stable at 124/76.  - Continue clonidine patch to 0.1 mg every 7 " days.    BILATERAL IMPACTED CERUMEN:  - Patient complains of ear irritation and congestion.  - Bilateral cerumen impaction was identified during exam.  - Complete removal of impacted cerumen was achieved through bilateral ear irrigation performed by the nurse.    BILATERAL TYMPANIC MEMBRANE DISORDER:  - Both tympanic membranes appear irritated after cerumen removal.  - Patient had previously responded well to antibiotic treatment for tympanic membrane irritation.  - After discussing treatment plan, risks, benefits, and alternatives, prescribed Ciprodex drops to be administered in bilateral ears.        Follow up as scheduled.     Health Maintenance Due   Topic Date Due    Shingles Vaccine (1 of 2) Never done    RSV Vaccine (Age 60+ and Pregnant patients) (1 - Risk 60-74 years 1-dose series) Never done    COVID-19 Vaccine (5 - 2024-25 season) 09/01/2024    Foot Exam  08/06/2025    Hemoglobin A1c  08/12/2025    Diabetic Eye Exam  10/15/2025       Problem List Items Addressed This Visit    None  Visit Diagnoses         Essential (primary) hypertension    -  Primary      Bilateral impacted cerumen          Irritation of tympanic membrane of both ears        Relevant Medications    ciprofloxacin-dexAMETHasone 0.3-0.1% (CIPRODEX) 0.3-0.1 % DrpS            Health Maintenance Topics with due status: Not Due       Topic Last Completion Date    TETANUS VACCINE 10/16/2017    Colorectal Cancer Screening 08/22/2022    Influenza Vaccine 12/03/2024    DEXA Scan 12/10/2024    Lipid Panel 03/12/2025    Mammogram 04/15/2025       Future Appointments   Date Time Provider Department Center   8/26/2025  1:40 PM Lauren Frazier FNP [facility] GASTR [facility]   9/23/2025  1:00 PM Laury Enrique FNP [facility]   10/8/2025 10:45 AM Tim Parson FNP [facility] NEUR[facility]   10/20/2025  1:40 PM [facility] MAMMO1 [facility] MAMMO [facility]   1/22/2026  9:00 AM AWV NURSE, [facility]         This note was generated with the assistance of ambient listening technology. Verbal consent was obtained by the patient and accompanying visitor(s) for the recording of patient appointment to facilitate this note. I attest to having reviewed and edited the generated note for accuracy, though some syntax or spelling errors may persist. Please contact the author of this note for any clarification.     Signature:  CHAPARRITA Bella  [facility] [address] [place] [address]  [phone]    Date of encounter: 7/22/25

## 2025-08-20 ENCOUNTER — OUTSIDE PLACE OF SERVICE (OUTPATIENT)
Dept: VASCULAR SURGERY | Facility: CLINIC | Age: 75
End: 2025-08-20
Payer: MEDICARE

## 2025-08-26 ENCOUNTER — EXTERNAL HOSPITAL ADMISSION (OUTPATIENT)
Dept: ADMINISTRATIVE | Facility: CLINIC | Age: 75
End: 2025-08-26
Payer: MEDICARE

## 2025-08-26 ENCOUNTER — OFFICE VISIT (OUTPATIENT)
Dept: GASTROENTEROLOGY | Facility: CLINIC | Age: 75
End: 2025-08-26
Payer: MEDICARE

## 2025-08-26 ENCOUNTER — PATIENT OUTREACH (OUTPATIENT)
Dept: ADMINISTRATIVE | Facility: CLINIC | Age: 75
End: 2025-08-26
Payer: MEDICARE

## 2025-08-26 VITALS
OXYGEN SATURATION: 98 % | DIASTOLIC BLOOD PRESSURE: 43 MMHG | BODY MASS INDEX: 23.56 KG/M2 | HEART RATE: 69 BPM | HEIGHT: 64 IN | WEIGHT: 138 LBS | SYSTOLIC BLOOD PRESSURE: 123 MMHG

## 2025-08-26 DIAGNOSIS — D50.9 IRON DEFICIENCY ANEMIA, UNSPECIFIED IRON DEFICIENCY ANEMIA TYPE: Primary | ICD-10-CM

## 2025-08-26 DIAGNOSIS — K59.04 CHRONIC IDIOPATHIC CONSTIPATION: ICD-10-CM

## 2025-08-26 PROCEDURE — 3074F SYST BP LT 130 MM HG: CPT | Mod: CPTII,,, | Performed by: NURSE PRACTITIONER

## 2025-08-26 PROCEDURE — 99999 PR PBB SHADOW E&M-EST. PATIENT-LVL IV: CPT | Mod: PBBFAC,,, | Performed by: NURSE PRACTITIONER

## 2025-08-26 PROCEDURE — 3008F BODY MASS INDEX DOCD: CPT | Mod: CPTII,,, | Performed by: NURSE PRACTITIONER

## 2025-08-26 PROCEDURE — 99214 OFFICE O/P EST MOD 30 MIN: CPT | Mod: PBBFAC | Performed by: NURSE PRACTITIONER

## 2025-08-26 PROCEDURE — 3078F DIAST BP <80 MM HG: CPT | Mod: CPTII,,, | Performed by: NURSE PRACTITIONER

## 2025-08-26 PROCEDURE — 99214 OFFICE O/P EST MOD 30 MIN: CPT | Mod: S$PBB,,, | Performed by: NURSE PRACTITIONER

## 2025-08-26 PROCEDURE — 3044F HG A1C LEVEL LT 7.0%: CPT | Mod: CPTII,,, | Performed by: NURSE PRACTITIONER

## 2025-08-26 PROCEDURE — 1160F RVW MEDS BY RX/DR IN RCRD: CPT | Mod: CPTII,,, | Performed by: NURSE PRACTITIONER

## 2025-08-26 PROCEDURE — 1159F MED LIST DOCD IN RCRD: CPT | Mod: CPTII,,, | Performed by: NURSE PRACTITIONER

## 2025-08-26 RX ORDER — FERROUS SULFATE 325(65) MG
325 TABLET, DELAYED RELEASE (ENTERIC COATED) ORAL
COMMUNITY

## 2025-09-04 ENCOUNTER — TELEPHONE (OUTPATIENT)
Dept: VASCULAR SURGERY | Facility: CLINIC | Age: 75
End: 2025-09-04
Payer: MEDICARE

## 2025-09-04 ENCOUNTER — OFFICE VISIT (OUTPATIENT)
Dept: VASCULAR SURGERY | Facility: CLINIC | Age: 75
End: 2025-09-04
Payer: MEDICARE

## 2025-09-04 VITALS — BODY MASS INDEX: 21.51 KG/M2 | WEIGHT: 126 LBS | HEIGHT: 64 IN

## 2025-09-04 DIAGNOSIS — I65.21 RIGHT-SIDED EXTRACRANIAL CAROTID ARTERY STENOSIS: Primary | ICD-10-CM

## 2025-09-04 PROCEDURE — 3288F FALL RISK ASSESSMENT DOCD: CPT | Mod: CPTII,,, | Performed by: SURGERY

## 2025-09-04 PROCEDURE — 99213 OFFICE O/P EST LOW 20 MIN: CPT | Mod: S$PBB,,, | Performed by: SURGERY

## 2025-09-04 PROCEDURE — 99213 OFFICE O/P EST LOW 20 MIN: CPT | Mod: PBBFAC | Performed by: SURGERY

## 2025-09-04 PROCEDURE — 3044F HG A1C LEVEL LT 7.0%: CPT | Mod: CPTII,,, | Performed by: SURGERY

## 2025-09-04 PROCEDURE — 1160F RVW MEDS BY RX/DR IN RCRD: CPT | Mod: CPTII,,, | Performed by: SURGERY

## 2025-09-04 PROCEDURE — 99999 PR PBB SHADOW E&M-EST. PATIENT-LVL III: CPT | Mod: PBBFAC,,, | Performed by: SURGERY

## 2025-09-04 PROCEDURE — 1125F AMNT PAIN NOTED PAIN PRSNT: CPT | Mod: CPTII,,, | Performed by: SURGERY

## 2025-09-04 PROCEDURE — 1159F MED LIST DOCD IN RCRD: CPT | Mod: CPTII,,, | Performed by: SURGERY

## 2025-09-04 PROCEDURE — 3008F BODY MASS INDEX DOCD: CPT | Mod: CPTII,,, | Performed by: SURGERY

## 2025-09-04 PROCEDURE — 1101F PT FALLS ASSESS-DOCD LE1/YR: CPT | Mod: CPTII,,, | Performed by: SURGERY
